# Patient Record
Sex: MALE | Race: BLACK OR AFRICAN AMERICAN | Employment: OTHER | URBAN - METROPOLITAN AREA
[De-identification: names, ages, dates, MRNs, and addresses within clinical notes are randomized per-mention and may not be internally consistent; named-entity substitution may affect disease eponyms.]

---

## 2017-01-18 RX ORDER — CYCLOBENZAPRINE HCL 5 MG
10 TABLET ORAL
Status: ON HOLD | COMMUNITY
End: 2019-08-01

## 2017-01-18 RX ORDER — LOSARTAN POTASSIUM 25 MG/1
25 TABLET ORAL DAILY
COMMUNITY
End: 2020-01-08 | Stop reason: CLARIF

## 2017-01-18 RX ORDER — METOPROLOL TARTRATE 25 MG/1
50 TABLET, FILM COATED ORAL 2 TIMES DAILY
Status: ON HOLD | COMMUNITY
End: 2020-01-30 | Stop reason: SDUPTHER

## 2017-01-18 RX ORDER — ASPIRIN 81 MG/1
81 TABLET ORAL DAILY
COMMUNITY
End: 2020-01-04

## 2017-01-19 ENCOUNTER — HOSPITAL ENCOUNTER (OUTPATIENT)
Dept: CARDIAC CATH/INVASIVE PROCEDURES | Age: 77
Discharge: HOME | End: 2017-01-19
Attending: INTERNAL MEDICINE | Admitting: INTERNAL MEDICINE
Payer: MEDICARE

## 2017-01-19 VITALS
BODY MASS INDEX: 23.19 KG/M2 | HEIGHT: 73 IN | TEMPERATURE: 97.8 F | SYSTOLIC BLOOD PRESSURE: 128 MMHG | OXYGEN SATURATION: 100 % | HEART RATE: 59 BPM | DIASTOLIC BLOOD PRESSURE: 72 MMHG | WEIGHT: 175 LBS | RESPIRATION RATE: 18 BRPM

## 2017-01-19 LAB
ANION GAP BLD CALC-SCNC: 9 MMOL/L (ref 7–16)
ATRIAL RATE: 68 BPM
BUN SERPL-MCNC: 23 MG/DL (ref 8–23)
CALCIUM SERPL-MCNC: 8.4 MG/DL (ref 8.3–10.4)
CALCULATED P AXIS, ECG09: 83 DEGREES
CALCULATED R AXIS, ECG10: -65 DEGREES
CALCULATED T AXIS, ECG11: 71 DEGREES
CHLORIDE SERPL-SCNC: 105 MMOL/L (ref 98–107)
CO2 SERPL-SCNC: 25 MMOL/L (ref 21–32)
CREAT SERPL-MCNC: 1.72 MG/DL (ref 0.8–1.5)
DIAGNOSIS, 93000: NORMAL
DIASTOLIC BP, ECG02: NORMAL MMHG
ERYTHROCYTE [DISTWIDTH] IN BLOOD BY AUTOMATED COUNT: 13.5 % (ref 11.9–14.6)
GLUCOSE SERPL-MCNC: 257 MG/DL (ref 65–100)
HCT VFR BLD AUTO: 40.9 % (ref 41.1–50.3)
HGB BLD-MCNC: 14.3 G/DL (ref 13.6–17.2)
INR PPP: 1 (ref 0.9–1.2)
MCH RBC QN AUTO: 27.9 PG (ref 26.1–32.9)
MCHC RBC AUTO-ENTMCNC: 35 G/DL (ref 31.4–35)
MCV RBC AUTO: 79.9 FL (ref 79.6–97.8)
P-R INTERVAL, ECG05: 166 MS
PLATELET # BLD AUTO: 191 K/UL (ref 150–450)
PMV BLD AUTO: 9.2 FL (ref 10.8–14.1)
POTASSIUM SERPL-SCNC: 4.4 MMOL/L (ref 3.5–5.1)
PROTHROMBIN TIME: 10.7 SEC (ref 9.6–12)
Q-T INTERVAL, ECG07: 414 MS
QRS DURATION, ECG06: 118 MS
QTC CALCULATION (BEZET), ECG08: 440 MS
RBC # BLD AUTO: 5.12 M/UL (ref 4.23–5.67)
SODIUM SERPL-SCNC: 139 MMOL/L (ref 136–145)
SYSTOLIC BP, ECG01: NORMAL MMHG
VENTRICULAR RATE, ECG03: 68 BPM
WBC # BLD AUTO: 4.3 K/UL (ref 4.3–11.1)

## 2017-01-19 PROCEDURE — 93005 ELECTROCARDIOGRAM TRACING: CPT | Performed by: INTERNAL MEDICINE

## 2017-01-19 PROCEDURE — 74011636320 HC RX REV CODE- 636/320: Performed by: INTERNAL MEDICINE

## 2017-01-19 PROCEDURE — 77030004558 HC CATH ANGI DX SUPR TORQ CARD -A

## 2017-01-19 PROCEDURE — 74011250636 HC RX REV CODE- 250/636

## 2017-01-19 PROCEDURE — 80048 BASIC METABOLIC PNL TOTAL CA: CPT | Performed by: INTERNAL MEDICINE

## 2017-01-19 PROCEDURE — 77030004559 HC CATH ANGI DX SUPT CARD -B

## 2017-01-19 PROCEDURE — 74011250637 HC RX REV CODE- 250/637: Performed by: INTERNAL MEDICINE

## 2017-01-19 PROCEDURE — 93458 L HRT ARTERY/VENTRICLE ANGIO: CPT

## 2017-01-19 PROCEDURE — 74011000250 HC RX REV CODE- 250: Performed by: INTERNAL MEDICINE

## 2017-01-19 PROCEDURE — C1894 INTRO/SHEATH, NON-LASER: HCPCS

## 2017-01-19 PROCEDURE — 85027 COMPLETE CBC AUTOMATED: CPT | Performed by: INTERNAL MEDICINE

## 2017-01-19 PROCEDURE — 99152 MOD SED SAME PHYS/QHP 5/>YRS: CPT

## 2017-01-19 PROCEDURE — 74011250636 HC RX REV CODE- 250/636: Performed by: INTERNAL MEDICINE

## 2017-01-19 PROCEDURE — 99153 MOD SED SAME PHYS/QHP EA: CPT

## 2017-01-19 PROCEDURE — 85610 PROTHROMBIN TIME: CPT | Performed by: INTERNAL MEDICINE

## 2017-01-19 RX ORDER — SODIUM CHLORIDE 0.9 % (FLUSH) 0.9 %
5-10 SYRINGE (ML) INJECTION EVERY 8 HOURS
Status: DISCONTINUED | OUTPATIENT
Start: 2017-01-19 | End: 2017-01-19 | Stop reason: HOSPADM

## 2017-01-19 RX ORDER — SODIUM CHLORIDE 9 MG/ML
75 INJECTION, SOLUTION INTRAVENOUS CONTINUOUS
Status: DISCONTINUED | OUTPATIENT
Start: 2017-01-19 | End: 2017-01-19 | Stop reason: HOSPADM

## 2017-01-19 RX ORDER — ACETAMINOPHEN 325 MG/1
650 TABLET ORAL
Status: DISCONTINUED | OUTPATIENT
Start: 2017-01-19 | End: 2017-01-19 | Stop reason: HOSPADM

## 2017-01-19 RX ORDER — CLOPIDOGREL BISULFATE 75 MG/1
75 TABLET ORAL ONCE
Status: COMPLETED | OUTPATIENT
Start: 2017-01-19 | End: 2017-01-19

## 2017-01-19 RX ORDER — HEPARIN SODIUM 200 [USP'U]/100ML
3 INJECTION, SOLUTION INTRAVENOUS CONTINUOUS
Status: DISCONTINUED | OUTPATIENT
Start: 2017-01-19 | End: 2017-01-19

## 2017-01-19 RX ORDER — GUAIFENESIN 100 MG/5ML
81 LIQUID (ML) ORAL DAILY
Status: DISCONTINUED | OUTPATIENT
Start: 2017-01-19 | End: 2017-01-19 | Stop reason: HOSPADM

## 2017-01-19 RX ORDER — LIDOCAINE HYDROCHLORIDE 20 MG/ML
1-20 INJECTION, SOLUTION INFILTRATION; PERINEURAL
Status: DISCONTINUED | OUTPATIENT
Start: 2017-01-19 | End: 2017-01-19

## 2017-01-19 RX ORDER — NITROGLYCERIN 0.4 MG/1
0.4 TABLET SUBLINGUAL
Status: DISCONTINUED | OUTPATIENT
Start: 2017-01-19 | End: 2017-01-19 | Stop reason: HOSPADM

## 2017-01-19 RX ORDER — DIAZEPAM 5 MG/1
5 TABLET ORAL ONCE
Status: DISCONTINUED | OUTPATIENT
Start: 2017-01-19 | End: 2017-01-19 | Stop reason: HOSPADM

## 2017-01-19 RX ORDER — FENTANYL CITRATE 50 UG/ML
25-100 INJECTION, SOLUTION INTRAMUSCULAR; INTRAVENOUS
Status: DISCONTINUED | OUTPATIENT
Start: 2017-01-19 | End: 2017-01-19

## 2017-01-19 RX ORDER — LORAZEPAM 1 MG/1
1 TABLET ORAL
Status: DISCONTINUED | OUTPATIENT
Start: 2017-01-19 | End: 2017-01-19 | Stop reason: HOSPADM

## 2017-01-19 RX ORDER — GUAIFENESIN 100 MG/5ML
81-324 LIQUID (ML) ORAL
Status: COMPLETED | OUTPATIENT
Start: 2017-01-19 | End: 2017-01-19

## 2017-01-19 RX ORDER — SODIUM CHLORIDE 0.9 % (FLUSH) 0.9 %
5-10 SYRINGE (ML) INJECTION AS NEEDED
Status: DISCONTINUED | OUTPATIENT
Start: 2017-01-19 | End: 2017-01-19 | Stop reason: HOSPADM

## 2017-01-19 RX ORDER — MIDAZOLAM HYDROCHLORIDE 1 MG/ML
.5-5 INJECTION, SOLUTION INTRAMUSCULAR; INTRAVENOUS
Status: DISCONTINUED | OUTPATIENT
Start: 2017-01-19 | End: 2017-01-19

## 2017-01-19 RX ORDER — LABETALOL HYDROCHLORIDE 5 MG/ML
10 INJECTION, SOLUTION INTRAVENOUS ONCE
Status: COMPLETED | OUTPATIENT
Start: 2017-01-19 | End: 2017-01-19

## 2017-01-19 RX ADMIN — SODIUM CHLORIDE 75 ML/HR: 900 INJECTION, SOLUTION INTRAVENOUS at 08:00

## 2017-01-19 RX ADMIN — ASPIRIN 81 MG CHEWABLE TABLET 324 MG: 81 TABLET CHEWABLE at 07:45

## 2017-01-19 RX ADMIN — LABETALOL HYDROCHLORIDE 10 MG: 5 INJECTION, SOLUTION INTRAVENOUS at 10:29

## 2017-01-19 RX ADMIN — LIDOCAINE HYDROCHLORIDE 200 MG: 20 INJECTION, SOLUTION INFILTRATION; PERINEURAL at 10:05

## 2017-01-19 RX ADMIN — CLOPIDOGREL BISULFATE 75 MG: 75 TABLET, FILM COATED ORAL at 08:20

## 2017-01-19 RX ADMIN — FENTANYL CITRATE 25 MCG: 50 INJECTION, SOLUTION INTRAMUSCULAR; INTRAVENOUS at 10:04

## 2017-01-19 RX ADMIN — IOVERSOL 100 ML: 741 INJECTION INTRA-ARTERIAL; INTRAVENOUS at 10:31

## 2017-01-19 RX ADMIN — MIDAZOLAM HYDROCHLORIDE 1 MG: 1 INJECTION, SOLUTION INTRAMUSCULAR; INTRAVENOUS at 10:04

## 2017-01-19 RX ADMIN — HEPARIN SODIUM 3 ML/HR: 200 INJECTION, SOLUTION INTRAVENOUS at 10:02

## 2017-01-19 NOTE — PROGRESS NOTES
Patient took Aspirin 81mg x 4 today at [place] [address] prior to arrival. Patient took Plavix 75mg x 1 at [facility]

## 2017-01-19 NOTE — PROGRESS NOTES
Patient received to [address] [place] [address] room # 1  Ambulatory from [facility]. Patient scheduled for C today with Dr Tano Villa. Procedure reviewed & questions answered, voiced good understanding consent obtained & placed on chart. All medications and medical history reviewed. Will prep patient per orders. Patient & family updated on plan of care.

## 2017-01-19 NOTE — PROGRESS NOTES
Patient pre-assessment complete for [facility] poss with DR Herrera President scheduled for 17 at 9:30, arrival time 7:30am. Patient verified using . Patient instructed to bring all home medications in labeled bottles on the day of procedure. NPO status reinforced. Patient informed to take a full dose aspirin 325mg  or 81 mg x 4 on the day of procedure. Patient instructed to take 1/2 insulin tonight & hold insulin tomorrow. Instructed they can take all other medications excluding vitamins & supplements. Patient verbalizes understanding of all instructions & denies any questions at this time.

## 2017-01-19 NOTE — IP AVS SNAPSHOT
Soledad Weldon 
 
 
 [address] [facility] [address] [facility] 
[phone] Patient: [facility] MRN: [id number] WTD:7/19/2738 Discharge Summary 1/19/2017 [facility] MRN[de-identified]  [id number] Admission Information Provider Pager Service Admission Date Expected D/C Date Karen Kaiser MD  CARDIAC CATH LAB 1/19/2017 Actual LOS Patient Class 0 days OUTPATIENT Follow-up Information Follow up With Details Comments Contact Info Karen Kaiser MD On 2/2/2017 @ 1:30 pm [address] [place] [address] 
[phone] Current Discharge Medication List  
  
ASK your doctor about these medications Dose & Instructions Dispensing Information Comments Morning Noon Evening Bedtime  
 aspirin delayed-release 81 mg tablet Your next dose is: Today, Tomorrow Other:  _________ Dose:  81 mg Take 81 mg by mouth daily. Refills:  0  
     
   
   
   
  
 ATIVAN 1 mg tablet Generic drug:  LORazepam  
   
Your next dose is: Today, Tomorrow Other:  _________ Dose:  1 mg Take 1 mg by mouth every eight (8) hours as needed. Refills:  0  
     
   
   
   
  
 cyclobenzaprine 5 mg tablet Commonly known as:  FLEXERIL Your next dose is: Today, Tomorrow Other:  _________ Dose:  10 mg Take 10 mg by mouth three (3) times daily as needed for Muscle Spasm(s). Refills:  0 INSULIN REGULAR HUMAN SC Your next dose is: Today, Tomorrow Other:  _________ Dose:  5 Units 5 Units by SubCUTAneous route three (3) times daily. As needed based on BGL Refills:  0  
     
   
   
   
  
 LANTUS 100 unit/mL injection Generic drug:  insulin glargine Your next dose is: Today, Tomorrow Other:  _________ Dose:  10 Units 10 Units nightly. Refills:  1 LIPITOR 10 mg tablet Generic drug:  atorvastatin Your next dose is: Today, Tomorrow Other:  _________ Dose:  10 mg Take 10 mg by mouth daily. Refills:  0  
     
   
   
   
  
 lisinopril 5 mg tablet Commonly known as:  Mollie Ripa Your next dose is: Today, Tomorrow Other:  _________ Dose:  5 mg  
take 5 mg by mouth daily. Refills:  0  
     
   
   
   
  
 losartan 25 mg tablet Commonly known as:  COZAAR Your next dose is: Today, Tomorrow Other:  _________ Dose:  25 mg Take 25 mg by mouth daily. Refills:  0  
     
   
   
   
  
 metoprolol tartrate 25 mg tablet Commonly known as:  LOPRESSOR Your next dose is: Today, Tomorrow Other:  _________ Dose:  25 mg Take 25 mg by mouth two (2) times a day. Refills:  0 PLAVIX PO Your next dose is: Today, Tomorrow Other:  _________ Dose:  75 mg  
take 75 mg by mouth daily. Refills:  0 WELLBUTRIN PO Your next dose is: Today, Tomorrow Other:  _________ Dose:  150 mg  
take 150 mg by mouth two (2) times a day. Refills:  0 General Information Please provide this summary of care documentation to your next provider. Allergies No Known Allergies Current Immunizations  Reviewed on 4/29/2015 No immunizations on file. Discharge Instructions Discharge Instructions Cardiac Catheterization/Angiography Discharge Instructions *Check the puncture site frequently for swelling or bleeding. If you see any bleeding, lie down and apply pressure over the area with a clean town or washcloth. Notify your doctor for any redness, swelling, drainage or oozing from the puncture site. Notify your doctor for any fever or chills. *If the leg or arm with the puncture becomes cold, numb or painful, call Dr Abdi Scruggs at  [phone] *Activity should be limited for the next 48 hours. Climb stairs as little as possible and avoid any stooping, bending or strenuous activity for 48 hours. No heavy lifting (anything over 10 pounds) for three days. *Do not drive for 48 hours. *You may resume your usual diet. Drink more fluids than usual. 
 
*Have a responsible person drive you home and stay with you for at least 24 hours after your heart catheterization/angiography. *You may remove the bandage from your Right and Groin in 24 hours. You may shower in 24 hours. No tub baths, hot tubs or swimming for one week. Do not place any lotions, creams, powders, ointments over the puncture site for one week. You may place a clean band-aid over the puncture site each day for 5 days. Change this daily. Discharge Orders None  
  
` Patient Signature:  ____________________________________________________________ Date:  ____________________________________________________________  
  
 Earnstine Tim Provider Signature:  ____________________________________________________________ Date:  ____________________________________________________________

## 2017-01-19 NOTE — PROGRESS NOTES
Pt chart reviewed for pending LHC with Dr Madeleine De Dios. Confirmed signed pt consent present on chart. Pt received ASA 81mg x4 today at 0745.  Pt currently denies cp

## 2017-01-19 NOTE — PROGRESS NOTES
TRANSFER - IN REPORT:    Verbal report received from [facility] on Monroe June Payton Dandy  being received from CCL(unit) for routine post - op      Report consisted of patients Situation, Background, Assessment and   Recommendations(SBAR). Information from the following report(s) Procedure Summary and MAR was reviewed with the receiving nurse. Opportunity for questions and clarification was provided. Assessment completed upon patients arrival to unit and care assumed.

## 2017-01-19 NOTE — ROUTINE PROCESS
TRANSFER - OUT REPORT:    Verbal report given to RN (name) on Monroe June Carissa Barahona  being transferred to [address] [place] [address] ([facility]) for routine progression of care       Report consisted of patients Situation, Background, Assessment and   Recommendations(SBAR). Information from the following report(s) Procedure Summary, MAR and Recent Results was reviewed with the receiving nurse. Lines:   Peripheral IV 01/19/17 Right Forearm (Active)       Peripheral IV 01/19/17 Left Antecubital (Active)        Opportunity for questions and clarification was provided.       Patient transported with:   [facility] w/ Dr Smiley Hewitt  6F sheath to [facility]  Versed 1 mg IV  Fentanyl 25 mcg IV  Labetolol 10 mg IV

## 2017-01-19 NOTE — PROCEDURES
Kendrick Coleman 46       Name:  Almas Sauceda   MR#:  [id number]   :  1940   Account #:  [de-identified]   Date of Adm:  2017       INDICATION: Referred for cardiac catheterization for positive   stress test, recurrent anginal pain. POST CATHETERIZATION DIAGNOSES: The left main was normal. The   left main bifurcated into left anterior descending and   circumflex artery. The left anterior descending has mild to   moderate disease in the distal segment. diagonal 1   and diagonal 2 branches showed some mild the disease. The   circumflex artery showed some luminal irregularities, about 30   40% disease. Right coronary artery shows moderate disease in the   mid segment, 45-50% ,also has 45-50% disease in the LAD and a   lot of calcium in the proximal mid LAD. LV gram showed global   hypokinesia. The EF is about 45%. There is hypokinesis inferior   wall. Left ventricular end-diastolic pressure was 12. There was   no gradient across the aortic valve. PROCEDURE IN DETAIL: The patient was brought to the cath lab, IV   line placed. Using Seldinger technique, a number 6-Iraqi   arterial sheath was introduced. Using 6-Iraqi left [place],   left coronary angiography was performed. Using JR4 catheter,   right coronary angiography was performed. Using angled pigtail   catheter, left ventriculography was performed. The patient   tolerated the procedure well. CONCLUSIONS: Left anterior descending artery has calcification   in the proximal mid segment showed luminal irregularities, about   25-30%, mid LAD has about 35-40% disease, diagonal 1, diagonal 2   has 25% to 30% disease. The circumflex artery has 25 to 30%   disease. Nothing obstructive in the left . The right coronary   artery has moderate disease about 45-50% in the mid segment,   long tubular disease from the mid segment of the right coronary   artery.  Global left ventricular ejection fraction 45-50%, hypokinesis inferior wall and anteroapical wall. We discussed   the treatment options with the patient about stent versus   medical management. The patient is preferred medical management   since he is tolerating medications and I discussed with Dr. Viktor Umanzor, agree with the plan to give him medical management. If his symptoms become more frequent, then we will proceed with   the stent in the right coronary at this point. PLAN: We are going to treat aggressively with medications. If he   gets frequent angina despite adequate medical management, then   he will undergo stent placement right coronary artery.         MD MARCUS Laureano / Cynthia Marroquin   D:  01/19/2017   10:43   T:  01/19/2017   11:08   Job #:  [id number]

## 2017-01-19 NOTE — PROGRESS NOTES
Patient received into lab for left heart cath. Patient was identified using name and date of birth. Pertinent information was reviewed including allergies, history, medications and lab work. Patient states that he has no questions concerning procedure. Signed consent is on chart as well current history and physical. IV access was checked for patency. ]

## 2017-01-19 NOTE — PROCEDURES
Brief Cardiac Procedure Note    Patient: [facility] MRN: [id number]  SSN: [id number]    YOB: 1940  Age: 78 y.o. Sex: male      Date of Procedure: 1/19/2017     Pre-procedure Diagnosis: Coronary Artery Disease    Post-procedure Diagnosis: Coronary Artery Disease    Procedure: Left Heart Catheterization    Brief Description of Procedure: left heart cath    Performed By: Waylon Muniz MD     Assistants: see chart    Anesthesia: Moderate Sedation    Estimated Blood Loss: Less than 10 mL      Specimens: None    Implants: None    Findings: morderate disease rca mid see report dictated    Complications: None    Recommendations: Continue medical therapy.     Signed By: Waylon Muniz MD     January 19, 2017

## 2017-01-19 NOTE — DISCHARGE INSTRUCTIONS
Cardiac Catheterization/Angiography Discharge Instructions    *Check the puncture site frequently for swelling or bleeding. If you see any bleeding, lie down and apply pressure over the area with a clean town or washcloth. Notify your doctor for any redness, swelling, drainage or oozing from the puncture site. Notify your doctor for any fever or chills. *If the leg or arm with the puncture becomes cold, numb or painful, call Dr Gladis Wills at  [phone]    *Activity should be limited for the next 48 hours. Climb stairs as little as possible and avoid any stooping, bending or strenuous activity for 48 hours. No heavy lifting (anything over 10 pounds) for three days. *Do not drive for 48 hours. *You may resume your usual diet. Drink more fluids than usual.    *Have a responsible person drive you home and stay with you for at least 24 hours after your heart catheterization/angiography. *You may remove the bandage from your Right and Groin in 24 hours. You may shower in 24 hours. No tub baths, hot tubs or swimming for one week. Do not place any lotions, creams, powders, ointments over the puncture site for one week. You may place a clean band-aid over the puncture site each day for 5 days. Change this daily.

## 2017-01-19 NOTE — PROGRESS NOTES
Discharge instructions reviewed with patient including post cath care. INT removed with cath intact. Right groin with no bleeding or hematoma noted.

## 2017-01-19 NOTE — PROGRESS NOTES
6 fr Right groin sheath removed using manual pressure for 25 minutes. Patient tolerated procedure well. No bleeding or hematoma noted. Gauze and tegaderm placed with sand bag in tact. Patient instructed on strict bedrest and limitations with right leg.

## 2019-06-19 ENCOUNTER — HOSPITAL ENCOUNTER (EMERGENCY)
Age: 79
Discharge: HOME | End: 2019-06-19
Attending: EMERGENCY MEDICINE
Payer: MEDICARE

## 2019-06-19 VITALS
RESPIRATION RATE: 18 BRPM | TEMPERATURE: 97.6 F | WEIGHT: 167 LBS | BODY MASS INDEX: 22.13 KG/M2 | HEIGHT: 73 IN | DIASTOLIC BLOOD PRESSURE: 68 MMHG | SYSTOLIC BLOOD PRESSURE: 156 MMHG | OXYGEN SATURATION: 98 % | HEART RATE: 55 BPM

## 2019-06-19 DIAGNOSIS — E11.649 DIABETIC HYPOGLYCEMIA (HCC): Primary | ICD-10-CM

## 2019-06-19 LAB
ALBUMIN SERPL-MCNC: 2.8 G/DL (ref 3.2–4.6)
ALBUMIN/GLOB SERPL: 0.8 {RATIO} (ref 1.2–3.5)
ALP SERPL-CCNC: 236 U/L (ref 50–136)
ALT SERPL-CCNC: 83 U/L (ref 12–65)
ANION GAP SERPL CALC-SCNC: 9 MMOL/L (ref 7–16)
AST SERPL-CCNC: 77 U/L (ref 15–37)
BACTERIA URNS QL MICRO: 0 /HPF
BASOPHILS # BLD: 0.1 K/UL (ref 0–0.2)
BASOPHILS NFR BLD: 1 % (ref 0–2)
BILIRUB SERPL-MCNC: 0.3 MG/DL (ref 0.2–1.1)
BUN SERPL-MCNC: 21 MG/DL (ref 8–23)
CALCIUM SERPL-MCNC: 7.4 MG/DL (ref 8.3–10.4)
CASTS URNS QL MICRO: 0 /LPF
CHLORIDE SERPL-SCNC: 109 MMOL/L (ref 98–107)
CO2 SERPL-SCNC: 23 MMOL/L (ref 21–32)
CREAT SERPL-MCNC: 1.39 MG/DL (ref 0.8–1.5)
DIFFERENTIAL METHOD BLD: ABNORMAL
EOSINOPHIL # BLD: 0.1 K/UL (ref 0–0.8)
EOSINOPHIL NFR BLD: 2 % (ref 0.5–7.8)
EPI CELLS #/AREA URNS HPF: 0 /HPF
ERYTHROCYTE [DISTWIDTH] IN BLOOD BY AUTOMATED COUNT: 13.9 % (ref 11.9–14.6)
GLOBULIN SER CALC-MCNC: 3.5 G/DL (ref 2.3–3.5)
GLUCOSE BLD STRIP.AUTO-MCNC: 205 MG/DL (ref 65–100)
GLUCOSE BLD STRIP.AUTO-MCNC: 92 MG/DL (ref 65–100)
GLUCOSE SERPL-MCNC: 67 MG/DL (ref 65–100)
HCT VFR BLD AUTO: 41.6 % (ref 41.1–50.3)
HGB BLD-MCNC: 14.1 G/DL (ref 13.6–17.2)
IMM GRANULOCYTES # BLD AUTO: 0 K/UL (ref 0–0.5)
IMM GRANULOCYTES NFR BLD AUTO: 1 % (ref 0–5)
LYMPHOCYTES # BLD: 2.2 K/UL (ref 0.5–4.6)
LYMPHOCYTES NFR BLD: 36 % (ref 13–44)
MCH RBC QN AUTO: 28.6 PG (ref 26.1–32.9)
MCHC RBC AUTO-ENTMCNC: 33.9 G/DL (ref 31.4–35)
MCV RBC AUTO: 84.4 FL (ref 79.6–97.8)
MONOCYTES # BLD: 0.4 K/UL (ref 0.1–1.3)
MONOCYTES NFR BLD: 7 % (ref 4–12)
NEUTS SEG # BLD: 3.3 K/UL (ref 1.7–8.2)
NEUTS SEG NFR BLD: 54 % (ref 43–78)
NRBC # BLD: 0 K/UL (ref 0–0.2)
PLATELET # BLD AUTO: 200 K/UL (ref 150–450)
PMV BLD AUTO: 8.9 FL (ref 9.4–12.3)
POTASSIUM SERPL-SCNC: 4 MMOL/L (ref 3.5–5.1)
PROT SERPL-MCNC: 6.3 G/DL (ref 6.3–8.2)
RBC # BLD AUTO: 4.93 M/UL (ref 4.23–5.6)
RBC #/AREA URNS HPF: 0 /HPF
SODIUM SERPL-SCNC: 141 MMOL/L (ref 136–145)
WBC # BLD AUTO: 6.1 K/UL (ref 4.3–11.1)
WBC URNS QL MICRO: NORMAL /HPF

## 2019-06-19 PROCEDURE — 82962 GLUCOSE BLOOD TEST: CPT

## 2019-06-19 PROCEDURE — 80053 COMPREHEN METABOLIC PANEL: CPT

## 2019-06-19 PROCEDURE — 81001 URINALYSIS AUTO W/SCOPE: CPT

## 2019-06-19 PROCEDURE — 85025 COMPLETE CBC W/AUTO DIFF WBC: CPT

## 2019-06-19 PROCEDURE — 99284 EMERGENCY DEPT VISIT MOD MDM: CPT | Performed by: EMERGENCY MEDICINE

## 2019-06-19 PROCEDURE — 81003 URINALYSIS AUTO W/O SCOPE: CPT | Performed by: EMERGENCY MEDICINE

## 2019-06-19 NOTE — ED TRIAGE NOTES
Pt states he was at PCP office about 3 hours ago and thinks his sugar dropped. When asked why he thinks his sugar dropped he states that his mind started to drift. Pt's sister says that he became very confusion. PCP office did not check blood sugar they called his sister and told her to bring him to ER.

## 2019-06-19 NOTE — ED PROVIDER NOTES
Patient presents to the ER for an episode of confusion, altered mental status. Patient was at his primary care physician we became somewhat more confused. Patient reports he felt as if his blood sugar was dropping. He was sent to the ER, reports he did eat some crackers and drink some juice on the way here. Initial blood sugar was noted be 98. Reports he feels at his normal baseline currently. Denies any preceding illnesses, chest pain, vomiting or diaphoresis    The history is provided by the patient. Altered mental status    This is a new problem. The current episode started 1 to 2 hours ago. Associated symptoms include confusion. Pertinent negatives include no weakness, no agitation, no self-injury, no tingling and no numbness. Past Medical History:   Diagnosis Date    COPD     Diabetes ([facility] [address]     HTN (hypertension) 4/29/2015    Other ill-defined conditions(799.89)     cholesterol       Past Surgical History:   Procedure Laterality Date    ABDOMEN SURGERY PROC UNLISTED  5/2015    explor lap    HX ORTHOPAEDIC      bilateral shoulder repairs, both knees repaired-no hardware         History reviewed. No pertinent family history.     Social History     Socioeconomic History    Marital status: SINGLE     Spouse name: Not on file    Number of children: Not on file    Years of education: Not on file    Highest education level: Not on file   Occupational History    Not on file   Social Needs    Financial resource strain: Not on file    Food insecurity:     Worry: Not on file     Inability: Not on file    Transportation needs:     Medical: Not on file     Non-medical: Not on file   Tobacco Use    Smoking status: Current Every Day Smoker     Packs/day: 1.00   Substance and Sexual Activity    Alcohol use: No    Drug use: No    Sexual activity: Not on file   Lifestyle    Physical activity:     Days per week: Not on file     Minutes per session: Not on file    Stress: Not on file Relationships    Social connections:     Talks on phone: Not on file     Gets together: Not on file     Attends Judaism service: Not on file     Active member of club or organization: Not on file     Attends meetings of clubs or organizations: Not on file     Relationship status: Not on file    Intimate partner violence:     Fear of current or ex partner: Not on file     Emotionally abused: Not on file     Physically abused: Not on file     Forced sexual activity: Not on file   Other Topics Concern    Not on file   Social History Narrative    Not on file         ALLERGIES: Patient has no known allergies. Review of Systems   Constitutional: Negative for fever. HENT: Negative for congestion and dental problem. Eyes: Negative for photophobia and visual disturbance. Respiratory: Negative for chest tightness. Cardiovascular: Negative for palpitations and leg swelling. Gastrointestinal: Negative for abdominal pain and nausea. Endocrine: Negative for polyuria. Genitourinary: Negative for flank pain, frequency and urgency. Musculoskeletal: Negative for back pain, neck pain and neck stiffness. Skin: Negative for color change and pallor. Neurological: Negative for tingling, weakness, light-headedness and numbness. Hematological: Negative for adenopathy. Does not bruise/bleed easily. Psychiatric/Behavioral: Positive for confusion. Negative for agitation and self-injury. All other systems reviewed and are negative. Vitals:    06/19/19 1823   BP: 137/63   Pulse: (!) 55   Resp: 17   Temp: 97.5 °F (36.4 °C)   SpO2: 100%   Weight: 75.8 kg (167 lb)   Height: 6' 1\" (1.854 m)            Physical Exam   Constitutional: He is oriented to person, place, and time. He appears well-developed and well-nourished. HENT:   Head: Normocephalic and atraumatic. Cardiovascular: Normal rate and regular rhythm. Exam reveals no friction rub. No murmur heard.   Pulmonary/Chest: Effort normal and breath sounds normal. No stridor. No respiratory distress. Abdominal: Soft. Bowel sounds are normal. He exhibits no distension. There is no tenderness. Musculoskeletal: Normal range of motion. He exhibits no edema or deformity. Neurological: He is alert and oriented to person, place, and time. No cranial nerve deficit. Coordination normal.   Nursing note and vitals reviewed. MDM  Number of Diagnoses or Management Options  Diagnosis management comments: Will continue to monitor blood sugars. Patient well-appearing here  Currently    9:12 PM  Blood sugar has improved at 205. Patient feels better. At his baseline. Able to stand and ambulate without complication. We'll discharge home. Amount and/or Complexity of Data Reviewed  Clinical lab tests: ordered and reviewed    Risk of Complications, Morbidity, and/or Mortality  Presenting problems: moderate  Diagnostic procedures: low  Management options: low    Patient Progress  Patient progress: stable         Procedures               Results Include:    Recent Results (from the past 24 hour(s))   GLUCOSE, POC    Collection Time: 06/19/19  6:28 PM   Result Value Ref Range    Glucose (POC) 92 65 - 100 mg/dL   CBC WITH AUTOMATED DIFF    Collection Time: 06/19/19  6:30 PM   Result Value Ref Range    WBC 6.1 4.3 - 11.1 K/uL    RBC 4.93 4.23 - 5.6 M/uL    HGB 14.1 13.6 - 17.2 g/dL    HCT 41.6 41.1 - 50.3 %    MCV 84.4 79.6 - 97.8 FL    MCH 28.6 26.1 - 32.9 PG    MCHC 33.9 31.4 - 35.0 g/dL    RDW 13.9 11.9 - 14.6 %    PLATELET 322 228 - 972 K/uL    MPV 8.9 (L) 9.4 - 12.3 FL    ABSOLUTE NRBC 0.00 0.0 - 0.2 K/uL    DF AUTOMATED      NEUTROPHILS 54 43 - 78 %    LYMPHOCYTES 36 13 - 44 %    MONOCYTES 7 4.0 - 12.0 %    EOSINOPHILS 2 0.5 - 7.8 %    BASOPHILS 1 0.0 - 2.0 %    IMMATURE GRANULOCYTES 1 0.0 - 5.0 %    ABS. NEUTROPHILS 3.3 1.7 - 8.2 K/UL    ABS. LYMPHOCYTES 2.2 0.5 - 4.6 K/UL    ABS. MONOCYTES 0.4 0.1 - 1.3 K/UL    ABS. EOSINOPHILS 0.1 0.0 - 0.8 K/UL    ABS. BASOPHILS 0.1 0.0 - 0.2 K/UL    ABS. IMM. GRANS. 0.0 0.0 - 0.5 K/UL   METABOLIC PANEL, COMPREHENSIVE    Collection Time: 06/19/19  6:30 PM   Result Value Ref Range    Sodium 141 136 - 145 mmol/L    Potassium 4.0 3.5 - 5.1 mmol/L    Chloride 109 (H) 98 - 107 mmol/L    CO2 23 21 - 32 mmol/L    Anion gap 9 7 - 16 mmol/L    Glucose 67 65 - 100 mg/dL    BUN 21 8 - 23 MG/DL    Creatinine 1.39 0.8 - 1.5 MG/DL    GFR est AA >60 >60 ml/min/1.73m2    GFR est non-AA 53 (L) >60 ml/min/1.73m2    Calcium 7.4 (L) 8.3 - 10.4 MG/DL    Bilirubin, total 0.3 0.2 - 1.1 MG/DL    ALT (SGPT) 83 (H) 12 - 65 U/L    AST (SGOT) 77 (H) 15 - 37 U/L    Alk. phosphatase 236 (H) 50 - 136 U/L    Protein, total 6.3 6.3 - 8.2 g/dL    Albumin 2.8 (L) 3.2 - 4.6 g/dL    Globulin 3.5 2.3 - 3.5 g/dL    A-G Ratio 0.8 (L) 1.2 - 3.5     URINE MICROSCOPIC    Collection Time: 06/19/19  7:53 PM   Result Value Ref Range    WBC 0-3 0 /hpf    RBC 0 0 /hpf    Epithelial cells 0 0 /hpf    Bacteria 0 0 /hpf    Casts 0 0 /lpf   GLUCOSE, POC    Collection Time: 06/19/19  8:39 PM   Result Value Ref Range    Glucose (POC) 205 (H) 65 - 100 mg/dL     Voice dictation software was used during the making of this note. This software is not perfect and grammatical and other typographical errors may be present. This note has been proofread, but may still contain errors.   Micaela Li MD; 6/19/2019 @9:12 PM   ===================================================================

## 2019-06-20 NOTE — DISCHARGE INSTRUCTIONS
Monitor your blood sugars at home  Follow up with your primary care physician  Return to the ER for any new or worsening symptoms    Diabetes Blood Sugar Emergencies: Your Action Plan  How can you prevent a blood sugar emergency? An important part of living with diabetes is keeping your blood sugar in your target range. You'll need to know what to do if it's too high or too low. Managing your blood sugar levels helps you avoid emergencies. This care sheet will teach you about the signs of high and low blood sugar. It will help you make an action plan with your doctor for when these signs occur. Low blood sugar is more likely to happen if you take certain medicines for diabetes. It can also happen if you skip a meal, drink alcohol, or exercise more than usual.  You may get high blood sugar if you eat differently than you normally do. One example is eating more carbohydrate than usual. Having a cold, the flu, or other sudden illness can also cause high blood sugar levels. Levels can also rise if you miss a dose of medicine. Any change in how you take your medicine may affect your blood sugar level. So it's important to work with your doctor before you make any changes. Check your blood sugar  Work with your doctor to fill in the blank spaces below that apply to you. Track your levels, know your target range, and write down ways you can get your blood sugar back in your target range. A log book can help you track your levels. Take the book to all of your medical appointments. · Check your blood sugar _____ times a day, at these times:________________________________________________. (For example: Before meals, at bedtime, before exercise, during exercise, other.)  · Your blood sugar target range before a meal is ___________________. Your blood sugar target range after a meal is _______________________.   · Do this--___________________________________________________--to get your blood sugar back within your safe range if your blood sugar results are _________________________________________. (For example: Less than 70 or above 250 mg/dL.)  Call your doctor when your blood sugar results are ___________________________________. (For example: Less than 70 or above 250 mg/dL.)  What are the symptoms of low and high blood sugar? Common symptoms of low blood sugar are sweating and feeling shaky, weak, hungry, or confused. Symptoms can start quickly. Common symptoms of high blood sugar are feeling very thirsty or very hungry. You may also pass urine more often than usual. You may have blurry vision and may lose weight without trying. But some people may have high or low blood sugar without having any symptoms. That's a good reason to check your blood sugar on a regular schedule. What should you do if you have symptoms? Work with your doctor to fill in the blank spaces below that apply to you. Low blood sugar  If you have symptoms of low blood sugar, check your blood sugar. If it's below _____ ( for example, below 70), eat or drink a quick-sugar food that has about 15 grams of carbohydrate. Your goal is to get your level back to your safe range. Check your blood sugar again 15 minutes later. If it's still not in your target range, take another 15 grams of carbohydrate and check your blood sugar again in 15 minutes. Repeat this until you reach your target. Then go back to your regular testing schedule. When you have low blood sugar, it's best to stop or reduce any physical activity until your blood sugar is back in your target range and is stable. If you must stay active, eat or drink 30 grams of carbohydrate. Then check your blood sugar again in 15 minutes. If it's not in your target range, take another 30 grams of carbohydrates. Check your blood sugar again in 15 minutes. Keep doing this until you reach your target. You can then go back to your regular testing schedule.   If your symptoms or blood sugar levels are getting worse or have not improved after 15 minutes, seek medical care right away. Here are some examples of quick-sugar foods with 15 grams of carbohydrate:  · 3 or 4 glucose tablets  · 1 tube of glucose gel  · Hard candy (such as 3 Jolly Ranchers or 5 to 7 Life Savers)  · ½ cup to ¾ cup (4 to 6 ounces) of fruit juice or regular (not diet) soda  High blood sugar  If you have symptoms of high blood sugar, check your blood sugar. Your goal is to get your level back to your target range. If it's above ______ ( for example, above 250), follow these steps:  · If you missed a dose of your diabetes medicine, take it now. Take only the amount of medicine that you have been prescribed. Do not take more or less medicine. · Give yourself insulin if your doctor has prescribed it for high blood sugar. · Test for ketones, if the doctor told you to do so. If the results of the ketone test show a moderate-to-large amount of ketones, call the doctor for advice. · Wait 30 minutes after you take the extra insulin or the missed medicine. Check your blood sugar again. If your symptoms or blood sugar levels are getting worse or have not improved after taking these steps, seek medical care right away. Follow-up care is a key part of your treatment and safety. Be sure to make and go to all appointments, and call your doctor if you are having problems. It's also a good idea to know your test results and keep a list of the medicines you take. Where can you learn more? Go to http://anastasia-lexis.info/. Enter R981 in the search box to learn more about \"Diabetes Blood Sugar Emergencies: Your Action Plan. \"  Current as of: July 25, 2018  Content Version: 11.9  © 4590-1205 [employer]. Care instructions adapted under license by [employer] (which disclaims liability or warranty for this information).  If you have questions about a medical condition or this instruction, always ask your healthcare professional. [address] any warranty or liability for your use of this information.

## 2019-06-20 NOTE — ED NOTES
I have reviewed discharge instructions with the patient. The patient verbalized understanding. Patient left ED via Discharge Method: ambulatory to Home with son. Opportunity for questions and clarification provided. Patient given 0 scripts. To continue your aftercare when you leave the hospital, you may receive an automated call from our care team to check in on how you are doing. This is a free service and part of our promise to provide the best care and service to meet your aftercare needs.  If you have questions, or wish to unsubscribe from this service please call [phone]. Thank you for Choosing our [facility].

## 2019-07-31 NOTE — PROGRESS NOTES
Called to pre-assess for [facility] poss with Dr Malcolm Ziegler , Scheduled 8/1/19. No answer & message left.

## 2019-08-01 ENCOUNTER — HOSPITAL ENCOUNTER (OUTPATIENT)
Dept: CARDIAC CATH/INVASIVE PROCEDURES | Age: 79
Discharge: HOME | End: 2019-08-01
Attending: INTERNAL MEDICINE | Admitting: INTERNAL MEDICINE
Payer: MEDICARE

## 2019-08-01 VITALS
HEART RATE: 60 BPM | SYSTOLIC BLOOD PRESSURE: 148 MMHG | OXYGEN SATURATION: 96 % | DIASTOLIC BLOOD PRESSURE: 63 MMHG | WEIGHT: 160 LBS | HEIGHT: 73 IN | RESPIRATION RATE: 21 BRPM | BODY MASS INDEX: 21.2 KG/M2

## 2019-08-01 LAB
ANION GAP SERPL CALC-SCNC: 12 MMOL/L (ref 7–16)
ATRIAL RATE: 62 BPM
BUN BLD-MCNC: 26 MG/DL (ref 8–26)
BUN SERPL-MCNC: 25 MG/DL (ref 8–23)
CA-I BLD-MCNC: 1.19 MMOL/L (ref 1.12–1.32)
CALCIUM SERPL-MCNC: 7.2 MG/DL (ref 8.3–10.4)
CALCULATED P AXIS, ECG09: 48 DEGREES
CALCULATED R AXIS, ECG10: -65 DEGREES
CALCULATED T AXIS, ECG11: 8 DEGREES
CHLORIDE BLD-SCNC: 97 MMOL/L (ref 98–107)
CHLORIDE SERPL-SCNC: 105 MMOL/L (ref 98–107)
CO2 BLD-SCNC: 24 MMOL/L (ref 21–32)
CO2 SERPL-SCNC: 24 MMOL/L (ref 21–32)
CREAT BLD-MCNC: 1.3 MG/DL (ref 0.8–1.5)
CREAT SERPL-MCNC: 1.2 MG/DL (ref 0.8–1.5)
DIAGNOSIS, 93000: NORMAL
ERYTHROCYTE [DISTWIDTH] IN BLOOD BY AUTOMATED COUNT: 12.8 % (ref 11.9–14.6)
GLUCOSE BLD-MCNC: 334 MG/DL (ref 65–100)
GLUCOSE SERPL-MCNC: 296 MG/DL (ref 65–100)
HCT VFR BLD AUTO: 34.7 % (ref 41.1–50.3)
HGB BLD-MCNC: 11.8 G/DL (ref 13.6–17.2)
INR PPP: 1
MCH RBC QN AUTO: 28 PG (ref 26.1–32.9)
MCHC RBC AUTO-ENTMCNC: 34 G/DL (ref 31.4–35)
MCV RBC AUTO: 82.2 FL (ref 79.6–97.8)
NRBC # BLD: 0 K/UL (ref 0–0.2)
P-R INTERVAL, ECG05: 172 MS
PLATELET # BLD AUTO: 177 K/UL (ref 150–450)
PMV BLD AUTO: 9.6 FL (ref 9.4–12.3)
POTASSIUM BLD-SCNC: 4.4 MMOL/L (ref 3.5–5.1)
POTASSIUM SERPL-SCNC: 3.8 MMOL/L (ref 3.5–5.1)
PROTHROMBIN TIME: 13.4 SEC (ref 11.7–14.5)
Q-T INTERVAL, ECG07: 434 MS
QRS DURATION, ECG06: 114 MS
QTC CALCULATION (BEZET), ECG08: 440 MS
RBC # BLD AUTO: 4.22 M/UL (ref 4.23–5.6)
SODIUM BLD-SCNC: 134 MMOL/L (ref 136–145)
SODIUM SERPL-SCNC: 141 MMOL/L (ref 136–145)
VENTRICULAR RATE, ECG03: 62 BPM
WBC # BLD AUTO: 4.4 K/UL (ref 4.3–11.1)

## 2019-08-01 PROCEDURE — 74011250637 HC RX REV CODE- 250/637: Performed by: INTERNAL MEDICINE

## 2019-08-01 PROCEDURE — 77030004558 HC CATH ANGI DX SUPR TORQ CARD -A

## 2019-08-01 PROCEDURE — 74011636320 HC RX REV CODE- 636/320: Performed by: INTERNAL MEDICINE

## 2019-08-01 PROCEDURE — 93454 CORONARY ARTERY ANGIO S&I: CPT

## 2019-08-01 PROCEDURE — 74011250636 HC RX REV CODE- 250/636

## 2019-08-01 PROCEDURE — C1769 GUIDE WIRE: HCPCS

## 2019-08-01 PROCEDURE — 80048 BASIC METABOLIC PNL TOTAL CA: CPT

## 2019-08-01 PROCEDURE — 99152 MOD SED SAME PHYS/QHP 5/>YRS: CPT

## 2019-08-01 PROCEDURE — 74011250636 HC RX REV CODE- 250/636: Performed by: INTERNAL MEDICINE

## 2019-08-01 PROCEDURE — 93005 ELECTROCARDIOGRAM TRACING: CPT | Performed by: INTERNAL MEDICINE

## 2019-08-01 PROCEDURE — 85027 COMPLETE CBC AUTOMATED: CPT

## 2019-08-01 PROCEDURE — C1894 INTRO/SHEATH, NON-LASER: HCPCS

## 2019-08-01 PROCEDURE — 77030004559 HC CATH ANGI DX SUPT CARD -B

## 2019-08-01 PROCEDURE — 80047 BASIC METABLC PNL IONIZED CA: CPT

## 2019-08-01 PROCEDURE — 85610 PROTHROMBIN TIME: CPT

## 2019-08-01 RX ORDER — GUAIFENESIN 100 MG/5ML
81-324 LIQUID (ML) ORAL ONCE
Status: COMPLETED | OUTPATIENT
Start: 2019-08-01 | End: 2019-08-01

## 2019-08-01 RX ORDER — GUAIFENESIN 100 MG/5ML
81 LIQUID (ML) ORAL DAILY
Status: DISCONTINUED | OUTPATIENT
Start: 2019-08-01 | End: 2019-08-01 | Stop reason: HOSPADM

## 2019-08-01 RX ORDER — LANOLIN ALCOHOL/MO/W.PET/CERES
400 CREAM (GRAM) TOPICAL DAILY
COMMUNITY
End: 2020-01-08 | Stop reason: CLARIF

## 2019-08-01 RX ORDER — ACETAMINOPHEN 325 MG/1
650 TABLET ORAL
Status: DISCONTINUED | OUTPATIENT
Start: 2019-08-01 | End: 2019-08-01 | Stop reason: HOSPADM

## 2019-08-01 RX ORDER — LORAZEPAM 1 MG/1
1 TABLET ORAL
Status: DISCONTINUED | OUTPATIENT
Start: 2019-08-01 | End: 2019-08-01 | Stop reason: HOSPADM

## 2019-08-01 RX ORDER — SODIUM CHLORIDE 9 MG/ML
75 INJECTION, SOLUTION INTRAVENOUS CONTINUOUS
Status: DISCONTINUED | OUTPATIENT
Start: 2019-08-01 | End: 2019-08-01 | Stop reason: HOSPADM

## 2019-08-01 RX ORDER — DIAZEPAM 5 MG/1
5 TABLET ORAL ONCE
Status: COMPLETED | OUTPATIENT
Start: 2019-08-01 | End: 2019-08-01

## 2019-08-01 RX ORDER — SODIUM CHLORIDE 0.9 % (FLUSH) 0.9 %
5-40 SYRINGE (ML) INJECTION EVERY 8 HOURS
Status: DISCONTINUED | OUTPATIENT
Start: 2019-08-01 | End: 2019-08-01 | Stop reason: HOSPADM

## 2019-08-01 RX ORDER — SODIUM CHLORIDE 0.9 % (FLUSH) 0.9 %
5-40 SYRINGE (ML) INJECTION AS NEEDED
Status: DISCONTINUED | OUTPATIENT
Start: 2019-08-01 | End: 2019-08-01 | Stop reason: HOSPADM

## 2019-08-01 RX ORDER — FENTANYL CITRATE 50 UG/ML
25-200 INJECTION, SOLUTION INTRAMUSCULAR; INTRAVENOUS
Status: DISCONTINUED | OUTPATIENT
Start: 2019-08-01 | End: 2019-08-01 | Stop reason: HOSPADM

## 2019-08-01 RX ORDER — HEPARIN SODIUM 200 [USP'U]/100ML
3 INJECTION, SOLUTION INTRAVENOUS CONTINUOUS
Status: DISCONTINUED | OUTPATIENT
Start: 2019-08-01 | End: 2019-08-01 | Stop reason: HOSPADM

## 2019-08-01 RX ORDER — NITROGLYCERIN 0.4 MG/1
0.4 TABLET SUBLINGUAL
Status: DISCONTINUED | OUTPATIENT
Start: 2019-08-01 | End: 2019-08-01 | Stop reason: HOSPADM

## 2019-08-01 RX ORDER — LIDOCAINE HYDROCHLORIDE 10 MG/ML
1-40 INJECTION INFILTRATION; PERINEURAL
Status: DISCONTINUED | OUTPATIENT
Start: 2019-08-01 | End: 2019-08-01 | Stop reason: HOSPADM

## 2019-08-01 RX ORDER — MIDAZOLAM HYDROCHLORIDE 1 MG/ML
.5-5 INJECTION, SOLUTION INTRAMUSCULAR; INTRAVENOUS
Status: DISCONTINUED | OUTPATIENT
Start: 2019-08-01 | End: 2019-08-01 | Stop reason: HOSPADM

## 2019-08-01 RX ADMIN — ASPIRIN 81 MG 324 MG: 81 TABLET ORAL at 07:26

## 2019-08-01 RX ADMIN — SODIUM CHLORIDE 75 ML/HR: 900 INJECTION, SOLUTION INTRAVENOUS at 07:28

## 2019-08-01 RX ADMIN — LIDOCAINE HYDROCHLORIDE 20 ML: 10 INJECTION, SOLUTION INFILTRATION; PERINEURAL at 08:12

## 2019-08-01 RX ADMIN — FENTANYL CITRATE 25 MCG: 50 INJECTION, SOLUTION INTRAMUSCULAR; INTRAVENOUS at 08:40

## 2019-08-01 RX ADMIN — HEPARIN SODIUM 3 ML/HR: 200 INJECTION, SOLUTION INTRAVENOUS at 08:12

## 2019-08-01 RX ADMIN — IOPAMIDOL 65 ML: 755 INJECTION, SOLUTION INTRAVENOUS at 09:02

## 2019-08-01 RX ADMIN — MIDAZOLAM HYDROCHLORIDE 1 MG: 1 INJECTION, SOLUTION INTRAMUSCULAR; INTRAVENOUS at 08:40

## 2019-08-01 RX ADMIN — DIAZEPAM 5 MG: 5 TABLET ORAL at 07:26

## 2019-08-01 NOTE — PROGRESS NOTES
Report received from [facility] crystal, Cath Lab RN. Procedural findings communicated. Intra procedural  medication administration reviewed. Progression of care discussed.      Patient received into [address] , with 6fr sheath to RFA    Access site without bleeding or swelling yes    Dressing dry and intact yes    Patient instructed to limit movement to right lower extremity    Routine post procedural vital signs and site assessment initiated yes

## 2019-08-01 NOTE — PROGRESS NOTES
6FR sheath removed from RFA. Manual pressure held for 20 minutes until hemostasis achieved. No bleeding or hematoma noted afterwards. Sterile dressing placed. Pt instructed to keep right leg straight and to remain flat. Pt verbalized understanding of post procedural instructions. Call bell within reach. Will continue to monitor. Hemostasis achieved at 0940.

## 2019-08-01 NOTE — DISCHARGE INSTRUCTIONS
HEART CATHETERIZATION/ANGIOGRAPHY DISCHARGE INSTRUCTIONS    1. Check puncture site frequently for swelling or bleeding. If there is any bleeding, lie down and apply pressure over the area with a clean towel or washcloth and call 911. Notify your doctor for any redness, swelling, drainage, or oozing from the puncture site. Notify your doctor for any fever or chills. 2. If the extremity becomes cold, numb, or painful call Dr. John Parrish at [phone].  3. Activity should be limited for the next 48 hours. Climb stairs as little as possible and avoid any stooping, bending, or strenuous activity for 48 hours. No heavy lifting (anything over 10 pounds) for 3 days. 4. You may resume your usual diet. Drink more fluids than usual.  5. Have a responsible person drive you home and stay with you for at least 24 hours after your heart catheterization/angiography. 6. You may remove bandage from your Right groin in 24 hours. You may shower in 24 hours. No tub baths, hot tubs, or swimming for 1 week. Do not place any lotions, creams, powders, or ointments over puncture site for 1 week. You may place a clean band-aid over the puncture site each day for 5 days. Change daily. I have read the above instructions and have had the opportunity to ask questions.       Patient: ________________________   Date: 8/1/2019    Witness: _______________________   Date: 8/1/2019

## 2019-08-01 NOTE — PROGRESS NOTES
Patient up to bedside, vital signs and site stable. Patient ambulated to bathroom without difficulty. Patient voided without difficulty. Vascular site stable. 1320 Discharge instructions and home medications reviewed with patient. Time allowed for questions and answers. 1330 Patient ambulated second time without difficulty. Site stable after ambulation. Peripheral IV sites dc'd without difficulty with tips intact. 1335 Patient discharged to home with family.

## 2019-08-01 NOTE — PROGRESS NOTES
TRANSFER - OUT REPORT:    Verbal report given to Marquis Heart RN(name) on [place]  being transferred to CPRU(unit) for routine progression of care       Report consisted of patients Situation, Background, Assessment and   Recommendations(SBAR). Information from the following report(s) SBAR and Procedure Summary was reviewed with the receiving nurse. Opportunity for questions and clarification was provided. Procedure: [facility]   Finding Summary: Diagnostic(cath/pci/pacer settings)  Location: RFA    Closure Device: 6 fr sheath in place to pull manually in recovery(yes/no/description)  Post Site Assessment: no oozing or hematoma, verified with [facility]     Pre Procedure Meds:(if any)    ASA: 324 mg  When Received:  0726  Antiplatelet: 75 mg Plavix at 0726    Intra Procedure Meds:    Versed: 1 mg  Fentanyl: 25 mcg               Peripheral IV 08/01/19 Posterior;Right Hand (Active)     Sheath 08/01/19 (Active)                             has No Known Allergies.     Past Medical History:   Diagnosis Date    COPD     Diabetes ([facility] [address]     HTN (hypertension) 4/29/2015    Other ill-defined conditions(799.89)     cholesterol     Visit Vitals  /48 (BP 1 Location: Left arm, BP Patient Position: At rest)   Pulse 66   Resp 18   Ht 6' 1\" (1.854 m)   Wt 72.6 kg (160 lb)   SpO2 95%   BMI 21.11 kg/m²

## 2019-08-01 NOTE — PROGRESS NOTES
IP consult to Cardiac Rehab. Chart review completed. No qualifying dx noted for Cardiac Rehab at this time. We will contact the patient if a qualifying referral is received.

## 2019-08-01 NOTE — PROGRESS NOTES
Patient's HOB elevated to 60 degrees, patient given beverage and meal tray. Call light within reach.

## 2019-08-01 NOTE — PROGRESS NOTES
TRANSFER - IN REPORT:    Verbal report received from Steve rojas RN(name) on [place]  being received from cath lab(unit) for routine progression of care      Report consisted of patients Situation, Background, Assessment and   Recommendations(SBAR). Information from the following report(s) Procedure Summary was reviewed with the receiving nurse. Opportunity for questions and clarification was provided. Assessment completed upon patients arrival to unit and care assumed.

## 2019-08-01 NOTE — PROGRESS NOTES
Patient received to [address] [place] [address] room # 12  Ambulatory from [facility]. Patient scheduled for [facility] today with Dr Angelique Dumont. Procedure reviewed & questions answered, voiced good understanding consent obtained & placed on chart. All medications and medical history reviewed. Will prep patient per orders. Patient & family updated on plan of care. The patient has a fraility score of 3-MANAGING WELL, based on patient A&Ox3, patient able to ambulate to room without difficulty.

## 2019-08-02 NOTE — PROCEDURES
[facility]  CARDIAC CATH    Name:  Jennifer Whiteside  MR#:  [id number]  :  1940  ACCOUNT #:  [de-identified]  DATE OF SERVICE:  2019    PROCEDURES PERFORMED:   Cardiac catheterization. PREOPERATIVE DIAGNOSES:  Abnormal stress  POSTOPERATIVE DIAGNOSES: cad. SURGEON:  Karen Marshall MD    ASSISTANT:  See chart    ESTIMATED BLOOD LOSS:  none    SPECIMENS REMOVED:  none    COMPLICATIONS:  none    IMPLANTS:  none    ANESTHESIA:  Lidocaine  local    INDICATIONS FOR CARDIAC CATHETERIZATION:  Abnormal stress test, diabetes, hypertension, heavy smoker. POST-CATH DIAGNOSES:  Left main was normal.  Left vein bifurcates to left anterior descending artery and circumflex artery. Left anterior descending artery showed luminal irregularities in the proximal mid segment. No obstructive disease. Gave diagonal 1, diagonal 2, and septal branches, which showed luminal irregularities, but no obstructive disease. Circumflex artery, good caliber vessel, gave obtuse marginal 1 and obtuse marginal 2 branches showed no evidence of any obstructive disease. Right coronary artery also shows luminal irregularities, gave ventricular branches, posterolateral branches, posterior descending artery branches, showed no obstructive disease. LV gram was not performed. The patient has a creatinine elevated to 1.3.  Echo criteria, prior to cath his ejection fraction is around 55%. Left ventricular end-diastolic pressure were 14 to 16. There was no gradient across the aortic valve. PROCEDURE IN DETAIL:   The patient was brought to cath lab. IV line was placed. Using Seldinger technique, a #6-Wallisian arterial sheath was introduced. Using #6-Wallisian left [place] coronary catheter, coronary angiography was performed. JR-4 catheter, right coronary angiography was performed. Using angled pigtail catheter, left ventricular pressure was measured. The patient tolerated the procedure well.     CONCLUSIONS:  No obstructive coronary artery disease. LV gram was not performed. LV ejection fraction by echo was about 55%. The patient was advised medical management, will see him back in 1 week from the date of discharge.           MD ELIZABETH [id number]  D:  08/01/2019 9:18  T:  08/01/2019 13:34  JOB #:  [id number]  CC:  Pasha Hsu MD

## 2019-10-10 ENCOUNTER — HOSPITAL ENCOUNTER (OUTPATIENT)
Dept: PET IMAGING | Age: 79
Discharge: HOME | End: 2019-10-10
Payer: MEDICARE

## 2019-10-10 DIAGNOSIS — R91.8 LUNG MASS: ICD-10-CM

## 2019-10-10 PROCEDURE — 74011636320 HC RX REV CODE- 636/320: Performed by: INTERNAL MEDICINE

## 2019-10-10 PROCEDURE — A9552 F18 FDG: HCPCS

## 2019-10-10 RX ORDER — SODIUM CHLORIDE 0.9 % (FLUSH) 0.9 %
10 SYRINGE (ML) INJECTION
Status: COMPLETED | OUTPATIENT
Start: 2019-10-10 | End: 2019-10-10

## 2019-10-10 RX ADMIN — DIATRIZOATE MEGLUMINE AND DIATRIZOATE SODIUM 10 ML: 660; 100 LIQUID ORAL; RECTAL at 16:32

## 2019-10-10 RX ADMIN — Medication 10 ML: at 16:32

## 2019-10-29 NOTE — PROGRESS NOTES
Pt confirmed arrival time to hospital of 0700 for their 0800 procedure. Pt also confirmed they will have  present.

## 2019-11-12 ENCOUNTER — APPOINTMENT (OUTPATIENT)
Dept: GENERAL RADIOLOGY | Age: 79
End: 2019-11-12
Attending: INTERNAL MEDICINE
Payer: MEDICARE

## 2019-11-12 ENCOUNTER — HOSPITAL ENCOUNTER (OUTPATIENT)
Age: 79
Setting detail: OUTPATIENT SURGERY
Discharge: HOME | End: 2019-11-12
Attending: INTERNAL MEDICINE | Admitting: INTERNAL MEDICINE
Payer: MEDICARE

## 2019-11-12 VITALS
TEMPERATURE: 97.7 F | RESPIRATION RATE: 20 BRPM | BODY MASS INDEX: 21.2 KG/M2 | HEIGHT: 73 IN | OXYGEN SATURATION: 93 % | SYSTOLIC BLOOD PRESSURE: 178 MMHG | DIASTOLIC BLOOD PRESSURE: 78 MMHG | WEIGHT: 160 LBS | HEART RATE: 65 BPM

## 2019-11-12 DIAGNOSIS — R91.1 LUNG NODULE: ICD-10-CM

## 2019-11-12 LAB — GLUCOSE BLD STRIP.AUTO-MCNC: 170 MG/DL (ref 65–100)

## 2019-11-12 PROCEDURE — 77030009046 HC CATH BRNCH BLLN OCOA -B: Performed by: INTERNAL MEDICINE

## 2019-11-12 PROCEDURE — 74011250636 HC RX REV CODE- 250/636: Performed by: INTERNAL MEDICINE

## 2019-11-12 PROCEDURE — 31652 BRONCH EBUS SAMPLNG 1/2 NODE: CPT | Performed by: INTERNAL MEDICINE

## 2019-11-12 PROCEDURE — 99152 MOD SED SAME PHYS/QHP 5/>YRS: CPT | Performed by: INTERNAL MEDICINE

## 2019-11-12 PROCEDURE — 74011000250 HC RX REV CODE- 250: Performed by: INTERNAL MEDICINE

## 2019-11-12 PROCEDURE — 77030020268 HC MISC GENERAL SUPPLY: Performed by: INTERNAL MEDICINE

## 2019-11-12 PROCEDURE — 88172 CYTP DX EVAL FNA 1ST EA SITE: CPT

## 2019-11-12 PROCEDURE — 77030031404 HC PTCH SENS SD DISP SPDM -A: Performed by: INTERNAL MEDICINE

## 2019-11-12 PROCEDURE — 31654 BRONCH EBUS IVNTJ PERPH LES: CPT | Performed by: INTERNAL MEDICINE

## 2019-11-12 PROCEDURE — 31624 DX BRONCHOSCOPE/LAVAGE: CPT | Performed by: INTERNAL MEDICINE

## 2019-11-12 PROCEDURE — 31627 NAVIGATIONAL BRONCHOSCOPY: CPT | Performed by: INTERNAL MEDICINE

## 2019-11-12 PROCEDURE — 76040000026: Performed by: INTERNAL MEDICINE

## 2019-11-12 PROCEDURE — 77030003406 HC NDL ASPIR BIOP OCOA -C: Performed by: INTERNAL MEDICINE

## 2019-11-12 PROCEDURE — 31628 BRONCHOSCOPY/LUNG BX EACH: CPT | Performed by: INTERNAL MEDICINE

## 2019-11-12 PROCEDURE — 88305 TISSUE EXAM BY PATHOLOGIST: CPT

## 2019-11-12 PROCEDURE — 88112 CYTOPATH CELL ENHANCE TECH: CPT

## 2019-11-12 PROCEDURE — 88173 CYTOPATH EVAL FNA REPORT: CPT

## 2019-11-12 PROCEDURE — 88177 CYTP FNA EVAL EA ADDL: CPT

## 2019-11-12 PROCEDURE — 77030021922 HC FCPS BIOP SD DISP SPDM -D: Performed by: INTERNAL MEDICINE

## 2019-11-12 PROCEDURE — 99153 MOD SED SAME PHYS/QHP EA: CPT | Performed by: INTERNAL MEDICINE

## 2019-11-12 PROCEDURE — 77030012699 HC VLV SUC CNTRL OCOA -A: Performed by: INTERNAL MEDICINE

## 2019-11-12 PROCEDURE — 82962 GLUCOSE BLOOD TEST: CPT

## 2019-11-12 RX ORDER — SODIUM CHLORIDE 0.9 % (FLUSH) 0.9 %
5-40 SYRINGE (ML) INJECTION EVERY 8 HOURS
Status: DISCONTINUED | OUTPATIENT
Start: 2019-11-12 | End: 2019-11-12 | Stop reason: HOSPADM

## 2019-11-12 RX ORDER — FLUMAZENIL 0.1 MG/ML
0.2 INJECTION INTRAVENOUS
Status: DISCONTINUED | OUTPATIENT
Start: 2019-11-12 | End: 2019-11-12 | Stop reason: HOSPADM

## 2019-11-12 RX ORDER — LIDOCAINE HYDROCHLORIDE 20 MG/ML
JELLY TOPICAL ONCE
Status: COMPLETED | OUTPATIENT
Start: 2019-11-12 | End: 2019-11-12

## 2019-11-12 RX ORDER — MIDAZOLAM HYDROCHLORIDE 1 MG/ML
1 INJECTION, SOLUTION INTRAMUSCULAR; INTRAVENOUS
Status: DISCONTINUED | OUTPATIENT
Start: 2019-11-12 | End: 2019-11-12 | Stop reason: HOSPADM

## 2019-11-12 RX ORDER — SODIUM CHLORIDE 0.9 % (FLUSH) 0.9 %
5-40 SYRINGE (ML) INJECTION AS NEEDED
Status: DISCONTINUED | OUTPATIENT
Start: 2019-11-12 | End: 2019-11-12 | Stop reason: HOSPADM

## 2019-11-12 RX ORDER — NALOXONE HYDROCHLORIDE 0.4 MG/ML
0.4 INJECTION, SOLUTION INTRAMUSCULAR; INTRAVENOUS; SUBCUTANEOUS
Status: DISCONTINUED | OUTPATIENT
Start: 2019-11-12 | End: 2019-11-12 | Stop reason: HOSPADM

## 2019-11-12 RX ORDER — FENTANYL CITRATE 50 UG/ML
50 INJECTION, SOLUTION INTRAMUSCULAR; INTRAVENOUS
Status: DISCONTINUED | OUTPATIENT
Start: 2019-11-12 | End: 2019-11-12 | Stop reason: HOSPADM

## 2019-11-12 RX ORDER — SODIUM CHLORIDE 9 MG/ML
1000 INJECTION, SOLUTION INTRAVENOUS CONTINUOUS
Status: DISCONTINUED | OUTPATIENT
Start: 2019-11-12 | End: 2019-11-12 | Stop reason: HOSPADM

## 2019-11-12 RX ORDER — LIDOCAINE HYDROCHLORIDE 40 MG/ML
SOLUTION TOPICAL ONCE
Status: COMPLETED | OUTPATIENT
Start: 2019-11-12 | End: 2019-11-12

## 2019-11-12 RX ADMIN — SODIUM CHLORIDE 1000 ML: 900 INJECTION, SOLUTION INTRAVENOUS at 08:57

## 2019-11-12 RX ADMIN — FENTANYL CITRATE 50 MCG: 50 INJECTION, SOLUTION INTRAMUSCULAR; INTRAVENOUS at 11:14

## 2019-11-12 RX ADMIN — MIDAZOLAM 1 MG: 1 INJECTION INTRAMUSCULAR; INTRAVENOUS at 10:33

## 2019-11-12 RX ADMIN — MIDAZOLAM 2 MG: 1 INJECTION INTRAMUSCULAR; INTRAVENOUS at 10:30

## 2019-11-12 RX ADMIN — FENTANYL CITRATE 50 MCG: 50 INJECTION, SOLUTION INTRAMUSCULAR; INTRAVENOUS at 11:02

## 2019-11-12 RX ADMIN — LIDOCAINE HYDROCHLORIDE: 20 JELLY TOPICAL at 10:30

## 2019-11-12 RX ADMIN — FENTANYL CITRATE 50 MCG: 50 INJECTION, SOLUTION INTRAMUSCULAR; INTRAVENOUS at 10:35

## 2019-11-12 RX ADMIN — LIDOCAINE HYDROCHLORIDE: 40 SOLUTION TOPICAL at 10:30

## 2019-11-12 RX ADMIN — FENTANYL CITRATE 50 MCG: 50 INJECTION, SOLUTION INTRAMUSCULAR; INTRAVENOUS at 10:50

## 2019-11-12 RX ADMIN — PROMETHAZINE HYDROCHLORIDE 6.25 MG: 25 INJECTION INTRAMUSCULAR; INTRAVENOUS at 11:13

## 2019-11-12 RX ADMIN — FENTANYL CITRATE 50 MCG: 50 INJECTION, SOLUTION INTRAMUSCULAR; INTRAVENOUS at 11:09

## 2019-11-12 RX ADMIN — FENTANYL CITRATE 50 MCG: 50 INJECTION, SOLUTION INTRAMUSCULAR; INTRAVENOUS at 10:59

## 2019-11-12 RX ADMIN — FENTANYL CITRATE 50 MCG: 50 INJECTION, SOLUTION INTRAMUSCULAR; INTRAVENOUS at 10:30
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
## 2019-11-12 NOTE — PROCEDURES
PROCEDURE  Bronchoscopy with Endobronchial Ultrasound Guided Fine Needle Aspiration Of Medistinal Lymph nodes and airway inspection and EMN aided biopsy of peripheral lung nodule. EQUIPMENT:  Olympus T180 bronchoscope, Super Dimension EMN system, Olympus PH897G EBUS scope, UM 17 Radial probe, Super D forceps, Fluoroscopy. Super dimension 270 deg guide sheath. INDICATION   Peripheral nodule/mass suspicious for malignancy/staging of presumed malignancy    10 L       POST OP DIAGNOSIS:  Super Dimension EMN system was employed to identify and biopsy the EVA nodule seen on CT/PET. 10 forceps biopsies were obtained 5 of which were  evaluated via touch prep and TAI. Touch preps revealed atypical cells. Histology from obtained tissue is currently pending. Following EMN procedure, linear EBUS was performed for mediastinal staging. Station 10L, was the only target,  biopsied and negative for malignancy on TAI. Based on CT chest/PET CT / and EBUS pt is a STAGE [T1c,N0,M0]  IA3 [place] lung Cancer once confirmed. CPFT:      Based on the above , the patient should be able to tolerate a EVA lobectomy and most definitely a wedge resection. Will discuss in tumor board. ANESTHESIA  Concious sedation with: Fentanyl 350 mcg IV; Versed   mg IV; Lidocaine 3 mg to tracheo-bronchial tree and vocal cords; Phenergan  6.25 mg IV  for nausea and vomiting prophylaxis. AIRWAY INSPECTION  After obtaining informed consent, using a bite block, an Olympus Q 180 viedeo bronchoscope was  introduced into the trachea through the vocal cords without complication.     RIGHT  LOCATION NORM/ABNORMAL DESCRIPTION   VOCAL CORDS NL    TRACHEA NL    CHARLETTE NL    RMSB NL    RUL NL    BI NL    RML NL    SUP SEGM RLL NL    MED BASAL NL    ANTERIOR BASAL NL    LATERAL BASAL NL    POSTERIOR BASAL NL                  LEFT  LOCATION NORMAL/ABNORMAL TYPE   LMSB NL    EVA NL    LINGULA NL    SUPERIOR DIVISION NL    SUPERIOR SEG LLL NL    TRINIDAD-MEDIAL LLL NL    LATERAL LLL NL    POSTERIOR LLL NL                         EBUS  After completing the airway inspection an Olympus  F EBUS bronchoscope was introduced into the trachea through the vocal chords without complication. The balloon was inflated with saline and a mediastinal inspection commenced:      STATION SIZE IN CM   11R sup Not insp   11R inf Not insp   10R No tgt   7 No tgt   4R 0.2/0.2   11L No tgt   10L 1.2/1.0   4L 0.2/0.2  0.4/0.2  0.4/0.4   2L No tgt   2R No tgt         After identifying targets the following samples were obtained:    STATION PASS# [id number] MALIGNANT ATYPICAL GRANULOMA NONDGNSTC   10L 1 - - - - blood    2 + - - -     3 - - - - blood       © 2018 UpToDate, Inc. and/or its affiliates. All Rights Reserved. T, N, and M descriptors for the eighth edition of TNM classification for lung cancer    T: Primary tumor   Tx Primary tumor cannot be assessed or tumor proven by presence of malignant cells in sputum or bronchial washings but not visualized by imaging or bronchoscopy   T0 No evidence of primary tumor   Tis Carcinoma in situ   T1 Tumor ? 3 cm in greatest dimension surrounded by lung or visceral pleura without bronchoscopic evidence of invasion more proximal than the lobar bronchus (ie, not in the main bronchus)*   T1a(mi) Minimally invasive adenocarcinoma¶   T1a Tumor ? 1 cm in greatest dimension*   T1b Tumor >1 cm but ?2 cm in greatest dimension*   T1c Tumor >2 cm but ?3 cm in greatest dimension*   T2 Tumor >3 cm but ?5 cm or tumor with any of the following features:?  · Involves main bronchus regardless of distance from the lata but without involvement of the lata  · Invades visceral pleura  · Associated with atelectasis or obstructive pneumonitis that extends to the hilar region, involving part or all of the lung   T2a Tumor >3 cm but ?4 cm in greatest dimension   T2b Tumor >4 cm but ?5 cm in greatest dimension   T3 Tumor >5 cm but ?7 cm in greatest dimension or associated with separate tumor nodule(s) in the same lobe as the primary tumor or directly invades any of the following structures: chest wall (including the parietal pleura and superior sulcus tumors), phrenic nerve, parietal pericardium   T4 Tumor >7 cm in greatest dimension or associated with separate tumor nodule(s) in a different ipsilateral lobe than that of the primary tumor or invades any of the following structures: diaphragm, mediastinum, heart, great vessels, trachea, recurrent laryngeal nerve, esophagus, vertebral body, and lata   N: Regional lymph node involvement   Nx Regional lymph nodes cannot be assessed   N0 No regional lymph node metastasis   N1 Metastasis in ipsilateral peribronchial and/or ipsilateral hilar lymph nodes and intrapulmonary nodes, including involvement by direct extension   N2 Metastasis in ipsilateral mediastinal and/or subcarinal lymph node(s)   N3 Metastasis in contralateral mediastinal, contralateral hilar, ipsilateral or contralateral scalene, or supraclavicular lymph node(s)   M: Distant metastasis   M0 No distant metastasis   M1 Distant metastasis present   M1a Separate tumor nodule(s) in a contralateral lobe; tumor with pleural or pericardial nodule(s) or malignant pleural or pericardial effusion?    M1b Single extrathoracic metastasis§   M1c Multiple extrathoracic metastases in one or more organs   Stage groupings   Occult carcinoma TX N0 M0   Stage 0 Tis N0 M0   Stage IA1 T1a(mi) N0 M0    T1a N0 M0   Stage IA2 T1b N0 M0   Stage IA3 T1c N0 M0   Stage IB T2a N0 M0   Stage IIA T2b N0 M0   Stage IIB T1a to c N1 M0    T2a N1 M0    T2b N1 M0    T3 N0 M0   Stage IIIA T1a to c N2 M0    T2a to b N2 M0    T3 N1 M0    T4 N0 M0    T4 N1 M0   Stage IIIB T1a to c N3 M0    T2a to b N3 M0    T3 N2 M0    T4 N2 M0   Stage IIIC T3 N3 M0    T4 N3 M0   Stage TAMARA Any T Any N M1a    Any T Any N M1b   Stage IVB Any T Any N M1c   NOTE: Changes to the seventh edition are in bold.    TNM: tumor, node, metastasis; Tis: carcinoma in situ; T1a(mi): minimally invasive adenocarcinoma. * The uncommon superficial spreading tumor of any size with its invasive component limited to the bronchial wall, which may extend proximal to the main bronchus, is also classified as T1a. ¶ Solitary adenocarcinoma, ?3 cm with a predominately lepidic pattern and ?5 mm invasion in any one focus. ? T2 tumors with these features are classified as T2a if ?4 cm in greatest dimension or if size cannot be determined, and T2b if >4 cm but ?5 cm in greatest dimension. ? Most pleural (pericardial) effusions with lung cancer are due to tumor. In a few patients, however, multiple microscopic examinations of pleural (pericardial) fluid are negative for tumor and the fluid is nonbloody and not an exudate. When these elements and clinical judgment dictate that the effusion is not related to the tumor, the effusion should be excluded as a staging descriptor. § This includes involvement of a single distant (nonregional) lymph node. Reproduced from: Cornel Montanez et al. The IASLC Lung Cancer Staging Project: Proposals for Revision of the TNM Stage Groupings in the Forthcoming (Eighth) Edition of the TNM Classification for Lung Cancer. J Thorac Oncol 2016; 11:39. Table used with the permission of [employer]. All rights reserved. Graphic 104504 Version 1.0                 Olympus T 180   bronchoscope wasintroduced into the airways to identify and biopsy the EVA nodule with the aid of Super D EMN system and radial probe US. CT image was acquired on with Super D protocol were used and the pathway to target  planned using super D planning software. Planing data was then used to navigate to the nodule, after verification with radial US:     10 forceps biopsies were obtained 5 of which were  evaluated via touch prep and TAI. Touch preps revealed atypical cells.     Histology from obtained tissue is currently pending. TOUCH PREP BIOPSY# MALIGNANT SUSPICIOUS ATYPIA NONDGNSTC   EVA nodule forceps 1 - - +     2 - - +     3 - - +     4 - - +     5 - - +     6 -- -- -- In toto for histo    7 -- -- -- \"    8 -- -- -- \"    9 -- -- -- \"    10 -- -- -- \"           EVA nodule BAL 1 -- -- -- In toto for cyto     EBL: 5 ml    Complications: NONE with no evidence of pneumothorax on post-op flouroscopy.     Makeda Delarosa MD.

## 2019-11-12 NOTE — ROUTINE PROCESS
Pt. Discharged to car by Jayna Raymond with Christelle Mckinleys, patients son . Vital signs stable. Able to tolerate ice chips.   Seen by MD.

## 2019-11-12 NOTE — DISCHARGE INSTRUCTIONS
RESPIRATORY CARE - BRONCHOSCOPY - DISCHARGE INSTRUCTIONS      You received a lot of numbing medication for your throat and nose, and you also received medication to make you sleepy during your procedure. Because of this and because the bronchoscopy may have irritated your airways, we ask that you follow these directions:    1. Do not eat or drink until  12:45 . After that, you may have what you please. You may want to try some liquids first, because your throat may be a little sore. 2. Medication may cause drowsiness for several hours, therefore:  · Do not drive or operate machinery for remainder of the day. · No alcohol today  · Do not make any important or legal decisions for 24 hours  · Do not sign any legal documents for 24 hours    3. You may cough up more mucus than usual and you may see some blood, but this is expected and should subside by the following day. 4. If severe throat irritation, coughing, or bleeding continue, call your doctor. 5.         If you run a fever greater than 102, call [facility] at [phone]. 6.         Dr. Luci Smith has asked that you:                A. Call the doctor's office at [phone] for any questions or concerns r/t to your procedure today. B. Await for final pathology to come back; ~1 week. The office will call you will follow-up appointments and test when the results are back. Discharge Medications:  Restart your Plavix tomorrow.          Instructions given to Nelson Main and other family members

## 2019-12-12 PROBLEM — C34.12 MALIGNANT NEOPLASM OF UPPER LOBE OF LEFT LUNG (HCC): Status: ACTIVE | Noted: 2019-12-12

## 2019-12-31 ENCOUNTER — HOSPITAL ENCOUNTER (INPATIENT)
Age: 79
LOS: 4 days | Discharge: HOME HEALTH | DRG: 193 | End: 2020-01-04
Attending: EMERGENCY MEDICINE | Admitting: HOSPITALIST
Payer: MEDICARE

## 2019-12-31 ENCOUNTER — APPOINTMENT (OUTPATIENT)
Dept: GENERAL RADIOLOGY | Age: 79
DRG: 193 | End: 2019-12-31
Attending: EMERGENCY MEDICINE
Payer: MEDICARE

## 2019-12-31 DIAGNOSIS — R91.1 LUNG NODULE: ICD-10-CM

## 2019-12-31 DIAGNOSIS — J18.9 PNEUMONIA OF LEFT UPPER LOBE DUE TO INFECTIOUS ORGANISM: ICD-10-CM

## 2019-12-31 DIAGNOSIS — R04.2 COUGH WITH HEMOPTYSIS: ICD-10-CM

## 2019-12-31 DIAGNOSIS — Z72.0 TOBACCO ABUSE: Chronic | ICD-10-CM

## 2019-12-31 DIAGNOSIS — C34.12 MALIGNANT NEOPLASM OF UPPER LOBE OF LEFT LUNG (HCC): ICD-10-CM

## 2019-12-31 LAB
ALBUMIN SERPL-MCNC: 1.9 G/DL (ref 3.2–4.6)
ALBUMIN/GLOB SERPL: 0.4 {RATIO} (ref 1.2–3.5)
ALP SERPL-CCNC: 239 U/L (ref 50–136)
ALT SERPL-CCNC: 23 U/L (ref 12–65)
ANION GAP SERPL CALC-SCNC: 7 MMOL/L (ref 7–16)
AST SERPL-CCNC: 37 U/L (ref 15–37)
ATRIAL RATE: 85 BPM
BASOPHILS # BLD: 0 K/UL (ref 0–0.2)
BASOPHILS NFR BLD: 0 % (ref 0–2)
BILIRUB SERPL-MCNC: 0.4 MG/DL (ref 0.2–1.1)
BNP SERPL-MCNC: 651 PG/ML
BUN SERPL-MCNC: 11 MG/DL (ref 8–23)
CALCIUM SERPL-MCNC: 8.7 MG/DL (ref 8.3–10.4)
CALCULATED P AXIS, ECG09: 88 DEGREES
CALCULATED R AXIS, ECG10: -65 DEGREES
CALCULATED T AXIS, ECG11: 84 DEGREES
CHLORIDE SERPL-SCNC: 96 MMOL/L (ref 98–107)
CO2 SERPL-SCNC: 29 MMOL/L (ref 21–32)
CREAT SERPL-MCNC: 1.27 MG/DL (ref 0.8–1.5)
DIAGNOSIS, 93000: NORMAL
DIFFERENTIAL METHOD BLD: ABNORMAL
EOSINOPHIL # BLD: 0 K/UL (ref 0–0.8)
EOSINOPHIL NFR BLD: 0 % (ref 0.5–7.8)
ERYTHROCYTE [DISTWIDTH] IN BLOOD BY AUTOMATED COUNT: 13.4 % (ref 11.9–14.6)
GLOBULIN SER CALC-MCNC: 5.1 G/DL (ref 2.3–3.5)
GLUCOSE BLD STRIP.AUTO-MCNC: 396 MG/DL (ref 65–100)
GLUCOSE SERPL-MCNC: 191 MG/DL (ref 65–100)
HCT VFR BLD AUTO: 33 % (ref 41.1–50.3)
HGB BLD-MCNC: 11.6 G/DL (ref 13.6–17.2)
IMM GRANULOCYTES # BLD AUTO: 0.1 K/UL (ref 0–0.5)
IMM GRANULOCYTES NFR BLD AUTO: 1 % (ref 0–5)
LYMPHOCYTES # BLD: 2.4 K/UL (ref 0.5–4.6)
LYMPHOCYTES NFR BLD: 18 % (ref 13–44)
MCH RBC QN AUTO: 28.2 PG (ref 26.1–32.9)
MCHC RBC AUTO-ENTMCNC: 35.2 G/DL (ref 31.4–35)
MCV RBC AUTO: 80.3 FL (ref 79.6–97.8)
MONOCYTES # BLD: 1 K/UL (ref 0.1–1.3)
MONOCYTES NFR BLD: 8 % (ref 4–12)
NEUTS SEG # BLD: 9.5 K/UL (ref 1.7–8.2)
NEUTS SEG NFR BLD: 73 % (ref 43–78)
NRBC # BLD: 0 K/UL (ref 0–0.2)
P-R INTERVAL, ECG05: 166 MS
PLATELET # BLD AUTO: 408 K/UL (ref 150–450)
PMV BLD AUTO: 9.7 FL (ref 9.4–12.3)
POTASSIUM SERPL-SCNC: 4.5 MMOL/L (ref 3.5–5.1)
PROCALCITONIN SERPL-MCNC: 1.31 NG/ML
PROT SERPL-MCNC: 7 G/DL (ref 6.3–8.2)
Q-T INTERVAL, ECG07: 412 MS
QRS DURATION, ECG06: 136 MS
QTC CALCULATION (BEZET), ECG08: 490 MS
RBC # BLD AUTO: 4.11 M/UL (ref 4.23–5.6)
SODIUM SERPL-SCNC: 132 MMOL/L (ref 136–145)
VENTRICULAR RATE, ECG03: 85 BPM
WBC # BLD AUTO: 13 K/UL (ref 4.3–11.1)

## 2019-12-31 PROCEDURE — 74011636637 HC RX REV CODE- 636/637: Performed by: HOSPITALIST

## 2019-12-31 PROCEDURE — 93005 ELECTROCARDIOGRAM TRACING: CPT | Performed by: EMERGENCY MEDICINE

## 2019-12-31 PROCEDURE — 94640 AIRWAY INHALATION TREATMENT: CPT

## 2019-12-31 PROCEDURE — 36415 COLL VENOUS BLD VENIPUNCTURE: CPT

## 2019-12-31 PROCEDURE — 85025 COMPLETE CBC W/AUTO DIFF WBC: CPT

## 2019-12-31 PROCEDURE — 84466 ASSAY OF TRANSFERRIN: CPT

## 2019-12-31 PROCEDURE — 82728 ASSAY OF FERRITIN: CPT

## 2019-12-31 PROCEDURE — 84145 PROCALCITONIN (PCT): CPT

## 2019-12-31 PROCEDURE — 99285 EMERGENCY DEPT VISIT HI MDM: CPT | Performed by: EMERGENCY MEDICINE

## 2019-12-31 PROCEDURE — 83540 ASSAY OF IRON: CPT

## 2019-12-31 PROCEDURE — 74011000250 HC RX REV CODE- 250: Performed by: HOSPITALIST

## 2019-12-31 PROCEDURE — 74011000258 HC RX REV CODE- 258: Performed by: HOSPITALIST

## 2019-12-31 PROCEDURE — 71046 X-RAY EXAM CHEST 2 VIEWS: CPT

## 2019-12-31 PROCEDURE — 80053 COMPREHEN METABOLIC PANEL: CPT

## 2019-12-31 PROCEDURE — 83880 ASSAY OF NATRIURETIC PEPTIDE: CPT

## 2019-12-31 PROCEDURE — 65270000029 HC RM PRIVATE

## 2019-12-31 PROCEDURE — 87040 BLOOD CULTURE FOR BACTERIA: CPT

## 2019-12-31 PROCEDURE — 82962 GLUCOSE BLOOD TEST: CPT

## 2019-12-31 PROCEDURE — 94760 N-INVAS EAR/PLS OXIMETRY 1: CPT

## 2019-12-31 PROCEDURE — 77010033678 HC OXYGEN DAILY

## 2019-12-31 PROCEDURE — 74011250636 HC RX REV CODE- 250/636: Performed by: HOSPITALIST

## 2019-12-31 PROCEDURE — 74011250637 HC RX REV CODE- 250/637: Performed by: HOSPITALIST

## 2019-12-31 RX ORDER — ARFORMOTEROL TARTRATE 15 UG/2ML
15 SOLUTION RESPIRATORY (INHALATION)
Status: DISCONTINUED | OUTPATIENT
Start: 2019-12-31 | End: 2020-01-04 | Stop reason: HOSPADM

## 2019-12-31 RX ORDER — LANOLIN ALCOHOL/MO/W.PET/CERES
1 CREAM (GRAM) TOPICAL 2 TIMES DAILY WITH MEALS
Status: DISCONTINUED | OUTPATIENT
Start: 2020-01-01 | End: 2020-01-04 | Stop reason: HOSPADM

## 2019-12-31 RX ORDER — DIPHENHYDRAMINE HCL 25 MG
25 CAPSULE ORAL
Status: DISCONTINUED | OUTPATIENT
Start: 2019-12-31 | End: 2020-01-04 | Stop reason: HOSPADM

## 2019-12-31 RX ORDER — HYDRALAZINE HYDROCHLORIDE 20 MG/ML
10 INJECTION INTRAMUSCULAR; INTRAVENOUS
Status: DISCONTINUED | OUTPATIENT
Start: 2019-12-31 | End: 2020-01-04 | Stop reason: HOSPADM

## 2019-12-31 RX ORDER — BENZONATATE 100 MG/1
100 CAPSULE ORAL
Status: DISCONTINUED | OUTPATIENT
Start: 2019-12-31 | End: 2020-01-04 | Stop reason: HOSPADM

## 2019-12-31 RX ORDER — VANCOMYCIN 2 GRAM/500 ML IN 0.9 % SODIUM CHLORIDE INTRAVENOUS
2000
Status: COMPLETED | OUTPATIENT
Start: 2019-12-31 | End: 2020-01-01

## 2019-12-31 RX ORDER — NALOXONE HYDROCHLORIDE 0.4 MG/ML
0.4 INJECTION, SOLUTION INTRAMUSCULAR; INTRAVENOUS; SUBCUTANEOUS AS NEEDED
Status: DISCONTINUED | OUTPATIENT
Start: 2019-12-31 | End: 2020-01-04 | Stop reason: HOSPADM

## 2019-12-31 RX ORDER — IPRATROPIUM BROMIDE AND ALBUTEROL SULFATE 2.5; .5 MG/3ML; MG/3ML
3 SOLUTION RESPIRATORY (INHALATION)
Status: DISCONTINUED | OUTPATIENT
Start: 2019-12-31 | End: 2020-01-04 | Stop reason: HOSPADM

## 2019-12-31 RX ORDER — ONDANSETRON 2 MG/ML
4 INJECTION INTRAMUSCULAR; INTRAVENOUS
Status: DISCONTINUED | OUTPATIENT
Start: 2019-12-31 | End: 2020-01-04 | Stop reason: HOSPADM

## 2019-12-31 RX ORDER — SODIUM CHLORIDE 9 MG/ML
50 INJECTION, SOLUTION INTRAVENOUS CONTINUOUS
Status: DISCONTINUED | OUTPATIENT
Start: 2019-12-31 | End: 2020-01-02

## 2019-12-31 RX ORDER — DRONABINOL 2.5 MG/1
5 CAPSULE ORAL 2 TIMES DAILY
Status: DISCONTINUED | OUTPATIENT
Start: 2019-12-31 | End: 2020-01-04 | Stop reason: HOSPADM

## 2019-12-31 RX ORDER — INSULIN LISPRO 100 [IU]/ML
6 INJECTION, SOLUTION INTRAVENOUS; SUBCUTANEOUS
Status: DISCONTINUED | OUTPATIENT
Start: 2020-01-01 | End: 2020-01-01

## 2019-12-31 RX ORDER — METOPROLOL TARTRATE 25 MG/1
25 TABLET, FILM COATED ORAL 2 TIMES DAILY
Status: DISCONTINUED | OUTPATIENT
Start: 2019-12-31 | End: 2020-01-04 | Stop reason: HOSPADM

## 2019-12-31 RX ORDER — ACETAMINOPHEN 325 MG/1
650 TABLET ORAL
Status: DISCONTINUED | OUTPATIENT
Start: 2019-12-31 | End: 2020-01-04 | Stop reason: HOSPADM

## 2019-12-31 RX ORDER — ATORVASTATIN CALCIUM 10 MG/1
10 TABLET, FILM COATED ORAL DAILY
Status: DISCONTINUED | OUTPATIENT
Start: 2020-01-01 | End: 2020-01-01

## 2019-12-31 RX ORDER — SODIUM CHLORIDE 0.9 % (FLUSH) 0.9 %
5-40 SYRINGE (ML) INJECTION EVERY 8 HOURS
Status: DISCONTINUED | OUTPATIENT
Start: 2019-12-31 | End: 2020-01-04 | Stop reason: HOSPADM

## 2019-12-31 RX ORDER — INSULIN GLARGINE 100 [IU]/ML
20 INJECTION, SOLUTION SUBCUTANEOUS
Status: DISCONTINUED | OUTPATIENT
Start: 2019-12-31 | End: 2020-01-01

## 2019-12-31 RX ORDER — MIRTAZAPINE 15 MG/1
15 TABLET, FILM COATED ORAL
Status: DISCONTINUED | OUTPATIENT
Start: 2019-12-31 | End: 2020-01-04 | Stop reason: HOSPADM

## 2019-12-31 RX ORDER — METRONIDAZOLE 500 MG/100ML
500 INJECTION, SOLUTION INTRAVENOUS EVERY 12 HOURS
Status: DISCONTINUED | OUTPATIENT
Start: 2019-12-31 | End: 2020-01-01

## 2019-12-31 RX ORDER — LOSARTAN POTASSIUM 25 MG/1
25 TABLET ORAL DAILY
Status: DISCONTINUED | OUTPATIENT
Start: 2020-01-01 | End: 2020-01-04 | Stop reason: HOSPADM

## 2019-12-31 RX ORDER — SODIUM CHLORIDE 0.9 % (FLUSH) 0.9 %
5-40 SYRINGE (ML) INJECTION AS NEEDED
Status: DISCONTINUED | OUTPATIENT
Start: 2019-12-31 | End: 2020-01-04 | Stop reason: HOSPADM

## 2019-12-31 RX ORDER — INSULIN LISPRO 100 [IU]/ML
INJECTION, SOLUTION INTRAVENOUS; SUBCUTANEOUS
Status: DISCONTINUED | OUTPATIENT
Start: 2019-12-31 | End: 2020-01-04 | Stop reason: HOSPADM

## 2019-12-31 RX ORDER — LORAZEPAM 0.5 MG/1
0.5 TABLET ORAL
Status: DISCONTINUED | OUTPATIENT
Start: 2019-12-31 | End: 2020-01-04 | Stop reason: HOSPADM

## 2019-12-31 RX ORDER — LANOLIN ALCOHOL/MO/W.PET/CERES
400 CREAM (GRAM) TOPICAL DAILY
Status: DISCONTINUED | OUTPATIENT
Start: 2020-01-01 | End: 2020-01-01

## 2019-12-31 RX ORDER — BUPROPION HYDROCHLORIDE 150 MG/1
150 TABLET, EXTENDED RELEASE ORAL DAILY
Status: CANCELLED | OUTPATIENT
Start: 2020-01-01

## 2019-12-31 RX ORDER — ADHESIVE BANDAGE
30 BANDAGE TOPICAL DAILY PRN
Status: DISCONTINUED | OUTPATIENT
Start: 2019-12-31 | End: 2020-01-04 | Stop reason: HOSPADM

## 2019-12-31 RX ORDER — MORPHINE SULFATE 2 MG/ML
2 INJECTION, SOLUTION INTRAMUSCULAR; INTRAVENOUS
Status: DISCONTINUED | OUTPATIENT
Start: 2019-12-31 | End: 2020-01-04 | Stop reason: HOSPADM

## 2019-12-31 RX ORDER — GUAIFENESIN 600 MG/1
600 TABLET, EXTENDED RELEASE ORAL EVERY 12 HOURS
Status: DISCONTINUED | OUTPATIENT
Start: 2019-12-31 | End: 2020-01-04 | Stop reason: HOSPADM

## 2019-12-31 RX ORDER — AMOXICILLIN 250 MG
2 CAPSULE ORAL DAILY
Status: DISCONTINUED | OUTPATIENT
Start: 2020-01-01 | End: 2020-01-04 | Stop reason: HOSPADM

## 2019-12-31 RX ORDER — CODEINE PHOSPHATE AND GUAIFENESIN 10; 100 MG/5ML; MG/5ML
5 SOLUTION ORAL
Status: DISCONTINUED | OUTPATIENT
Start: 2019-12-31 | End: 2020-01-04 | Stop reason: HOSPADM

## 2019-12-31 RX ORDER — HYDROCODONE BITARTRATE AND ACETAMINOPHEN 5; 325 MG/1; MG/1
1 TABLET ORAL
Status: DISCONTINUED | OUTPATIENT
Start: 2019-12-31 | End: 2020-01-04 | Stop reason: HOSPADM

## 2019-12-31 RX ADMIN — AZITHROMYCIN MONOHYDRATE 500 MG: 500 INJECTION, POWDER, LYOPHILIZED, FOR SOLUTION INTRAVENOUS at 21:08

## 2019-12-31 RX ADMIN — CEFTRIAXONE 2 G: 2 INJECTION, POWDER, FOR SOLUTION INTRAMUSCULAR; INTRAVENOUS at 21:09

## 2019-12-31 RX ADMIN — METOPROLOL TARTRATE 25 MG: 25 TABLET, FILM COATED ORAL at 21:18

## 2019-12-31 RX ADMIN — Medication 10 ML: at 21:21

## 2019-12-31 RX ADMIN — MIRTAZAPINE 15 MG: 15 TABLET, FILM COATED ORAL at 21:18

## 2019-12-31 RX ADMIN — METRONIDAZOLE 500 MG: 500 INJECTION, SOLUTION INTRAVENOUS at 21:08

## 2019-12-31 RX ADMIN — VANCOMYCIN HYDROCHLORIDE 2000 MG: 10 INJECTION, POWDER, LYOPHILIZED, FOR SOLUTION INTRAVENOUS at 21:09

## 2019-12-31 RX ADMIN — DRONABINOL 5 MG: 2.5 CAPSULE ORAL at 21:18

## 2019-12-31 RX ADMIN — SODIUM CHLORIDE 100 ML/HR: 900 INJECTION, SOLUTION INTRAVENOUS at 21:08

## 2019-12-31 RX ADMIN — GUAIFENESIN 600 MG: 600 TABLET ORAL at 21:18

## 2019-12-31 RX ADMIN — INSULIN LISPRO 10 UNITS: 100 INJECTION, SOLUTION INTRAVENOUS; SUBCUTANEOUS at 21:19

## 2019-12-31 RX ADMIN — ARFORMOTEROL TARTRATE 15 MCG: 15 SOLUTION RESPIRATORY (INHALATION) at 20:21

## 2019-12-31 RX ADMIN — LORAZEPAM 0.5 MG: 0.5 TABLET ORAL at 21:21

## 2019-12-31 RX ADMIN — INSULIN GLARGINE 20 UNITS: 100 INJECTION, SOLUTION SUBCUTANEOUS at 21:18

## 2019-12-31 NOTE — ED NOTES
TRANSFER - OUT REPORT:    Verbal report given to Adelaida(name) on Kimberly Ly  being transferred to 820(unit) for routine progression of care       Report consisted of patients Situation, Background, Assessment and   Recommendations(SBAR). Information from the following report(s) ED Summary was reviewed with the receiving nurse. Lines:   Peripheral IV 12/31/19 Right Forearm (Active)        Opportunity for questions and clarification was provided.       Patient transported with:   RO[employer]

## 2019-12-31 NOTE — H&P
H&P      Patient: Cindy Gayle               Sex: male             MRN: [id number]      YOB: 1940      Age:  70 y.o. 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 Patient had a bronchoscopy and biopsy in November 2019 and diagnosed with lung cancer. He was having cough which has gradually worsened over the last 1 month, has become moderate in severity in the last few days, worse with exertion, no specific relieving factors, associated with blood-tinged sputum over the last several days. Blood in the sputum is worsening over the last few days. He was also having shortness of breath which is also progressively getting worse, moderate in severity, worse with exertion, better with rest.  He was having left upper anterior chest pain with coughing. No fevers. No diarrhea. Nausea on and off. Decreased appetite and has not been eating or drinking much in the last 1 month. Complains of generalized weakness, weight loss in the last 1 month. He has noticed increasing swelling in both lower extremities over the last few days. Patient denies any burning with urination. No abdominal pain. Review of Systems  Comprehensive 10 point ROS is done, and pertinent positives & negatives per HPI, rest of them are negative.     Past Medical History:   Diagnosis Date    COPD     Diabetes ([address]     HTN (hypertension) 4/29/2015    Malignant neoplasm of upper lobe of left lung ([address] 12/12/2019    Other ill-defined conditions(799.89)     cholesterol       Past Surgical History:   Procedure Laterality Date    ABDOMEN SURGERY PROC UNLISTED  5/2015    explor lap    HX ORTHOPAEDIC      bilateral shoulder repairs, both knees repaired-no hardware       Family history: Reviewed and significant for lung cancer in brother. Diabetes in family. Social history: Patient was a lifetime smoker, quit 1 month ago after the diagnosis of cancer. No alcohol or drug abuse. Social History     Socioeconomic History    Marital status: SINGLE     Spouse name: Not on file    Number of children: Not on file    Years of education: Not on file    Highest education level: Not on file   Tobacco Use    Smoking status: Current Every Day Smoker     Packs/day: 1.00     Years: 40.00     Pack years: 40.00     Types: Cigarettes    Smokeless tobacco: Never Used   Substance and Sexual Activity    Alcohol use: No    Drug use: No       No Known Allergies    Prior to Admission medications    Medication Sig Start Date End Date Taking? Authorizing Provider   magnesium oxide (MAG-OX) 400 mg tablet Take 400 mg by mouth daily. Provider, Historical   metoprolol tartrate (LOPRESSOR) 25 mg tablet Take 25 mg by mouth two (2) times a day. Provider, Historical   losartan (COZAAR) 25 mg tablet Take 25 mg by mouth daily. Provider, Historical   aspirin delayed-release 81 mg tablet Take 81 mg by mouth daily. Provider, Historical   LANTUS 100 unit/mL injection 20 Units nightly. 4/11/16   Provider, Historical   LIPITOR 10 mg Tab Take 10 mg by mouth daily. Provider, Historical   lisinopril (PRINIVIL, ZESTRIL) 5 mg tablet take 5 mg by mouth daily. Provider, Historical   ATIVAN 1 mg Tab Take 1 mg by mouth every eight (8) hours as needed. Provider, Historical   PLAVIX PO take 75 mg by mouth daily. Provider, Historical   INSULIN REGULAR HUMAN SC 5 Units by SubCUTAneous route three (3) times daily. As needed based on BGL    Provider, Historical   WELLBUTRIN PO take 150 mg by mouth two (2) times a day.               Physical Exam     Visit Vitals  /56 (BP Patient Position: Standing)   Pulse 94   Temp 98.4 °F (36.9 °C)   Resp 16   Ht 6' 1\" (1.854 m)   Wt 72.6 kg (160 lb)   SpO2 94%   BMI 21.11 kg/m²      Temp (24hrs), Av.4 °F (36.9 °C), Min:98.4 °F (36.9 °C), Max:98.4 °F (36.9 °C)    Oxygen Therapy  O2 Sat (%): 94 % (19 1651)  Pulse via Oximetry: 84 beats per minute (19 1648)  O2 Device: Room air (19 1236)  No intake or output data in the 24 hours ending 19 1856     General: Conscious, No acute distress  Eyes:  LISA, No pallor/icterus    HENT:             Oral Mucosa is dry, No sinus tenderness  Neck:               Supple, No JVD  Lungs:  CTA Bilaterally, No significant wheezing  Heart:  S1 S2 regular  Abdomen: Soft, Positive bowel sounds, NTND, No guarding/rigidity/rebound tend. Extremities: Bilateral pitting edema in the lower extremities. Neurologic:  AAOX2, No acute FND, Motor: LUE: 5/5, LLE: 5/5, RUE: 5/5, RLE: 5/5  Skin:                No acute rashes  Musculoskeletal: No Acute findings  Psych:            Somewhat depressed mood    LAB  Recent Results (from the past 24 hour(s))   CBC WITH AUTOMATED DIFF    Collection Time: 19 12:41 PM   Result Value Ref Range    WBC 13.0 (H) 4.3 - 11.1 K/uL    RBC 4.11 (L) 4.23 - 5.6 M/uL    HGB 11.6 (L) 13.6 - 17.2 g/dL    HCT 33.0 (L) 41.1 - 50.3 %    MCV 80.3 79.6 - 97.8 FL    MCH 28.2 26.1 - 32.9 PG    MCHC 35.2 (H) 31.4 - 35.0 g/dL    RDW 13.4 11.9 - 14.6 %    PLATELET 559 771 - 852 K/uL    MPV 9.7 9.4 - 12.3 FL    ABSOLUTE NRBC 0.00 0.0 - 0.2 K/uL    DF AUTOMATED      NEUTROPHILS 73 43 - 78 %    LYMPHOCYTES 18 13 - 44 %    MONOCYTES 8 4.0 - 12.0 %    EOSINOPHILS 0 (L) 0.5 - 7.8 %    BASOPHILS 0 0.0 - 2.0 %    IMMATURE GRANULOCYTES 1 0.0 - 5.0 %    ABS. NEUTROPHILS 9.5 (H) 1.7 - 8.2 K/UL    ABS. LYMPHOCYTES 2.4 0.5 - 4.6 K/UL    ABS. MONOCYTES 1.0 0.1 - 1.3 K/UL    ABS. EOSINOPHILS 0.0 0.0 - 0.8 K/UL    ABS. BASOPHILS 0.0 0.0 - 0.2 K/UL    ABS. IMM.  GRANS. 0.1 0.0 - 0.5 K/UL   METABOLIC PANEL, COMPREHENSIVE    Collection Time: 19 12:41 PM   Result Value Ref Range Sodium 132 (L) 136 - 145 mmol/L    Potassium 4.5 3.5 - 5.1 mmol/L    Chloride 96 (L) 98 - 107 mmol/L    CO2 29 21 - 32 mmol/L    Anion gap 7 7 - 16 mmol/L    Glucose 191 (H) 65 - 100 mg/dL    BUN 11 8 - 23 MG/DL    Creatinine 1.27 0.8 - 1.5 MG/DL    GFR est AA >60 >60 ml/min/1.73m2    GFR est non-AA 58 (L) >60 ml/min/1.73m2    Calcium 8.7 8.3 - 10.4 MG/DL    Bilirubin, total 0.4 0.2 - 1.1 MG/DL    ALT (SGPT) 23 12 - 65 U/L    AST (SGOT) 37 15 - 37 U/L    Alk. phosphatase 239 (H) 50 - 136 U/L    Protein, total 7.0 6.3 - 8.2 g/dL    Albumin 1.9 (L) 3.2 - 4.6 g/dL    Globulin 5.1 (H) 2.3 - 3.5 g/dL    A-G Ratio 0.4 (L) 1.2 - 3.5     EKG, 12 LEAD, INITIAL    Collection Time: 12/31/19 12:43 PM   Result Value Ref Range    Ventricular Rate 85 BPM    Atrial Rate 85 BPM    P-R Interval 166 ms    QRS Duration 136 ms    Q-T Interval 412 ms    QTC Calculation (Bezet) 490 ms    Calculated P Axis 88 degrees    Calculated R Axis -65 degrees    Calculated T Axis 84 degrees    Diagnosis       Sinus rhythm with occasional Premature ventricular complexes  Right bundle branch block  Left anterior fascicular block  !!! Bifascicular block !!!  Septal infarct (cited on or before 30-NOV-2010)  Abnormal ECG  When compared with ECG of 01-AUG-2019 07:31,  Premature ventricular complexes are now Present  Right bundle branch block has replaced Incomplete right bundle branch block  Questionable change in initial forces of Septal leads  Confirmed by Tracey Webb MD (), DAVID OROSCO ([id number]) on 12/31/2019 3:28:49 PM         IMAGING:     Xr Chest Pa Lat    Result Date: 12/31/2019  IMPRESSION: 1. Cavitary left upper lobe lesion consistent with a lung tumor. There is surrounding consolidation in the left upper lobe. 2. Minimal consolidation in the left lung base.  [address] Results     Procedure Component Value Units Date/Time    MSSA/MRSA SC BY PCR, NASAL SWAB [[id number]]     Order Status:  Sent Specimen:  Nasal swab     CULTURE, BLOOD [[id number]] Order Status:  Sent Specimen:  Blood     CULTURE, BLOOD [[id number]]     Order Status:  Sent Specimen:  Blood           EKG: personally reviewed and shows normal sinus rhythm, right bundle branch block. CXR: Personally reviewed and shows left upper lobe lung mass with getting consolidation and also left lower lobe consolidation. Assessment/Plan     Active Problems:    Pneumonia (12/31/2019)      1. Left upper lobe adenocarcinoma of the lung with left upper lobe consolidation. Patient also has small left lower lobe consolidation. Patient may have obstructive pneumonia which is probably causing shortness of breath and cough. Patient has small amount of hemoptysis which could be from pneumonia or could be from underlying cancer. We will send blood cultures, get sputum cultures. Start him on empiric IV ceftriaxone, azithromycin, and Flagyl. Patient has no history of MRSA and has not been hospitalized recently. Will check MRSA screen. He received 1 dose of IV vancomycin in the emergency room. Patient is not wheezing currently. Hold off on his steroids. Place him on bronchodilators, guaifenesin, monitor. Pulmonology consulted for further recommendations. 2.  Bilateral lower extremity edema: Could be secondary to severe hypoalbuminemia from protein calorie malnutrition. He had a cardiac cath in August 2019 and showed normal EF and no obstructive coronary artery disease. Will check venous Doppler to look for any DVT. 3.  Submassive hemoptysis. Hemoglobin is okay. Will hold aspirin and Plavix, monitor hemoglobin. Pulmonology consulted. 4.  Severe protein calorie malnutrition, secondary to anorexia, malignancy. Nutritionist consulted. Glucerna ordered. Also placed on Marinol to help boost appetite. 5.  Hypertension: Continue home blood pressure medications and monitor. 6.  Diabetes type 2, insulin-dependent. Check A1c.  Keep him on Lantus, mealtime Humalog, insulin sliding scale.     7. CKD stage III    8. Possibly anemia of chronic disease with subacute blood loss anemia. Check iron studies, start him on iron supplementation and monitor. 9.  Mild hyponatremia. Place him on normal saline and monitor. All the pertinent investigative studies and treatment plan was discussed with the patient. Further recommendations as per the clinical course. DVT prophylaxis: SCDs  Code status: Full  Risk: High risk patient secondary to lung cancer, at risk for respiratory failure and sepsis.     Pryia Lau MD  December 31, 2019

## 2019-12-31 NOTE — ED TRIAGE NOTES
Pt has had hemoptysis for the last couple of weeks. Pt has lung cancer and COPD. Was told that he would cough up blood but is still concerned. Not currently receiving chemo. Scheduled for operation on January 15th. Also chest pain when he coughs. Sees Dr Jenni Segovia.

## 2019-12-31 NOTE — ED PROVIDER NOTES
Epifanio Marr is a 70 y.o. male seen on 12/31/2019 at 4:21 PM in the [facility] EMERGENCY DEPT in room ER04/04. Chief Complaint   Patient presents with    Hemoptysis     HPI: Is a 70-year-old male story of recent diagnosis of lung cancer, COPD, hypertension and diabetes presenting to the emergency department for hemoptysis, fatigue, increasing cough, and swelling in bilateral lower extremities. He has had hemoptysis since a pulmonary biopsy about 4 to 6 weeks ago. However he feels like it has increased slightly. He is also complaining of swelling in bilateral lower extremities. No unilateral leg swelling or edema. He states he thinks he supposed to be taking a water pill but is not sure what the name of it is. He lives independently and his family is concerned for his safety as he feels lightheaded when he stands. It does look like he is scheduled for lung mass resection in 2 weeks. He has not had any fevers, no sweats or chills. Historian: Patient    REVIEW OF SYSTEMS     Review of Systems   Constitutional: Negative for fatigue and fever. HENT: Negative. Eyes: Negative. Respiratory: Positive for cough. Negative for chest tightness, shortness of breath and wheezing. Cardiovascular: Positive for leg swelling. Negative for chest pain. Gastrointestinal: Negative for abdominal distention, abdominal pain, diarrhea, nausea and vomiting. Genitourinary: Negative for dysuria, flank pain, frequency, genital sores and urgency. Musculoskeletal: Negative. Skin: Negative for rash. Neurological: Negative for dizziness, syncope and headaches. All other systems reviewed and are negative.       PAST MEDICAL HISTORY     Past Medical History:   Diagnosis Date    COPD     Diabetes ([address]     HTN (hypertension) 4/29/2015    Malignant neoplasm of upper lobe of left lung ([address] 12/12/2019    Other ill-defined conditions(799.89)     cholesterol     Past Surgical History:   Procedure Laterality Date    ABDOMEN SURGERY PROC UNLISTED  2015    explor lap    HX ORTHOPAEDIC      bilateral shoulder repairs, both knees repaired-no hardware     Social History     Socioeconomic History    Marital status: SINGLE     Spouse name: Not on file    Number of children: Not on file    Years of education: Not on file    Highest education level: Not on file   Tobacco Use    Smoking status: Current Every Day Smoker     Packs/day: 1.00     Years: 40.00     Pack years: 40.00     Types: Cigarettes    Smokeless tobacco: Never Used   Substance and Sexual Activity    Alcohol use: No    Drug use: No     Prior to Admission Medications   Prescriptions Last Dose Informant Patient Reported? Taking? ATIVAN 1 mg Tab  Self Yes No   Sig: Take 1 mg by mouth every eight (8) hours as needed. INSULIN REGULAR HUMAN SC  Self Yes No   Si Units by SubCUTAneous route three (3) times daily. As needed based on BGL   LANTUS 100 unit/mL injection   Yes No   Si Units nightly. LIPITOR 10 mg Tab  Self Yes No   Sig: Take 10 mg by mouth daily. PLAVIX PO  Self Yes No   Sig: take 75 mg by mouth daily. WELLBUTRIN PO  Self Yes No   Sig: take 150 mg by mouth two (2) times a day. aspirin delayed-release 81 mg tablet   Yes No   Sig: Take 81 mg by mouth daily. lisinopril (PRINIVIL, ZESTRIL) 5 mg tablet  Self Yes No   Sig: take 5 mg by mouth daily. losartan (COZAAR) 25 mg tablet   Yes No   Sig: Take 25 mg by mouth daily. magnesium oxide (MAG-OX) 400 mg tablet   Yes No   Sig: Take 400 mg by mouth daily. metoprolol tartrate (LOPRESSOR) 25 mg tablet   Yes No   Sig: Take 25 mg by mouth two (2) times a day. Facility-Administered Medications: None     No Known Allergies     PHYSICAL EXAM       Vitals:    19 1236   BP: 133/56   Pulse: 83   Resp: 20   Temp: 98.4 °F (36.9 °C)   SpO2: 94%    Vital signs were reviewed. Physical Exam  Vitals signs reviewed.    Constitutional: General: He is not in acute distress. Appearance: He is well-developed. He is not diaphoretic. HENT:      Head: Normocephalic and atraumatic. Eyes:      Pupils: Pupils are equal, round, and reactive to light. Neck:      Musculoskeletal: Normal range of motion and neck supple. Cardiovascular:      Rate and Rhythm: Normal rate and regular rhythm. Heart sounds: Normal heart sounds. No murmur. No friction rub. No gallop. Pulmonary:      Effort: Pulmonary effort is normal. No respiratory distress. Breath sounds: Normal breath sounds. No wheezing. Abdominal:      General: Bowel sounds are normal. There is no distension. Palpations: Abdomen is soft. There is no mass. Tenderness: There is no tenderness. There is no guarding or rebound. Musculoskeletal: Normal range of motion. General: No tenderness or deformity. Right lower leg: Edema (2+) present. Left lower leg: Edema (2+) present. Skin:     General: Skin is warm. Findings: No erythema or rash. Neurological:      Mental Status: He is alert and oriented to person, place, and time. Sensory: No sensory deficit. Coordination: Coordination normal.      Comments: No focal deficits   Psychiatric:         Behavior: Behavior normal.         Thought Content: Thought content normal.          MEDICAL DECISION MAKING     MDM  Number of Diagnoses or Management Options     Amount and/or Complexity of Data Reviewed  Clinical lab tests: ordered and reviewed  Tests in the radiology section of CPT®: ordered and reviewed  Review and summarize past medical records: yes  Discuss the patient with other providers: yes    Risk of Complications, Morbidity, and/or Mortality  Presenting problems: high  Management options: high      Procedures    ED Course: In reviewing the patient's medications, he has multiple empty pill bottles.   He has a bottle of Plavix that is empty, however it was a 90-day prescription that should have lasted him until January 18. I am concerned he is not taking his medication as prescribed. 6:17 PM  Discussed the case with pulmonary, Dr. Rosalia Fields, who feels like given his chest x-ray findings and mild leukocytosis that he should be started on antibiotics to prevent worsening infection that might affect his surgery. Given his recent instrumentation this would most likely be healthcare associated pneumonia and will require IV vancomycin. He was started on Rocephin and vancomycin here in the emergency department I discussed the case with the hospitalist who will admit for further treatment and care. Disposition: Admission to the hospital  Diagnosis: Lung cancer, pneumonia, hemoptysis  ____________________________________________________________________  A portion of this note was generated using voice recognition dictation software. While the note has been reviewed for accuracy, please note certain words and phrases may not be transcribed as intended and some grammatical and/or typographical errors may be present.

## 2020-01-01 ENCOUNTER — APPOINTMENT (OUTPATIENT)
Dept: ULTRASOUND IMAGING | Age: 80
DRG: 193 | End: 2020-01-01
Attending: HOSPITALIST
Payer: MEDICARE

## 2020-01-01 PROBLEM — R04.2 COUGH WITH HEMOPTYSIS: Status: ACTIVE | Noted: 2020-01-01

## 2020-01-01 LAB
ALBUMIN SERPL-MCNC: 1.7 G/DL (ref 3.2–4.6)
ALBUMIN/GLOB SERPL: 0.3 {RATIO} (ref 1.2–3.5)
ALP SERPL-CCNC: 219 U/L (ref 50–136)
ALT SERPL-CCNC: 20 U/L (ref 12–65)
ANION GAP SERPL CALC-SCNC: 6 MMOL/L (ref 7–16)
APTT PPP: 30.1 SEC (ref 24.7–39.8)
AST SERPL-CCNC: 29 U/L (ref 15–37)
BASOPHILS # BLD: 0.1 K/UL (ref 0–0.2)
BASOPHILS NFR BLD: 1 % (ref 0–2)
BILIRUB SERPL-MCNC: 0.3 MG/DL (ref 0.2–1.1)
BUN SERPL-MCNC: 10 MG/DL (ref 8–23)
CALCIUM SERPL-MCNC: 8.6 MG/DL (ref 8.3–10.4)
CHLORIDE SERPL-SCNC: 103 MMOL/L (ref 98–107)
CK SERPL-CCNC: 113 U/L (ref 21–215)
CO2 SERPL-SCNC: 28 MMOL/L (ref 21–32)
CREAT SERPL-MCNC: 1.09 MG/DL (ref 0.8–1.5)
DIFFERENTIAL METHOD BLD: ABNORMAL
EOSINOPHIL # BLD: 0 K/UL (ref 0–0.8)
EOSINOPHIL NFR BLD: 0 % (ref 0.5–7.8)
ERYTHROCYTE [DISTWIDTH] IN BLOOD BY AUTOMATED COUNT: 13.6 % (ref 11.9–14.6)
EST. AVERAGE GLUCOSE BLD GHB EST-MCNC: 249 MG/DL
FERRITIN SERPL-MCNC: 326 NG/ML (ref 8–388)
GLOBULIN SER CALC-MCNC: 5 G/DL (ref 2.3–3.5)
GLUCOSE BLD STRIP.AUTO-MCNC: 188 MG/DL (ref 65–100)
GLUCOSE BLD STRIP.AUTO-MCNC: 234 MG/DL (ref 65–100)
GLUCOSE BLD STRIP.AUTO-MCNC: 241 MG/DL (ref 65–100)
GLUCOSE BLD STRIP.AUTO-MCNC: 372 MG/DL (ref 65–100)
GLUCOSE SERPL-MCNC: 201 MG/DL (ref 65–100)
HBA1C MFR BLD: 10.3 % (ref 4.8–6)
HCT VFR BLD AUTO: 32.9 % (ref 41.1–50.3)
HGB BLD-MCNC: 11.4 G/DL (ref 13.6–17.2)
HGB RETIC QN AUTO: 33 PG (ref 29–35)
IMM GRANULOCYTES # BLD AUTO: 0.1 K/UL (ref 0–0.5)
IMM GRANULOCYTES NFR BLD AUTO: 1 % (ref 0–5)
IMM RETICS NFR: 22.9 % (ref 2.3–13.4)
INR PPP: 1.1
IRON SERPL-MCNC: 23 UG/DL (ref 35–150)
LYMPHOCYTES # BLD: 2.4 K/UL (ref 0.5–4.6)
LYMPHOCYTES NFR BLD: 24 % (ref 13–44)
MAGNESIUM SERPL-MCNC: 1.7 MG/DL (ref 1.8–2.4)
MCH RBC QN AUTO: 27.9 PG (ref 26.1–32.9)
MCHC RBC AUTO-ENTMCNC: 34.7 G/DL (ref 31.4–35)
MCV RBC AUTO: 80.6 FL (ref 79.6–97.8)
MONOCYTES # BLD: 0.9 K/UL (ref 0.1–1.3)
MONOCYTES NFR BLD: 9 % (ref 4–12)
NEUTS SEG # BLD: 6.7 K/UL (ref 1.7–8.2)
NEUTS SEG NFR BLD: 67 % (ref 43–78)
NRBC # BLD: 0 K/UL (ref 0–0.2)
PHOSPHATE SERPL-MCNC: 2.2 MG/DL (ref 2.3–3.7)
PLATELET # BLD AUTO: 406 K/UL (ref 150–450)
PMV BLD AUTO: 9.9 FL (ref 9.4–12.3)
POTASSIUM SERPL-SCNC: 4.1 MMOL/L (ref 3.5–5.1)
PROT SERPL-MCNC: 6.7 G/DL (ref 6.3–8.2)
PROTHROMBIN TIME: 14.7 SEC (ref 11.7–14.5)
RBC # BLD AUTO: 4.08 M/UL (ref 4.23–5.6)
RETICS # AUTO: 0.11 M/UL (ref 0.03–0.1)
RETICS/RBC NFR AUTO: 2.7 % (ref 0.3–2)
SODIUM SERPL-SCNC: 137 MMOL/L (ref 136–145)
TRANSFERRIN SERPL-MCNC: 152 MG/DL (ref 202–364)
VIT B12 SERPL-MCNC: 518 PG/ML (ref 193–986)
WBC # BLD AUTO: 10.1 K/UL (ref 4.3–11.1)

## 2020-01-01 PROCEDURE — 82962 GLUCOSE BLOOD TEST: CPT

## 2020-01-01 PROCEDURE — 94640 AIRWAY INHALATION TREATMENT: CPT

## 2020-01-01 PROCEDURE — 83735 ASSAY OF MAGNESIUM: CPT

## 2020-01-01 PROCEDURE — 85610 PROTHROMBIN TIME: CPT

## 2020-01-01 PROCEDURE — 82550 ASSAY OF CK (CPK): CPT

## 2020-01-01 PROCEDURE — 77030012893

## 2020-01-01 PROCEDURE — 83036 HEMOGLOBIN GLYCOSYLATED A1C: CPT

## 2020-01-01 PROCEDURE — 74011000250 HC RX REV CODE- 250: Performed by: HOSPITALIST

## 2020-01-01 PROCEDURE — 74011000258 HC RX REV CODE- 258: Performed by: HOSPITALIST

## 2020-01-01 PROCEDURE — 65270000029 HC RM PRIVATE

## 2020-01-01 PROCEDURE — 99223 1ST HOSP IP/OBS HIGH 75: CPT | Performed by: INTERNAL MEDICINE

## 2020-01-01 PROCEDURE — 85730 THROMBOPLASTIN TIME PARTIAL: CPT

## 2020-01-01 PROCEDURE — 97530 THERAPEUTIC ACTIVITIES: CPT

## 2020-01-01 PROCEDURE — 85025 COMPLETE CBC W/AUTO DIFF WBC: CPT

## 2020-01-01 PROCEDURE — 84100 ASSAY OF PHOSPHORUS: CPT

## 2020-01-01 PROCEDURE — 94760 N-INVAS EAR/PLS OXIMETRY 1: CPT

## 2020-01-01 PROCEDURE — 74011636637 HC RX REV CODE- 636/637: Performed by: HOSPITALIST

## 2020-01-01 PROCEDURE — 77030020263 HC SOL INJ SOD CL0.9% LFCR 1000ML

## 2020-01-01 PROCEDURE — 74011250637 HC RX REV CODE- 250/637: Performed by: HOSPITALIST

## 2020-01-01 PROCEDURE — 36415 COLL VENOUS BLD VENIPUNCTURE: CPT

## 2020-01-01 PROCEDURE — 80053 COMPREHEN METABOLIC PANEL: CPT

## 2020-01-01 PROCEDURE — 97161 PT EVAL LOW COMPLEX 20 MIN: CPT

## 2020-01-01 PROCEDURE — 82607 VITAMIN B-12: CPT

## 2020-01-01 PROCEDURE — 93970 EXTREMITY STUDY: CPT

## 2020-01-01 PROCEDURE — 85046 RETICYTE/HGB CONCENTRATE: CPT

## 2020-01-01 PROCEDURE — 77010033678 HC OXYGEN DAILY

## 2020-01-01 PROCEDURE — 74011250636 HC RX REV CODE- 250/636: Performed by: HOSPITALIST

## 2020-01-01 RX ORDER — LANOLIN ALCOHOL/MO/W.PET/CERES
400 CREAM (GRAM) TOPICAL 2 TIMES DAILY
Status: DISCONTINUED | OUTPATIENT
Start: 2020-01-01 | End: 2020-01-04 | Stop reason: HOSPADM

## 2020-01-01 RX ORDER — INSULIN LISPRO 100 [IU]/ML
8 INJECTION, SOLUTION INTRAVENOUS; SUBCUTANEOUS
Status: DISCONTINUED | OUTPATIENT
Start: 2020-01-01 | End: 2020-01-04 | Stop reason: HOSPADM

## 2020-01-01 RX ORDER — INSULIN GLARGINE 100 [IU]/ML
30 INJECTION, SOLUTION SUBCUTANEOUS
Status: DISCONTINUED | OUTPATIENT
Start: 2020-01-01 | End: 2020-01-02

## 2020-01-01 RX ORDER — LANOLIN ALCOHOL/MO/W.PET/CERES
400 CREAM (GRAM) TOPICAL ONCE
Status: COMPLETED | OUTPATIENT
Start: 2020-01-01 | End: 2020-01-01

## 2020-01-01 RX ORDER — INSULIN LISPRO 100 [IU]/ML
4 INJECTION, SOLUTION INTRAVENOUS; SUBCUTANEOUS
Status: DISCONTINUED | OUTPATIENT
Start: 2020-01-01 | End: 2020-01-01

## 2020-01-01 RX ORDER — SODIUM,POTASSIUM PHOSPHATES 280-250MG
1 POWDER IN PACKET (EA) ORAL 2 TIMES DAILY
Status: COMPLETED | OUTPATIENT
Start: 2020-01-01 | End: 2020-01-01

## 2020-01-01 RX ORDER — ATORVASTATIN CALCIUM 10 MG/1
10 TABLET, FILM COATED ORAL
Status: DISCONTINUED | OUTPATIENT
Start: 2020-01-01 | End: 2020-01-04 | Stop reason: HOSPADM

## 2020-01-01 RX ORDER — INSULIN GLARGINE 100 [IU]/ML
24 INJECTION, SOLUTION SUBCUTANEOUS
Status: DISCONTINUED | OUTPATIENT
Start: 2020-01-01 | End: 2020-01-01

## 2020-01-01 RX ADMIN — ARFORMOTEROL TARTRATE 15 MCG: 15 SOLUTION RESPIRATORY (INHALATION) at 08:24

## 2020-01-01 RX ADMIN — DRONABINOL 5 MG: 2.5 CAPSULE ORAL at 09:00

## 2020-01-01 RX ADMIN — METRONIDAZOLE 500 MG: 500 INJECTION, SOLUTION INTRAVENOUS at 09:40

## 2020-01-01 RX ADMIN — GUAIFENESIN 600 MG: 600 TABLET ORAL at 09:37

## 2020-01-01 RX ADMIN — Medication 400 MG: at 16:13

## 2020-01-01 RX ADMIN — ATORVASTATIN CALCIUM 10 MG: 10 TABLET, FILM COATED ORAL at 21:02

## 2020-01-01 RX ADMIN — Medication 10 ML: at 16:15

## 2020-01-01 RX ADMIN — INSULIN LISPRO 2 UNITS: 100 INJECTION, SOLUTION INTRAVENOUS; SUBCUTANEOUS at 06:12

## 2020-01-01 RX ADMIN — INSULIN LISPRO 4 UNITS: 100 INJECTION, SOLUTION INTRAVENOUS; SUBCUTANEOUS at 12:17

## 2020-01-01 RX ADMIN — Medication 400 MG: at 21:08

## 2020-01-01 RX ADMIN — METOPROLOL TARTRATE 25 MG: 25 TABLET, FILM COATED ORAL at 09:00

## 2020-01-01 RX ADMIN — POTASSIUM & SODIUM PHOSPHATES POWDER PACK 280-160-250 MG 1 PACKET: 280-160-250 PACK at 21:08

## 2020-01-01 RX ADMIN — SODIUM CHLORIDE 100 ML/HR: 900 INJECTION, SOLUTION INTRAVENOUS at 09:33

## 2020-01-01 RX ADMIN — AZITHROMYCIN MONOHYDRATE 500 MG: 500 INJECTION, POWDER, LYOPHILIZED, FOR SOLUTION INTRAVENOUS at 21:14

## 2020-01-01 RX ADMIN — INSULIN GLARGINE 30 UNITS: 100 INJECTION, SOLUTION SUBCUTANEOUS at 21:11

## 2020-01-01 RX ADMIN — TIOTROPIUM BROMIDE 18 MCG: 18 CAPSULE ORAL; RESPIRATORY (INHALATION) at 08:24

## 2020-01-01 RX ADMIN — Medication 10 ML: at 06:12

## 2020-01-01 RX ADMIN — Medication 400 MG: at 09:00

## 2020-01-01 RX ADMIN — INSULIN LISPRO 4 UNITS: 100 INJECTION, SOLUTION INTRAVENOUS; SUBCUTANEOUS at 17:09

## 2020-01-01 RX ADMIN — CEFTRIAXONE 2 G: 2 INJECTION, POWDER, FOR SOLUTION INTRAMUSCULAR; INTRAVENOUS at 20:38

## 2020-01-01 RX ADMIN — INSULIN LISPRO 8 UNITS: 100 INJECTION, SOLUTION INTRAVENOUS; SUBCUTANEOUS at 17:09

## 2020-01-01 RX ADMIN — INSULIN LISPRO 10 UNITS: 100 INJECTION, SOLUTION INTRAVENOUS; SUBCUTANEOUS at 12:15

## 2020-01-01 RX ADMIN — POTASSIUM & SODIUM PHOSPHATES POWDER PACK 280-160-250 MG 1 PACKET: 280-160-250 PACK at 16:14

## 2020-01-01 RX ADMIN — GUAIFENESIN 600 MG: 600 TABLET ORAL at 21:02

## 2020-01-01 RX ADMIN — Medication 10 ML: at 21:15

## 2020-01-01 RX ADMIN — SENNOSIDES AND DOCUSATE SODIUM 2 TABLET: 8.6; 5 TABLET ORAL at 09:00

## 2020-01-01 RX ADMIN — INSULIN LISPRO 4 UNITS: 100 INJECTION, SOLUTION INTRAVENOUS; SUBCUTANEOUS at 09:39

## 2020-01-01 RX ADMIN — MIRTAZAPINE 15 MG: 15 TABLET, FILM COATED ORAL at 21:02

## 2020-01-01 RX ADMIN — ARFORMOTEROL TARTRATE 15 MCG: 15 SOLUTION RESPIRATORY (INHALATION) at 21:07

## 2020-01-01 RX ADMIN — LORAZEPAM 0.5 MG: 0.5 TABLET ORAL at 21:08

## 2020-01-01 RX ADMIN — INSULIN LISPRO 4 UNITS: 100 INJECTION, SOLUTION INTRAVENOUS; SUBCUTANEOUS at 21:12

## 2020-01-01 RX ADMIN — FERROUS SULFATE TAB 325 MG (65 MG ELEMENTAL FE) 325 MG: 325 (65 FE) TAB at 08:00

## 2020-01-01 NOTE — PROGRESS NOTES
Pt resting in bed. Respirations even and unlabored. No s/sx of distress noted. Call light within reach, bed low and locked. Will give report to oncoming RN.

## 2020-01-01 NOTE — PROGRESS NOTES
Nutrition Assessment for:   Consult for general nutrition management and supplements (Lane Payne MD)  Lung cancer, protein calorie malnutrition, weight loss and anorexia. Assessment:   Admitted with L upper lobe adenocarcinoma with left upper lobe consolidation, bilateral LE edema, submassive hemoptysis, severe protein calorie malnutrition secondary to anorexia, malignancy, mild hyponatremia. 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 Visit with patient at bedside, his sister arrived during interview. Pt reports history of poor appetite and weight loss. He lives alone and generally gets food from [employer] or [employer]. He indicates he always tries to eat something secondary to DM but has been consuming significantly less than baseline. He indicates his # ~ 5 months ago and indicates his clothing has changed from 32 waist to 30 waist.  He references a recent surgery several times stating that is when his appetite started to decline, it appears he is speaking of his cardiac cath in August based on time frame. He has consumed 100% of am meal at RD visit and didn't want to drink the glucerna secondary to \"sugar\" related to DM. He indicates his appetite has increased over the past 3-4 days. DIET NUTRITIONAL SUPPLEMENTS All Meals; Glucerna Shake ( )  DIET DIABETIC CONSISTENT CARB Regular    Pertinent Medications: marinol, SSI, humalog (nutritional insulin), flagyl, remeron  marinol and remeron are not on med rec history pt is uncertain if he was taking these meds at home  Anthropometrics:  Height: 6' 1\" (185.4 cm), Weight: 72.2 kg (159 lb 1.6 oz), Weight Source: Standing scale (comment), Body mass index is 20.99 kg/m². BMI class of Underweight (Age >65 and BMI <22). Note current weight is likely skewed by 2+ bilateral LE edema.     Weight history per EMR review reveals 167# in June in emergency room, 159# in pulmonary office in September. Based on available data pt presents with 5% weight loss over 6 months, potentially greater given current BLE edema which pt reports is new. Malnutrition Criteria: Body fat loss:   (seen in Orbital and Triceps)  Muscle mass loss:   (seen in Temples (temporalis), Clavicle (pectoralis and deltoids) and Hand (interosseous)))    Macronutrient needs: (using CBW (Current body weight) standing scale 72 kg)  EER: 4021-5954 kcal/day 25-30 kcal/kg   EPR: 72-86 g/day 1-1.2g/kg    Intake/Comparative Standards: Pt consumed 100% times one meal at this point. His recall of home intake is of limited nutrition quality with decreasing amounts reported but he doesn't provide a quantifiable amount. Insufficient data to fully assess at this time. Nutrition Diagnosis:  Moderate chronic disease or condition related malnutrition related to declining oral intake and hypermetabolic state as evidenced by reports decreased intake, +wt loss with muscle and fat wasting in setting of recent L upper lobe adenocarcinoma and short bowel syndrome. Nutrition Intervention:  Meals and snacks: Continue current diet. Medical food supplement therapy: Change oral supplement to Ensure High Protein BID, discussed with patient use of lower cho formula to prevent additional weight loss. Coordination of nutrition care by a Nutrition Professional: Discussed with Norma Hernández RN. Discharge Nutrition Plan: Continue Oral Nutrition Supplement (ONS) at discharge. Recommend Ensure High Protein or a comparable/similar product Twice daily for 30 days unless otherwise directed by your Primary Care Physician.       [place],  [address], [address] [facility]

## 2020-01-01 NOTE — PROGRESS NOTES
Pt arrived to floor via stretcher accompanied by family. Pt oriented to room and surroundings. Pt alert and oriented x4. Pt complaining of cough with pink sputum. Respirations are even and unlabored. Lung sounds are coarse with no wheezes. Bowel sounds are active. Heart sounds are regular. Pt has pitting edema in the lower extremities. Skin is clean, dry, and intact, Dual skin assessment performed with Daina Benitez RN. Pt denies pain at this time. No s/sx of distress noted. Call light within reach, bed low and locked, door open, family at bedside.

## 2020-01-01 NOTE — PROGRESS NOTES
Lying quietly in bed, alert, answers questions appropriately. Tolerating diet. Remains on O2 at 2lpm. lung sounds diminished with small amts pink tinged sputum noted when pt coughs. Abdomen soft, bs present. 1-2 + edema in lower extremities. NS continues to infuse at 100cc/hr via pump.   Rails up x3, susana open  Aware to call for asst

## 2020-01-01 NOTE — PROGRESS NOTES
Problem: Mobility Impaired (Adult and Pediatric)  Goal: *Acute Goals and Plan of Care (Insert Text)  Description  Discharge Goals:  (1.)Mr. Henri Deluca will move from supine to sit and sit to supine  with INDEPENDENT within 7 treatment day(s). (2.)Mr. Henri Deluca will transfer from bed to chair and chair to bed with INDEPENDENT using the least restrictive device within 7 treatment day(s). (3.)Mr. Henri Deluca will ambulate with SUPERVISION for 500+ feet with the least restrictive device within 7 treatment day(s) while maintaining stable O2 sat.     ________________________________________________________________________________________________     Outcome: Progressing Towards Goal       PHYSICAL THERAPY: Initial Assessment and PM 1/1/2020  INPATIENT: PT Visit Days : 1  Payor: Merediht Richards / Plan: [address] HMO / Product Type: [employer] /       NAME/AGE/GENDER: Lainey Cortes is a 70 y.o. male   PRIMARY DIAGNOSIS: Pneumonia [J18.9] Pneumonia of left upper lobe due to infectious organism ([facility] Pneumonia of left upper lobe due to infectious organism [facility])        ICD-10: Treatment Diagnosis:    Generalized Muscle Weakness (M62.81)  Difficulty in walking, Not elsewhere classified (R26.2)   Precaution/Allergies:  Patient has no known allergies. ASSESSMENT:     Mr. Henri Deluca is supine in bed upon contact and agreeable to PT evaluation and treatment this afternoon. Pt with reports of 0/10 pain prior to mobility. Pt currently on 1L O2 NC but does not require supplemental O2 at baseline with O2 sat at 93%. Pt lives alone in 1 story home with 1 step to enter. Pt is independent with gait and ADLs, drives, and 0 falls. Pt reports having access to RW at discharge if needed. Pt transitioned supine to sit EOB with SBA-supervision. Pt performed STS to RW with CGA-Fer. Pt ambulated 120 ft in hallway with RW and CGA. Pt ambulates with narrowed PERLA and shuffling gait pattern.  Pt returned to room and sitting up in bedside chair with O2 sat at 91%. Pt returned to 1L O2 and left sitting up with all needs met and within reach. Jacquelyn Mendoza will benefit from skilled PT (medically necessary) to address decreased strength, decreased balance, decreased functional tolerance, decreased cardiopulmonary endurance affecting participation in basic ADLs and functional tasks. This section established at most recent assessment   PROBLEM LIST (Impairments causing functional limitations):  Decreased Strength  Decreased ADL/Functional Activities  Decreased Transfer Abilities  Decreased Ambulation Ability/Technique  Decreased Balance  Decreased Activity Tolerance  Decreased Pacing Skills  Increased Fatigue  Increased Shortness of Breath  Decreased [place] with Home Exercise Program   INTERVENTIONS PLANNED: (Benefits and precautions of physical therapy have been discussed with the patient.)  Balance Exercise  Bed Mobility  Family Education  Gait Training  Home Exercise Program (HEP)  Neuromuscular Re-education/Strengthening  Therapeutic Activites  Therapeutic Exercise/Strengthening  Transfer Training     TREATMENT PLAN: Frequency/Duration: 3 times a week for duration of hospital stay  Rehabilitation Potential For Stated Goals: Good     REHAB RECOMMENDATIONS (at time of discharge pending progress):    Placement: It is my opinion, based on this patient's performance to date, that Mr. Ashish Joel may benefit from [address] after discharge due to the functional deficits listed above that are likely to improve with skilled rehabilitation because he/she has multiple medical issues that affect his/her functional mobility in the community.   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 Patient had a bronchoscopy and biopsy in November 2019 and diagnosed with lung cancer. He was having cough which has gradually worsened over the last 1 month, has become moderate in severity in the last few days, worse with exertion, no specific relieving factors, associated with blood-tinged sputum over the last several days. Blood in the sputum is worsening over the last few days. He was also having shortness of breath which is also progressively getting worse, moderate in severity, worse with exertion, better with rest.  He was having left upper anterior chest pain with coughing. No fevers. No diarrhea. Nausea on and off. Decreased appetite and has not been eating or drinking much in the last 1 month. Complains of generalized weakness, weight loss in the last 1 month. He has noticed increasing swelling in both lower extremities over the last few days. Patient denies any burning with urination. No abdominal pain. Past Medical History/Comorbidities:   Mr. Deborah Gutiérrez  has a past medical history of COPD, Diabetes ([address] HTN (hypertension) (4/29/2015), Malignant neoplasm of upper lobe of left lung ([address] (12/12/2019), and Other ill-defined conditions(799.89). He also has no past medical history of Arrhythmia, Asthma, Autoimmune disease ([address] CAD (coronary artery disease), Chronic kidney disease, Difficult intubation, GERD (gastroesophageal reflux disease), Heart failure ([address] Liver disease, Malignant hyperthermia due to anesthesia, Nausea & vomiting, Neurological disorder, Pseudocholinesterase deficiency, Psychiatric disorder, PUD (peptic ulcer disease), Seizures ([address] Sleep apnea, Stroke ([address] Thromboembolus ([address] or Thyroid disease. Mr. Deborah Gutiérrez  has a past surgical history that includes pr abdomen surgery proc unlisted (5/2015) and hx orthopaedic.   Social History/Living Environment:   Home Environment: Private residence  # Steps to Enter: 1  One/Two Story Residence: One story  Living Alone: Yes  Support Systems: Family member(s), Friends \ neighbors  Patient Expects to be Discharged to[de-identified] Private residence  Current DME Used/Available at Home: Betty Morin  Prior Level of Function/Work/Activity:  Independent with gait and ADLs, 0 falls, drives   Number of Personal Factors/Comorbidities that affect the Plan of Care: 3+: HIGH COMPLEXITY   EXAMINATION:   Most Recent Physical Functioning:   Gross Assessment:  AROM: Within functional limits  Strength: Generally decreased, functional  Coordination: Generally decreased, functional               Posture:     Balance:  Sitting: Intact; Without support  Standing: Impaired;Pull to stand; With support  Standing - Static: Good;Constant support  Standing - Dynamic : Fair;Constant support Bed Mobility:  Supine to Sit: Supervision  Wheelchair Mobility:     Transfers:  Sit to Stand: Contact guard assistance;Minimum assistance  Stand to Sit: Contact guard assistance  Gait:     Base of Support: Narrowed  Speed/Kate: Slow;Shuffled  Step Length: Left shortened;Right shortened  Gait Abnormalities: Decreased step clearance;Shuffling gait  Distance (ft): 120 Feet (ft)  Assistive Device: Walker, rolling  Ambulation - Level of Assistance: Contact guard assistance  Interventions: Safety awareness training; Tactile cues; Verbal cues      Body Structures Involved:  Lungs  Bones  Muscles Body Functions Affected:  Cardio  Respiratory  Neuromusculoskeletal  Movement Related Activities and Participation Affected:  General Tasks and Demands  Mobility  Self Care  Domestic Life  Interpersonal Interactions and Relationships  Community, Social and Civic Life   Number of elements that affect the Plan of Care: 4+: HIGH COMPLEXITY   CLINICAL PRESENTATION:   Presentation: Evolving clinical presentation with changing clinical characteristics: [address] [place] [address] AM-PAC 6 Clicks   Basic Mobility Inpatient Short Form  How much difficulty does the patient currently have. .. Unable A Lot A Little None   1. Turning over in bed (including adjusting bedclothes, sheets and blankets)? [] 1   [] 2   [] 3   [x] 4   2. Sitting down on and standing up from a chair with arms ( e.g., wheelchair, bedside commode, etc.)   [] 1   [] 2   [x] 3   [] 4   3. Moving from lying on back to sitting on the side of the bed? [] 1   [] 2   [x] 3   [] 4   How much help from another person does the patient currently need. .. Total A Lot A Little None   4. Moving to and from a bed to a chair (including a wheelchair)? [] 1   [] 2   [x] 3   [] 4   5. Need to walk in hospital room? [] 1   [] 2   [x] 3   [] 4   6. Climbing 3-5 steps with a railing? [] 1   [] 2   [x] 3   [] 4   © , Trustees of [address] [place] [address], under license to [employer]. All rights reserved      Score:  Initial:19 Most Recent: X (Date: -- )    Interpretation of Tool:  Represents activities that are increasingly more difficult (i.e. Bed mobility, Transfers, Gait). Medical Necessity:     Patient is expected to demonstrate progress in   strength, balance, coordination, and functional technique   to   decrease assistance required with gait, transfers, and functional mobility. .  Reason for Services/Other Comments:  Patient continues to require skilled intervention due to   decreased strength, decreased balance, decreased functional tolerance, decreased cardiopulmonary endurance affecting participation in basic ADLs and functional tasks. .   Use of outcome tool(s) and clinical judgement create a POC that gives a: Clear prediction of patient's progress: LOW COMPLEXITY            TREATMENT:   (In addition to Assessment/Re-Assessment sessions the following treatments were rendered)   Pre-treatment Symptoms/Complaints:   \"Im feeling better\"  Pain: Initial:   Pain Intensity 1: 0  Post Session:  0/10     Therapeutic Activity: (    8 minutes): Therapeutic activities including Bed transfers, Chair transfers, and Ambulation on level ground to improve mobility, strength, balance, and coordination. Required minimal Safety awareness training; Tactile cues; Verbal cues to promote static and dynamic balance in standing and promote coordination of bilateral, lower extremity(s). Braces/Orthotics/Lines/Etc:   IV  O2 Device: Nasal cannula  Treatment/Session Assessment:    Response to Treatment:  amb 120 ft with RW and CGA  Interdisciplinary Collaboration:   Physical Therapist  Registered Nurse  After treatment position/precautions:   Up in chair  Bed/Chair-wheels locked  Bed in low position  Call light within reach  RN notified   Compliance with Program/Exercises: Will assess as treatment progresses  Recommendations/Intent for next treatment session: \"Next visit will focus on advancements to more challenging activities and reduction in assistance provided\".   Total Treatment Duration:  PT Patient Time In/Time Out  Time In: 1307  Time Out: [address]

## 2020-01-01 NOTE — PROGRESS NOTES
Appetite good this shift. Scant amt hemoptysis today just small amt pink tinged sputum. Remains alert, oriented x4, short memory span. Rails up x3, door open.  Aware to call for asst oxygen remains on via n/c at 2 lpm.

## 2020-01-01 NOTE — CONSULTS
CONSULT NOTE    Martha Al    1/1/2020    Date of Admission:  12/31/2019    The patient's chart is reviewed and the patient is discussed with the staff. Subjective:     Patient is a 70 y.o.  male seen and evaluated at the request of Dr. Triny Quinonez. 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 Patient had a bronchoscopy and biopsy in November 2019 and diagnosed with lung cancer. He was having cough which has gradually worsened over the last 1 month, has become moderate in severity in the last few days, worse with exertion, no specific relieving factors, associated with blood-tinged sputum over the last several days. Blood in the sputum is worsening over the last few days. He was also having shortness of breath which is also progressively getting worse, moderate in severity, worse with exertion, better with rest.  He was having left upper anterior chest pain with coughing. No fevers , chills, diarrhea, but has nausea on and off. He also complains of poor appetite, generalized weakness, weight loss in the last month. He has noticed increasing swelling in both lower extremities over the last few days. Patient denies any burning with urination. No abdominal pain. Patient was seen by Dr. Yasmin Cazares earlier last month and he was a scheduled tentatively for left VAT needle localization with segmentectomy and mediastinal lymphadenectomy. We were asked to assist in his management. Review of Systems  A comprehensive review of systems was negative except for that written in the HPI.     Patient Active Problem List   Diagnosis Code    SBO (small bowel obstruction) ([facility] K56.609    Type II or unspecified type diabetes mellitus with other specified manifestations, not stated as uncontrolled E11.69    Hyperglycemia R73.9    Abdominal pain R10.9    HTN (hypertension) I10    Hyperlipidemia E78.5    Tobacco abuse Z72.0    Anxiety disorder F41.9    High transaminase levels R74.0    Abnormal finding of kidney R39.9    Internal hernia K45.8    Lung nodule R91.1    Malignant neoplasm of upper lobe of left lung (HCC) C34.12    Pneumonia of left upper lobe due to infectious organism (HCC) J18.1    Cough with hemoptysis R04.2       Home [employer] none. Prior to Admission Medications   Prescriptions Last Dose Informant Patient Reported? Taking? ATIVAN 1 mg Tab  Self Yes No   Sig: Take 1 mg by mouth every eight (8) hours as needed. INSULIN REGULAR HUMAN SC  Self Yes No   Si Units by SubCUTAneous route three (3) times daily. As needed based on BGL   LANTUS 100 unit/mL injection   Yes No   Si Units nightly. LIPITOR 10 mg Tab  Self Yes No   Sig: Take 10 mg by mouth daily. PLAVIX PO  Self Yes No   Sig: take 75 mg by mouth daily. WELLBUTRIN PO  Self Yes No   Sig: take 150 mg by mouth two (2) times a day. aspirin delayed-release 81 mg tablet   Yes No   Sig: Take 81 mg by mouth daily. lisinopril (PRINIVIL, ZESTRIL) 5 mg tablet  Self Yes No   Sig: take 5 mg by mouth daily. losartan (COZAAR) 25 mg tablet   Yes No   Sig: Take 25 mg by mouth daily. magnesium oxide (MAG-OX) 400 mg tablet   Yes No   Sig: Take 400 mg by mouth daily. metoprolol tartrate (LOPRESSOR) 25 mg tablet   Yes No   Sig: Take 25 mg by mouth two (2) times a day.       Facility-Administered Medications: None       Past Medical History:   Diagnosis Date    COPD     [facility]     HTN (hypertension) 2015    Malignant neoplasm of upper lobe of left lung ([facility] 2019    Other ill-defined conditions(799.89)     cholesterol     Past Surgical History:   Procedure Laterality Date    ABDOMEN SURGERY PROC UNLISTED  2015    explor lap    HX ORTHOPAEDIC bilateral shoulder repairs, both knees repaired-no hardware     Social History     Socioeconomic History    Marital status: SINGLE     Spouse name: Not on file    Number of children: Not on file    Years of education: Not on file    Highest education level: Not on file   Occupational History    Not on file   Social Needs    Financial resource strain: Not on file    Food insecurity:     Worry: Not on file     Inability: Not on file    Transportation needs:     Medical: Not on file     Non-medical: Not on file   Tobacco Use    Smoking status: Current Every Day Smoker     Packs/day: 1.00     Years: 40.00     Pack years: 40.00     Types: Cigarettes    Smokeless tobacco: Never Used   Substance and Sexual Activity    Alcohol use: No    Drug use: No    Sexual activity: Not on file   Lifestyle    Physical activity:     Days per week: Not on file     Minutes per session: Not on file    Stress: Not on file   Relationships    Social connections:     Talks on phone: Not on file     Gets together: Not on file     Attends Restorationist service: Not on file     Active member of club or organization: Not on file     Attends meetings of clubs or organizations: Not on file     Relationship status: Not on file    Intimate partner violence:     Fear of current or ex partner: Not on file     Emotionally abused: Not on file     Physically abused: Not on file     Forced sexual activity: Not on file   Other Topics Concern    Not on file   Social History Narrative    Not on file     History reviewed. No pertinent family history.   No Known Allergies    Current Facility-Administered Medications   Medication Dose Route Frequency    atorvastatin (LIPITOR) tablet 10 mg  10 mg Oral QHS    insulin lispro (HUMALOG) injection 4 Units  4 Units SubCUTAneous TID WITH MEALS    [Held by provider] insulin glargine (LANTUS) injection 24 Units  24 Units SubCUTAneous QHS    cefTRIAXone (ROCEPHIN) 2 g in 0.9% sodium chloride (MBP/ADV) 50 mL 2 g IntraVENous Q24H    dronabinol (MARINOL) capsule 5 mg  5 mg Oral BID    albuterol-ipratropium (DUO-NEB) 2.5 MG-0.5 MG/3 ML  3 mL Nebulization Q4H PRN    guaiFENesin ER (MUCINEX) tablet 600 mg  600 mg Oral Q12H    benzonatate (TESSALON) capsule 100 mg  100 mg Oral TID PRN    guaiFENesin-codeine (ROBITUSSIN AC) 100-10 mg/5 mL solution 5 mL  5 mL Oral Q4H PRN    insulin lispro (HUMALOG) injection   SubCUTAneous AC&HS    hydrALAZINE (APRESOLINE) 20 mg/mL injection 10 mg  10 mg IntraVENous Q6H PRN    LORazepam (ATIVAN) tablet 0.5 mg  0.5 mg Oral Q8H PRN    [Held by provider] losartan (COZAAR) tablet 25 mg  25 mg Oral DAILY    magnesium oxide (MAG-OX) tablet 400 mg  400 mg Oral DAILY    [Held by provider] metoprolol tartrate (LOPRESSOR) tablet 25 mg  25 mg Oral BID    0.9% sodium chloride infusion  50 mL/hr IntraVENous CONTINUOUS    sodium chloride (NS) flush 5-40 mL  5-40 mL IntraVENous Q8H    sodium chloride (NS) flush 5-40 mL  5-40 mL IntraVENous PRN    acetaminophen (TYLENOL) tablet 650 mg  650 mg Oral Q4H PRN    HYDROcodone-acetaminophen (NORCO) 5-325 mg per tablet 1 Tab  1 Tab Oral Q4H PRN    morphine injection 2 mg  2 mg IntraVENous Q4H PRN    naloxone (NARCAN) injection 0.4 mg  0.4 mg IntraVENous PRN    diphenhydrAMINE (BENADRYL) capsule 25 mg  25 mg Oral Q4H PRN    ondansetron (ZOFRAN) injection 4 mg  4 mg IntraVENous Q4H PRN    magnesium hydroxide (MILK OF MAGNESIA) 400 mg/5 mL oral suspension 30 mL  30 mL Oral DAILY PRN    senna-docusate (PERICOLACE) 8.6-50 mg per tablet 2 Tab  2 Tab Oral DAILY    arformoterol (BROVANA) neb solution 15 mcg  15 mcg Nebulization BID RT    tiotropium (SPIRIVA) inhalation capsule 18 mcg  1 Cap Inhalation DAILY    phenol throat spray (CHLORASEPTIC) 1 Spray  1 Spray Oral PRN    mirtazapine (REMERON) tablet 15 mg  15 mg Oral QHS    ferrous sulfate tablet 325 mg  1 Tab Oral BID WITH MEALS    azithromycin (ZITHROMAX) 500 mg in 0.9% sodium chloride (MBP/ADV) 250 mL  500 mg IntraVENous Q24H    metroNIDAZOLE (FLAGYL) IVPB premix 500 mg  500 mg IntraVENous Q12H         Objective:     Vitals:    20 0232 20 0743 20 0824 20 1144   BP: 104/61 107/57  127/63   Pulse: (!) 59 89  (!) 102   Resp:  18  18   Temp: 98.1 °F (36.7 °C) 98.2 °F (36.8 °C)  98 °F (36.7 °C)   SpO2: 96% 96% 95% 93%   Weight:       Height:           PHYSICAL EXAM     Constitutional:  the patient is well developed and in no acute distress  EENMT:  Sclera clear, pupils equal, oral mucosa moist  Respiratory: Diminished breath sound bilaterally  Cardiovascular:  RRR without M,G,R  Gastrointestinal: soft and non-tender; with positive bowel sounds. Musculoskeletal: warm without cyanosis. There is 1+ lower extremity edema. Skin:  no jaundice or rashes, no wounds   Neurologic: no gross neuro deficits     Psychiatric:  alert and oriented x 3    CXR:            Recent Labs     20  0913 19  1241   WBC 10.1 13.0*   HGB 11.4* 11.6*   HCT 32.9* 33.0*    408   INR 1.1  --      Recent Labs     20  0913 19  1241    132*   K 4.1 4.5    96*   * 191*   CO2 28 29   BUN 10 11   CREA 1.09 1.27   MG 1.7*  --    PHOS 2.2*  --    CA 8.6 8.7   ALB 1.7* 1.9*   TBILI 0.3 0.4   ALT 20 23   SGOT 29 37       PFT:      10/25/2019              COMPLETE PULMONARY FUNCTION STUDY    SPIROMETRY:    FVC 3.32 L or 69% of predicted  FEV1   2.04 L or 57% of predicted  FEV1/FVC was 61 %. FEF 25-75 was 33 % of predicted. The flow volume loop reveals expiratory flow obstruction. LUNG VOLUMES:  Obtained by plethysmography.     Total Lung Capacity: 109% of predicted  FRC:   151% of predicted  Residual Volume:   165% of predicted  RV/T-increased    DIFFUSION CAPACITY:    Diffusion Capacity: 45% of predicted  DLCO corrected for alveolar volume: 55% of predicted    IMPRESSION:    Spirometry is consistent with a moderately severe obstructive/restrictive ventilatory defect.  Lung volumes were increased consistent with air trapping.  The diffusion capacity corrected for alveolar volume was decreased suggesting loss of gas exchanging lung units.  This is study is consistent with a diagnosis of emphysema.  Clinical correlation is needed. No results for input(s): PH, PCO2, PO2, HCO3, PHI, PCO2I, PO2I, HCO3I in the last 72 hours. No results for input(s): LCAD, LAC in the last 72 hours. Assessment:  (Medical Decision Making)     Hospital Problems  Date Reviewed: 12/10/2019          Codes Class Noted POA    Cough with hemoptysis ICD-10-CM: R04.2  ICD-9-CM: 786.39  1/1/2020 Yes        * (Principal) Pneumonia of left upper lobe due to infectious organism [facility]) ICD-10-CM: J18.1  ICD-9-CM: 141  12/31/2019 Yes    Overview Signed 1/1/2020 12:23 PM by Raj Hardin MD     Presumptive non-small cell lung cancer stage IA3 based on the bronchoscopy finding done last month             Malignant neoplasm of upper lobe of left lung ([facility] ICD-10-CM: C34.12  ICD-9-CM: 162.3  12/12/2019 Yes        Tobacco abuse (Chronic) ICD-10-CM: Z72.0  ICD-9-CM: 305.1  4/29/2015 Yes              Plan:  (Medical Decision Making)     Continue bronchodilators  Continue antibiotic coverage for likely postobstructive pneumonia. Can stop Flagyl  Hold antihypertensive medication   surgery consult in a.m. to consider doing surgical resection sooner  Follow his CBC and replace his magnesium  Follow his labs  DVT and GI prophylaxis      More than 50% of the time documented was spent in face-to-face contact with the patient and in the care of the patient on the floor/unit where the patient is located. Thank you very much for this referral.  We appreciate the opportunity to participate in this patient's care. Will follow along with above stated plan.     Yesy Melendez MD

## 2020-01-01 NOTE — PROGRESS NOTES
Progress Note      Patient: Lainey Cortes               Sex: male          MRN: [id number]           YOB: 1940      Age:  70 y.o. 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 2020: Cough is improving. Very minimal hemoptysis today. Breathing is also improving. No chest pain or abdominal pain or nausea or vomitings. Eating better today. Objective:   Physical Exam:   Visit Vitals  /63   Pulse (!) 102   Temp 98 °F (36.7 °C)   Resp 18   Ht 6' 1\" (1.854 m)   Wt 72.2 kg (159 lb 1.6 oz)   SpO2 93%   BMI 20.99 kg/m²      Temp (24hrs), Av.1 °F (36.7 °C), Min:98 °F (36.7 °C), Max:98.2 °F (36.8 °C)    Oxygen Therapy  O2 Sat (%): 93 % (20 1144)  Pulse via Oximetry: 77 beats per minute (19)  O2 Device: Nasal cannula (20)  O2 Flow Rate (L/min): 1.5 l/min (20)    Intake/Output Summary (Last 24 hours) at 2020 1252  Last data filed at 2020 1145  Gross per 24 hour   Intake 118 ml   Output 1525 ml   Net -1407 ml      General:          Conscious, No acute distress  Eyes:               LISA, No pallor/icterus                      HENT:             Oral Mucosa is dry, No sinus tenderness  Neck:               Supple, No JVD  Lungs:             CTA Bilaterally, No significant wheezing  Heart:              S1 S2 regular  Abdomen:        Soft, Positive bowel sounds, NTND, No guarding/rigidity/rebound tend. Extremities:     Bilateral pitting edema in the lower extremities.   Neurologic:  AAOX2, No acute FND, Motor: LUE: 5/5, LLE: 5/5, RUE: 5/5, RLE: 5/5  Skin:                No acute rashes  Musculoskeletal: No Acute findings  Psych:             Mood is appropriate. LAB  Recent Results (from the past 24 hour(s))   CULTURE, BLOOD    Collection Time: 12/31/19  7:57 PM   Result Value Ref Range    Special Requests: RIGHT  HAND        Culture result: NO GROWTH AFTER 10 HOURS     CULTURE, BLOOD    Collection Time: 12/31/19  7:58 PM   Result Value Ref Range    Special Requests: RIGHT  HAND        Culture result: NO GROWTH AFTER 10 HOURS     GLUCOSE, POC    Collection Time: 12/31/19  8:48 PM   Result Value Ref Range    Glucose (POC) 396 (H) 65 - 100 mg/dL   GLUCOSE, POC    Collection Time: 01/01/20  5:41 AM   Result Value Ref Range    Glucose (POC) 188 (H) 65 - 100 mg/dL   CBC WITH AUTOMATED DIFF    Collection Time: 01/01/20  9:13 AM   Result Value Ref Range    WBC 10.1 4.3 - 11.1 K/uL    RBC 4.08 (L) 4.23 - 5.6 M/uL    HGB 11.4 (L) 13.6 - 17.2 g/dL    HCT 32.9 (L) 41.1 - 50.3 %    MCV 80.6 79.6 - 97.8 FL    MCH 27.9 26.1 - 32.9 PG    MCHC 34.7 31.4 - 35.0 g/dL    RDW 13.6 11.9 - 14.6 %    PLATELET 786 290 - 082 K/uL    MPV 9.9 9.4 - 12.3 FL    ABSOLUTE NRBC 0.00 0.0 - 0.2 K/uL    DF AUTOMATED      NEUTROPHILS 67 43 - 78 %    LYMPHOCYTES 24 13 - 44 %    MONOCYTES 9 4.0 - 12.0 %    EOSINOPHILS 0 (L) 0.5 - 7.8 %    BASOPHILS 1 0.0 - 2.0 %    IMMATURE GRANULOCYTES 1 0.0 - 5.0 %    ABS. NEUTROPHILS 6.7 1.7 - 8.2 K/UL    ABS. LYMPHOCYTES 2.4 0.5 - 4.6 K/UL    ABS. MONOCYTES 0.9 0.1 - 1.3 K/UL    ABS. EOSINOPHILS 0.0 0.0 - 0.8 K/UL    ABS. BASOPHILS 0.1 0.0 - 0.2 K/UL    ABS. IMM.  GRANS. 0.1 0.0 - 0.5 K/UL   METABOLIC PANEL, COMPREHENSIVE    Collection Time: 01/01/20  9:13 AM   Result Value Ref Range    Sodium 137 136 - 145 mmol/L    Potassium 4.1 3.5 - 5.1 mmol/L    Chloride 103 98 - 107 mmol/L    CO2 28 21 - 32 mmol/L    Anion gap 6 (L) 7 - 16 mmol/L    Glucose 201 (H) 65 - 100 mg/dL    BUN 10 8 - 23 MG/DL Creatinine 1.09 0.8 - 1.5 MG/DL    GFR est AA >60 >60 ml/min/1.73m2    GFR est non-AA >60 >60 ml/min/1.73m2    Calcium 8.6 8.3 - 10.4 MG/DL    Bilirubin, total 0.3 0.2 - 1.1 MG/DL    ALT (SGPT) 20 12 - 65 U/L    AST (SGOT) 29 15 - 37 U/L    Alk. phosphatase 219 (H) 50 - 136 U/L    Protein, total 6.7 6.3 - 8.2 g/dL    Albumin 1.7 (L) 3.2 - 4.6 g/dL    Globulin 5.0 (H) 2.3 - 3.5 g/dL    A-G Ratio 0.3 (L) 1.2 - 3.5     PROTHROMBIN TIME + INR    Collection Time: 01/01/20  9:13 AM   Result Value Ref Range    Prothrombin time 14.7 (H) 11.7 - 14.5 sec    INR 1.1     PTT    Collection Time: 01/01/20  9:13 AM   Result Value Ref Range    aPTT 30.1 24.7 - 39.8 SEC   HEMOGLOBIN A1C WITH EAG    Collection Time: 01/01/20  9:13 AM   Result Value Ref Range    Hemoglobin A1c 10.3 (H) 4.8 - 6.0 %    Est. average glucose 249 mg/dL   VITAMIN B12    Collection Time: 01/01/20  9:13 AM   Result Value Ref Range    Vitamin B12 518 193 - 986 pg/mL   RETICULOCYTE COUNT    Collection Time: 01/01/20  9:13 AM   Result Value Ref Range    Reticulocyte count 2.7 (H) 0.3 - 2.0 %    Absolute Retic Cnt. 0.1098 (H) 0.026 - 0.095 M/ul    Immature Retic Fraction 22.9 (H) 2.3 - 13.4 %    Retic Hgb Conc. 33 29 - 35 pg   MAGNESIUM    Collection Time: 01/01/20  9:13 AM   Result Value Ref Range    Magnesium 1.7 (L) 1.8 - 2.4 mg/dL   PHOSPHORUS    Collection Time: 01/01/20  9:13 AM   Result Value Ref Range    Phosphorus 2.2 (L) 2.3 - 3.7 MG/DL   GLUCOSE, POC    Collection Time: 01/01/20 11:09 AM   Result Value Ref Range    Glucose (POC) 372 (H) 65 - 100 mg/dL       Xr Chest Pa Lat    Result Date: 12/31/2019  PA LATERAL CHEST  12/31/2019 3:29 PM HISTORY:  hemoptysis COMPARISON: July 26, 2016 chest radiograph and PET/CT October 10, 2019 FINDINGS:  The heart size is within normal limits. There is a cavitary mass in the left upper lobe containing air-fluid level. There is surrounding consolidation. There is minimal consolidation in the left lung base as well. Pleural spaces are clear. IMPRESSION: 1. Cavitary left upper lobe lesion consistent with a lung tumor. There is surrounding consolidation in the left upper lobe. 2. Minimal consolidation in the left lung base. 689    Duplex Lower Ext Venous Bilat    Result Date: 1/1/2020  Bilateral lower extremity venous ultrasound INDICATION:  Pain and swelling, Doppler ultrasound of both lower extremities was performed. FINDINGS:  There is normal flow in the greater saphenous, common femoral, superficial femoral, and popliteal veins. Normal compression and augmentation is demonstrated. The proximal calf veins are also patent. IMPRESSION: No evidence of deep venous thrombosis in either lower extremity       Xr Chest Pa Lat    Result Date: 12/31/2019  IMPRESSION: 1. Cavitary left upper lobe lesion consistent with a lung tumor. There is surrounding consolidation in the left upper lobe. 2. Minimal consolidation in the left lung base.  689    Duplex Lower Ext Venous Bilat    Result Date: 1/1/2020  IMPRESSION: No evidence of deep venous thrombosis in either lower extremity       All Micro Results     Procedure Component Value Units Date/Time    CULTURE, BLOOD [[id number]] Collected:  12/31/19 1957    Order Status:  Completed Specimen:  Blood Updated:  01/01/20 0631     Special Requests: --        RIGHT  HAND       Culture result: NO GROWTH AFTER 10 HOURS       CULTURE, BLOOD [[id number]] Collected:  12/31/19 1958    Order Status:  Completed Specimen:  Blood Updated:  01/01/20 0631     Special Requests: --        RIGHT  HAND       Culture result: NO GROWTH AFTER 10 HOURS       CULTURE, RESPIRATORY/SPUTUM/BRONCH Starr Rude STAIN [[id number]]     Order Status:  Sent Specimen:  Sputum     MSSA/MRSA SC BY PCR, NASAL SWAB [[id number]]     Order Status:  Sent Specimen:  Nasal swab           Current Medications Reviewed      Assessment/Plan     Principal Problem:    Pneumonia of left upper lobe due to infectious organism ([facility] [address] (12/31/2019) Overview: Presumptive non-small cell lung cancer stage IA3 based on the bronchoscopy       finding done last month    Active Problems:    Tobacco abuse (4/29/2015)      Malignant neoplasm of upper lobe of left lung ([address] (12/12/2019)      Cough with hemoptysis (1/1/2020)        1. 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 Patient also has small left lower lobe consolidation. Patient has small amount of hemoptysis which could be from pneumonia or could be from underlying cancer. Seen by pulmonology in consultation. Recommended to continue him on empiric IV ceftriaxone, azithromycin. Will check MRSA screen. He received 1 dose of IV vancomycin in the emergency room. Placed him on bronchodilators, guaifenesin, monitor. Spoke to the pulmonologist today, recommended to continue antibiotics and consult general surgery, Dr. Army Linda to evaluate for surgical resection of the tumor sooner than later. 2.  Bilateral lower extremity edema: Could be secondary to severe hypoalbuminemia from protein calorie malnutrition. He had a cardiac cath in August 2019 and showed normal EF and no obstructive coronary artery disease. No DVT in lower extremities. Apply compression stockings to help with edema. 3.  Submassive hemoptysis. Hemoglobin is stable. Will hold aspirin and Plavix, monitor hemoglobin. Pulmonology consulted.     4.  Severe protein calorie malnutrition, secondary to anorexia, malignancy. Nutritionist consulted. Glucerna ordered. Continue on Marinol to help boost appetite.     5. Hypertension: Continue home blood pressure medications and monitor. Blood pressure is stable.     6.  Diabetes type 2, insulin-dependent. With hyperglycemia, seems to be uncontrolled. A1c is 10.3. Adjust insulin regimen, monitor.       7. CKD stage III     8. Possibly anemia of chronic disease with subacute blood loss anemia. Iron deficiency anemia.   Started on iron replacement. 9.  Mild hyponatremia. Sodium level improved with fluids.      DVT prophylaxis: SCDs  Code status: Full  Risk: High risk patient secondary to lung cancer, at risk for respiratory failure and sepsis.     Priya Lau MD  January 1, 2020

## 2020-01-02 ENCOUNTER — APPOINTMENT (OUTPATIENT)
Dept: CT IMAGING | Age: 80
DRG: 193 | End: 2020-01-02
Attending: SURGERY
Payer: MEDICARE

## 2020-01-02 LAB
ANION GAP SERPL CALC-SCNC: 7 MMOL/L (ref 7–16)
BASOPHILS # BLD: 0 K/UL (ref 0–0.2)
BASOPHILS NFR BLD: 0 % (ref 0–2)
BUN SERPL-MCNC: 7 MG/DL (ref 8–23)
CALCIUM SERPL-MCNC: 8.1 MG/DL (ref 8.3–10.4)
CHLORIDE SERPL-SCNC: 102 MMOL/L (ref 98–107)
CO2 SERPL-SCNC: 27 MMOL/L (ref 21–32)
CREAT SERPL-MCNC: 1.01 MG/DL (ref 0.8–1.5)
DIFFERENTIAL METHOD BLD: ABNORMAL
EOSINOPHIL # BLD: 0.1 K/UL (ref 0–0.8)
EOSINOPHIL NFR BLD: 1 % (ref 0.5–7.8)
ERYTHROCYTE [DISTWIDTH] IN BLOOD BY AUTOMATED COUNT: 13.8 % (ref 11.9–14.6)
GLUCOSE BLD STRIP.AUTO-MCNC: 133 MG/DL (ref 65–100)
GLUCOSE BLD STRIP.AUTO-MCNC: 157 MG/DL (ref 65–100)
GLUCOSE BLD STRIP.AUTO-MCNC: 165 MG/DL (ref 65–100)
GLUCOSE BLD STRIP.AUTO-MCNC: 257 MG/DL (ref 65–100)
GLUCOSE BLD STRIP.AUTO-MCNC: 69 MG/DL (ref 65–100)
GLUCOSE SERPL-MCNC: 155 MG/DL (ref 65–100)
HCT VFR BLD AUTO: 29.8 % (ref 41.1–50.3)
HGB BLD-MCNC: 10.2 G/DL (ref 13.6–17.2)
IMM GRANULOCYTES # BLD AUTO: 0.1 K/UL (ref 0–0.5)
IMM GRANULOCYTES NFR BLD AUTO: 1 % (ref 0–5)
LYMPHOCYTES # BLD: 2.4 K/UL (ref 0.5–4.6)
LYMPHOCYTES NFR BLD: 26 % (ref 13–44)
MAGNESIUM SERPL-MCNC: 1.6 MG/DL (ref 1.8–2.4)
MCH RBC QN AUTO: 27.9 PG (ref 26.1–32.9)
MCHC RBC AUTO-ENTMCNC: 34.2 G/DL (ref 31.4–35)
MCV RBC AUTO: 81.4 FL (ref 79.6–97.8)
MONOCYTES # BLD: 0.9 K/UL (ref 0.1–1.3)
MONOCYTES NFR BLD: 10 % (ref 4–12)
NEUTS SEG # BLD: 5.5 K/UL (ref 1.7–8.2)
NEUTS SEG NFR BLD: 62 % (ref 43–78)
NRBC # BLD: 0 K/UL (ref 0–0.2)
PHOSPHATE SERPL-MCNC: 2.9 MG/DL (ref 2.3–3.7)
PLATELET # BLD AUTO: 382 K/UL (ref 150–450)
PMV BLD AUTO: 9.9 FL (ref 9.4–12.3)
POTASSIUM SERPL-SCNC: 3.9 MMOL/L (ref 3.5–5.1)
RBC # BLD AUTO: 3.66 M/UL (ref 4.23–5.6)
SODIUM SERPL-SCNC: 136 MMOL/L (ref 136–145)
WBC # BLD AUTO: 8.9 K/UL (ref 4.3–11.1)

## 2020-01-02 PROCEDURE — 74011000250 HC RX REV CODE- 250: Performed by: HOSPITALIST

## 2020-01-02 PROCEDURE — 65270000029 HC RM PRIVATE

## 2020-01-02 PROCEDURE — 84100 ASSAY OF PHOSPHORUS: CPT

## 2020-01-02 PROCEDURE — 74011250636 HC RX REV CODE- 250/636: Performed by: HOSPITALIST

## 2020-01-02 PROCEDURE — 85025 COMPLETE CBC W/AUTO DIFF WBC: CPT

## 2020-01-02 PROCEDURE — 77010033678 HC OXYGEN DAILY

## 2020-01-02 PROCEDURE — 80048 BASIC METABOLIC PNL TOTAL CA: CPT

## 2020-01-02 PROCEDURE — 94640 AIRWAY INHALATION TREATMENT: CPT

## 2020-01-02 PROCEDURE — 99232 SBSQ HOSP IP/OBS MODERATE 35: CPT | Performed by: INTERNAL MEDICINE

## 2020-01-02 PROCEDURE — 82962 GLUCOSE BLOOD TEST: CPT

## 2020-01-02 PROCEDURE — 71250 CT THORAX DX C-: CPT

## 2020-01-02 PROCEDURE — 74011250637 HC RX REV CODE- 250/637: Performed by: HOSPITALIST

## 2020-01-02 PROCEDURE — 74011250636 HC RX REV CODE- 250/636: Performed by: INTERNAL MEDICINE

## 2020-01-02 PROCEDURE — 94760 N-INVAS EAR/PLS OXIMETRY 1: CPT

## 2020-01-02 PROCEDURE — 87070 CULTURE OTHR SPECIMN AEROBIC: CPT

## 2020-01-02 PROCEDURE — 87106 FUNGI IDENTIFICATION YEAST: CPT

## 2020-01-02 PROCEDURE — 36415 COLL VENOUS BLD VENIPUNCTURE: CPT

## 2020-01-02 PROCEDURE — 83735 ASSAY OF MAGNESIUM: CPT

## 2020-01-02 PROCEDURE — 74011000258 HC RX REV CODE- 258: Performed by: HOSPITALIST

## 2020-01-02 PROCEDURE — 74011636637 HC RX REV CODE- 636/637: Performed by: HOSPITALIST

## 2020-01-02 RX ORDER — INSULIN GLARGINE 100 [IU]/ML
28 INJECTION, SOLUTION SUBCUTANEOUS
Status: DISCONTINUED | OUTPATIENT
Start: 2020-01-02 | End: 2020-01-03

## 2020-01-02 RX ORDER — MAGNESIUM SULFATE HEPTAHYDRATE 40 MG/ML
2 INJECTION, SOLUTION INTRAVENOUS ONCE
Status: COMPLETED | OUTPATIENT
Start: 2020-01-02 | End: 2020-01-02

## 2020-01-02 RX ADMIN — INSULIN LISPRO 8 UNITS: 100 INJECTION, SOLUTION INTRAVENOUS; SUBCUTANEOUS at 11:47

## 2020-01-02 RX ADMIN — Medication 400 MG: at 09:04

## 2020-01-02 RX ADMIN — SENNOSIDES AND DOCUSATE SODIUM 2 TABLET: 8.6; 5 TABLET ORAL at 09:04

## 2020-01-02 RX ADMIN — DRONABINOL 5 MG: 2.5 CAPSULE ORAL at 09:04

## 2020-01-02 RX ADMIN — AZITHROMYCIN MONOHYDRATE 500 MG: 500 INJECTION, POWDER, LYOPHILIZED, FOR SOLUTION INTRAVENOUS at 20:57

## 2020-01-02 RX ADMIN — ATORVASTATIN CALCIUM 10 MG: 10 TABLET, FILM COATED ORAL at 21:02

## 2020-01-02 RX ADMIN — INSULIN LISPRO 2 UNITS: 100 INJECTION, SOLUTION INTRAVENOUS; SUBCUTANEOUS at 11:48

## 2020-01-02 RX ADMIN — MAGNESIUM SULFATE HEPTAHYDRATE 2 G: 40 INJECTION, SOLUTION INTRAVENOUS at 16:50

## 2020-01-02 RX ADMIN — GUAIFENESIN 600 MG: 600 TABLET ORAL at 09:05

## 2020-01-02 RX ADMIN — GUAIFENESIN 600 MG: 600 TABLET ORAL at 21:02

## 2020-01-02 RX ADMIN — Medication 10 ML: at 16:57

## 2020-01-02 RX ADMIN — Medication 400 MG: at 17:00

## 2020-01-02 RX ADMIN — SODIUM CHLORIDE 50 ML/HR: 900 INJECTION, SOLUTION INTRAVENOUS at 05:01

## 2020-01-02 RX ADMIN — INSULIN LISPRO 6 UNITS: 100 INJECTION, SOLUTION INTRAVENOUS; SUBCUTANEOUS at 16:55

## 2020-01-02 RX ADMIN — INSULIN LISPRO 8 UNITS: 100 INJECTION, SOLUTION INTRAVENOUS; SUBCUTANEOUS at 16:54

## 2020-01-02 RX ADMIN — DRONABINOL 5 MG: 2.5 CAPSULE ORAL at 16:50

## 2020-01-02 RX ADMIN — INSULIN GLARGINE 28 UNITS: 100 INJECTION, SOLUTION SUBCUTANEOUS at 21:03

## 2020-01-02 RX ADMIN — METOPROLOL TARTRATE 25 MG: 25 TABLET, FILM COATED ORAL at 16:50

## 2020-01-02 RX ADMIN — CEFTRIAXONE 2 G: 2 INJECTION, POWDER, FOR SOLUTION INTRAMUSCULAR; INTRAVENOUS at 20:57

## 2020-01-02 RX ADMIN — Medication 10 ML: at 05:01

## 2020-01-02 RX ADMIN — ARFORMOTEROL TARTRATE 15 MCG: 15 SOLUTION RESPIRATORY (INHALATION) at 07:48

## 2020-01-02 RX ADMIN — INSULIN LISPRO 8 UNITS: 100 INJECTION, SOLUTION INTRAVENOUS; SUBCUTANEOUS at 09:05

## 2020-01-02 RX ADMIN — METOPROLOL TARTRATE 25 MG: 25 TABLET, FILM COATED ORAL at 09:05

## 2020-01-02 RX ADMIN — ARFORMOTEROL TARTRATE 15 MCG: 15 SOLUTION RESPIRATORY (INHALATION) at 20:21

## 2020-01-02 RX ADMIN — Medication 10 ML: at 21:04

## 2020-01-02 RX ADMIN — FERROUS SULFATE TAB 325 MG (65 MG ELEMENTAL FE) 325 MG: 325 (65 FE) TAB at 16:50

## 2020-01-02 RX ADMIN — INSULIN LISPRO 2 UNITS: 100 INJECTION, SOLUTION INTRAVENOUS; SUBCUTANEOUS at 21:02

## 2020-01-02 RX ADMIN — MIRTAZAPINE 15 MG: 15 TABLET, FILM COATED ORAL at 21:02

## 2020-01-02 RX ADMIN — TIOTROPIUM BROMIDE 18 MCG: 18 CAPSULE ORAL; RESPIRATORY (INHALATION) at 07:48

## 2020-01-02 RX ADMIN — FERROUS SULFATE TAB 325 MG (65 MG ELEMENTAL FE) 325 MG: 325 (65 FE) TAB at 09:05

## 2020-01-02 NOTE — PROGRESS NOTES
Care Management Interventions  PCP Verified by CM: Yes  Physical Therapy Consult: Yes  Occupational Therapy Consult: No  Speech Therapy Consult: No  Current Support Network: Lives Alone  Confirm Follow Up Transport: Family  The Patient and/or Patient Representative was Provided with a Choice of Provider and Agrees with the Discharge Plan?: Yes   Resource Information Provided?: No  Discharge Location  Discharge Placement: Unable to determine at this time  Patient is alert and oriented in all spheres. Lives alone. Independent with ambulation and ADLs. Drives. No home 02. Verified pcp. No history of HH or rehab. PT consulted. CM following.

## 2020-01-02 NOTE — PROGRESS NOTES
Received bedside shift report from offgoing nurseDavid. Patient resting quietly in bed at this time, eyes closed, respirations even and unlabored on 1L NC. No needs stated. Bed low and locked. Bedside table, personal belongings and call light within reach.

## 2020-01-02 NOTE — PROGRESS NOTES
Problem: Falls - Risk of  Goal: *Absence of Falls  Description  Document Christie Perry Fall Risk and appropriate interventions in the flowsheet.   Outcome: Progressing Towards Goal  Note: Fall Risk Interventions:  Mobility Interventions: Patient to call before getting OOB         Medication Interventions: Evaluate medications/consider consulting pharmacy    Elimination Interventions: Call light in reach

## 2020-01-02 NOTE — PROGRESS NOTES
Bedside shift report completed with oncoming nurse, Rosalva Moran. Patient resting quietly in bed at this time, awake, respirations even and unlabored on 1L NC. Denies needs at this time. Bed low and locked. Bedside table, personal belongings and call light within reach.

## 2020-01-02 NOTE — CONSULTS
H&P/Consult Note/Progress Note/Office Note:   Gissel Jose  MRN: [id number]  DZO:8/22[birth year]  Age:79 y.o.    HPI: Gissel Jose is a 70 y.o. male who we are asked by hospitalist to see for left upper lobe lung mass. Pt is known to Dr. Trista Britton and was initially referred by pulmonary for evaluation of a lung mass involving the Left upper lobe. The tumor was identified incidentally by routine chest xray and then proceeded to CT scan. The pathology showed a adenocarcinoma and lymph node 10L suspicious for malignancy. He was scheduled for Left VATs on 1/15/20. He presented to the ED with worsening hemoptysis, cough, pleuritic pain, SOB, bilateral LE edema, weakness, weight loss and lightheadedness. Pt reports hemoptysis for 4-6 weeks since his biopsy in November. He is on Plavix but may not be taking it correctly. On admission, he was found to have pneumonia and was started on IV Abx. His H/H is 10/29 today. Pt denies any further hempotysis in the last 24 hours. General surgery was consulted for evaluation. He has a history of smoking and quit 1 month ago. He does not have a history of drinking.         According to previous HPI, \"He does not have a family history of cancer. Medical history - DM, Htn, COPD . He has had previous chest surgery- collapsed left lung with chest tubes. Other surgeries include exploratory laparotomy for SBO. does take blood thinner.- Plavix, but unsure why he takes this, denies any stent or a-fib, denies history of CVA, states has been taking for a long time. \"    12/31/19 CXR:  FINDINGS:  The heart size is within normal limits.     There is a cavitary mass in the left upper lobe containing air-fluid level. There is surrounding consolidation. There is minimal consolidation in the left  lung base as well. Pleural spaces are clear. IMPRESSION:     1. Cavitary left upper lobe lesion consistent with a lung tumor. There is  surrounding consolidation in the left upper lobe.     2. Minimal consolidation in the left lung base.     Past Medical History:   Diagnosis Date    COPD     Diabetes ([facility] [address]     HTN (hypertension) 4/29/2015    Malignant neoplasm of upper lobe of left lung ([facility] [address] 12/12/2019    Other ill-defined conditions(799.89)     cholesterol     Past Surgical History:   Procedure Laterality Date    ABDOMEN SURGERY PROC UNLISTED  5/2015    explor lap    HX ORTHOPAEDIC      bilateral shoulder repairs, both knees repaired-no hardware     Current Facility-Administered Medications   Medication Dose Route Frequency    insulin glargine (LANTUS) injection 28 Units  28 Units SubCUTAneous QHS    atorvastatin (LIPITOR) tablet 10 mg  10 mg Oral QHS    insulin lispro (HUMALOG) injection 8 Units  8 Units SubCUTAneous TID WITH MEALS    magnesium oxide (MAG-OX) tablet 400 mg  400 mg Oral BID    cefTRIAXone (ROCEPHIN) 2 g in 0.9% sodium chloride (MBP/ADV) 50 mL  2 g IntraVENous Q24H    dronabinol (MARINOL) capsule 5 mg  5 mg Oral BID    albuterol-ipratropium (DUO-NEB) 2.5 MG-0.5 MG/3 ML  3 mL Nebulization Q4H PRN    guaiFENesin ER (MUCINEX) tablet 600 mg  600 mg Oral Q12H    benzonatate (TESSALON) capsule 100 mg  100 mg Oral TID PRN    guaiFENesin-codeine (ROBITUSSIN AC) 100-10 mg/5 mL solution 5 mL  5 mL Oral Q4H PRN    insulin lispro (HUMALOG) injection   SubCUTAneous AC&HS    hydrALAZINE (APRESOLINE) 20 mg/mL injection 10 mg  10 mg IntraVENous Q6H PRN    LORazepam (ATIVAN) tablet 0.5 mg  0.5 mg Oral Q8H PRN    [Held by provider] losartan (COZAAR) tablet 25 mg  25 mg Oral DAILY    metoprolol tartrate (LOPRESSOR) tablet 25 mg  25 mg Oral BID    sodium chloride (NS) flush 5-40 mL  5-40 mL IntraVENous Q8H    sodium chloride (NS) flush 5-40 mL  5-40 mL IntraVENous PRN    acetaminophen (TYLENOL) tablet 650 mg  650 mg Oral Q4H PRN    HYDROcodone-acetaminophen (NORCO) 5-325 mg per tablet 1 Tab  1 Tab Oral Q4H PRN    morphine injection 2 mg  2 mg IntraVENous Q4H PRN    naloxone [facility]) injection 0.4 mg  0.4 mg IntraVENous PRN    diphenhydrAMINE (BENADRYL) capsule 25 mg  25 mg Oral Q4H PRN    ondansetron (ZOFRAN) injection 4 mg  4 mg IntraVENous Q4H PRN    magnesium hydroxide (MILK OF MAGNESIA) 400 mg/5 mL oral suspension 30 mL  30 mL Oral DAILY PRN    senna-docusate (PERICOLACE) 8.6-50 mg per tablet 2 Tab  2 Tab Oral DAILY    arformoterol (BROVANA) neb solution 15 mcg  15 mcg Nebulization BID RT    tiotropium (SPIRIVA) inhalation capsule 18 mcg  1 Cap Inhalation DAILY    phenol throat spray (CHLORASEPTIC) 1 Spray  1 Spray Oral PRN    mirtazapine (REMERON) tablet 15 mg  15 mg Oral QHS    ferrous sulfate tablet 325 mg  1 Tab Oral BID WITH MEALS    azithromycin (ZITHROMAX) 500 mg in 0.9% sodium chloride (MBP/ADV) 250 mL  500 mg IntraVENous Q24H     Patient has no known allergies. Social History     Socioeconomic History    Marital status: SINGLE     Spouse name: Not on file    Number of children: Not on file    Years of education: Not on file    Highest education level: Not on file   Tobacco Use    Smoking status: Current Every Day Smoker     Packs/day: 1.00     Years: 40.00     Pack years: 40.00     Types: Cigarettes    Smokeless tobacco: Never Used   Substance and Sexual Activity    Alcohol use: No    Drug use: No     Social History     Tobacco Use   Smoking Status Current Every Day Smoker    Packs/day: 1.00    Years: 40.00    Pack years: 40.00    Types: Cigarettes   Smokeless Tobacco Never Used     History reviewed. No pertinent family history. ROS: The patient has difficulty with chest pain and shortness of breath. No fever or chills. Comprehensive review of systems was otherwise unremarkable except as noted above.     Physical Exam:   Visit Vitals  /63 (BP 1 Location: Right arm, BP Patient Position: At rest)   Pulse 87   Temp 98.7 °F (37.1 °C)   Resp 18   Ht 6' 1\" (1.854 m)   Wt 154 lb 12.2 oz (70.2 kg)   SpO2 94%   BMI 20.42 kg/m²     Constitutional: Alert, oriented, cooperative patient in no acute distress; appears stated age    Eyes: Sclera are clear. EOMs intact  ENMT: no external lesions gross hearing normal; no obvious neck masses, no ear or lip lesions, nares normal  CV: RRR. Normal perfusion  Resp: No JVD. Breathing is  non-labored; no audible wheezing. Mild wheezing left lung fields. GI: soft and non-distended; non tender. Musculoskeletal: unremarkable with normal function. No embolic signs or cyanosis. + LE edema.   Neuro:  Oriented; moves all 4; no focal deficits  Psychiatric: normal affect and mood, no memory impairment    Recent vitals (if inpt):  Patient Vitals for the past 24 hrs:   BP Temp Pulse Resp SpO2 Weight   01/02/20 0748 -- -- -- -- 94 % --   01/02/20 0713 146/63 98.7 °F (37.1 °C) 87 18 95 % --   01/02/20 0336 138/57 98 °F (36.7 °C) 85 17 95 % 154 lb 12.2 oz (70.2 kg)   01/01/20 2346 145/63 98.4 °F (36.9 °C) 88 16 95 % --   01/01/20 2107 -- -- -- -- 94 % --   01/01/20 1927 132/50 98.9 °F (37.2 °C) 97 17 94 % --   01/01/20 1551 130/57 97.9 °F (36.6 °C) 87 18 94 % --   01/01/20 1144 127/63 98 °F (36.7 °C) (!) 102 18 93 % --       Labs:  Recent Labs     01/02/20  0757 01/01/20  0913   WBC 8.9 10.1   HGB 10.2* 11.4*    406   NA  --  137   K  --  4.1   CL  --  103   CO2  --  28   BUN  --  10   CREA  --  1.09   GLU  --  201*   PTP  --  14.7*   INR  --  1.1   APTT  --  30.1   TBILI  --  0.3   SGOT  --  29   ALT  --  20   AP  --  219*       Lab Results   Component Value Date/Time    WBC 8.9 01/02/2020 07:57 AM    HGB 10.2 (L) 01/02/2020 07:57 AM    PLATELET 843 62/95/4478 07:57 AM    Sodium 137 01/01/2020 09:13 AM    Potassium 4.1 01/01/2020 09:13 AM    Chloride 103 01/01/2020 09:13 AM    CO2 28 01/01/2020 09:13 AM    BUN 10 01/01/2020 09:13 AM    Creatinine 1.09 01/01/2020 09:13 AM    Glucose 201 (H) 01/01/2020 09:13 AM    INR 1.1 01/01/2020 09:13 AM    aPTT 30.1 01/01/2020 09:13 AM    Bilirubin, total 0.3 01/01/2020 09:13 AM    AST (SGOT) 29 01/01/2020 09:13 AM    ALT (SGPT) 20 01/01/2020 09:13 AM    Alk. phosphatase 219 (H) 01/01/2020 09:13 AM    Lipase 144 04/28/2015 01:00 PM    Troponin-I <0.05 11/30/2010 02:02 PM       I reviewed recent labs and recent radiologic studies. I independently reviewed radiology images for studies I described above or studies I have ordered.    Admission date (for inpatients): 12/31/2019   * No surgery found *  * No surgery found *    ASSESSMENT/PLAN:  Problem List  Date Reviewed: 12/10/2019          Codes Class Noted    Cough with hemoptysis ICD-10-CM: R04.2  ICD-9-CM: 786.39  1/1/2020        * (Principal) Pneumonia of left upper lobe due to infectious organism [facility]) ICD-10-CM: J18.1  ICD-9-CM: 486  12/31/2019    Overview Signed 1/1/2020 12:23 PM by Fany Montelongo MD     Presumptive non-small cell lung cancer stage IA3 based on the bronchoscopy finding done last month             Malignant neoplasm of upper lobe of left lung ([facility] [address] ICD-10-CM: C34.12  ICD-9-CM: 162.3  12/12/2019        Lung nodule ICD-10-CM: R91.1  ICD-9-CM: 793.11  11/12/2019        Internal hernia ICD-10-CM: K45.8  ICD-9-CM: 553.8  4/30/2015    Overview Signed 4/30/2015  8:34 AM by Burke Razo MD     4/29/15 s/p expl lap with reduction of internal hernia (which was obstructing small bowel) and lysis; Dr Patrick Bingham             HTN (hypertension) (Chronic) ICD-10-CM: I10  ICD-9-CM: 401.9  4/29/2015        Hyperlipidemia (Chronic) ICD-10-CM: E78.5  ICD-9-CM: 272.4  4/29/2015        Tobacco abuse (Chronic) ICD-10-CM: Z72.0  ICD-9-CM: 305.1  4/29/2015        Anxiety disorder (Chronic) ICD-10-CM: F41.9  ICD-9-CM: 300.00  4/29/2015        High transaminase levels ICD-10-CM: R74.0  ICD-9-CM: 790.4  4/29/2015        Abnormal finding of kidney ICD-10-CM: R39.9  ICD-9-CM: 593.9  4/29/2015    Overview Signed 4/29/2015 12:52 PM by Emmett Landers     Abnormal cystic changes             SBO (small bowel obstruction) ([facility] ICD-10-CM: K97.837  ICD-9-CM: 560.9  4/28/2015        Type II or unspecified type diabetes mellitus with other specified manifestations, not stated as uncontrolled ICD-10-CM: E11.69  ICD-9-CM: 250.80  4/28/2015        Hyperglycemia ICD-10-CM: R73.9  ICD-9-CM: 790.29  4/28/2015        Abdominal pain ICD-10-CM: R10.9  ICD-9-CM: 789.00  4/28/2015            Principal Problem:    Pneumonia of left upper lobe due to infectious organism ([facility] [address] (12/31/2019)      Overview: Presumptive non-small cell lung cancer stage IA3 based on the bronchoscopy       finding done last month    Active Problems:    Tobacco abuse (4/29/2015)      Malignant neoplasm of upper lobe of left lung ([facility] [address] (12/12/2019)      Cough with hemoptysis (1/1/2020)           CT Results                 PET Results (most recent):       Results from [place] encounter on 10/10/19   PET/CT TUMOR IMAGE SKULL THIGH W (INI)     Narrative F-18 FDG PET/CT Imaging.      Indication: lung mass, 70 years Male initial staging     Comparison: CT chest outside facility September 23, 2019     Radiopharmaceutical: 17.2 mCi of F-18 FDG.    Technique: Delayed PET post oral contrast CT images from skull base to mid  thighs were performed. Patient received 10 cc of Gastroview for oral contrast.  F-18 FDG PET/CT has limited sensitivity for low grade malignancies, small tumor  deposits, mucin producing neoplasms.     Findings: Persistent mild bilateral upper lobe predominant panlobular emphysema. Again visualized is the spiculated mass in the medial left upper lobe measuring  2.8 x 2.5 cm, associated with intense FDG uptake, maximum SUV of 6.9, consistent  with a small primary bronchogenic carcinoma. Small perifissural nodule in the  right middle lobe measuring up to 6 mm is also relatively unchanged, without  significant FDG uptake but too small to evaluate by PET.   Asymmetric focal  intense FDG uptake is seen in the left hilum, maximum SUV of 3.6, suspicious for  local cristhian metastasis. There is physiologic distribution of FDG in head and  neck region, abdomen and pelvis. No focal uptake identified in the bone. Non  diagnostic CT demonstrates grossly unremarkable heart and mediastinum as well as  liver, spleen, adrenals, kidneys, pancreas and bowel.        Impression Impression:   1. Relatively unchanged spiculated mass in the medial left upper lobe  associated with intense FDG uptake, consistent with a small primary bronchogenic  neoplasm. 2.  Findings suspicious for left hilar cristhian metastasis. 3.  Unchanged 6 mm perifissural right middle lobe nodule, without significant  FDG uptake but too small to evaluate by PET.              PFT   DLCO- 45%  FEV1- 2.04     Pathology   DIAGNOSIS   EVA NODULE BIOPSY:   FINDINGS CONSISTENT WITH ADENOCARCINOMA      Cytology   DIAGNOSIS   10L Lymph Node, Fine Needle Aspiration:   RARE CELLS SUSPICIOUS FOR MALIGNANCY         Assessment/Plan:   Kimberly Julian is a 70 y.o. male who has signs and symptoms consistent with adenocarcinoma left upper lobe. Discussed his PFT and may need supplemental oxygen for a short while, but did offer segmentectomy as well as lobectomy, but chose segmentectomy related to DLCO 45%. Also, small nodule in right lung too small to characterized- will monitor. Treat pneumonia before proceeding with surgery. If pneumonia resolves by the date of surgery (1/15/20), continue with plans for left VAT Needle localization with segmentectomy and mediastinal lymphadenectomy as scheduled. If pneumonia has not resolved, delay surgery. If hemoptysis becomes severe may necessitate earlier intervention. Hold blood thinners      Signed:  Norton Ganser, PA     I have seen and examined the patient with the Nurse Practitioner and agree with the above assessment and plan. Will try not rush him to surgery if possible except hemoptysis is out of control. May have to postpone his surgery due to active pneumonia.      Amilcar Kc, MD

## 2020-01-02 NOTE — PROGRESS NOTES
Spiritual Care Visit. Initial visit. Patient was visited by [facility]. Entered by Irais Garcia, Staff Chaplain. Vera, Gus.

## 2020-01-02 NOTE — PROGRESS NOTES
Stacie Kvng  Admission Date: 12/31/2019             Daily Progress Note: 1/2/2020    The patient's chart is reviewed and the patient is discussed with the staff. 70 y.o.  male seen and evaluated at the request of Dr. Eula Rush. This is a 82-year-old gentleman with history of hypertension, diabetes, COPD, CKD stage III, who was recently diagnosed with adenocarcinoma of the upper lobe of the left lung, came to the emergency room with complaints of worsening cough, shortness of breath and bloody sputum.  Patient had a bronchoscopy and biopsy in November 2019 and diagnosed with lung cancer. Gustavo Tobin was having cough which has gradually worsened over the last 1 month, has become moderate in severity in the last few days, worse with exertion, no specific relieving factors, associated with blood-tinged sputum over the last several days.  Blood in the sputum is worsening over the last few days. Gustavo Tobin was also having shortness of breath which is also progressively getting worse, moderate in severity, worse with exertion, better with rest.  He was having left upper anterior chest pain with coughing.  No fevers , chills, diarrhea, but has nausea on and off.    He also complains of poor appetite, generalized weakness, weight loss in the last month.  He has noticed increasing swelling in both lower extremities over the last few days.  Patient denies any burning with urination.  No abdominal pain. Patient was seen by Dr. Lindsey Ibrahim earlier last month and he was a scheduled tentatively for left VAT needle localization with segmentectomy and mediastinal lymphadenectomy. We were asked to assist in his management. Subjective:     Patient resting in bed, family at bedside. States feeling \"better\" than previously noted. No hemoptysis yesterday or today so far. Denies any worsening dyspnea or pain with respirations.       Current Facility-Administered Medications   Medication Dose Route Frequency    insulin glargine (LANTUS) injection 28 Units  28 Units SubCUTAneous QHS    atorvastatin (LIPITOR) tablet 10 mg  10 mg Oral QHS    insulin lispro (HUMALOG) injection 8 Units  8 Units SubCUTAneous TID WITH MEALS    magnesium oxide (MAG-OX) tablet 400 mg  400 mg Oral BID    cefTRIAXone (ROCEPHIN) 2 g in 0.9% sodium chloride (MBP/ADV) 50 mL  2 g IntraVENous Q24H    dronabinol (MARINOL) capsule 5 mg  5 mg Oral BID    albuterol-ipratropium (DUO-NEB) 2.5 MG-0.5 MG/3 ML  3 mL Nebulization Q4H PRN    guaiFENesin ER (MUCINEX) tablet 600 mg  600 mg Oral Q12H    benzonatate (TESSALON) capsule 100 mg  100 mg Oral TID PRN    guaiFENesin-codeine (ROBITUSSIN AC) 100-10 mg/5 mL solution 5 mL  5 mL Oral Q4H PRN    insulin lispro (HUMALOG) injection   SubCUTAneous AC&HS    hydrALAZINE (APRESOLINE) 20 mg/mL injection 10 mg  10 mg IntraVENous Q6H PRN    LORazepam (ATIVAN) tablet 0.5 mg  0.5 mg Oral Q8H PRN    [Held by provider] losartan (COZAAR) tablet 25 mg  25 mg Oral DAILY    metoprolol tartrate (LOPRESSOR) tablet 25 mg  25 mg Oral BID    sodium chloride (NS) flush 5-40 mL  5-40 mL IntraVENous Q8H    sodium chloride (NS) flush 5-40 mL  5-40 mL IntraVENous PRN    acetaminophen (TYLENOL) tablet 650 mg  650 mg Oral Q4H PRN    HYDROcodone-acetaminophen (NORCO) 5-325 mg per tablet 1 Tab  1 Tab Oral Q4H PRN    morphine injection 2 mg  2 mg IntraVENous Q4H PRN    naloxone (NARCAN) injection 0.4 mg  0.4 mg IntraVENous PRN    diphenhydrAMINE (BENADRYL) capsule 25 mg  25 mg Oral Q4H PRN    ondansetron (ZOFRAN) injection 4 mg  4 mg IntraVENous Q4H PRN    magnesium hydroxide (MILK OF MAGNESIA) 400 mg/5 mL oral suspension 30 mL  30 mL Oral DAILY PRN    senna-docusate (PERICOLACE) 8.6-50 mg per tablet 2 Tab  2 Tab Oral DAILY    arformoterol (BROVANA) neb solution 15 mcg  15 mcg Nebulization BID RT    tiotropium (SPIRIVA) inhalation capsule 18 mcg  1 Cap Inhalation DAILY    phenol throat spray (CHLORASEPTIC) 1 Spray  1 Spray Oral PRN    mirtazapine (REMERON) tablet 15 mg  15 mg Oral QHS    ferrous sulfate tablet 325 mg  1 Tab Oral BID WITH MEALS    azithromycin (ZITHROMAX) 500 mg in 0.9% sodium chloride (MBP/ADV) 250 mL  500 mg IntraVENous Q24H       Review of Systems    Constitutional: negative for fever, chills, sweats  Cardiovascular: negative for chest pain, palpitations, syncope, edema  Gastrointestinal:  negative for dysphagia, reflux, vomiting, diarrhea, abdominal pain, or melena  Neurologic:  negative for focal weakness, numbness, headache    Objective:     Vitals:    01/01/20 2346 01/02/20 0336 01/02/20 0713 01/02/20 0748   BP: 145/63 138/57 146/63    Pulse: 88 85 87    Resp: 16 17 18    Temp: 98.4 °F (36.9 °C) 98 °F (36.7 °C) 98.7 °F (37.1 °C)    SpO2: 95% 95% 95% 94%   Weight:  154 lb 12.2 oz (70.2 kg)     Height:             Intake/Output Summary (Last 24 hours) at 1/2/2020 1055  Last data filed at 1/2/2020 1041  Gross per 24 hour   Intake 718 ml   Output 2970 ml   Net -2252 ml       Physical Exam:   Constitution:  the patient is well developed and in no acute distress  EENMT:  Sclera clear, pupils equal, oral mucosa moist  Respiratory: Clear, O2 at 1L NC  Cardiovascular:  RRR without M,G,R  Gastrointestinal: soft and non-tender; with positive bowel sounds. Musculoskeletal: warm without cyanosis. There is no lower extremity edema. Skin:  no jaundice or rashes, no wounds   Neurologic: no gross neuro deficits     Psychiatric:  alert and oriented x 3, pleasant, following commands    CXR: 12/31/2019      IMPRESSION:  1. Cavitary left upper lobe lesion consistent with a lung tumor. There is  surrounding consolidation in the left upper lobe. 2. Minimal consolidation in the left lung base. BILATERAL LOWER EXTREMITY DUPLEX:  1/1/2020  FINDINGS:  There is normal flow in the greater saphenous, common femoral,  superficial femoral, and popliteal veins. Normal compression and augmentation is  demonstrated.  The proximal calf veins are also patent. IMPRESSION  No evidence of deep venous thrombosis in either lower extremity        10/25/2019  COMPLETE PULMONARY FUNCTION STUDY    SPIROMETRY:    FVC 3.32 L or 69% of predicted  FEV1   2.04 L or 57% of predicted  FEV1/FVC was 61 %. FEF 25-75 was 33 % of predicted. The flow volume loop reveals expiratory flow obstruction. LUNG VOLUMES:  Obtained by plethysmography. Total Lung Capacity: 109% of predicted  FRC:   151% of predicted  Residual Volume:   165% of predicted  RV/T-increased    DIFFUSION CAPACITY:    Diffusion Capacity: 45% of predicted  DLCO corrected for alveolar volume: 55% of predicted    IMPRESSION:    Spirometry is consistent with a moderately severe obstructive/restrictive ventilatory defect.  Lung volumes were increased consistent with air trapping.  The diffusion capacity corrected for alveolar volume was decreased suggesting loss of gas exchanging lung units.  This is study is consistent with a diagnosis of emphysema.  Clinical correlation is needed. Louis Rand MD        LAB  Recent Labs     20  0625 20  0545 20  2050 20  1648 20  1109   GLUCPOC 133* 69 234* 241* 372*      Recent Labs     20  0757 20  0913 19  1241   WBC 8.9 10.1 13.0*   HGB 10.2* 11.4* 11.6*   HCT 29.8* 32.9* 33.0*    406 408   INR  --  1.1  --      Recent Labs     20  0757 20  0913 19  1241    137 132*   K 3.9 4.1 4.5    103 96*   CO2 27 28 29   * 201* 191*   BUN 7* 10 11   CREA 1.01 1.09 1.27   MG 1.6* 1.7*  --    CA 8.1* 8.6 8.7   PHOS 2.9 2.2*  --    ALB  --  1.7* 1.9*   TBILI  --  0.3 0.4   ALT  --  20 23   SGOT  --  29 37     No results for input(s): PH, PCO2, PO2, HCO3, PHI, PCO2I, PO2I, HCO3I in the last 72 hours. No results for input(s): LCAD, LAC in the last 72 hours.       Assessment:  (Medical Decision Making)     Hospital Problems  Date Reviewed: 2020 Codes Class Noted POA    Cough with hemoptysis ICD-10-CM: R04.2  ICD-9-CM: 786.39  1/1/2020 Yes    No hemoptysis recently    * (Principal) Pneumonia of left upper lobe due to infectious organism [facility]) ICD-10-CM: J18.1  ICD-9-CM: 623  12/31/2019 Yes    Overview Signed 1/1/2020 12:23 PM by Diego Houser MD     Presumptive non-small cell lung cancer stage IA3 based on the bronchoscopy finding done last month         On antibiotics    Malignant neoplasm of upper lobe of left lung ([facility] [address] ICD-10-CM: C34.12  ICD-9-CM: 162.3  12/12/2019 Yes    Plans for VAT    Tobacco abuse (Chronic) ICD-10-CM: Z72.0  ICD-9-CM: 305.1  4/29/2015 Yes    Current daily smoker, 1 ppd x 40 years          Plan:  (Medical Decision Making)     --Blood cultures X 2 negative growth X 2 days  --Zithromax day 3/7, Rocephin day 3/7  --Continue supplemental O2,wean as tolerated  --Continue Brovana, Mucinex, Spiriva, prn Duoneb  --Cough suppressants as needed  --Plans for left VAT on 1/15/20 as long as pneumonia has resolved    More than 50% of the time documented was spent in face-to-face contact with the patient and in the care of the patient on the floor/unit where the patient is located. Sonal Weems, NP    Lungs:  coarse  Heart:  RRR with no Murmur/Rubs/Gallops    Additional Comments:  Continue ABX ,surgery plans for 1/15/ unless uncontrolled hemoptysis    I have spoken with and examined the patient. I agree with the above assessment and plan as documented.     Lilly Brannon MD

## 2020-01-02 NOTE — PROGRESS NOTES
Ambulating in room, no hemoptysis this shift. Appetite good, hw intact, denies any pain. aware to call for asst.

## 2020-01-02 NOTE — PROGRESS NOTES
Progress Note      Patient: Lainey Cortes               Sex: male          MRN: [id number]           YOB: 1940      Age:  70 y.o. PCP:  Cintia Garnett MD  Treatment Team: Attending Provider: Kadeem Lara MD; Consulting Provider: Hui Lantigua MD; Consulting Provider: Ru Briseno MD; Care Manager: Shobha Dickens.; Utilization Review: Kirstin Hernandez RN  Subjective: This is a 73-year-old gentleman with history of hypertension, diabetes, COPD, CKD stage III, who was recently diagnosed with adenocarcinoma of the upper lobe of the left lung, came to the emergency room with complaints of worsening cough, shortness of breath and bloody sputum. 2020: Cough is improving. Very minimal hemoptysis today. Breathing is also improving. No chest pain or abdominal pain or nausea or vomitings. Eating better today. 2020: Feeling better. Cough is much improved. No hemoptysis. Denies any chest pain or abdominal pain.       Objective:   Physical Exam:   Visit Vitals  /63 (BP 1 Location: Right arm, BP Patient Position: At rest)   Pulse 84   Temp 98.2 °F (36.8 °C)   Resp 18   Ht 6' 1\" (1.854 m)   Wt 70.2 kg (154 lb 12.2 oz)   SpO2 96%   BMI 20.42 kg/m²      Temp (24hrs), Av.4 °F (36.9 °C), Min:97.9 °F (36.6 °C), Max:98.9 °F (37.2 °C)    Oxygen Therapy  O2 Sat (%): 96 % (20 1107)  Pulse via Oximetry: 83 beats per minute (20 0748)  O2 Device: Nasal cannula (20)  O2 Flow Rate (L/min): 1 l/min (20 07)    Intake/Output Summary (Last 24 hours) at 2020 1333  Last data filed at 2020 1041  Gross per 24 hour   Intake 718 ml   Output 2295 ml   Net -1577 ml      General:          Conscious, No acute distress  Eyes:               LISA, No pallor/icterus                      HENT:             Oral Mucosa is moist, No sinus tenderness  Neck:               Supple, No JVD  Lungs:             CTA Bilaterally, No significant wheezing  Heart:              S1 S2 regular  Abdomen:        Soft, Positive bowel sounds, NTND, No guarding/rigidity/rebound tend. Extremities:     Bilateral pitting edema in the lower extremities -improving. Neurologic:      AAOX2, No acute FND, Motor: LUE: 5/5, LLE: 5/5, RUE: 5/5, RLE: 5/5  Skin:                No acute rashes  Musculoskeletal: No Acute findings  Psych:             Mood is appropriate. LAB  Recent Results (from the past 24 hour(s))   GLUCOSE, POC    Collection Time: 01/01/20  4:48 PM   Result Value Ref Range    Glucose (POC) 241 (H) 65 - 100 mg/dL   GLUCOSE, POC    Collection Time: 01/01/20  8:50 PM   Result Value Ref Range    Glucose (POC) 234 (H) 65 - 100 mg/dL   GLUCOSE, POC    Collection Time: 01/02/20  5:45 AM   Result Value Ref Range    Glucose (POC) 69 65 - 100 mg/dL   GLUCOSE, POC    Collection Time: 01/02/20  6:25 AM   Result Value Ref Range    Glucose (POC) 133 (H) 65 - 100 mg/dL   CBC WITH AUTOMATED DIFF    Collection Time: 01/02/20  7:57 AM   Result Value Ref Range    WBC 8.9 4.3 - 11.1 K/uL    RBC 3.66 (L) 4.23 - 5.6 M/uL    HGB 10.2 (L) 13.6 - 17.2 g/dL    HCT 29.8 (L) 41.1 - 50.3 %    MCV 81.4 79.6 - 97.8 FL    MCH 27.9 26.1 - 32.9 PG    MCHC 34.2 31.4 - 35.0 g/dL    RDW 13.8 11.9 - 14.6 %    PLATELET 167 459 - 792 K/uL    MPV 9.9 9.4 - 12.3 FL    ABSOLUTE NRBC 0.00 0.0 - 0.2 K/uL    DF AUTOMATED      NEUTROPHILS 62 43 - 78 %    LYMPHOCYTES 26 13 - 44 %    MONOCYTES 10 4.0 - 12.0 %    EOSINOPHILS 1 0.5 - 7.8 %    BASOPHILS 0 0.0 - 2.0 %    IMMATURE GRANULOCYTES 1 0.0 - 5.0 %    ABS. NEUTROPHILS 5.5 1.7 - 8.2 K/UL    ABS. LYMPHOCYTES 2.4 0.5 - 4.6 K/UL    ABS. MONOCYTES 0.9 0.1 - 1.3 K/UL    ABS. EOSINOPHILS 0.1 0.0 - 0.8 K/UL    ABS. BASOPHILS 0.0 0.0 - 0.2 K/UL    ABS. IMM.  GRANS. 0.1 0.0 - 0.5 K/UL   METABOLIC PANEL, BASIC    Collection Time: 01/02/20  7:57 AM   Result Value Ref Range    Sodium 136 136 - 145 mmol/L    Potassium 3.9 3.5 - 5.1 mmol/L    Chloride 102 98 - 107 mmol/L    CO2 27 21 - 32 mmol/L    Anion gap 7 7 - 16 mmol/L    Glucose 155 (H) 65 - 100 mg/dL    BUN 7 (L) 8 - 23 MG/DL    Creatinine 1.01 0.8 - 1.5 MG/DL    GFR est AA >60 >60 ml/min/1.73m2    GFR est non-AA >60 >60 ml/min/1.73m2    Calcium 8.1 (L) 8.3 - 10.4 MG/DL   MAGNESIUM    Collection Time: 01/02/20  7:57 AM   Result Value Ref Range    Magnesium 1.6 (L) 1.8 - 2.4 mg/dL   PHOSPHORUS    Collection Time: 01/02/20  7:57 AM   Result Value Ref Range    Phosphorus 2.9 2.3 - 3.7 MG/DL   GLUCOSE, POC    Collection Time: 01/02/20 11:10 AM   Result Value Ref Range    Glucose (POC) 165 (H) 65 - 100 mg/dL       No results found. No results found. All Micro Results     Procedure Component Value Units Date/Time    CULTURE, RESPIRATORY/SPUTUM/BRONCH Brendia Began [[id number]] Collected:  01/02/20 9832    Order Status:  Completed Specimen:  Sputum Updated:  01/02/20 0941    CULTURE, BLOOD [[id number]] Collected:  12/31/19 1957    Order Status:  Completed Specimen:  Blood Updated:  01/02/20 0809     Special Requests: --        RIGHT  HAND       Culture result: NO GROWTH 2 DAYS       CULTURE, BLOOD [[id number]] Collected:  12/31/19 1958    Order Status:  Completed Specimen:  Blood Updated:  01/02/20 0809     Special Requests: --        RIGHT  HAND       Culture result: NO GROWTH 2 DAYS       MSSA/MRSA SC BY PCR, NASAL SWAB [[id number]]     Order Status:  Sent Specimen:  Nasal swab           Current Medications Reviewed      Assessment/Plan     Principal Problem:    Pneumonia of left upper lobe due to infectious organism ([facility] [address] (12/31/2019)      Overview: Presumptive non-small cell lung cancer stage IA3 based on the bronchoscopy       finding done last month    Active Problems:    Tobacco abuse (4/29/2015)      Malignant neoplasm of upper lobe of left lung ([address] (12/12/2019)      Cough with hemoptysis (1/1/2020)        1. 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  Patient also has small left lower lobe consolidation. Patient has small amount of hemoptysis which could be from pneumonia or could be from underlying cancer. Seen by pulmonology in consultation. Recommended to continue him on empiric IV ceftriaxone, azithromycin. He received 1 dose of IV vancomycin in the emergency room. Placed him on bronchodilators, guaifenesin, monitor. Seen by Dr. Madison Alvarado and recommended no rush surgery unless patient develops any significant hemoptysis. 2.  Bilateral lower extremity edema: Could be secondary to severe hypoalbuminemia from protein calorie malnutrition. He had a cardiac cath in August 2019 and showed normal EF and no obstructive coronary artery disease. No DVT in lower extremities. Apply compression stockings to help with edema. 3.  Submassive hemoptysis. Hemoglobin is stable. Will hold aspirin and Plavix, monitor hemoglobin. Pulmonology following.     4.  Severe protein calorie malnutrition, secondary to anorexia, malignancy. Nutritionist consulted. Glucerna ordered. Continue on Marinol to help boost appetite.     5. Hypertension: Continue home blood pressure medications and monitor. Blood pressure is stable.     6.  Diabetes type 2, insulin-dependent. With hyperglycemia, seems to be uncontrolled. A1c is 10.3. Had mild hypoglycemic episode this morning. Adjust insulin regimen and monitor. 7.  CKD stage III -creatinine stable.     8.  Possibly anemia of chronic disease with subacute blood loss anemia. Iron deficiency anemia. Started on iron replacement. 9.  Mild hyponatremia. Sodium level improved with fluids.      DVT prophylaxis: SCDs  Code status: Full  Moderate risk patient.     Bud Palomino MD  January 2, 2020

## 2020-01-02 NOTE — PROGRESS NOTES
Ambulating in room,alert, oriented. Oxygen remains on via n/c at 2 lpm heart regular, lung sounds diminished. No cough, no hemoptysis noted. Abdomen soft,bs present, appetite good. Voiding into urinal w/o any difficulty. Amari hose remain on bilaterally. labs resulted and reviewed. Right arm hw intact with ns infusing at 50cc/hr via pump. aware to call for asst,

## 2020-01-02 NOTE — PROGRESS NOTES
Patient's blood glucose 69. Patient given juice to drink per hypoglycemia protocol. Will recheck blood glucose.

## 2020-01-03 ENCOUNTER — HOME HEALTH ADMISSION (OUTPATIENT)
Dept: HOME HEALTH SERVICES | Facility: HOME HEALTH | Age: 80
End: 2020-01-03
Payer: MEDICARE

## 2020-01-03 LAB
GLUCOSE BLD STRIP.AUTO-MCNC: 140 MG/DL (ref 65–100)
GLUCOSE BLD STRIP.AUTO-MCNC: 141 MG/DL (ref 65–100)
GLUCOSE BLD STRIP.AUTO-MCNC: 303 MG/DL (ref 65–100)
GLUCOSE BLD STRIP.AUTO-MCNC: 62 MG/DL (ref 65–100)
GLUCOSE BLD STRIP.AUTO-MCNC: 62 MG/DL (ref 65–100)
GLUCOSE BLD STRIP.AUTO-MCNC: 86 MG/DL (ref 65–100)

## 2020-01-03 PROCEDURE — 74011000250 HC RX REV CODE- 250: Performed by: HOSPITALIST

## 2020-01-03 PROCEDURE — 65270000029 HC RM PRIVATE

## 2020-01-03 PROCEDURE — 82962 GLUCOSE BLOOD TEST: CPT

## 2020-01-03 PROCEDURE — 94760 N-INVAS EAR/PLS OXIMETRY 1: CPT

## 2020-01-03 PROCEDURE — 94640 AIRWAY INHALATION TREATMENT: CPT

## 2020-01-03 PROCEDURE — 74011250637 HC RX REV CODE- 250/637: Performed by: HOSPITALIST

## 2020-01-03 PROCEDURE — 97530 THERAPEUTIC ACTIVITIES: CPT

## 2020-01-03 PROCEDURE — 99232 SBSQ HOSP IP/OBS MODERATE 35: CPT | Performed by: INTERNAL MEDICINE

## 2020-01-03 PROCEDURE — 74011636637 HC RX REV CODE- 636/637: Performed by: HOSPITALIST

## 2020-01-03 RX ORDER — SODIUM CHLORIDE 0.9 % (FLUSH) 0.9 %
5-40 SYRINGE (ML) INJECTION EVERY 8 HOURS
Status: CANCELLED | OUTPATIENT
Start: 2020-01-03

## 2020-01-03 RX ORDER — SODIUM CHLORIDE 0.9 % (FLUSH) 0.9 %
5-40 SYRINGE (ML) INJECTION AS NEEDED
Status: CANCELLED | OUTPATIENT
Start: 2020-01-03

## 2020-01-03 RX ORDER — INSULIN GLARGINE 100 [IU]/ML
24 INJECTION, SOLUTION SUBCUTANEOUS
Status: DISCONTINUED | OUTPATIENT
Start: 2020-01-03 | End: 2020-01-04 | Stop reason: HOSPADM

## 2020-01-03 RX ADMIN — FERROUS SULFATE TAB 325 MG (65 MG ELEMENTAL FE) 325 MG: 325 (65 FE) TAB at 17:10

## 2020-01-03 RX ADMIN — GUAIFENESIN 600 MG: 600 TABLET ORAL at 09:27

## 2020-01-03 RX ADMIN — DRONABINOL 5 MG: 2.5 CAPSULE ORAL at 09:26

## 2020-01-03 RX ADMIN — ATORVASTATIN CALCIUM 10 MG: 10 TABLET, FILM COATED ORAL at 21:07

## 2020-01-03 RX ADMIN — METOPROLOL TARTRATE 25 MG: 25 TABLET, FILM COATED ORAL at 17:10

## 2020-01-03 RX ADMIN — DRONABINOL 5 MG: 2.5 CAPSULE ORAL at 17:09

## 2020-01-03 RX ADMIN — ARFORMOTEROL TARTRATE 15 MCG: 15 SOLUTION RESPIRATORY (INHALATION) at 07:49

## 2020-01-03 RX ADMIN — Medication 10 ML: at 05:51

## 2020-01-03 RX ADMIN — GUAIFENESIN 600 MG: 600 TABLET ORAL at 21:07

## 2020-01-03 RX ADMIN — Medication 400 MG: at 09:27

## 2020-01-03 RX ADMIN — INSULIN LISPRO 8 UNITS: 100 INJECTION, SOLUTION INTRAVENOUS; SUBCUTANEOUS at 12:26

## 2020-01-03 RX ADMIN — Medication 10 ML: at 09:36

## 2020-01-03 RX ADMIN — LEVOFLOXACIN 750 MG: 500 TABLET, FILM COATED ORAL at 12:30

## 2020-01-03 RX ADMIN — SENNOSIDES AND DOCUSATE SODIUM 2 TABLET: 8.6; 5 TABLET ORAL at 09:26

## 2020-01-03 RX ADMIN — INSULIN LISPRO 8 UNITS: 100 INJECTION, SOLUTION INTRAVENOUS; SUBCUTANEOUS at 09:27

## 2020-01-03 RX ADMIN — FERROUS SULFATE TAB 325 MG (65 MG ELEMENTAL FE) 325 MG: 325 (65 FE) TAB at 09:27

## 2020-01-03 RX ADMIN — Medication 10 ML: at 21:08

## 2020-01-03 RX ADMIN — MIRTAZAPINE 15 MG: 15 TABLET, FILM COATED ORAL at 21:07

## 2020-01-03 RX ADMIN — METOPROLOL TARTRATE 25 MG: 25 TABLET, FILM COATED ORAL at 09:27

## 2020-01-03 RX ADMIN — Medication 400 MG: at 17:10

## 2020-01-03 RX ADMIN — TIOTROPIUM BROMIDE 18 MCG: 18 CAPSULE ORAL; RESPIRATORY (INHALATION) at 07:49

## 2020-01-03 RX ADMIN — INSULIN GLARGINE 24 UNITS: 100 INJECTION, SOLUTION SUBCUTANEOUS at 21:09

## 2020-01-03 RX ADMIN — INSULIN LISPRO 8 UNITS: 100 INJECTION, SOLUTION INTRAVENOUS; SUBCUTANEOUS at 21:08

## 2020-01-03 NOTE — PROGRESS NOTES
Assumed care of patient. Assessment completed and documented, see docflow. Patient awakens easily, resting in bed. NAD noted at present. Respirations even and non labored. Encouraged to call for needs. Call light within reach. Will continue to monitor.

## 2020-01-03 NOTE — PROGRESS NOTES
Problem: Falls - Risk of  Goal: *Absence of Falls  Description  Document Stephon Eubanks Fall Risk and appropriate interventions in the flowsheet.   Outcome: Progressing Towards Goal  Note: Fall Risk Interventions:  Mobility Interventions: Patient to call before getting OOB         Medication Interventions: Evaluate medications/consider consulting pharmacy    Elimination Interventions: Call light in reach              Problem: Gas Exchange - Impaired  Goal: *Absence of hypoxia  Outcome: Progressing Towards Goal

## 2020-01-03 NOTE — PROGRESS NOTES
Progress Note      Patient: Jacquelyn Mendoza               Sex: male          MRN: [id number]           YOB: 1940      Age:  70 y.o. 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 2020: Cough is improving. Very minimal hemoptysis today. Breathing is also improving. No chest pain or abdominal pain or nausea or vomitings. Eating better today. 2020: Feeling better. Cough is much improved. No hemoptysis. Denies any chest pain or abdominal pain. 1/3/2020: Cough is improving. Minimal amount of sputum production. No blood in the sputum. No fevers. Shortness of breath much improved. No fevers overnight. Blood sugars again dropped this morning. Eating okay.       Objective:   Physical Exam:   Visit Vitals  /63   Pulse 82   Temp 98.2 °F (36.8 °C)   Resp 19   Ht 6' 1\" (1.854 m)   Wt 69.4 kg (153 lb 1.6 oz)   SpO2 94%   BMI 20.20 kg/m²      Temp (24hrs), Av.6 °F (37 °C), Min:98.2 °F (36.8 °C), Max:99.1 °F (37.3 °C)    Oxygen Therapy  O2 Sat (%): 94 % (20 1103)  Pulse via Oximetry: 79 beats per minute (20 0749)  O2 Device: Room air (20 0749)  O2 Flow Rate (L/min): 1 l/min (20 0748)    Intake/Output Summary (Last 24 hours) at 1/3/2020 1149  Last data filed at 1/3/2020 0908  Gross per 24 hour   Intake 720 ml   Output 2700 ml   Net -1980 ml      General:          Conscious, No acute distress  Eyes: LISA, No pallor/icterus                      HENT:             Oral Mucosa is moist, No sinus tenderness  Neck:               Supple, No JVD  Lungs:             CTA Bilaterally, No significant wheezing  Heart:              S1 S2 regular  Abdomen:        Soft, Positive bowel sounds, NTND, No guarding/rigidity/rebound tend. Extremities:     Bilateral pitting edema in the lower extremities -improving. Neurologic:      AAOX2, No acute FND, Motor: LUE: 5/5, LLE: 5/5, RUE: 5/5, RLE: 5/5  Skin:                No acute rashes  Musculoskeletal: No Acute findings  Psych:             Mood is appropriate. LAB  Recent Results (from the past 24 hour(s))   GLUCOSE, POC    Collection Time: 01/02/20  4:25 PM   Result Value Ref Range    Glucose (POC) 257 (H) 65 - 100 mg/dL   GLUCOSE, POC    Collection Time: 01/02/20  8:06 PM   Result Value Ref Range    Glucose (POC) 157 (H) 65 - 100 mg/dL   GLUCOSE, POC    Collection Time: 01/03/20  5:32 AM   Result Value Ref Range    Glucose (POC) 62 (L) 65 - 100 mg/dL   GLUCOSE, POC    Collection Time: 01/03/20  6:25 AM   Result Value Ref Range    Glucose (POC) 140 (H) 65 - 100 mg/dL   GLUCOSE, POC    Collection Time: 01/03/20 10:55 AM   Result Value Ref Range    Glucose (POC) 141 (H) 65 - 100 mg/dL       Ct Chest Wo Cont    Result Date: 1/2/2020  CT THORAX WITHOUT CONTRAST HISTORY: lung cancer with pneumonia, 70 years Male Bernice Man a 70 y. o.?male? who has signs and symptoms consistent with adenocarcinoma left upper lobe. ? Discussed his PFT and may need supplemental oxygen for a short while, but did offer segmentectomy as well as lobectomy, but chose segmentectomy related to DLCO 45%. ? Also, small nodule in right lung too small to characterized- will monitor. History of left VATS segmentectomy and mediastinal lymphadenectomy on January 15, 2020.  COMPARISON: CT chest September 23, 2019 outside facility TECHNIQUE: Noncontrast axial helical images from the thoracic inlet through diaphragm were obtained. Coronal reformatted images were obtained at the scanner console and made available for review. All CT scans at this location are performed using dose modulation techniques as appropriate to a performed exam including the following: automated exposure control; adjustment of the mA and/or kV according to patient's size (this includes techniques or standardized protocols for targeted exams where dose is matched to indication/reason for exam; i.e. extremities or head); use of iterative reconstruction technique. FINDINGS: Partially visualized thyroid unremarkable. No evidence of significant mediastinal, or axillary lymphadenopathy. Mild calcific atherosclerosis of a normal caliber aortic arch and descending aorta. Persistent mild bilateral upper lobe predominant panlobular emphysema. New small left and trace right pleural effusions with associated mild dependent subsegmental atelectasis bilateral lung bases. There is new patchy dense airspace opacity involving the left upper lobe, consistent with acute left upper lobar pneumonia. A small perifissural right middle lobe nodule measuring up to 6 m appears unchanged and is nonspecific. Visualized proximal airways unremarkable. Visualized upper abdominal viscera including the adrenal glands are otherwise unremarkable. Visualized osseous structures unremarkable. IMPRESSION: 1. Acute left upper lobar pneumonia, with new small bilateral pleural effusions. 2.  Other chronic and nonspecific findings as above. Ct Chest Wo Cont    Result Date: 1/2/2020  IMPRESSION: 1. Acute left upper lobar pneumonia, with new small bilateral pleural effusions. 2.  Other chronic and nonspecific findings as above.        All Micro Results     Procedure Component Value Units Date/Time    CULTURE, RESPIRATORY/SPUTUM/BRONCH Saima Quintero [[id number]] Collected:  01/02/20 4676    Order Status:  Completed Specimen:  Sputum Updated:  01/03/20 0912     Special Requests: NO SPECIAL REQUESTS        GRAM STAIN 20 TO 50 WBCS SEEN PER OIF      0 TO 1 EPITHELIAL CELLS SEEN PER OIF      2+ MUCUS PRESENT               MODERATE GRAM POSITIVE COCCI            FEW GRAM POSITIVE RODS         FEW YEAST        Culture result:       MODERATE YEAST SUBCULTURE IN PROGRESS          CULTURE, BLOOD [[id number]] Collected:  12/31/19 1958    Order Status:  Completed Specimen:  Blood Updated:  01/03/20 0812     Special Requests: --        RIGHT  HAND       Culture result: NO GROWTH 3 DAYS       CULTURE, BLOOD [[id number]] Collected:  12/31/19 1957    Order Status:  Completed Specimen:  Blood Updated:  01/03/20 0812     Special Requests: --        RIGHT  HAND       Culture result: NO GROWTH 3 DAYS       MSSA/MRSA SC BY PCR, NASAL SWAB [[id number]] Collected:  12/31/19 1900    Order Status:  Canceled Specimen:  Nasal swab           Current Medications Reviewed      Assessment/Plan     Principal Problem:    Pneumonia of left upper lobe due to infectious organism ([facility] [address] (12/31/2019)      Overview: Presumptive non-small cell lung cancer stage IA3 based on the bronchoscopy       finding done last month    Active Problems:    Tobacco abuse (4/29/2015)      Malignant neoplasm of upper lobe of left lung ([facility] [address] (12/12/2019)      Cough with hemoptysis (1/1/2020)        1. 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 Patient also has small left lower lobe consolidation. Patient has small amount of hemoptysis which could be from pneumonia or could be from underlying cancer. Seen by pulmonology in consultation. Received IV ceftriaxone and azithromycin for 3 days. We will switch him to oral Levaquin today and observe. .  Continue him on bronchodilators, guaifenesin, monitor. Seen by Dr. Wes Wolfe and recommended no rush surgery unless patient develops any significant hemoptysis.     2.  Bilateral lower extremity edema: Could be secondary to severe hypoalbuminemia from protein calorie malnutrition. He had a cardiac cath in August 2019 and showed normal EF and no obstructive coronary artery disease. No DVT in lower extremities. Apply compression stockings to help with edema. Improving. 3.  Submassive hemoptysis. Hemoglobin is stable. Will hold aspirin and Plavix, monitor hemoglobin. Pulmonology following.     4.  Severe protein calorie malnutrition, secondary to anorexia, malignancy. Nutritionist consulted. Glucerna ordered. Continue on Marinol to help boost appetite.     5. Hypertension: Continue home blood pressure medications and monitor. Blood pressure is stable.     6.  Diabetes type 2, insulin-dependent. A1c is 10.3. Patient had hypoglycemic episode this morning again. Will decrease Lantus to 24 units at night and continue 8 units of mealtime insulin along with sliding scale. 7.  CKD stage III -creatinine stable.     8.  Possibly anemia of chronic disease with subacute blood loss anemia. Iron deficiency anemia. Started on iron replacement. Hemoglobin has been stable. 9.  Mild hyponatremia. Resolved. If blood sugars are stable, if patient continues to do well, possible discharge home tomorrow with home health. DVT prophylaxis: SCDs  Code status: Full  Moderate risk patient.     Carolann Bates MD  January 3, 2020

## 2020-01-03 NOTE — ADT AUTH CERT NOTES
Patient Demographics     Patient Name  Flavio Alberto  [id number] Sex  Male   1940 Address  [address] [place] [address] Phone  [phone] (Home) *Preferred*  [phone] (Mobile)   CSN:   [id number]   Admit Date: Admit Time Room Bed   Dec 31, 2019  3:17  [[id number]] 01 [963]   Attending Providers     Provider Pager From To   Jelani Jaramillo MD  19   Porsha Seth MD  19    Emergency Contact(s)     Name Relation Home Work Mobile   Eve Clark [phone]     Utilization Reviews         Pneumonia - Care Day 3 (2020) by Mahnaz Wong RN         Review Entered Review Status   1/3/2020 10:04 Completed       Criteria Review      Care Day: 3 Care Date: 2020 Level of Care: Inpatient Floor    Guideline Day 3    Clinical Status    (X) * Hemodynamic stability    1/3/2020 10:04:09 EST by Boo Bray      TEMP 98.6, HR 80, RR 18, O2 96 ON 1L NC, /69    (X) * Afebrile, or temperature acceptable for next level of care    (X) * Tachypnea absent    (X) * Hypoxemia absent    (X) * Mental status at baseline    ( ) * Antibiotic regimen acceptable for next level of care    1/3/2020 10:04:09 EST by Boo Bray      REMAINS ON IV ABX    ( ) * Discharge plans and education understood    Activity    (X) * Ambulatory    Routes    ( ) * Oral hydration, medications, and diet    1/3/2020 10:04:09 EST by Boo Bray      NS DRIP @ 50, MAG SULF 2G IV X 1, MARINOL 5 MG BID PO, BROVANA NEB BID    Interventions    ( ) * Oxygen absent or at baseline need    1/3/2020 10:04:09 EST by Boo Bray      1L NC    (X) WBC    1/3/2020 10:04:09 EST by Boo Bray      8.9    (X) Incentive spirometry    Medications    ( ) Oral antibiotics    1/3/2020 10:04:09 EST by Boo Bray      ZITHROMAX 500 MG QD IV, ROCEPHIN 2G QD IV    * Milestone   Additional Notes   GEN SURG CONSULT   Assessment/Plan:    Alfie Lees is a 70 y. o. male who has signs and symptoms consistent with adenocarcinoma left upper lobe.  Discussed his PFT and may need supplemental oxygen for a short while, but did offer segmentectomy as well as lobectomy, but chose segmentectomy related to DLCO 45%.  Also, small nodule in right lung too small to characterized- will monitor.       Treat pneumonia before proceeding with surgery. If pneumonia resolves by the date of surgery (1/15/20), continue with plans for left VAT Needle localization with segmentectomy and mediastinal lymphadenectomy as scheduled. If pneumonia has not resolved, delay surgery. If hemoptysis becomes severe may necessitate earlier intervention.       Hold blood thinners           Will try not cruz him to surgery if possible except hemoptysis is out of control. May have to postpone his surgery due to active pneumonia. PULM NOTE   Patient resting in bed, family at bedside.  States feeling \"better\" than previously noted.  No hemoptysis yesterday or today so far. Denies any worsening dyspnea or pain with respirations.       --Blood cultures X 2 negative growth X 2 days   --Zithromax day 3/7, Rocephin day 3/7   --Continue supplemental O2,wean as tolerated   --Continue Brovana, Mucinex, Spiriva, prn Duoneb   --Cough suppressants as needed   --Plans for left VAT on 1/15/20 as long as pneumonia has resolved      Lungs:  coarse   Heart:  RRR with no Murmur/Rubs/Gallops       Additional Comments:  Continue ABX ,surgery plans for 1/15/ unless uncontrolled hemoptysis               IM NOTE   1/2/2020: Feeling better.  Cough is much improved.  No hemoptysis.  Denies any chest pain or abdominal pain.           1.  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 Patient also has small left lower lobe consolidation.  Patient has small amount of hemoptysis which could be from pneumonia or could be from underlying cancer.  Seen by pulmonology in consultation.  Recommended to continue him on empiric IV ceftriaxone, azithromycin. He received 1 dose of IV vancomycin in the emergency room. Placed him on bronchodilators, guaifenesin, monitor.  Seen by Dr. Wilber Max and recommended no rush surgery unless patient develops any significant hemoptysis.       2.  Bilateral lower extremity edema: Could be secondary to severe hypoalbuminemia from protein calorie malnutrition. Azael Mcgregor had a cardiac cath in August 2019 and showed normal EF and no obstructive coronary artery disease.  No DVT in lower extremities.  Apply compression stockings to help with edema.       3.  Submassive hemoptysis.  Hemoglobin is stable.  Will hold aspirin and Plavix, monitor hemoglobin.  Pulmonology following.       4.  Severe protein calorie malnutrition, secondary to anorexia, malignancy.  Nutritionist consulted. Branda Card ordered.    Continue on Marinol to help boost appetite.       5.  Hypertension: Continue home blood pressure medications and monitor.  Blood pressure is stable.       6.  Diabetes type 2, insulin-dependent.    With hyperglycemia, seems to be uncontrolled.  A1c is 10.3.  Had mild hypoglycemic episode this morning.  Adjust insulin regimen and monitor.       7.  CKD stage III -creatinine stable.       8.  Possibly anemia of chronic disease with subacute blood loss anemia.  Iron deficiency anemia.  Started on iron replacement.       9.  Mild hyponatremia.  Sodium level improved with fluids.                /63, TEMP 98.7, HR 87, RR 18, O2 94 ON 1L NC      MAG 1.6, CA 8.1         CT CHEST: 1.  Acute left upper lobar pneumonia, with new small bilateral pleural   effusions. 2.  Other chronic and nonspecific findings as above.             PT, NUT SUPP, DIAB DIET, I/O, IS                Pneumonia - Care Day 2 (1/1/2020) by Brett Cummins RN         Review Entered Review Status   1/3/2020 09:54 Completed       Criteria Review      Care Day: 2 Care Date: 1/1/2020 Level of Care: Inpatient Floor    Guideline Day 2    Level Of Care    (X) Floor    Clinical Status    (X) * No CO2 retention or acidosis    (X) * No requirement for mechanical ventilation    (X) * Hypotension absent    1/3/2020 09:54:11 EST by Boo Bray      /57    (X) * Fever absent or reduced    (X) * No hypoxia on room air or oxygenation improved    (X) * Mental status improved or at baseline    Activity    (X) * Increased activity    Routes    (X) Oral hydration, medications    (X) Usual diet    1/3/2020 09:54:11 EST by Boo Bray      DIAB DIET    Interventions    (X) Incentive spirometry    (X) Possible oxygen    1/3/2020 09:54:11 EST by Boo Bray      1.5L NC    Medications    (X) IV or oral antibiotics    1/3/2020 09:54:11 EST by Cheikh Beam 500 MG QD IV, ROCEPHIN 2G QD IV, FLAGYL 500 MG IV X 1    * Milestone   Additional Notes   PULM CONSULT   Continue bronchodilators   Continue antibiotic coverage for likely postobstructive pneumonia.  Can stop Flagyl   Hold antihypertensive medication    surgery consult in a.m. to consider doing surgical resection sooner   Follow his CBC and replace his magnesium   Follow his labs   DVT and GI prophylaxis               IM NOTE   1/1/2020: Cough is improving.  Very minimal hemoptysis today.  Breathing is also improving.  No chest pain or abdominal pain or nausea or vomitings.  Eating better today.       General:          Conscious, No acute distress   Eyes:               LISA, No pallor/icterus                       HENT:             Oral Mucosa is dry, No sinus tenderness   Neck:               Supple, No JVD   Lungs:             CTA Bilaterally, No significant wheezing   Heart:              S1 S2 regular   Abdomen:        Soft, Positive bowel sounds, NTND, No guarding/rigidity/rebound tend. Extremities:     Bilateral pitting edema in the lower extremities.    Neurologic:      AAOX2, No acute FND, Motor: LUE: 5/5, LLE: 5/5, RUE: 5/5, RLE: 5/5   Skin:                No acute rashes Musculoskeletal: No Acute findings   Psych:             Mood is appropriate. 1.  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 Patient also has small left lower lobe consolidation.  Patient has small amount of hemoptysis which could be from pneumonia or could be from underlying cancer.  Seen by pulmonology in consultation.  Recommended to continue him on empiric IV ceftriaxone, azithromycin. Will check MRSA screen.  He received 1 dose of IV vancomycin in the emergency room. Placed him on bronchodilators, guaifenesin, monitor.  Spoke to the pulmonologist today, recommended to continue antibiotics and consult general surgery, Dr. Shari Le to evaluate for surgical resection of the tumor sooner than later.       2.  Bilateral lower extremity edema: Could be secondary to severe hypoalbuminemia from protein calorie malnutrition. [facility] had a cardiac cath in August 2019 and showed normal EF and no obstructive coronary artery disease.  No DVT in lower extremities.  Apply compression stockings to help with edema.       3.  Submassive hemoptysis.  Hemoglobin is stable.  Will hold aspirin and Plavix, monitor hemoglobin.  Pulmonology consulted.       4.  Severe protein calorie malnutrition, secondary to anorexia, malignancy.  Nutritionist consulted. Radha Keene ordered.    Continue on Marinol to help boost appetite.       5.  Hypertension: Continue home blood pressure medications and monitor.  Blood pressure is stable.       6.  Diabetes type 2, insulin-dependent.    With hyperglycemia, seems to be uncontrolled.  A1c is 10. 3.  Adjust insulin regimen, monitor.         7.  CKD stage III       8.  Possibly anemia of chronic disease with subacute blood loss anemia.  Iron deficiency anemia.  Started on iron replacement.       9.  Mild hyponatremia.  Sodium level improved with fluids.        DVT prophylaxis: SCDs   Code status: Full   Risk: High risk patient secondary to lung cancer, at risk for respiratory failure and sepsis. /57, TEMP 97.9, HR 87, RR 18, O2 94 ON 1.5L NC      PT 14.7, PHOS 2.2, MAG 1.7, ALB 1.7, ALK , A1C 10.3         **RESP CULT: 2 + MUCUS, GRAM + RODS**            CT CHEST: 1.  Acute left upper lobar pneumonia, with new small bilateral pleural   effusions.    2.  Other chronic and nonspecific findings as above.             PT, DIAB DIET, I/O, IS, GEN SURG CONSULT

## 2020-01-03 NOTE — PROGRESS NOTES
Problem: Mobility Impaired (Adult and Pediatric)  Goal: *Acute Goals and Plan of Care (Insert Text)  Description  Discharge Goals:  (1.)Mr. [place] Lynch will move from supine to sit and sit to supine  with INDEPENDENT within 7 treatment day(s). (2.)Mr. [place] Lynch will transfer from bed to chair and chair to bed with INDEPENDENT using the least restrictive device within 7 treatment day(s). (3.)Mr. [place] Lynch will ambulate with SUPERVISION for 500+ feet with the least restrictive device within 7 treatment day(s) while maintaining stable O2 sat.     ________________________________________________________________________________________________     Outcome: Progressing Towards Goal       PHYSICAL THERAPY: Daily Note and PM 1/3/2020  INPATIENT: PT Visit Days : 2  Payor: Jacky Mcrae / Plan: [address] [place] [address] HMO / Product Type: [employer] /       NAME/AGE/GENDER: Ana Cristina Kiran is a 70 y.o. male   PRIMARY DIAGNOSIS: Pneumonia [J18.9] Pneumonia of left upper lobe due to infectious organism ([facility] [address] Pneumonia of left upper lobe due to infectious organism ([facility] [address]       ICD-10: Treatment Diagnosis:    · Generalized Muscle Weakness (M62.81)  · Difficulty in walking, Not elsewhere classified (R26.2)   Precaution/Allergies:  Patient has no known allergies. ASSESSMENT:     Mr. [place]ne Lynch is sitting up in chair on contact and agreeable to therapy. Pt stood and was able to ambulate about 250' with rolling walker and CGA/SBA. Pt does ambulate with narrow PERLA, slightly flexed posture, and shuffled gait pattern. Pt returned to chair in room and left with needs in reach. Pt is making good progress towards goals with increased ambulation distance this afternoon. Will continue with POC. This section established at most recent assessment   PROBLEM LIST (Impairments causing functional limitations):  1. Decreased Strength  2. Decreased ADL/Functional Activities  3. Decreased Transfer Abilities  4.  Decreased Ambulation Ability/Technique  5. Decreased Balance  6. Decreased Activity Tolerance  7. Decreased Pacing Skills  8. Increased Fatigue  9. Increased Shortness of Breath  10. Decreased [place] with Home Exercise Program   INTERVENTIONS PLANNED: (Benefits and precautions of physical therapy have been discussed with the patient.)  1. Balance Exercise  2. Bed Mobility  3. Family Education  4. Gait Training  5. Home Exercise Program (HEP)  6. Neuromuscular Re-education/Strengthening  7. Therapeutic Activites  8. Therapeutic Exercise/Strengthening  9. Transfer Training     TREATMENT PLAN: Frequency/Duration: 3 times a week for duration of hospital stay  Rehabilitation Potential For Stated Goals: Good     REHAB RECOMMENDATIONS (at time of discharge pending progress):    Placement: It is my opinion, based on this patient's performance to date, that Mr. [place] Lynch may benefit from [address] after discharge due to the functional deficits listed above that are likely to improve with skilled rehabilitation because he/she has multiple medical issues that affect his/her functional mobility in the community. Equipment:    None at this time              HISTORY:   History of Present Injury/Illness (Reason for Referral):  See H&P below  This is a 82-year-old gentleman with history of hypertension, diabetes, COPD, CKD stage III, who was recently diagnosed with adenocarcinoma of the upper lobe of the left lung, came to the emergency room with complaints of worsening cough, shortness of breath and bloody sputum. Patient had a bronchoscopy and biopsy in November 2019 and diagnosed with lung cancer. He was having cough which has gradually worsened over the last 1 month, has become moderate in severity in the last few days, worse with exertion, no specific relieving factors, associated with blood-tinged sputum over the last several days. Blood in the sputum is worsening over the last few days.   He was also having shortness of breath which is also progressively getting worse, moderate in severity, worse with exertion, better with rest.  He was having left upper anterior chest pain with coughing. No fevers. No diarrhea. Nausea on and off. Decreased appetite and has not been eating or drinking much in the last 1 month. Complains of generalized weakness, weight loss in the last 1 month. He has noticed increasing swelling in both lower extremities over the last few days. Patient denies any burning with urination. No abdominal pain. Past Medical History/Comorbidities:   Mr. Salena Banda  has a past medical history of COPD, Diabetes ([address] HTN (hypertension) (4/29/2015), Malignant neoplasm of upper lobe of left lung ([address] (12/12/2019), and Other ill-defined conditions(799.89). He also has no past medical history of Arrhythmia, Asthma, Autoimmune disease ([address] CAD (coronary artery disease), Chronic kidney disease, Difficult intubation, GERD (gastroesophageal reflux disease), Heart failure ([address] Liver disease, Malignant hyperthermia due to anesthesia, Nausea & vomiting, Neurological disorder, Pseudocholinesterase deficiency, Psychiatric disorder, PUD (peptic ulcer disease), Seizures ([address] Sleep apnea, Stroke ([address] Thromboembolus ([address] or Thyroid disease. Mr. Salena Banda  has a past surgical history that includes pr abdomen surgery proc unlisted (5/2015) and hx orthopaedic.   Social History/Living Environment:   Home Environment: Private residence  # Steps to Enter: 1  One/Two Story Residence: One story  Living Alone: No  Support Systems: Child(shayy), [employer]anda[employer] / kathy community, Family member(s), Friends \ neighbors  Patient Expects to be Discharged to[de-identified] Private residence  Current DME Used/Available at Home: None  Prior Level of Function/Work/Activity:  Independent with gait and ADLs, 0 falls, drives   Number of Personal Factors/Comorbidities that affect the Plan of Care: 3+: HIGH COMPLEXITY   EXAMINATION:   Most Recent Physical Functioning:   Gross Assessment:                  Posture:     Balance:  Sitting: Intact  Standing - Static: Good;Constant support  Standing - Dynamic : Fair;Constant support Bed Mobility:     Wheelchair Mobility:     Transfers:  Sit to Stand: Contact guard assistance  Stand to Sit: Contact guard assistance  Gait:     Base of Support: Narrowed  Speed/Kate: Pace decreased (<100 feet/min)  Step Length: Left shortened;Right shortened  Gait Abnormalities: Decreased step clearance;Trunk sway increased  Distance (ft): 250 Feet (ft)  Assistive Device: Walker, rolling  Ambulation - Level of Assistance: Contact guard assistance;Stand-by assistance  Interventions: Safety awareness training;Verbal cues      Body Structures Involved:  1. Lungs  2. Bones  3. Muscles Body Functions Affected:  1. Cardio  2. Respiratory  3. Neuromusculoskeletal  4. Movement Related Activities and Participation Affected:  1. General Tasks and Demands  2. Mobility  3. Self Care  4. Domestic Life  5. Interpersonal Interactions and Relationships  6. Community, Social and [place]   Number of elements that affect the Plan of Care: 4+: HIGH COMPLEXITY   CLINICAL PRESENTATION:   Presentation: Evolving clinical presentation with changing clinical characteristics: MODERATE COMPLEXITY   CLINICAL DECISION MAKIN [facility] Inpatient Short Form  How much difficulty does the patient currently have. .. Unable A Lot A Little None   1. Turning over in bed (including adjusting bedclothes, sheets and blankets)? [] 1   [] 2   [] 3   [x] 4   2. Sitting down on and standing up from a chair with arms ( e.g., wheelchair, bedside commode, etc.)   [] 1   [] 2   [x] 3   [] 4   3. Moving from lying on back to sitting on the side of the bed? [] 1   [] 2   [x] 3   [] 4   How much help from another person does the patient currently need. .. Total A Lot A Little None   4.   Moving to and from a bed to a chair (including a wheelchair)? [] 1   [] 2   [x] 3   [] 4   5. Need to walk in hospital room? [] 1   [] 2   [x] 3   [] 4   6. Climbing 3-5 steps with a railing? [] 1   [] 2   [x] 3   [] 4   © 2007, Trustees of [address] [place] [address] [address] [address], under license to [employer]. All rights reserved      Score:  Initial:19 Most Recent: X (Date: -- )    Interpretation of Tool:  Represents activities that are increasingly more difficult (i.e. Bed mobility, Transfers, Gait). Medical Necessity:     · Patient is expected to demonstrate progress in   · strength, balance, coordination, and functional technique  ·  to   · decrease assistance required with gait, transfers, and functional mobility. · .  Reason for Services/Other Comments:  · Patient continues to require skilled intervention due to   · decreased strength, decreased balance, decreased functional tolerance, decreased cardiopulmonary endurance affecting participation in basic ADLs and functional tasks. · .   Use of outcome tool(s) and clinical judgement create a POC that gives a: Clear prediction of patient's progress: LOW COMPLEXITY            TREATMENT:      Pre-treatment Symptoms/Complaints: \"Im feeling better\"  Pain: Initial:      Post Session:  0/10     Therapeutic Activity: (    15 minutes): Therapeutic activities including Bed transfers, Chair transfers, and Ambulation on level ground to improve mobility, strength, balance, and coordination. Required minimal Safety awareness training;Verbal cues to promote static and dynamic balance in standing and promote coordination of bilateral, lower extremity(s).        Braces/Orthotics/Lines/Etc:   · IV  · O2 Device: Nasal cannula  Treatment/Session Assessment:    · Response to Treatment:  amb 250 ft with RW and SBA/CGA  · Interdisciplinary Collaboration:   o Physical Therapy Assistant  o Registered Nurse  · After treatment position/precautions:   o Up in chair  o Bed/Chair-wheels locked  o Bed in low position  o Call light within reach  o RN notified  o PCT at bedside   · Compliance with Program/Exercises: Will assess as treatment progresses  · Recommendations/Intent for next treatment session: \"Next visit will focus on advancements to more challenging activities and reduction in assistance provided\".   Total Treatment Duration:  PT Patient Time In/Time Out  Time In: 1445  Time Out: [address], PTA

## 2020-01-03 NOTE — PROGRESS NOTES
Ale Cornejo  Admission Date: 12/31/2019             Daily Progress Note: 1/3/2020    The patient's chart is reviewed and the patient is discussed with the staff. 70 y.o.  male seen and evaluated at the request of Dr. Jw Serrano. 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  Patient had a bronchoscopy and biopsy in November 2019 and diagnosed with lung cancer. Brittani Harding was having cough which has gradually worsened over the last 1 month, has become moderate in severity in the last few days, worse with exertion, no specific relieving factors, associated with blood-tinged sputum over the last several days.  Blood in the sputum is worsening over the last few days. Brittani Harding was also having shortness of breath which is also progressively getting worse, moderate in severity, worse with exertion, better with rest.  He was having left upper anterior chest pain with coughing.  No fevers , chills, diarrhea, but has nausea on and off.    He also complains of poor appetite, generalized weakness, weight loss in the last month.  He has noticed increasing swelling in both lower extremities over the last few days.  Patient denies any burning with urination.  No abdominal pain. Patient was seen by Dr. Kasey Cooper earlier last month and he was a scheduled tentatively for left VAT needle localization with segmentectomy and mediastinal lymphadenectomy. We were asked to assist in his management. Subjective:     Patient resting in bed, denies hemoptysis yesterday or today so far. Denies any worsening dyspnea or pain with respirations. On room air now.       Current Facility-Administered Medications   Medication Dose Route Frequency    insulin glargine (LANTUS) injection 24 Units  24 Units SubCUTAneous QHS    atorvastatin (LIPITOR) tablet 10 mg  10 mg Oral QHS    insulin lispro (HUMALOG) injection 8 Units  8 Units SubCUTAneous TID WITH MEALS    magnesium oxide (MAG-OX) tablet 400 mg  400 mg Oral BID    cefTRIAXone (ROCEPHIN) 2 g in 0.9% sodium chloride (MBP/ADV) 50 mL  2 g IntraVENous Q24H    dronabinol (MARINOL) capsule 5 mg  5 mg Oral BID    albuterol-ipratropium (DUO-NEB) 2.5 MG-0.5 MG/3 ML  3 mL Nebulization Q4H PRN    guaiFENesin ER (MUCINEX) tablet 600 mg  600 mg Oral Q12H    benzonatate (TESSALON) capsule 100 mg  100 mg Oral TID PRN    guaiFENesin-codeine (ROBITUSSIN AC) 100-10 mg/5 mL solution 5 mL  5 mL Oral Q4H PRN    insulin lispro (HUMALOG) injection   SubCUTAneous AC&HS    hydrALAZINE (APRESOLINE) 20 mg/mL injection 10 mg  10 mg IntraVENous Q6H PRN    LORazepam (ATIVAN) tablet 0.5 mg  0.5 mg Oral Q8H PRN    [Held by provider] losartan (COZAAR) tablet 25 mg  25 mg Oral DAILY    metoprolol tartrate (LOPRESSOR) tablet 25 mg  25 mg Oral BID    sodium chloride (NS) flush 5-40 mL  5-40 mL IntraVENous Q8H    sodium chloride (NS) flush 5-40 mL  5-40 mL IntraVENous PRN    acetaminophen (TYLENOL) tablet 650 mg  650 mg Oral Q4H PRN    HYDROcodone-acetaminophen (NORCO) 5-325 mg per tablet 1 Tab  1 Tab Oral Q4H PRN    morphine injection 2 mg  2 mg IntraVENous Q4H PRN    naloxone (NARCAN) injection 0.4 mg  0.4 mg IntraVENous PRN    diphenhydrAMINE (BENADRYL) capsule 25 mg  25 mg Oral Q4H PRN    ondansetron (ZOFRAN) injection 4 mg  4 mg IntraVENous Q4H PRN    magnesium hydroxide (MILK OF MAGNESIA) 400 mg/5 mL oral suspension 30 mL  30 mL Oral DAILY PRN    senna-docusate (PERICOLACE) 8.6-50 mg per tablet 2 Tab  2 Tab Oral DAILY    arformoterol (BROVANA) neb solution 15 mcg  15 mcg Nebulization BID RT    tiotropium (SPIRIVA) inhalation capsule 18 mcg  1 Cap Inhalation DAILY    phenol throat spray (CHLORASEPTIC) 1 Spray  1 Spray Oral PRN    mirtazapine (REMERON) tablet 15 mg  15 mg Oral QHS    ferrous sulfate tablet 325 mg  1 Tab Oral BID WITH MEALS    azithromycin (ZITHROMAX) 500 mg in 0.9% sodium chloride (MBP/ADV) 250 mL  500 mg IntraVENous Q24H       Review of Systems    Constitutional: negative for fever, chills, sweats  Cardiovascular: negative for chest pain, palpitations, syncope, edema  Gastrointestinal:  negative for dysphagia, reflux, vomiting, diarrhea, abdominal pain, or melena  Neurologic:  negative for focal weakness, numbness, headache    Objective:     Vitals:    01/03/20 0240 01/03/20 0607 01/03/20 0712 01/03/20 0749   BP: 145/62  146/66    Pulse: 88  84    Resp: 19  19    Temp: 99.1 °F (37.3 °C)  98.5 °F (36.9 °C)    SpO2: 91%  96% 97%   Weight:  153 lb 1.6 oz (69.4 kg)     Height:             Intake/Output Summary (Last 24 hours) at 1/3/2020 6038  Last data filed at 1/3/2020 0908  Gross per 24 hour   Intake 960 ml   Output 3200 ml   Net -2240 ml       Physical Exam:   Constitution:  the patient is well developed and in no acute distress  EENMT:  Sclera clear, pupils equal, oral mucosa moist  Respiratory: Clear, on room air  Cardiovascular:  RRR without M,G,R  Gastrointestinal: soft and non-tender; with positive bowel sounds. Musculoskeletal: warm without cyanosis. There is no lower extremity edema. Skin:  no jaundice or rashes, no wounds   Neurologic: no gross neuro deficits     Psychiatric:  alert and oriented x 3, pleasant, following commands    CXR: 12/31/2019      IMPRESSION:  1. Cavitary left upper lobe lesion consistent with a lung tumor. There is  surrounding consolidation in the left upper lobe. 2. Minimal consolidation in the left lung base. BILATERAL LOWER EXTREMITY DUPLEX:  1/1/2020  FINDINGS:  There is normal flow in the greater saphenous, common femoral,  superficial femoral, and popliteal veins. Normal compression and augmentation is  demonstrated. The proximal calf veins are also patent.   IMPRESSION  No evidence of deep venous thrombosis in either lower extremity        10/25/2019  COMPLETE PULMONARY FUNCTION STUDY    SPIROMETRY:    FVC 3.32 L or 69% of predicted  FEV1   2.04 L or 57% of predicted  FEV1/FVC was 61 %. FEF 25-75 was 33 % of predicted. The flow volume loop reveals expiratory flow obstruction. LUNG VOLUMES:  Obtained by plethysmography. Total Lung Capacity: 109% of predicted  FRC:   151% of predicted  Residual Volume:   165% of predicted  RV/T-increased    DIFFUSION CAPACITY:    Diffusion Capacity: 45% of predicted  DLCO corrected for alveolar volume: 55% of predicted    IMPRESSION:    Spirometry is consistent with a moderately severe obstructive/restrictive ventilatory defect.  Lung volumes were increased consistent with air trapping.  The diffusion capacity corrected for alveolar volume was decreased suggesting loss of gas exchanging lung units.  This is study is consistent with a diagnosis of emphysema.  Clinical correlation is needed. Seth Pascual MD        LAB  Recent Labs     20  0625 20  0532 20  1625 20  1110   GLUCPOC 140* 62* 157* 257* 165*      Recent Labs     20  0757 20  0913 19  1241   WBC 8.9 10.1 13.0*   HGB 10.2* 11.4* 11.6*   HCT 29.8* 32.9* 33.0*    406 408   INR  --  1.1  --      Recent Labs     20  0757 20  0913 19  1241    137 132*   K 3.9 4.1 4.5    103 96*   CO2 27 28 29   * 201* 191*   BUN 7* 10 11   CREA 1.01 1.09 1.27   MG 1.6* 1.7*  --    CA 8.1* 8.6 8.7   PHOS 2.9 2.2*  --    ALB  --  1.7* 1.9*   TBILI  --  0.3 0.4   ALT  --  20 23   SGOT  --  29 37     No results for input(s): PH, PCO2, PO2, HCO3, PHI, PCO2I, PO2I, HCO3I in the last 72 hours. No results for input(s): LCAD, LAC in the last 72 hours.       Assessment:  (Medical Decision Making)     Hospital Problems  Date Reviewed: 2020          Codes Class Noted POA    Cough with hemoptysis ICD-10-CM: R04.2  ICD-9-CM: 786.39 1/1/2020 Yes    No hemoptysis recently    * (Principal) Pneumonia of left upper lobe due to infectious organism [facility]) ICD-10-CM: J18.1  ICD-9-CM: 486  12/31/2019 Yes    Overview Signed 1/1/2020 12:23 PM by Robert Estrella MD     Presumptive non-small cell lung cancer stage IA3 based on the bronchoscopy finding done last month         On antibiotics    Malignant neoplasm of upper lobe of left lung [facility]) ICD-10-CM: C34.12  ICD-9-CM: 162.3  12/12/2019 Yes    Plans for VAT    Tobacco abuse (Chronic) ICD-10-CM: Z72.0  ICD-9-CM: 305.1  4/29/2015 Yes    Current daily smoker, 1 ppd x 40 years          Plan:  (Medical Decision Making)     --Blood cultures X 2, negative growth X 3 days  --Zithromax day 4/7, Rocephin day 4/7  --Continue supplemental O2,wean as tolerated  --Continue Brovana, Mucinex, Spiriva, prn Duoneb  --Cough suppressants as needed  --Plans for left VAT on 1/15/20 as long as pneumonia has resolved and there is no uncontrolled hemoptysis    More than 50% of the time documented was spent in face-to-face contact with the patient and in the care of the patient on the floor/unit where the patient is located. Jorge Uribe NP     Lungs:  Few trace rhonchi  Heart:  RRR with no Murmur/Rubs/Gallops    Additional Comments:  Much better. No further hemoptysis. Appears to be responding to Abx. Likely discharge tomorrow. Surgery hopefully in mid January. Will sign off. Call if needed. I have spoken with and examined the patient. I agree with the above assessment and plan as documented.     Riaz Bartlett MD

## 2020-01-03 NOTE — PROGRESS NOTES
[facility]  Face to Face Encounter    Patients Name: Lainey Cortes    YOB: 1940    Ordering Physician: Dr. Reid Reilly    Primary Diagnosis: Pneumonia [J18.9]    Date of Face to Face:   1/3/2020                                  Face to Face Encounter findings are related to primary reason for home care:   yes. 1. I certify that the patient needs intermittent care as follows: skilled nursing care:  skilled observation/assessment, patient education  physical therapy: strengthening  occupational therapy:  ADL safety (ie. cooking, bathing, dressing)    2. I certify that this patient is homebound, that is: 1) patient requires the use of a walker device, special transportation, or assistance of another to leave the home; or 2) patient's condition makes leaving the home medically contraindicated; and 3) patient has a normal inability to leave the home and leaving the home requires considerable and taxing effort. Patient may leave the home for infrequent and short duration for medical reasons, and occasional absences for non-medical reasons. Homebound status is due to the following functional limitations: Patient with strength deficits limiting the performance of all ADL's without caregiver assistance or the use of an assistive device. 3. I certify that this patient is under my care and that I, or a nurse practitioner or  [id number], or clinical nurse specialist, or certified nurse midwife, working with me, had a Face-to-Face Encounter that meets the physician Face-to-Face Encounter requirements. The following are the clinical findings from the [address] [place] [address] encounter that support the need for skilled services and is a summary of the encounter:     See discharge summary      Chris Cooper  1/3/2020      THE FOLLOWING TO BE COMPLETED BY THE COMMUNITY PHYSICIAN:    I concur with the findings described above from the [facility] encounter that this patient is homebound and in need of a skilled service.     Certifying Physician: _____________________________________      Printed Certifying Physician Name: _____________________________________    Date: _________________

## 2020-01-03 NOTE — PROGRESS NOTES
Bedside report given to Artur, RN. Pt. Is alert and oriented with no signs of distress or complaints of pain at this time.

## 2020-01-03 NOTE — PROGRESS NOTES
1/3/2020     PLAN:  Care management per Hospitalist  Hemoptysis improved overnight. Will re-evaluate on Monday. May have to postpone his surgery due to active pneumonia. 1/3/19: Pt awake in bed. Reports no hemoptysis overnight. No other complaints. AF, NAD.     HPI: Verónica Schaefer is a 70 y.o. male who we are asked by hospitalist to see for left upper lobe lung mass. Pt is known to Dr. Stephanie Puckett and was initially referred by pulmonary for evaluation of a lung mass involving the Left upper lobe. The tumor was identified incidentally by routine chest xray and then proceeded to CT scan. The pathology showed a adenocarcinoma and lymph node 10L suspicious for malignancy. He was scheduled for Left VATs on 1/15/20. He presented to the ED with worsening hemoptysis, cough, pleuritic pain, SOB, bilateral LE edema, weakness, weight loss and lightheadedness. Pt reports hemoptysis for 4-6 weeks since his biopsy in November. He is on Plavix but may not be taking it correctly. On admission, he was found to have pneumonia and was started on IV Abx. His H/H is 10/29 today. Pt denies any further hempotysis in the last 24 hours. General surgery was consulted for evaluation. He has a history of smoking and quit 1 month ago. He does not have a history of drinking.         According to previous HPI, \"He does not have a family history of cancer. Medical history - DM, Htn, COPD . He has had previous chest surgery- collapsed left lung with chest tubes. Other surgeries include exploratory laparotomy for SBO. does take blood thinner.- Plavix, but unsure why he takes this, denies any stent or a-fib, denies history of CVA, states has been taking for a long time. \"    12/31/19 CXR:  FINDINGS:  The heart size is within normal limits.     There is a cavitary mass in the left upper lobe containing air-fluid level.  There is surrounding consolidation. There is minimal consolidation in the left  lung base as well. Pleural spaces are clear. IMPRESSION:     1. Cavitary left upper lobe lesion consistent with a lung tumor. There is  surrounding consolidation in the left upper lobe.     2.  Minimal consolidation in the left lung base.     Past Medical History:   Diagnosis Date    COPD     Diabetes ([facility]     HTN (hypertension) 4/29/2015    Malignant neoplasm of upper lobe of left lung ([facility] [address] 12/12/2019    Other ill-defined conditions(799.89)     cholesterol     Past Surgical History:   Procedure Laterality Date    ABDOMEN SURGERY PROC UNLISTED  5/2015    explor lap    HX ORTHOPAEDIC      bilateral shoulder repairs, both knees repaired-no hardware     Current Facility-Administered Medications   Medication Dose Route Frequency    insulin glargine (LANTUS) injection 24 Units  24 Units SubCUTAneous QHS    atorvastatin (LIPITOR) tablet 10 mg  10 mg Oral QHS    insulin lispro (HUMALOG) injection 8 Units  8 Units SubCUTAneous TID WITH MEALS    magnesium oxide (MAG-OX) tablet 400 mg  400 mg Oral BID    cefTRIAXone (ROCEPHIN) 2 g in 0.9% sodium chloride (MBP/ADV) 50 mL  2 g IntraVENous Q24H    dronabinol (MARINOL) capsule 5 mg  5 mg Oral BID    albuterol-ipratropium (DUO-NEB) 2.5 MG-0.5 MG/3 ML  3 mL Nebulization Q4H PRN    guaiFENesin ER (MUCINEX) tablet 600 mg  600 mg Oral Q12H    benzonatate (TESSALON) capsule 100 mg  100 mg Oral TID PRN    guaiFENesin-codeine (ROBITUSSIN AC) 100-10 mg/5 mL solution 5 mL  5 mL Oral Q4H PRN    insulin lispro (HUMALOG) injection   SubCUTAneous AC&HS    hydrALAZINE (APRESOLINE) 20 mg/mL injection 10 mg  10 mg IntraVENous Q6H PRN    LORazepam (ATIVAN) tablet 0.5 mg  0.5 mg Oral Q8H PRN    [Held by provider] losartan (COZAAR) tablet 25 mg  25 mg Oral DAILY    metoprolol tartrate (LOPRESSOR) tablet 25 mg  25 mg Oral BID    sodium chloride (NS) flush 5-40 mL  5-40 mL IntraVENous Q8H  sodium chloride (NS) flush 5-40 mL  5-40 mL IntraVENous PRN    acetaminophen (TYLENOL) tablet 650 mg  650 mg Oral Q4H PRN    HYDROcodone-acetaminophen (NORCO) 5-325 mg per tablet 1 Tab  1 Tab Oral Q4H PRN    morphine injection 2 mg  2 mg IntraVENous Q4H PRN    naloxone (NARCAN) injection 0.4 mg  0.4 mg IntraVENous PRN    diphenhydrAMINE (BENADRYL) capsule 25 mg  25 mg Oral Q4H PRN    ondansetron (ZOFRAN) injection 4 mg  4 mg IntraVENous Q4H PRN    magnesium hydroxide (MILK OF MAGNESIA) 400 mg/5 mL oral suspension 30 mL  30 mL Oral DAILY PRN    senna-docusate (PERICOLACE) 8.6-50 mg per tablet 2 Tab  2 Tab Oral DAILY    arformoterol (BROVANA) neb solution 15 mcg  15 mcg Nebulization BID RT    tiotropium (SPIRIVA) inhalation capsule 18 mcg  1 Cap Inhalation DAILY    phenol throat spray (CHLORASEPTIC) 1 Spray  1 Spray Oral PRN    mirtazapine (REMERON) tablet 15 mg  15 mg Oral QHS    ferrous sulfate tablet 325 mg  1 Tab Oral BID WITH MEALS    azithromycin (ZITHROMAX) 500 mg in 0.9% sodium chloride (MBP/ADV) 250 mL  500 mg IntraVENous Q24H     Patient has no known allergies. Social History     Socioeconomic History    Marital status: SINGLE     Spouse name: Not on file    Number of children: Not on file    Years of education: Not on file    Highest education level: Not on file   Tobacco Use    Smoking status: Current Every Day Smoker     Packs/day: 1.00     Years: 40.00     Pack years: 40.00     Types: Cigarettes    Smokeless tobacco: Never Used   Substance and Sexual Activity    Alcohol use: No    Drug use: No     Social History     Tobacco Use   Smoking Status Current Every Day Smoker    Packs/day: 1.00    Years: 40.00    Pack years: 40.00    Types: Cigarettes   Smokeless Tobacco Never Used     History reviewed. No pertinent family history. ROS:  Comprehensive review of systems was otherwise unremarkable except as noted above.     Physical Exam:   Visit Vitals  /66   Pulse 84   Temp 98.5 °F (36.9 °C)   Resp 19   Ht 6' 1\" (1.854 m)   Wt 153 lb 1.6 oz (69.4 kg)   SpO2 97%   BMI 20.20 kg/m²     Constitutional: Alert, cooperative, no acute distress; appears stated age    Eyes: Sclera are clear. EOMs intact  ENMT: no external lesions gross hearing normal; no obvious neck masses, no ear or lip lesions, nares normal  CV: RRR. Normal perfusion  Resp: No JVD. Breathing is  non-labored; no audible wheezing. GI: soft and non-distended; non tender. Musculoskeletal: unremarkable with normal function. No embolic signs or cyanosis. + LE edema.   Neuro:  Oriented; moves all 4; no focal deficits  Psychiatric: normal affect and mood, no memory impairment    Recent vitals (if inpt):  Patient Vitals for the past 24 hrs:   BP Temp Pulse Resp SpO2 Weight   01/03/20 0749 -- -- -- -- 97 % --   01/03/20 0712 146/66 98.5 °F (36.9 °C) 84 19 96 % --   01/03/20 0607 -- -- -- -- -- 153 lb 1.6 oz (69.4 kg)   01/03/20 0240 145/62 99.1 °F (37.3 °C) 88 19 91 % --   01/02/20 2244 162/71 98.6 °F (37 °C) 89 19 96 % --   01/02/20 2022 -- -- -- -- 93 % --   01/02/20 1930 136/67 98.6 °F (37 °C) 88 19 94 % --   01/02/20 1514 154/69 98.6 °F (37 °C) 80 18 96 % --   01/02/20 1107 145/63 98.2 °F (36.8 °C) 84 18 96 % --       Labs:  Recent Labs     01/02/20  0757 01/01/20  0913   WBC 8.9 10.1   HGB 10.2* 11.4*    406    137   K 3.9 4.1    103   CO2 27 28   BUN 7* 10   CREA 1.01 1.09   * 201*   PTP  --  14.7*   INR  --  1.1   APTT  --  30.1   TBILI  --  0.3   SGOT  --  29   ALT  --  20   AP  --  219*       Lab Results   Component Value Date/Time    WBC 8.9 01/02/2020 07:57 AM    HGB 10.2 (L) 01/02/2020 07:57 AM    PLATELET 272 65/51/8137 07:57 AM    Sodium 136 01/02/2020 07:57 AM    Potassium 3.9 01/02/2020 07:57 AM    Chloride 102 01/02/2020 07:57 AM    CO2 27 01/02/2020 07:57 AM    BUN 7 (L) 01/02/2020 07:57 AM    Creatinine 1.01 01/02/2020 07:57 AM    Glucose 155 (H) 01/02/2020 07:57 AM    INR 1.1 01/01/2020 09:13 AM    aPTT 30.1 01/01/2020 09:13 AM    Bilirubin, total 0.3 01/01/2020 09:13 AM    AST (SGOT) 29 01/01/2020 09:13 AM    ALT (SGPT) 20 01/01/2020 09:13 AM    Alk. phosphatase 219 (H) 01/01/2020 09:13 AM    Lipase 144 04/28/2015 01:00 PM    Troponin-I <0.05 11/30/2010 02:02 PM       I reviewed recent labs and recent radiologic studies. I independently reviewed radiology images for studies I described above or studies I have ordered.    Admission date (for inpatients): 12/31/2019   * No surgery found *  * No surgery found *    ASSESSMENT/PLAN:  Problem List  Date Reviewed: 1/2/2020          Codes Class Noted    Cough with hemoptysis ICD-10-CM: R04.2  ICD-9-CM: 786.39  1/1/2020        * (Principal) Pneumonia of left upper lobe due to infectious organism [facility]) ICD-10-CM: J18.1  ICD-9-CM: 978  12/31/2019    Overview Signed 1/1/2020 12:23 PM by Camille Nunez MD     Presumptive non-small cell lung cancer stage IA3 based on the bronchoscopy finding done last month             Malignant neoplasm of upper lobe of left lung ([facility] [address] ICD-10-CM: C34.12  ICD-9-CM: 162.3  12/12/2019        Lung nodule ICD-10-CM: R91.1  ICD-9-CM: 793.11  11/12/2019        Internal hernia ICD-10-CM: K45.8  ICD-9-CM: 553.8  4/30/2015    Overview Signed 4/30/2015  8:34 AM by Teri Mancera MD     4/29/15 s/p expl lap with reduction of internal hernia (which was obstructing small bowel) and lysis; Dr Shelli Read             HTN (hypertension) (Chronic) ICD-10-CM: I10  ICD-9-CM: 401.9  4/29/2015        Hyperlipidemia (Chronic) ICD-10-CM: E78.5  ICD-9-CM: 272.4  4/29/2015        Tobacco abuse (Chronic) ICD-10-CM: Z72.0  ICD-9-CM: 305.1  4/29/2015        Anxiety disorder (Chronic) ICD-10-CM: F41.9  ICD-9-CM: 300.00  4/29/2015        High transaminase levels ICD-10-CM: R74.0  ICD-9-CM: 790.4  4/29/2015        Abnormal finding of kidney ICD-10-CM: R39.9  ICD-9-CM: 593.9  4/29/2015    Overview Signed 4/29/2015 12:52 PM by Estrella Luna Abnormal cystic changes             SBO (small bowel obstruction) ([facility] [address] ICD-10-CM: Z13.788  ICD-9-CM: 560.9  4/28/2015        Type II or unspecified type diabetes mellitus with other specified manifestations, not stated as uncontrolled ICD-10-CM: E11.69  ICD-9-CM: 250.80  4/28/2015        Hyperglycemia ICD-10-CM: R73.9  ICD-9-CM: 790.29  4/28/2015        Abdominal pain ICD-10-CM: R10.9  ICD-9-CM: 789.00  4/28/2015            Principal Problem:    Pneumonia of left upper lobe due to infectious organism [facility] (12/31/2019)      Overview: Presumptive non-small cell lung cancer stage IA3 based on the bronchoscopy       finding done last month    Active Problems:    Tobacco abuse (4/29/2015)      Malignant neoplasm of upper lobe of left lung ([facility] [address] (12/12/2019)      Cough with hemoptysis (1/1/2020)           CT Results                 PET Results (most recent):       Results from [place] encounter on 10/10/19   PET/CT TUMOR IMAGE SKULL THIGH W (INI)     Narrative F-18 FDG PET/CT Imaging.      Indication: lung mass, 70 years Male initial staging     Comparison: CT chest outside facility September 23, 2019     Radiopharmaceutical: 17.2 mCi of F-18 FDG.    Technique: Delayed PET post oral contrast CT images from skull base to mid  thighs were performed. Patient received 10 cc of Gastroview for oral contrast.  F-18 FDG PET/CT has limited sensitivity for low grade malignancies, small tumor  deposits, mucin producing neoplasms.     Findings: Persistent mild bilateral upper lobe predominant panlobular emphysema. Again visualized is the spiculated mass in the medial left upper lobe measuring  2.8 x 2.5 cm, associated with intense FDG uptake, maximum SUV of 6.9, consistent  with a small primary bronchogenic carcinoma. Small perifissural nodule in the  right middle lobe measuring up to 6 mm is also relatively unchanged, without  significant FDG uptake but too small to evaluate by PET.   Asymmetric focal  intense FDG uptake is seen in the left hilum, maximum SUV of 3.6, suspicious for  local cristhian metastasis. There is physiologic distribution of FDG in head and  neck region, abdomen and pelvis. No focal uptake identified in the bone. Non  diagnostic CT demonstrates grossly unremarkable heart and mediastinum as well as  liver, spleen, adrenals, kidneys, pancreas and bowel.        Impression Impression:   1. Relatively unchanged spiculated mass in the medial left upper lobe  associated with intense FDG uptake, consistent with a small primary bronchogenic  neoplasm. 2.  Findings suspicious for left hilar cristhian metastasis. 3.  Unchanged 6 mm perifissural right middle lobe nodule, without significant  FDG uptake but too small to evaluate by PET.              PFT   DLCO- 45%  FEV1- 2.04     Pathology   DIAGNOSIS   EVA NODULE BIOPSY:   FINDINGS CONSISTENT WITH ADENOCARCINOMA      Cytology   DIAGNOSIS   10L Lymph Node, Fine Needle Aspiration:   RARE CELLS SUSPICIOUS FOR MALIGNANCY         Assessment/Plan:   Charito Fox is a 70 y.o. male who has signs and symptoms consistent with adenocarcinoma left upper lobe. Discussed his PFT and may need supplemental oxygen for a short while, but did offer segmentectomy as well as lobectomy, but chose segmentectomy related to DLCO 45%. Also, small nodule in right lung too small to characterized- will monitor. Treat pneumonia before proceeding with surgery. If pneumonia resolves by the date of surgery (1/15/20), continue with plans for left VAT Needle localization with segmentectomy and mediastinal lymphadenectomy as scheduled. If pneumonia has not resolved, delay surgery. If hemoptysis becomes severe may necessitate earlier intervention. Hold blood thinners      Signed:  Gloria Null NP     I have seen and examined the patient with the Nurse Practitioner and agree with the above assessment and plan.      CT reviewed, left upper lobe pneumonia is separate from cancer, the cancer is more peripheral and is not the cause for his hemoptysis. Will mostly likely postpone surgery to allow pneumonia to resolve, will decide next week.      Ludwig Guadarrama MD

## 2020-01-03 NOTE — PROGRESS NOTES
Bedside report received from Luli Hall, [address] [place] [address]. Assessment completed. Bed is in low and locked position with floor free of objects. Patient AxOx3, and resting quietly sitting on side of bed. No needs noted at present. Respirations even and non labored. Verbalized understanding to call for needs. Call light within reach.

## 2020-01-04 VITALS
TEMPERATURE: 98.1 F | SYSTOLIC BLOOD PRESSURE: 136 MMHG | HEIGHT: 73 IN | RESPIRATION RATE: 18 BRPM | WEIGHT: 153.1 LBS | DIASTOLIC BLOOD PRESSURE: 63 MMHG | BODY MASS INDEX: 20.29 KG/M2 | OXYGEN SATURATION: 94 % | HEART RATE: 80 BPM

## 2020-01-04 LAB
ANION GAP SERPL CALC-SCNC: 6 MMOL/L (ref 7–16)
BASOPHILS # BLD: 0.1 K/UL (ref 0–0.2)
BASOPHILS NFR BLD: 1 % (ref 0–2)
BUN SERPL-MCNC: 11 MG/DL (ref 8–23)
CALCIUM SERPL-MCNC: 8.9 MG/DL (ref 8.3–10.4)
CHLORIDE SERPL-SCNC: 99 MMOL/L (ref 98–107)
CO2 SERPL-SCNC: 31 MMOL/L (ref 21–32)
CREAT SERPL-MCNC: 0.96 MG/DL (ref 0.8–1.5)
DIFFERENTIAL METHOD BLD: ABNORMAL
EOSINOPHIL # BLD: 0.1 K/UL (ref 0–0.8)
EOSINOPHIL NFR BLD: 1 % (ref 0.5–7.8)
ERYTHROCYTE [DISTWIDTH] IN BLOOD BY AUTOMATED COUNT: 13.9 % (ref 11.9–14.6)
GLUCOSE BLD STRIP.AUTO-MCNC: 120 MG/DL (ref 65–100)
GLUCOSE BLD STRIP.AUTO-MCNC: 182 MG/DL (ref 65–100)
GLUCOSE SERPL-MCNC: 89 MG/DL (ref 65–100)
HCT VFR BLD AUTO: 32.8 % (ref 41.1–50.3)
HGB BLD-MCNC: 11.4 G/DL (ref 13.6–17.2)
IMM GRANULOCYTES # BLD AUTO: 0 K/UL (ref 0–0.5)
IMM GRANULOCYTES NFR BLD AUTO: 1 % (ref 0–5)
LYMPHOCYTES # BLD: 2.6 K/UL (ref 0.5–4.6)
LYMPHOCYTES NFR BLD: 31 % (ref 13–44)
MAGNESIUM SERPL-MCNC: 1.7 MG/DL (ref 1.8–2.4)
MCH RBC QN AUTO: 27.9 PG (ref 26.1–32.9)
MCHC RBC AUTO-ENTMCNC: 34.8 G/DL (ref 31.4–35)
MCV RBC AUTO: 80.4 FL (ref 79.6–97.8)
MONOCYTES # BLD: 0.7 K/UL (ref 0.1–1.3)
MONOCYTES NFR BLD: 9 % (ref 4–12)
NEUTS SEG # BLD: 4.9 K/UL (ref 1.7–8.2)
NEUTS SEG NFR BLD: 58 % (ref 43–78)
NRBC # BLD: 0 K/UL (ref 0–0.2)
PHOSPHATE SERPL-MCNC: 3.5 MG/DL (ref 2.3–3.7)
PLATELET # BLD AUTO: 435 K/UL (ref 150–450)
PMV BLD AUTO: 9.6 FL (ref 9.4–12.3)
POTASSIUM SERPL-SCNC: 4.5 MMOL/L (ref 3.5–5.1)
RBC # BLD AUTO: 4.08 M/UL (ref 4.23–5.6)
SODIUM SERPL-SCNC: 136 MMOL/L (ref 136–145)
WBC # BLD AUTO: 8.4 K/UL (ref 4.3–11.1)

## 2020-01-04 PROCEDURE — 36415 COLL VENOUS BLD VENIPUNCTURE: CPT

## 2020-01-04 PROCEDURE — 94760 N-INVAS EAR/PLS OXIMETRY 1: CPT

## 2020-01-04 PROCEDURE — 84100 ASSAY OF PHOSPHORUS: CPT

## 2020-01-04 PROCEDURE — 74011250637 HC RX REV CODE- 250/637: Performed by: HOSPITALIST

## 2020-01-04 PROCEDURE — 74011000250 HC RX REV CODE- 250: Performed by: HOSPITALIST

## 2020-01-04 PROCEDURE — 94640 AIRWAY INHALATION TREATMENT: CPT

## 2020-01-04 PROCEDURE — 83735 ASSAY OF MAGNESIUM: CPT

## 2020-01-04 PROCEDURE — 82962 GLUCOSE BLOOD TEST: CPT

## 2020-01-04 PROCEDURE — 74011636637 HC RX REV CODE- 636/637: Performed by: HOSPITALIST

## 2020-01-04 PROCEDURE — 85025 COMPLETE CBC W/AUTO DIFF WBC: CPT

## 2020-01-04 PROCEDURE — 80048 BASIC METABOLIC PNL TOTAL CA: CPT

## 2020-01-04 RX ORDER — CEFPODOXIME PROXETIL 200 MG/1
200 TABLET, FILM COATED ORAL EVERY 12 HOURS
Status: DISCONTINUED | OUTPATIENT
Start: 2020-01-04 | End: 2020-01-04 | Stop reason: HOSPADM

## 2020-01-04 RX ORDER — DRONABINOL 5 MG/1
5 CAPSULE ORAL 2 TIMES DAILY
Qty: 60 CAP | Refills: 0 | Status: SHIPPED | OUTPATIENT
Start: 2020-01-04 | End: 2020-02-25 | Stop reason: CLARIF

## 2020-01-04 RX ORDER — BENZONATATE 100 MG/1
100 CAPSULE ORAL
Qty: 20 CAP | Refills: 0 | Status: SHIPPED | OUTPATIENT
Start: 2020-01-04 | End: 2020-01-11

## 2020-01-04 RX ORDER — CEFPODOXIME PROXETIL 200 MG/1
200 TABLET, FILM COATED ORAL EVERY 12 HOURS
Qty: 10 TAB | Refills: 0 | Status: SHIPPED | OUTPATIENT
Start: 2020-01-04 | End: 2020-01-09

## 2020-01-04 RX ORDER — AZITHROMYCIN 250 MG/1
500 TABLET, FILM COATED ORAL DAILY
Status: DISCONTINUED | OUTPATIENT
Start: 2020-01-04 | End: 2020-01-04 | Stop reason: HOSPADM

## 2020-01-04 RX ORDER — AZITHROMYCIN 250 MG/1
500 TABLET, FILM COATED ORAL DAILY
Qty: 2 TAB | Refills: 0 | Status: SHIPPED | OUTPATIENT
Start: 2020-01-04 | End: 2020-01-30

## 2020-01-04 RX ORDER — IPRATROPIUM BROMIDE AND ALBUTEROL SULFATE 2.5; .5 MG/3ML; MG/3ML
3 SOLUTION RESPIRATORY (INHALATION) 3 TIMES DAILY
Qty: 90 NEBULE | Refills: 0 | Status: SHIPPED | OUTPATIENT
Start: 2020-01-04

## 2020-01-04 RX ORDER — FLUTICASONE PROPIONATE AND SALMETEROL 250; 50 UG/1; UG/1
1 POWDER RESPIRATORY (INHALATION) 2 TIMES DAILY
Qty: 1 INHALER | Refills: 0 | Status: SHIPPED | OUTPATIENT
Start: 2020-01-04

## 2020-01-04 RX ORDER — LANOLIN ALCOHOL/MO/W.PET/CERES
325 CREAM (GRAM) TOPICAL 2 TIMES DAILY WITH MEALS
Qty: 60 TAB | Refills: 0 | Status: SHIPPED | OUTPATIENT
Start: 2020-01-04

## 2020-01-04 RX ORDER — GUAIFENESIN 600 MG/1
600 TABLET, EXTENDED RELEASE ORAL EVERY 12 HOURS
Qty: 40 TAB | Refills: 0 | Status: SHIPPED | OUTPATIENT
Start: 2020-01-04 | End: 2020-02-25 | Stop reason: CLARIF

## 2020-01-04 RX ADMIN — CEFPODOXIME PROXETIL 200 MG: 200 TABLET, FILM COATED ORAL at 11:55

## 2020-01-04 RX ADMIN — INSULIN LISPRO 8 UNITS: 100 INJECTION, SOLUTION INTRAVENOUS; SUBCUTANEOUS at 11:56

## 2020-01-04 RX ADMIN — TIOTROPIUM BROMIDE 18 MCG: 18 CAPSULE ORAL; RESPIRATORY (INHALATION) at 08:58

## 2020-01-04 RX ADMIN — LOSARTAN POTASSIUM 25 MG: 25 TABLET ORAL at 09:31

## 2020-01-04 RX ADMIN — GUAIFENESIN 600 MG: 600 TABLET ORAL at 09:30

## 2020-01-04 RX ADMIN — INSULIN LISPRO 2 UNITS: 100 INJECTION, SOLUTION INTRAVENOUS; SUBCUTANEOUS at 11:56

## 2020-01-04 RX ADMIN — Medication 400 MG: at 09:30

## 2020-01-04 RX ADMIN — DRONABINOL 5 MG: 2.5 CAPSULE ORAL at 09:30

## 2020-01-04 RX ADMIN — ARFORMOTEROL TARTRATE 15 MCG: 15 SOLUTION RESPIRATORY (INHALATION) at 08:58

## 2020-01-04 RX ADMIN — Medication 10 ML: at 09:36

## 2020-01-04 RX ADMIN — INSULIN LISPRO 8 UNITS: 100 INJECTION, SOLUTION INTRAVENOUS; SUBCUTANEOUS at 09:30

## 2020-01-04 RX ADMIN — Medication 10 ML: at 05:19

## 2020-01-04 RX ADMIN — METOPROLOL TARTRATE 25 MG: 25 TABLET, FILM COATED ORAL at 09:30

## 2020-01-04 RX ADMIN — SENNOSIDES AND DOCUSATE SODIUM 2 TABLET: 8.6; 5 TABLET ORAL at 09:30

## 2020-01-04 RX ADMIN — AZITHROMYCIN 500 MG: 250 TABLET, FILM COATED ORAL at 11:55

## 2020-01-04 RX ADMIN — FERROUS SULFATE TAB 325 MG (65 MG ELEMENTAL FE) 325 MG: 325 (65 FE) TAB at 09:30

## 2020-01-04 NOTE — PROGRESS NOTES
Assumed care of patient. Assessment completed and documented, see docflow. Patient awake in bed, watching TV. Patient denies pain or needs. Alert in all spheres. NAD noted at present. Respirations even and non labored. Encouraged to call for needs. Call light within reach. Will continue to monitor.

## 2020-01-04 NOTE — PROGRESS NOTES
Received bedside shift report from [address] [place] [address], Larry Lewis. Patient resting quietly in bed at this time, eyes closed, respirations even and unlabored on room air. No needs stated. Bed low and locked. Bedside table, personal belongings and call light within reach.

## 2020-01-04 NOTE — PROGRESS NOTES
Problem: Falls - Risk of  Goal: *Absence of Falls  Description  Document Isak Cheung Fall Risk and appropriate interventions in the flowsheet. Outcome: Resolved/Met  Note: Fall Risk Interventions:  Mobility Interventions: Communicate number of staff needed for ambulation/transfer, Patient to call before getting OOB, PT Consult for mobility concerns         Medication Interventions: Evaluate medications/consider consulting pharmacy, Teach patient to arise slowly    Elimination Interventions: Call light in reach, Toilet paper/wipes in reach, Toileting schedule/hourly rounds, Urinal in reach              Problem: Diabetes Self-Management  Goal: *Disease process and treatment process  Description  Define diabetes and identify own type of diabetes; list 3 options for treating diabetes. Outcome: Resolved/Met  Goal: *Incorporating nutritional management into lifestyle  Description  Describe effect of type, amount and timing of food on blood glucose; list 3 methods for planning meals. Outcome: Resolved/Met  Goal: *Incorporating physical activity into lifestyle  Description  State effect of exercise on blood glucose levels. Outcome: Resolved/Met  Goal: *Developing strategies to promote health/change behavior  Description  Define the ABC's of diabetes; identify appropriate screenings, schedule and personal plan for screenings. Outcome: Resolved/Met  Goal: *Using medications safely  Description  State effect of diabetes medications on diabetes; name diabetes medication taking, action and side effects. Outcome: Resolved/Met  Goal: *Monitoring blood glucose, interpreting and using results  Description  Identify recommended blood glucose targets  and personal targets. Outcome: Resolved/Met  Goal: *Prevention, detection, treatment of acute complications  Description  List symptoms of hyper- and hypoglycemia; describe how to treat low blood sugar and actions for lowering  high blood glucose level.   Outcome: Resolved/Met  Goal: *Prevention, detection and treatment of chronic complications  Description  Define the natural course of diabetes and describe the relationship of blood glucose levels to long term complications of diabetes.   Outcome: Resolved/Met  Goal: *Developing strategies to address psychosocial issues  Description  Describe feelings about living with diabetes; identify support needed and support network  Outcome: Resolved/Met  Goal: *Insulin pump training  Outcome: Resolved/Met  Goal: *Sick day guidelines  Outcome: Resolved/Met  Goal: *Patient Specific Goal (EDIT GOAL, INSERT TEXT)  Outcome: Resolved/Met     Problem: Nutrition Deficit  Goal: *Optimize nutritional status  Outcome: Resolved/Met     Problem: Gas Exchange - Impaired  Goal: *Absence of hypoxia  Outcome: Resolved/Met     Problem: Patient Education: Go to Patient Education Activity  Goal: Patient/Family Education  Outcome: Resolved/Met

## 2020-01-04 NOTE — DISCHARGE INSTRUCTIONS
Patient Education        Pneumonia: Care Instructions  Your Care Instructions    Pneumonia is an infection of the lungs. Most cases are caused by infections from bacteria or viruses. Pneumonia may be mild or very severe. If it is caused by bacteria, you will be treated with antibiotics. It may take a few weeks to a few months to recover fully from pneumonia, depending on how sick you were and whether your overall health is good. Follow-up care is a key part of your treatment and safety. Be sure to make and go to all appointments, and call your doctor if you are having problems. It's also a good idea to know your test results and keep a list of the medicines you take. How can you care for yourself at home? · Take your antibiotics exactly as directed. Do not stop taking the medicine just because you are feeling better. You need to take the full course of antibiotics. · Take your medicines exactly as prescribed. Call your doctor if you think you are having a problem with your medicine. · Get plenty of rest and sleep. You may feel weak and tired for a while, but your energy level will improve with time. · To prevent dehydration, drink plenty of fluids, enough so that your urine is light yellow or clear like water. Choose water and other caffeine-free clear liquids until you feel better. If you have kidney, heart, or liver disease and have to limit fluids, talk with your doctor before you increase the amount of fluids you drink. · Take care of your cough so you can rest. A cough that brings up mucus from your lungs is common with pneumonia. It is one way your body gets rid of the infection. But if coughing keeps you from resting or causes severe fatigue and chest-wall pain, talk to your doctor. He or she may suggest that you take a medicine to reduce the cough. · Use a vaporizer or humidifier to add moisture to your bedroom. Follow the directions for cleaning the machine.   · Do not smoke or allow others to smoke around you. Smoke will make your cough last longer. If you need help quitting, talk to your doctor about stop-smoking programs and medicines. These can increase your chances of quitting for good. · Take an over-the-counter pain medicine, such as acetaminophen (Tylenol), ibuprofen (Advil, Motrin), or naproxen (Aleve). Read and follow all instructions on the label. · Do not take two or more pain medicines at the same time unless the doctor told you to. Many pain medicines have acetaminophen, which is Tylenol. Too much acetaminophen (Tylenol) can be harmful. · If you were given a spirometer to measure how well your lungs are working, use it as instructed. This can help your doctor tell how your recovery is going. · To prevent pneumonia in the future, talk to your doctor about getting a flu vaccine (once a year) and a pneumococcal vaccine (one time only for most people). When should you call for help? Call 911 anytime you think you may need emergency care. For example, call if:    · You have severe trouble breathing.    Call your doctor now or seek immediate medical care if:    · You cough up dark brown or bloody mucus (sputum).     · You have new or worse trouble breathing.     · You are dizzy or lightheaded, or you feel like you may faint.    Watch closely for changes in your health, and be sure to contact your doctor if:    · You have a new or higher fever.     · You are coughing more deeply or more often.     · You are not getting better after 2 days (48 hours).     · You do not get better as expected. Where can you learn more? Go to http://anastasia-lexis.info/. Enter [id number] in the search box to learn more about \"Pneumonia: Care Instructions. \"  Current as of: June 9, 2019  Content Version: 12.2  © 8130-6318 [employer]. Care instructions adapted under license by [employer] (which disclaims liability or warranty for this information).  If you have questions about a medical condition or this instruction, always ask your healthcare professional. [address] any warranty or liability for your use of this information. Continue Oral Nutrition Supplement (ONS) at discharge. Recommend Ensure High Protien or a comparable/similar product Twice daily for 30 days unless otherwise directed by your Primary Care Physician.

## 2020-01-04 NOTE — PROGRESS NOTES
Bedside shift report completed with oncoming nurse, Brina Paris. Patient resting quietly in bed at this time, eyes closed, respirations even and unlabored on room air. No needs stated. Bed low and locked. Bedside table, personal belongings and call light within reach.

## 2020-01-04 NOTE — DISCHARGE SUMMARY
Physician Discharge Summary       Patient: Lainey Cortes MRN: [id number]  SSN: [id number]    YOB: 1940  Age: 70 y.o. Sex: male    PCP: Cintia Garnett MD    Allergies: Patient has no known allergies. Admit date: 12/31/2019  Admitting Provider: Dexter Strange MD    Discharge date: 1/4/2020  Discharging Provider: Sonia Lafleur MD    * Admission Diagnoses: Pneumonia [J18.9]    * Discharge Diagnoses:    Hospital Problems as of 1/4/2020 Date Reviewed: 1/2/2020          Codes Class Noted - Resolved POA    Cough with hemoptysis ICD-10-CM: R04.2  ICD-9-CM: 786.39  1/1/2020 - Present Yes        * (Principal) Pneumonia of left upper lobe due to infectious organism [facility]) ICD-10-CM: J18.1  ICD-9-CM: 486  12/31/2019 - Present Yes    Overview Signed 1/1/2020 12:23 PM by Cynthia Moyer MD     Presumptive non-small cell lung cancer stage IA3 based on the bronchoscopy finding done last month             Malignant neoplasm of upper lobe of left lung ([facility] [address] ICD-10-CM: C34.12  ICD-9-CM: 162.3  12/12/2019 - Present Yes        Tobacco abuse (Chronic) ICD-10-CM: Z72.0  ICD-9-CM: 305.1  4/29/2015 - Present Yes              * Hospital Course: This is a 17-year-old gentleman with history of hypertension, diabetes, COPD, CKD stage III, who was admitted on 12/31/19 after presenting with hemoptysis. 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 Patient had a bronchoscopy and biopsy in November 2019 and diagnosed with lung cancer. He reported cough over 1 month and worsening blood in the sputum. Pt had CXR which showed cavitary EVA mass with associated surrounding consolidation. Pt was seen by pulmonary and surgery due to the hemoptysis which resolved with holding aspirin/plavix.  He was treated with Zithromax and Rocephin for his pneumonia and did well. Pt was seen by surgery with plans to keep VATS on 1/15 as previously scheduled and have pt get over his pneumonia as this appeared separate from his lung mass and was not thought the source of his hemoptysis and more so his bronchitis. Pt was also treated for acute exacerbation of COPD. So at this time pt is doing better and on room air - O2 sat 95% on RA with ambulation. HH/Home PT as well as meals on wheels will be ordered at discharge. D/w pt and son. Pt is stable and will be discharged home. .    Consults: Pulmonary/Intensive care and General Surgery    Significant Diagnostic Studies: radiology: CT scan: Chest: IMPRESSION  IMPRESSION:      1. Acute left upper lobar pneumonia, with new small bilateral pleural  effusions. 2.  Other chronic and nonspecific findings as above. Venous US - no DVT    Discharge Exam:  General appearance: alert, cooperative, no distress, appears stated age  Lungs: clear to auscultation bilaterally  Heart: regular rate and rhythm, S1, S2 normal  Abdomen: soft, non-tender. Bowel sounds normal. No masses,  no organomegaly  Extremities: extremities normal, atraumatic, no cyanosis or edema  Psych: A+Ox3    * Discharge Condition: good  * Disposition: Home    Discharge Medications:  Current Discharge Medication List      START taking these medications    Details   albuterol-ipratropium (DUO-NEB) 2.5 mg-0.5 mg/3 ml nebu 3 mL by Nebulization route three (3) times daily. Qty: 90 Nebule, Refills: 0      fluticasone propion-salmeterol (ADVAIR/WIXELA) 250-50 mcg/dose diskus inhaler Take 1 Puff by inhalation two (2) times a day. Indications: Bronchospasm Prevention with COPD  Qty: 1 Inhaler, Refills: 0      azithromycin (ZITHROMAX) 250 mg tablet Take 2 Tabs by mouth daily. Qty: 2 Tab, Refills: 0      benzonatate (TESSALON) 100 mg capsule Take 1 Cap by mouth three (3) times daily as needed for Cough for up to 7 days.  Indications: cough  Qty: 20 Cap, Refills: 0 cefpodoxime (VANTIN) 200 mg tablet Take 1 Tab by mouth every twelve (12) hours for 5 days. Qty: 10 Tab, Refills: 0      dronabinol (MARINOL) 5 mg capsule Take 1 Cap by mouth two (2) times a day. Max Daily Amount: 10 mg.  Qty: 60 Cap, Refills: 0    Associated Diagnoses: Malignant neoplasm of upper lobe of left lung (HCC)      ferrous sulfate 325 mg (65 mg iron) tablet Take 1 Tab by mouth two (2) times daily (with meals). Qty: 60 Tab, Refills: 0      guaiFENesin ER (MUCINEX) 600 mg ER tablet Take 1 Tab by mouth every twelve (12) hours. Qty: 40 Tab, Refills: 0      tiotropium (SPIRIVA) 18 mcg inhalation capsule Take 1 Cap by inhalation daily. Qty: 30 Cap, Refills: 0         CONTINUE these medications which have NOT CHANGED    Details   magnesium oxide (MAG-OX) 400 mg tablet Take 400 mg by mouth daily. metoprolol tartrate (LOPRESSOR) 25 mg tablet Take 25 mg by mouth two (2) times a day. losartan (COZAAR) 25 mg tablet Take 25 mg by mouth daily. LANTUS 100 unit/mL injection 20 Units nightly. Refills: 1      LIPITOR 10 mg Tab Take 10 mg by mouth daily. lisinopril (PRINIVIL, ZESTRIL) 5 mg tablet take 5 mg by mouth daily. ATIVAN 1 mg Tab Take 1 mg by mouth every eight (8) hours as needed. INSULIN REGULAR HUMAN SC 5 Units by SubCUTAneous route three (3) times daily. As needed based on BGL      WELLBUTRIN PO take 150 mg by mouth two (2) times a day. STOP taking these medications       aspirin delayed-release 81 mg tablet Comments:   Reason for Stopping:         PLAVIX PO Comments:   Reason for Stopping:               * Follow-up Care/Patient Instructions:   Activity: Activity as tolerated  Diet: Diabetic Diet    Follow-up Information     Follow up With Specialties Details Why [address]   [address] [address]  [address] [place] [address] Services  will deliver your nebulizer and rolling walker [address] [id number]  [phone]    Nayan Murphy MD Cardiology   [address] [place] [address]  [phone]          40 minutes spent on discharge and coordination of care.   Signed:  Melly Mi MD  1/4/2020  11:54 AM

## 2020-01-04 NOTE — PROGRESS NOTES
Visit with patient to build rapport with .   Calm  Encouraged with presence and words of hope  Very pleasant to visit with      Kristen Langford  Staff   C: [phone]  /  [email or web address]

## 2020-01-04 NOTE — PROGRESS NOTES
Pt was discharged home today with resumed [facility] and a nebulizer and rolling walker provided by [facility]  PCP Verified by CM: Jean-Pierre Aragon MD)  Mode of Transport at Discharge:  Other (see comment)(Family)  Transition of Care Consult (CM Consult): Discharge Planning, Home Health(patient will be active with [facility] )  [facility] - INPATIENT: Yes  Discharge Durable Medical Equipment: Yes(yes, patient was ordered a walker and a nebulizer from [facility] [address])  Physical Therapy Consult: Yes  Occupational Therapy Consult: No  Speech Therapy Consult: No  Current Support Network: Lives Alone  Confirm Follow Up Transport: Family  The Patient and/or Patient Representative was Provided with a Choice of Provider and Agrees with the Discharge Plan?: Yes  Freedom of Choice List was Provided with Basic Dialogue that Supports the Patient's Individualized Plan of Care/Goals, Treatment Preferences and Shares the Quality Data Associated with the Providers?: Yes  [place] Resource Information Provided?: No  Discharge Location  Discharge Placement: Home with home health(Patient will be active with [facility])

## 2020-01-05 ENCOUNTER — HOME CARE VISIT (OUTPATIENT)
Dept: SCHEDULING | Facility: HOME HEALTH | Age: 80
End: 2020-01-05
Payer: MEDICARE

## 2020-01-05 ENCOUNTER — HOME CARE VISIT (OUTPATIENT)
Dept: HOME HEALTH SERVICES | Facility: HOME HEALTH | Age: 80
End: 2020-01-05
Payer: MEDICARE

## 2020-01-05 VITALS
DIASTOLIC BLOOD PRESSURE: 60 MMHG | HEART RATE: 78 BPM | RESPIRATION RATE: 14 BRPM | TEMPERATURE: 98 F | SYSTOLIC BLOOD PRESSURE: 140 MMHG | OXYGEN SATURATION: 97 %

## 2020-01-05 LAB
BACTERIA SPEC CULT: ABNORMAL
BACTERIA SPEC CULT: ABNORMAL
BACTERIA SPEC CULT: NORMAL
BACTERIA SPEC CULT: NORMAL
GRAM STN SPEC: ABNORMAL
SERVICE CMNT-IMP: ABNORMAL
SERVICE CMNT-IMP: NORMAL
SERVICE CMNT-IMP: NORMAL

## 2020-01-05 PROCEDURE — G0299 HHS/HOSPICE OF RN EA 15 MIN: HCPCS

## 2020-01-05 PROCEDURE — 3331090001 HH PPS REVENUE CREDIT

## 2020-01-05 PROCEDURE — 400013 HH SOC

## 2020-01-05 PROCEDURE — 3331090002 HH PPS REVENUE DEBIT

## 2020-01-06 PROCEDURE — 3331090001 HH PPS REVENUE CREDIT

## 2020-01-06 PROCEDURE — 3331090002 HH PPS REVENUE DEBIT

## 2020-01-07 PROCEDURE — 3331090002 HH PPS REVENUE DEBIT

## 2020-01-07 PROCEDURE — 3331090001 HH PPS REVENUE CREDIT

## 2020-01-08 ENCOUNTER — ANESTHESIA EVENT (OUTPATIENT)
Dept: SURGERY | Age: 80
End: 2020-01-08
Payer: MEDICARE

## 2020-01-08 ENCOUNTER — HOME CARE VISIT (OUTPATIENT)
Dept: HOME HEALTH SERVICES | Facility: HOME HEALTH | Age: 80
End: 2020-01-08
Payer: MEDICARE

## 2020-01-08 ENCOUNTER — HOSPITAL ENCOUNTER (OUTPATIENT)
Dept: SURGERY | Age: 80
Discharge: HOME | End: 2020-01-08
Payer: MEDICARE

## 2020-01-08 ENCOUNTER — HOSPITAL ENCOUNTER (OUTPATIENT)
Dept: GENERAL RADIOLOGY | Age: 80
Discharge: HOME | End: 2020-01-08
Attending: SURGERY
Payer: MEDICARE

## 2020-01-08 ENCOUNTER — HOME CARE VISIT (OUTPATIENT)
Dept: SCHEDULING | Facility: HOME HEALTH | Age: 80
End: 2020-01-08
Payer: MEDICARE

## 2020-01-08 VITALS
TEMPERATURE: 98 F | HEART RATE: 78 BPM | OXYGEN SATURATION: 95 % | RESPIRATION RATE: 18 BRPM | SYSTOLIC BLOOD PRESSURE: 116 MMHG | DIASTOLIC BLOOD PRESSURE: 64 MMHG

## 2020-01-08 VITALS
BODY MASS INDEX: 19.69 KG/M2 | SYSTOLIC BLOOD PRESSURE: 102 MMHG | HEIGHT: 73 IN | TEMPERATURE: 97.6 F | OXYGEN SATURATION: 95 % | RESPIRATION RATE: 16 BRPM | WEIGHT: 148.6 LBS | DIASTOLIC BLOOD PRESSURE: 49 MMHG

## 2020-01-08 LAB
ALBUMIN SERPL-MCNC: 2 G/DL (ref 3.2–4.6)
ALBUMIN/GLOB SERPL: 0.4 {RATIO} (ref 1.2–3.5)
ALP SERPL-CCNC: 250 U/L (ref 50–136)
ALT SERPL-CCNC: 26 U/L (ref 12–65)
ANION GAP SERPL CALC-SCNC: 7 MMOL/L (ref 7–16)
APPEARANCE UR: CLEAR
AST SERPL-CCNC: 32 U/L (ref 15–37)
BASOPHILS # BLD: 0.1 K/UL (ref 0–0.2)
BASOPHILS NFR BLD: 1 % (ref 0–2)
BILIRUB SERPL-MCNC: 0.3 MG/DL (ref 0.2–1.1)
BILIRUB UR QL: NEGATIVE
BUN SERPL-MCNC: 24 MG/DL (ref 8–23)
CALCIUM SERPL-MCNC: 8.7 MG/DL (ref 8.3–10.4)
CHLORIDE SERPL-SCNC: 98 MMOL/L (ref 98–107)
CO2 SERPL-SCNC: 27 MMOL/L (ref 21–32)
COLOR UR: YELLOW
CREAT SERPL-MCNC: 1.49 MG/DL (ref 0.8–1.5)
DIFFERENTIAL METHOD BLD: ABNORMAL
EOSINOPHIL # BLD: 0 K/UL (ref 0–0.8)
EOSINOPHIL NFR BLD: 1 % (ref 0.5–7.8)
ERYTHROCYTE [DISTWIDTH] IN BLOOD BY AUTOMATED COUNT: 14.3 % (ref 11.9–14.6)
GLOBULIN SER CALC-MCNC: 5.2 G/DL (ref 2.3–3.5)
GLUCOSE BLD STRIP.AUTO-MCNC: 428 MG/DL (ref 65–100)
GLUCOSE SERPL-MCNC: 420 MG/DL (ref 65–100)
GLUCOSE UR STRIP.AUTO-MCNC: >1000 MG/DL
HCT VFR BLD AUTO: 37 % (ref 41.1–50.3)
HGB BLD-MCNC: 12.3 G/DL (ref 13.6–17.2)
HGB UR QL STRIP: NEGATIVE
IMM GRANULOCYTES # BLD AUTO: 0 K/UL (ref 0–0.5)
IMM GRANULOCYTES NFR BLD AUTO: 0 % (ref 0–5)
INR PPP: 1
KETONES UR QL STRIP.AUTO: 15 MG/DL
LEUKOCYTE ESTERASE UR QL STRIP.AUTO: NEGATIVE
LYMPHOCYTES # BLD: 2.2 K/UL (ref 0.5–4.6)
LYMPHOCYTES NFR BLD: 41 % (ref 13–44)
MCH RBC QN AUTO: 27.8 PG (ref 26.1–32.9)
MCHC RBC AUTO-ENTMCNC: 33.2 G/DL (ref 31.4–35)
MCV RBC AUTO: 83.7 FL (ref 79.6–97.8)
MONOCYTES # BLD: 0.5 K/UL (ref 0.1–1.3)
MONOCYTES NFR BLD: 9 % (ref 4–12)
NEUTS SEG # BLD: 2.6 K/UL (ref 1.7–8.2)
NEUTS SEG NFR BLD: 49 % (ref 43–78)
NITRITE UR QL STRIP.AUTO: NEGATIVE
NRBC # BLD: 0 K/UL (ref 0–0.2)
PH UR STRIP: 6 [PH] (ref 5–9)
PLATELET # BLD AUTO: 395 K/UL (ref 150–450)
PMV BLD AUTO: 9.1 FL (ref 9.4–12.3)
POTASSIUM SERPL-SCNC: 4.2 MMOL/L (ref 3.5–5.1)
PROT SERPL-MCNC: 7.2 G/DL (ref 6.3–8.2)
PROT UR STRIP-MCNC: NEGATIVE MG/DL
PROTHROMBIN TIME: 13.4 SEC (ref 11.7–14.5)
RBC # BLD AUTO: 4.42 M/UL (ref 4.23–5.6)
SODIUM SERPL-SCNC: 132 MMOL/L (ref 136–145)
SP GR UR REFRACTOMETRY: 1.02 (ref 1–1.02)
UROBILINOGEN UR QL STRIP.AUTO: 0.2 EU/DL (ref 0.2–1)
WBC # BLD AUTO: 5.4 K/UL (ref 4.3–11.1)

## 2020-01-08 PROCEDURE — 82962 GLUCOSE BLOOD TEST: CPT

## 2020-01-08 PROCEDURE — 85610 PROTHROMBIN TIME: CPT

## 2020-01-08 PROCEDURE — 3331090002 HH PPS REVENUE DEBIT

## 2020-01-08 PROCEDURE — 93005 ELECTROCARDIOGRAM TRACING: CPT | Performed by: SURGERY

## 2020-01-08 PROCEDURE — 81003 URINALYSIS AUTO W/O SCOPE: CPT

## 2020-01-08 PROCEDURE — G0151 HHCP-SERV OF PT,EA 15 MIN: HCPCS

## 2020-01-08 PROCEDURE — 80053 COMPREHEN METABOLIC PANEL: CPT

## 2020-01-08 PROCEDURE — 3331090001 HH PPS REVENUE CREDIT

## 2020-01-08 PROCEDURE — 71046 X-RAY EXAM CHEST 2 VIEWS: CPT

## 2020-01-08 PROCEDURE — 85025 COMPLETE CBC W/AUTO DIFF WBC: CPT

## 2020-01-08 NOTE — PERIOP NOTES
PLEASE CONTINUE TAKING ALL PRESCRIPTION MEDICATIONS UP TO THE DAY OF SURGERY UNLESS OTHERWISE DIRECTED BELOW. DISCONTINUE all vitamins and supplements 7 days prior to surgery. DISCONTINUE Non-Steriodal Anti-Inflammatory (NSAIDS) such as Advil and Aleve 5 days prior to surgery. Home Medications to take  the day of surgery    Nebulizer, Inhalers, Ativan if needed, Wellbutrin and Metoprolol. Only take 16 units of Lantus on the night before surgery. Home Medications   to Hold   Vitamins, Supplements, and Herbals. Non-Steriodal Anti-Inflammatory (NSAIDS) such as Advil and Aleve. Comments   Appointment made with Dr. Marcela Smith on Thursday 1/9/20 for blood sugars. If medications are changed please call [phone] to see how to take the day of surgery. Please do not bring home medications with you on the day of surgery unless otherwise directed by your nurse. If you are instructed to bring home medications, please give them to your nurse as they will be administered by the nursing staff. If you have any questions, please call [facility] [phone] or [facility] [phone].

## 2020-01-08 NOTE — PERIOP NOTES
Recent Results (from the past 12 hour(s))   CBC WITH AUTOMATED DIFF    Collection Time: 01/08/20 11:31 AM   Result Value Ref Range    WBC 5.4 4.3 - 11.1 K/uL    RBC 4.42 4.23 - 5.6 M/uL    HGB 12.3 (L) 13.6 - 17.2 g/dL    HCT 37.0 (L) 41.1 - 50.3 %    MCV 83.7 79.6 - 97.8 FL    MCH 27.8 26.1 - 32.9 PG    MCHC 33.2 31.4 - 35.0 g/dL    RDW 14.3 11.9 - 14.6 %    PLATELET 398 171 - 928 K/uL    MPV 9.1 (L) 9.4 - 12.3 FL    ABSOLUTE NRBC 0.00 0.0 - 0.2 K/uL    DF AUTOMATED      NEUTROPHILS 49 43 - 78 %    LYMPHOCYTES 41 13 - 44 %    MONOCYTES 9 4.0 - 12.0 %    EOSINOPHILS 1 0.5 - 7.8 %    BASOPHILS 1 0.0 - 2.0 %    IMMATURE GRANULOCYTES 0 0.0 - 5.0 %    ABS. NEUTROPHILS 2.6 1.7 - 8.2 K/UL    ABS. LYMPHOCYTES 2.2 0.5 - 4.6 K/UL    ABS. MONOCYTES 0.5 0.1 - 1.3 K/UL    ABS. EOSINOPHILS 0.0 0.0 - 0.8 K/UL    ABS. BASOPHILS 0.1 0.0 - 0.2 K/UL    ABS. IMM. GRANS. 0.0 0.0 - 0.5 K/UL   METABOLIC PANEL, COMPREHENSIVE    Collection Time: 01/08/20 11:31 AM   Result Value Ref Range    Sodium 132 (L) 136 - 145 mmol/L    Potassium 4.2 3.5 - 5.1 mmol/L    Chloride 98 98 - 107 mmol/L    CO2 27 21 - 32 mmol/L    Anion gap 7 7 - 16 mmol/L    Glucose 420 (H) 65 - 100 mg/dL    BUN 24 (H) 8 - 23 MG/DL    Creatinine 1.49 0.8 - 1.5 MG/DL    GFR est AA 59 (L) >60 ml/min/1.73m2    GFR est non-AA 49 (L) >60 ml/min/1.73m2    Calcium 8.7 8.3 - 10.4 MG/DL    Bilirubin, total 0.3 0.2 - 1.1 MG/DL    ALT (SGPT) 26 12 - 65 U/L    AST (SGOT) 32 15 - 37 U/L    Alk.  phosphatase 250 (H) 50 - 136 U/L    Protein, total 7.2 6.3 - 8.2 g/dL    Albumin 2.0 (L) 3.2 - 4.6 g/dL    Globulin 5.2 (H) 2.3 - 3.5 g/dL    A-G Ratio 0.4 (L) 1.2 - 3.5     URINALYSIS W/ RFLX MICROSCOPIC    Collection Time: 01/08/20 11:31 AM   Result Value Ref Range    Color YELLOW      Appearance CLEAR      Specific gravity 1.022 1.001 - 1.023      pH (UA) 6.0 5.0 - 9.0      Protein NEGATIVE  NEG mg/dL    Glucose >1,000 mg/dL    Ketone 15 (A) NEG mg/dL    Bilirubin NEGATIVE  NEG      Blood NEGATIVE  NEG      Urobilinogen 0.2 0.2 - 1.0 EU/dL    Nitrites NEGATIVE  NEG      Leukocyte Esterase NEGATIVE  NEG     PROTHROMBIN TIME + INR    Collection Time: 01/08/20 11:31 AM   Result Value Ref Range    Prothrombin time 13.4 11.7 - 14.5 sec    INR 1.0     GLUCOSE, POC    Collection Time: 01/08/20 11:49 AM   Result Value Ref Range    Glucose (POC) 428 (H) 65 - 100 mg/dL   EKG, 12 LEAD, INITIAL    Collection Time: 01/08/20 12:57 PM   Result Value Ref Range    Ventricular Rate 70 BPM    Atrial Rate 70 BPM    P-R Interval 148 ms    QRS Duration 130 ms    Q-T Interval 462 ms    QTC Calculation (Bezet) 498 ms    Calculated P Axis 89 degrees    Calculated R Axis -80 degrees    Calculated T Axis 68 degrees    Diagnosis       Normal sinus rhythm  Left axis deviation  Right bundle branch block  Abnormal ECG  When compared with ECG of 31-DEC-2019 12:43,  Premature ventricular complexes are no longer Present  Criteria for Septal infarct are no longer Present  Nonspecific T wave abnormality now evident in Inferior leads          Results reviewed. Spoke with Dr. Sienna Armstrong and [address] at Dr. Ida Goss office regarding blood glucose levels. Chart flagged for charge nurse to follow up regarding appointment with PCP in the am. Results routed to Dr. Ida Goss, no further action required.

## 2020-01-08 NOTE — PERIOP NOTES
Patient verified name and     Order for consent found in EHR and matches case posting; patient verified. Type 3 surgery, assessment complete. Labs per surgeon: CBC, BMP, PT/INR and UA collected and sent to lab  Labs per anesthesia protocol: Type & Screen signed and held DOS   EK20  POC glucose: 428 and recheck 383    Dr. Barbour in to see patient, reviewed chart and EKG and notified of blood sugar. Per Dr. Barbour, called and made an appointment with Dr. Brissa Moreau tomorrow (20) at 10:30 am to try to get blood sugars better regulated before surgery, and instructed patient to call PAT back with any medication changes if they are made tomorrow. Called and spoke with Vasyl Ordoñez at Dr. Gagan Hudson office to update on blood glucose levels and appointment with PCP. Patient stated he did not know how to use his home nebulizer, and at his request called [phone] [facility] office and left a message for his  to please educate on machine at next home health visit. Hospital approved surgical skin cleanser and instructions given per hospital policy. Patient provided with and instructed on educational handouts including Guide to Surgery, Pain Management, Hand Hygiene, Blood Transfusion Education, and [facility] Brochure. Patient answered medical/surgical history questions at their best of ability. All prior to admission medications documented in [facility]. Original medication prescription bottle visualized during patient appointment. Patient instructed to hold all vitamins 7 days prior to surgery and NSAIDS 5 days prior to surgery, patient verbalized understanding. Patient teach back successful and patient demonstrates knowledge of instructions.

## 2020-01-09 ENCOUNTER — HOME CARE VISIT (OUTPATIENT)
Dept: SCHEDULING | Facility: HOME HEALTH | Age: 80
End: 2020-01-09
Payer: MEDICARE

## 2020-01-09 ENCOUNTER — TELEPHONE (OUTPATIENT)
Dept: HOME HEALTH SERVICES | Facility: HOME HEALTH | Age: 80
End: 2020-01-09

## 2020-01-09 VITALS
TEMPERATURE: 98.3 F | OXYGEN SATURATION: 98 % | HEART RATE: 68 BPM | RESPIRATION RATE: 16 BRPM | SYSTOLIC BLOOD PRESSURE: 118 MMHG | DIASTOLIC BLOOD PRESSURE: 60 MMHG

## 2020-01-09 VITALS
HEART RATE: 80 BPM | DIASTOLIC BLOOD PRESSURE: 58 MMHG | TEMPERATURE: 98.8 F | RESPIRATION RATE: 18 BRPM | OXYGEN SATURATION: 95 % | SYSTOLIC BLOOD PRESSURE: 120 MMHG

## 2020-01-09 LAB
ATRIAL RATE: 70 BPM
CALCULATED P AXIS, ECG09: 89 DEGREES
CALCULATED R AXIS, ECG10: -80 DEGREES
CALCULATED T AXIS, ECG11: 68 DEGREES
DIAGNOSIS, 93000: NORMAL
P-R INTERVAL, ECG05: 148 MS
Q-T INTERVAL, ECG07: 462 MS
QRS DURATION, ECG06: 130 MS
QTC CALCULATION (BEZET), ECG08: 498 MS
VENTRICULAR RATE, ECG03: 70 BPM

## 2020-01-09 PROCEDURE — G0152 HHCP-SERV OF OT,EA 15 MIN: HCPCS

## 2020-01-09 PROCEDURE — 3331090002 HH PPS REVENUE DEBIT

## 2020-01-09 PROCEDURE — G0299 HHS/HOSPICE OF RN EA 15 MIN: HCPCS

## 2020-01-09 PROCEDURE — 3331090001 HH PPS REVENUE CREDIT

## 2020-01-09 NOTE — TELEPHONE ENCOUNTER
[address] Pharmacist consult. Mr. Maria Del Rosario Morales was discharged from [facility] after having PNA. I spoke with him and explained my part of the [facility] team.  He said he was very drowsy and I found him to be very thick tongued. I did review his discharge medications. He did finished the azithromycin and has one more day of the Vantin. He said he could not figure out how to use his inhalers. I was unable to explain over there phone, so will contact his [facility] RN to rakesh. He was unable to take my cell number, but said OK to call back.

## 2020-01-10 ENCOUNTER — HOME CARE VISIT (OUTPATIENT)
Dept: SCHEDULING | Facility: HOME HEALTH | Age: 80
End: 2020-01-10
Payer: MEDICARE

## 2020-01-10 VITALS
RESPIRATION RATE: 18 BRPM | SYSTOLIC BLOOD PRESSURE: 114 MMHG | TEMPERATURE: 97.9 F | HEART RATE: 78 BPM | DIASTOLIC BLOOD PRESSURE: 62 MMHG

## 2020-01-10 PROCEDURE — 3331090001 HH PPS REVENUE CREDIT

## 2020-01-10 PROCEDURE — 3331090002 HH PPS REVENUE DEBIT

## 2020-01-10 PROCEDURE — G0151 HHCP-SERV OF PT,EA 15 MIN: HCPCS

## 2020-01-10 NOTE — PERIOP NOTES
Left voice mail message at Dr. Manuel Jeffy office to request copy of office visit note from 1/9/20.

## 2020-01-11 PROCEDURE — 3331090001 HH PPS REVENUE CREDIT

## 2020-01-11 PROCEDURE — 3331090002 HH PPS REVENUE DEBIT

## 2020-01-12 PROCEDURE — 3331090002 HH PPS REVENUE DEBIT

## 2020-01-12 PROCEDURE — 3331090001 HH PPS REVENUE CREDIT

## 2020-01-13 ENCOUNTER — HOME CARE VISIT (OUTPATIENT)
Dept: SCHEDULING | Facility: HOME HEALTH | Age: 80
End: 2020-01-13
Payer: MEDICARE

## 2020-01-13 PROCEDURE — G0157 HHC PT ASSISTANT EA 15: HCPCS

## 2020-01-13 PROCEDURE — 3331090002 HH PPS REVENUE DEBIT

## 2020-01-13 PROCEDURE — 3331090001 HH PPS REVENUE CREDIT

## 2020-01-13 NOTE — PERIOP NOTES
Received copy of 01/09/2020 appointment from Dr Segun Hagen office and new dose of Lantus insulin noted in notes. Called and spoke with patient, Mr. Salena Banda and instructed him to take 20 units of Lantus insulin the night prior to surgery.  Patient verbalized understanding of instructions

## 2020-01-14 ENCOUNTER — HOME CARE VISIT (OUTPATIENT)
Dept: SCHEDULING | Facility: HOME HEALTH | Age: 80
End: 2020-01-14
Payer: MEDICARE

## 2020-01-14 VITALS
SYSTOLIC BLOOD PRESSURE: 118 MMHG | DIASTOLIC BLOOD PRESSURE: 56 MMHG | HEART RATE: 63 BPM | OXYGEN SATURATION: 98 % | RESPIRATION RATE: 18 BRPM | TEMPERATURE: 98 F

## 2020-01-14 VITALS
HEART RATE: 66 BPM | SYSTOLIC BLOOD PRESSURE: 140 MMHG | TEMPERATURE: 97.2 F | RESPIRATION RATE: 18 BRPM | DIASTOLIC BLOOD PRESSURE: 60 MMHG

## 2020-01-14 PROCEDURE — G0299 HHS/HOSPICE OF RN EA 15 MIN: HCPCS

## 2020-01-14 PROCEDURE — 3331090002 HH PPS REVENUE DEBIT

## 2020-01-14 PROCEDURE — 3331090001 HH PPS REVENUE CREDIT

## 2020-01-14 RX ORDER — NALOXONE HYDROCHLORIDE 0.4 MG/ML
0.1 INJECTION, SOLUTION INTRAMUSCULAR; INTRAVENOUS; SUBCUTANEOUS AS NEEDED
Status: CANCELLED | OUTPATIENT
Start: 2020-01-14

## 2020-01-14 RX ORDER — HYDROMORPHONE HYDROCHLORIDE 2 MG/ML
0.5 INJECTION, SOLUTION INTRAMUSCULAR; INTRAVENOUS; SUBCUTANEOUS
Status: CANCELLED | OUTPATIENT
Start: 2020-01-14

## 2020-01-14 RX ORDER — ALBUTEROL SULFATE 0.83 MG/ML
2.5 SOLUTION RESPIRATORY (INHALATION) AS NEEDED
Status: CANCELLED | OUTPATIENT
Start: 2020-01-14

## 2020-01-14 RX ORDER — DIPHENHYDRAMINE HYDROCHLORIDE 50 MG/ML
12.5 INJECTION, SOLUTION INTRAMUSCULAR; INTRAVENOUS
Status: CANCELLED | OUTPATIENT
Start: 2020-01-14 | End: 2020-01-15

## 2020-01-14 RX ORDER — OXYCODONE HYDROCHLORIDE 5 MG/1
10 TABLET ORAL
Status: CANCELLED | OUTPATIENT
Start: 2020-01-14 | End: 2020-01-15

## 2020-01-14 RX ORDER — ONDANSETRON 2 MG/ML
4 INJECTION INTRAMUSCULAR; INTRAVENOUS ONCE
Status: CANCELLED | OUTPATIENT
Start: 2020-01-14 | End: 2020-01-14

## 2020-01-14 RX ORDER — OXYCODONE HYDROCHLORIDE 5 MG/1
5 TABLET ORAL
Status: CANCELLED | OUTPATIENT
Start: 2020-01-14 | End: 2020-01-15

## 2020-01-14 RX ORDER — SODIUM CHLORIDE, SODIUM LACTATE, POTASSIUM CHLORIDE, CALCIUM CHLORIDE 600; 310; 30; 20 MG/100ML; MG/100ML; MG/100ML; MG/100ML
100 INJECTION, SOLUTION INTRAVENOUS CONTINUOUS
Status: CANCELLED | OUTPATIENT
Start: 2020-01-14

## 2020-01-15 ENCOUNTER — APPOINTMENT (OUTPATIENT)
Dept: CT IMAGING | Age: 80
End: 2020-01-15
Attending: SURGERY
Payer: MEDICARE

## 2020-01-15 ENCOUNTER — ANESTHESIA (OUTPATIENT)
Dept: SURGERY | Age: 80
End: 2020-01-15
Payer: MEDICARE

## 2020-01-15 ENCOUNTER — HOSPITAL ENCOUNTER (OUTPATIENT)
Age: 80
Setting detail: OUTPATIENT SURGERY
LOS: 1 days | Discharge: HOME | End: 2020-01-15
Attending: SURGERY | Admitting: SURGERY
Payer: MEDICARE

## 2020-01-15 VITALS
OXYGEN SATURATION: 97 % | BODY MASS INDEX: 20.86 KG/M2 | HEIGHT: 73 IN | RESPIRATION RATE: 16 BRPM | WEIGHT: 157.4 LBS | TEMPERATURE: 98.2 F | DIASTOLIC BLOOD PRESSURE: 62 MMHG | SYSTOLIC BLOOD PRESSURE: 130 MMHG | HEART RATE: 61 BPM

## 2020-01-15 DIAGNOSIS — R91.1 LUNG NODULE: ICD-10-CM

## 2020-01-15 PROBLEM — C34.90 LUNG CANCER (HCC): Status: ACTIVE | Noted: 2020-01-15

## 2020-01-15 LAB
ABO + RH BLD: NORMAL
BLOOD GROUP ANTIBODIES SERPL: NORMAL
GLUCOSE BLD STRIP.AUTO-MCNC: 216 MG/DL (ref 65–100)
GLUCOSE BLD STRIP.AUTO-MCNC: 259 MG/DL (ref 65–100)
SPECIMEN EXP DATE BLD: NORMAL

## 2020-01-15 PROCEDURE — 74011636637 HC RX REV CODE- 636/637: Performed by: ANESTHESIOLOGY

## 2020-01-15 PROCEDURE — 74011250636 HC RX REV CODE- 250/636: Performed by: ANESTHESIOLOGY

## 2020-01-15 PROCEDURE — 86900 BLOOD TYPING SEROLOGIC ABO: CPT

## 2020-01-15 PROCEDURE — 71250 CT THORAX DX C-: CPT

## 2020-01-15 PROCEDURE — 3331090002 HH PPS REVENUE DEBIT

## 2020-01-15 PROCEDURE — 65270000029 HC RM PRIVATE

## 2020-01-15 PROCEDURE — 3331090001 HH PPS REVENUE CREDIT

## 2020-01-15 PROCEDURE — 82962 GLUCOSE BLOOD TEST: CPT

## 2020-01-15 RX ORDER — LIDOCAINE HYDROCHLORIDE 10 MG/ML
0.1 INJECTION INFILTRATION; PERINEURAL AS NEEDED
Status: DISCONTINUED | OUTPATIENT
Start: 2020-01-15 | End: 2020-01-15 | Stop reason: HOSPADM

## 2020-01-15 RX ORDER — FENTANYL CITRATE 50 UG/ML
25-50 INJECTION, SOLUTION INTRAMUSCULAR; INTRAVENOUS
Status: DISCONTINUED | OUTPATIENT
Start: 2020-01-15 | End: 2020-01-15

## 2020-01-15 RX ORDER — FENTANYL CITRATE 50 UG/ML
100 INJECTION, SOLUTION INTRAMUSCULAR; INTRAVENOUS ONCE
Status: DISCONTINUED | OUTPATIENT
Start: 2020-01-15 | End: 2020-01-15 | Stop reason: HOSPADM

## 2020-01-15 RX ORDER — SODIUM CHLORIDE 0.9 % (FLUSH) 0.9 %
5-40 SYRINGE (ML) INJECTION EVERY 8 HOURS
Status: DISCONTINUED | OUTPATIENT
Start: 2020-01-15 | End: 2020-01-15 | Stop reason: HOSPADM

## 2020-01-15 RX ORDER — MIDAZOLAM HYDROCHLORIDE 1 MG/ML
.5-2 INJECTION, SOLUTION INTRAMUSCULAR; INTRAVENOUS
Status: DISCONTINUED | OUTPATIENT
Start: 2020-01-15 | End: 2020-01-15

## 2020-01-15 RX ORDER — CEFAZOLIN SODIUM/WATER 2 G/20 ML
2 SYRINGE (ML) INTRAVENOUS ONCE
Status: DISCONTINUED | OUTPATIENT
Start: 2020-01-15 | End: 2020-01-15 | Stop reason: HOSPADM

## 2020-01-15 RX ORDER — MIDAZOLAM HYDROCHLORIDE 1 MG/ML
2 INJECTION, SOLUTION INTRAMUSCULAR; INTRAVENOUS ONCE
Status: DISCONTINUED | OUTPATIENT
Start: 2020-01-15 | End: 2020-01-15 | Stop reason: HOSPADM

## 2020-01-15 RX ORDER — SODIUM CHLORIDE, SODIUM LACTATE, POTASSIUM CHLORIDE, CALCIUM CHLORIDE 600; 310; 30; 20 MG/100ML; MG/100ML; MG/100ML; MG/100ML
100 INJECTION, SOLUTION INTRAVENOUS CONTINUOUS
Status: DISCONTINUED | OUTPATIENT
Start: 2020-01-15 | End: 2020-01-15 | Stop reason: HOSPADM

## 2020-01-15 RX ORDER — LIDOCAINE HYDROCHLORIDE 20 MG/ML
100 INJECTION, SOLUTION INFILTRATION; PERINEURAL ONCE
Status: DISCONTINUED | OUTPATIENT
Start: 2020-01-15 | End: 2020-01-15

## 2020-01-15 RX ORDER — SODIUM CHLORIDE 0.9 % (FLUSH) 0.9 %
5-40 SYRINGE (ML) INJECTION AS NEEDED
Status: DISCONTINUED | OUTPATIENT
Start: 2020-01-15 | End: 2020-01-15 | Stop reason: HOSPADM

## 2020-01-15 RX ORDER — MIDAZOLAM HYDROCHLORIDE 1 MG/ML
2 INJECTION, SOLUTION INTRAMUSCULAR; INTRAVENOUS
Status: DISCONTINUED | OUTPATIENT
Start: 2020-01-15 | End: 2020-01-15 | Stop reason: HOSPADM

## 2020-01-15 RX ORDER — INSULIN LISPRO 100 [IU]/ML
5 INJECTION, SOLUTION INTRAVENOUS; SUBCUTANEOUS
Status: COMPLETED | OUTPATIENT
Start: 2020-01-15 | End: 2020-01-15

## 2020-01-15 RX ADMIN — SODIUM CHLORIDE, SODIUM LACTATE, POTASSIUM CHLORIDE, AND CALCIUM CHLORIDE 100 ML/HR: 600; 310; 30; 20 INJECTION, SOLUTION INTRAVENOUS at 09:36

## 2020-01-15 RX ADMIN — INSULIN LISPRO 5 UNITS: 100 INJECTION, SOLUTION INTRAVENOUS; SUBCUTANEOUS at 09:33

## 2020-01-15 NOTE — PERIOP NOTES
Patient transported to IR by IR staff. Patient's family instructed to wait in main surgery waiting room.

## 2020-01-15 NOTE — PERIOP NOTES
Confirmed with Dr. Lennox Cooks, patient to be discharged home and office will contact patient regarding follow up.

## 2020-01-15 NOTE — H&P
Department of Interventional Radiology  [phone]    History and Physical    Patient:  Bakari Cabrales MRN:  [id number]  SSN:  [id number]    YOB: 1940  Age:  70 y.o. Sex:  male      Primary Care Provider:  Jojo Barry MD  Referring Physician:  Keyon Renae MD    Subjective:     Chief Complaint: lung nodule    History of the Present Illness: The patient is a 70 y.o. male with EVA lung nodule who presents for needle localization in preparation for VATS. EBUS 11/2019 biopsy + for adenocarcinoma. Plavix held. He was recently discharged from the hospital having been treated for pneumonia. He is coughing less. NPO. Past Medical History:   Diagnosis Date    COPD     inhalers    Depression     managed with medications    Diabetes ([address]     takes insulin, A1c on 12/31/19 was 10.3; unsure of avg blood sugar    HTN (hypertension) 4/29/2015    managed wtih medications    Malignant neoplasm of upper lobe of left lung ([address] 12/12/2019    Other ill-defined conditions(799.89)     cholesterol     Past Surgical History:   Procedure Laterality Date    ABDOMEN SURGERY PROC UNLISTED  5/2015    explor lap    HX COLONOSCOPY      HX ORTHOPAEDIC      bilateral shoulder repairs, both knees repaired-no hardware    HX OTHER SURGICAL  12/2019    lung biopsy        Review of Systems:    Pertinent items are noted in the History of Present Illness. Prior to Admission medications    Medication Sig Start Date End Date Taking? Authorizing Provider   insulin aspart protamine/insulin aspart (NOVOLOG MIX 70/30) 100 unit/mL (70-30) inpn 10 Units by SubCUTAneous route three (3) times daily. Take with each meal.   Yes Provider, Historical   albuterol-ipratropium (DUO-NEB) 2.5 mg-0.5 mg/3 ml nebu 3 mL by Nebulization route three (3) times daily.  1/4/20  Yes Jayla Jarrett MD   fluticasone propion-salmeterol (ADVAIR/WIXELA) 250-50 mcg/dose diskus inhaler Take 1 Puff by inhalation two (2) times a day. Indications: Bronchospasm Prevention with COPD 1/4/20  Yes Amber Patel MD   azithromycin [facility]) 250 mg tablet Take 2 Tabs by mouth daily. 1/4/20  Yes Amber Patel MD   dronabinol (MARINOL) 5 mg capsule Take 1 Cap by mouth two (2) times a day. Max Daily Amount: 10 mg. 1/4/20  Yes Amber Patel MD   ferrous sulfate 325 mg (65 mg iron) tablet Take 1 Tab by mouth two (2) times daily (with meals). 1/4/20  Yes Amber Patel MD   guaiFENesin ER (MUCINEX) 600 mg ER tablet Take 1 Tab by mouth every twelve (12) hours. 1/4/20  Yes Amber Patel MD   tiotropium [facility]) 18 mcg inhalation capsule Take 1 Cap by inhalation daily. 1/5/20  Yes Amber Patel MD   metoprolol tartrate (LOPRESSOR) 25 mg tablet Take 50 mg by mouth two (2) times a day. Yes Provider, Historical   LANTUS 100 unit/mL injection 20 Units nightly. 4/11/16  Yes Provider, Historical   LIPITOR 10 mg Tab Take 10 mg by mouth daily. Yes Provider, Historical   lisinopril (PRINIVIL, ZESTRIL) 5 mg tablet take 5 mg by mouth daily. Yes Provider, Historical   ATIVAN 1 mg Tab Take 1 mg by mouth every eight (8) hours as needed. Yes Provider, Historical   WELLBUTRIN PO take 150 mg by mouth two (2) times a day.     Yes         No Known Allergies    Family History   Problem Relation Age of Onset    Heart Disease Mother     Heart Disease Father      Social History     Tobacco Use    Smoking status: Current Every Day Smoker     Packs/day: 1.00     Years: 40.00     Pack years: 40.00     Types: Cigarettes    Smokeless tobacco: Never Used   Substance Use Topics    Alcohol use: No        Objective:       Physical Examination:    Vitals:    01/15/20 0842 01/15/20 1000   BP: 139/65 130/62   Pulse: 62 61   Resp: 16 16   Temp: 98.1 °F (36.7 °C) 98.2 °F (36.8 °C)   SpO2: 97% 97%   Weight: 71.4 kg (157 lb 6.4 oz)    Height: 6' 1\" (1.854 m)        Pain Assessment  Pain Intensity 1: 0 (01/15/20 6024) HEART: regular rate and rhythm  LUNG: clear to auscultation bilaterally  ABDOMEN: normal findings: soft, non-tender  EXTREMITIES: normal strength, tone, and muscle mass    Laboratory:     Lab Results   Component Value Date/Time    Sodium 132 (L) 01/08/2020 11:31 AM    Sodium 136 01/04/2020 08:28 AM    Potassium 4.2 01/08/2020 11:31 AM    Potassium 4.5 01/04/2020 08:28 AM    Chloride 98 01/08/2020 11:31 AM    Chloride 99 01/04/2020 08:28 AM    CO2 27 01/08/2020 11:31 AM    CO2 31 01/04/2020 08:28 AM    Anion gap 7 01/08/2020 11:31 AM    Anion gap 6 (L) 01/04/2020 08:28 AM    Glucose 420 (H) 01/08/2020 11:31 AM    Glucose 89 01/04/2020 08:28 AM    BUN 24 (H) 01/08/2020 11:31 AM    BUN 11 01/04/2020 08:28 AM    Creatinine 1.49 01/08/2020 11:31 AM    Creatinine 0.96 01/04/2020 08:28 AM    GFR est AA 59 (L) 01/08/2020 11:31 AM    GFR est AA >60 01/04/2020 08:28 AM    GFR est non-AA 49 (L) 01/08/2020 11:31 AM    GFR est non-AA >60 01/04/2020 08:28 AM    Calcium 8.7 01/08/2020 11:31 AM    Calcium 8.9 01/04/2020 08:28 AM    Magnesium 1.7 (L) 01/04/2020 08:28 AM    Magnesium 1.6 (L) 01/02/2020 07:57 AM    Phosphorus 3.5 01/04/2020 08:28 AM    Phosphorus 2.9 01/02/2020 07:57 AM    Albumin 2.0 (L) 01/08/2020 11:31 AM    Albumin 1.7 (L) 01/01/2020 09:13 AM    Protein, total 7.2 01/08/2020 11:31 AM    Protein, total 6.7 01/01/2020 09:13 AM    Globulin 5.2 (H) 01/08/2020 11:31 AM    Globulin 5.0 (H) 01/01/2020 09:13 AM    A-G Ratio 0.4 (L) 01/08/2020 11:31 AM    A-G Ratio 0.3 (L) 01/01/2020 09:13 AM    AST (SGOT) 32 01/08/2020 11:31 AM    AST (SGOT) 29 01/01/2020 09:13 AM    ALT (SGPT) 26 01/08/2020 11:31 AM    ALT (SGPT) 20 01/01/2020 09:13 AM     Lab Results   Component Value Date/Time    WBC 5.4 01/08/2020 11:31 AM    WBC 8.4 01/04/2020 08:28 AM    HGB 12.3 (L) 01/08/2020 11:31 AM    HGB 11.4 (L) 01/04/2020 08:28 AM    HCT 37.0 (L) 01/08/2020 11:31 AM    HCT 32.8 (L) 01/04/2020 08:28 AM    PLATELET 469 81/10/6927 11:31 AM    PLATELET 005 35/10/5635 08:28 AM     Lab Results   Component Value Date/Time    aPTT 30.1 01/01/2020 09:13 AM    Prothrombin time 13.4 01/08/2020 11:31 AM    Prothrombin time 14.7 (H) 01/01/2020 09:13 AM    INR 1.0 01/08/2020 11:31 AM    INR 1.1 01/01/2020 09:13 AM       Assessment:     EVA lung nodule, adenocarcinoma    Hospital Problems  Date Reviewed: 1/2/2020          Codes Class Noted POA    Lung cancer [facility]) ICD-10-CM: C34.90  ICD-9-CM: 162.9  1/15/2020 Unknown              Plan:     Planned Procedure:  Needle localization    Risks, benefits, and alternatives reviewed with patient and he agrees to proceed with the procedure.       Signed By: Jyoti Dorantes PA-C     January 15, 2020

## 2020-01-15 NOTE — PROGRESS NOTES
Dr. Johanna Haley notified after repeat blood glucose check of 216 mg/dl. No further action needed at this time.

## 2020-01-15 NOTE — PERIOP NOTES
Patient returned to preop. Per IR RN, surgery cancelled for today. Dr. Cody Cagle spoke with patient while patient was in radiology.

## 2020-01-15 NOTE — PERIOP NOTES
Notified Laura Davis RN in IR to recheck blood sugar at 1000 and to notify Dr. Sue Pearl with result if low.

## 2020-01-16 ENCOUNTER — TELEPHONE (OUTPATIENT)
Dept: HOME HEALTH SERVICES | Facility: HOME HEALTH | Age: 80
End: 2020-01-16

## 2020-01-16 PROCEDURE — 3331090002 HH PPS REVENUE DEBIT

## 2020-01-16 PROCEDURE — 3331090001 HH PPS REVENUE CREDIT

## 2020-01-16 NOTE — TELEPHONE ENCOUNTER
Mr. Raymond Weems said his needle localization of Lung node yesterday was cancelled because they said he still had some PNA showing. I asked if he now understood how to use his inhalers. He said his son handled his medications and showed him how to use the inhalers. He said he was feeling pretty good today. His son was not there right now. He had no medication questions at this time. I asked him to tell his son I called and to call me with any medication questions or issues. He verified understanding.

## 2020-01-17 ENCOUNTER — HOME CARE VISIT (OUTPATIENT)
Dept: SCHEDULING | Facility: HOME HEALTH | Age: 80
End: 2020-01-17
Payer: MEDICARE

## 2020-01-17 PROCEDURE — 3331090001 HH PPS REVENUE CREDIT

## 2020-01-17 PROCEDURE — G0299 HHS/HOSPICE OF RN EA 15 MIN: HCPCS

## 2020-01-17 PROCEDURE — 3331090002 HH PPS REVENUE DEBIT

## 2020-01-18 VITALS
OXYGEN SATURATION: 99 % | HEART RATE: 64 BPM | DIASTOLIC BLOOD PRESSURE: 60 MMHG | TEMPERATURE: 97.5 F | SYSTOLIC BLOOD PRESSURE: 120 MMHG | RESPIRATION RATE: 18 BRPM

## 2020-01-18 PROCEDURE — 3331090002 HH PPS REVENUE DEBIT

## 2020-01-18 PROCEDURE — 3331090001 HH PPS REVENUE CREDIT

## 2020-01-19 PROCEDURE — 3331090001 HH PPS REVENUE CREDIT

## 2020-01-19 PROCEDURE — 3331090002 HH PPS REVENUE DEBIT

## 2020-01-20 PROCEDURE — 3331090002 HH PPS REVENUE DEBIT

## 2020-01-20 PROCEDURE — 3331090001 HH PPS REVENUE CREDIT

## 2020-01-21 ENCOUNTER — HOME CARE VISIT (OUTPATIENT)
Dept: SCHEDULING | Facility: HOME HEALTH | Age: 80
End: 2020-01-21
Payer: MEDICARE

## 2020-01-21 VITALS
HEART RATE: 75 BPM | SYSTOLIC BLOOD PRESSURE: 110 MMHG | OXYGEN SATURATION: 97 % | TEMPERATURE: 97.3 F | RESPIRATION RATE: 18 BRPM | DIASTOLIC BLOOD PRESSURE: 68 MMHG

## 2020-01-21 PROCEDURE — 3331090001 HH PPS REVENUE CREDIT

## 2020-01-21 PROCEDURE — G0299 HHS/HOSPICE OF RN EA 15 MIN: HCPCS

## 2020-01-21 PROCEDURE — 3331090002 HH PPS REVENUE DEBIT

## 2020-01-22 PROCEDURE — 3331090002 HH PPS REVENUE DEBIT

## 2020-01-22 PROCEDURE — 3331090001 HH PPS REVENUE CREDIT

## 2020-01-23 ENCOUNTER — HOME CARE VISIT (OUTPATIENT)
Dept: SCHEDULING | Facility: HOME HEALTH | Age: 80
End: 2020-01-23
Payer: MEDICARE

## 2020-01-23 VITALS
SYSTOLIC BLOOD PRESSURE: 122 MMHG | HEART RATE: 62 BPM | DIASTOLIC BLOOD PRESSURE: 54 MMHG | OXYGEN SATURATION: 98 % | RESPIRATION RATE: 18 BRPM | TEMPERATURE: 97.3 F

## 2020-01-23 VITALS
DIASTOLIC BLOOD PRESSURE: 64 MMHG | RESPIRATION RATE: 18 BRPM | HEART RATE: 84 BPM | SYSTOLIC BLOOD PRESSURE: 128 MMHG | TEMPERATURE: 98.1 F

## 2020-01-23 PROCEDURE — 3331090002 HH PPS REVENUE DEBIT

## 2020-01-23 PROCEDURE — G0151 HHCP-SERV OF PT,EA 15 MIN: HCPCS

## 2020-01-23 PROCEDURE — 3331090001 HH PPS REVENUE CREDIT

## 2020-01-23 PROCEDURE — G0299 HHS/HOSPICE OF RN EA 15 MIN: HCPCS

## 2020-01-24 ENCOUNTER — APPOINTMENT (OUTPATIENT)
Dept: CT IMAGING | Age: 80
DRG: 871 | End: 2020-01-24
Attending: EMERGENCY MEDICINE
Payer: MEDICARE

## 2020-01-24 ENCOUNTER — HOSPITAL ENCOUNTER (INPATIENT)
Age: 80
LOS: 6 days | Discharge: HOME HEALTH | DRG: 871 | End: 2020-01-30
Attending: EMERGENCY MEDICINE | Admitting: INTERNAL MEDICINE
Payer: MEDICARE

## 2020-01-24 ENCOUNTER — APPOINTMENT (OUTPATIENT)
Dept: GENERAL RADIOLOGY | Age: 80
DRG: 871 | End: 2020-01-24
Attending: EMERGENCY MEDICINE
Payer: MEDICARE

## 2020-01-24 DIAGNOSIS — T68.XXXA HYPOTHERMIA, INITIAL ENCOUNTER: ICD-10-CM

## 2020-01-24 DIAGNOSIS — E10.649 TYPE 1 DIABETES MELLITUS WITH HYPOGLYCEMIA AND WITHOUT COMA (HCC): Primary | ICD-10-CM

## 2020-01-24 PROBLEM — E16.2 HYPOGLYCEMIA: Status: ACTIVE | Noted: 2020-01-24

## 2020-01-24 PROBLEM — A41.9 SEPSIS (HCC): Status: ACTIVE | Noted: 2020-01-24

## 2020-01-24 LAB
ALBUMIN SERPL-MCNC: 3.1 G/DL (ref 3.2–4.6)
ALBUMIN/GLOB SERPL: 0.7 {RATIO} (ref 1.2–3.5)
ALP SERPL-CCNC: 260 U/L (ref 50–136)
ALT SERPL-CCNC: 56 U/L (ref 12–65)
ANION GAP SERPL CALC-SCNC: 3 MMOL/L (ref 7–16)
AST SERPL-CCNC: 60 U/L (ref 15–37)
BASOPHILS # BLD: 0.1 K/UL (ref 0–0.2)
BASOPHILS NFR BLD: 1 % (ref 0–2)
BILIRUB SERPL-MCNC: 0.3 MG/DL (ref 0.2–1.1)
BUN SERPL-MCNC: 16 MG/DL (ref 8–23)
CALCIUM SERPL-MCNC: 9.4 MG/DL (ref 8.3–10.4)
CHLORIDE SERPL-SCNC: 104 MMOL/L (ref 98–107)
CO2 SERPL-SCNC: 32 MMOL/L (ref 21–32)
CREAT SERPL-MCNC: 1.24 MG/DL (ref 0.8–1.5)
DIFFERENTIAL METHOD BLD: ABNORMAL
EOSINOPHIL # BLD: 0.1 K/UL (ref 0–0.8)
EOSINOPHIL NFR BLD: 1 % (ref 0.5–7.8)
ERYTHROCYTE [DISTWIDTH] IN BLOOD BY AUTOMATED COUNT: 15.7 % (ref 11.9–14.6)
GLOBULIN SER CALC-MCNC: 4.6 G/DL (ref 2.3–3.5)
GLUCOSE BLD STRIP.AUTO-MCNC: 149 MG/DL (ref 65–100)
GLUCOSE BLD STRIP.AUTO-MCNC: 241 MG/DL (ref 65–100)
GLUCOSE BLD STRIP.AUTO-MCNC: 30 MG/DL (ref 65–100)
GLUCOSE BLD STRIP.AUTO-MCNC: 44 MG/DL (ref 65–100)
GLUCOSE BLD STRIP.AUTO-MCNC: 57 MG/DL (ref 65–100)
GLUCOSE BLD STRIP.AUTO-MCNC: 87 MG/DL (ref 65–100)
GLUCOSE SERPL-MCNC: 36 MG/DL (ref 65–100)
HCT VFR BLD AUTO: 37.6 % (ref 41.1–50.3)
HGB BLD-MCNC: 12.6 G/DL (ref 13.6–17.2)
IMM GRANULOCYTES # BLD AUTO: 0 K/UL (ref 0–0.5)
IMM GRANULOCYTES NFR BLD AUTO: 0 % (ref 0–5)
LACTATE SERPL-SCNC: 1.5 MMOL/L (ref 0.4–2)
LYMPHOCYTES # BLD: 3.3 K/UL (ref 0.5–4.6)
LYMPHOCYTES NFR BLD: 51 % (ref 13–44)
MCH RBC QN AUTO: 28.3 PG (ref 26.1–32.9)
MCHC RBC AUTO-ENTMCNC: 33.5 G/DL (ref 31.4–35)
MCV RBC AUTO: 84.5 FL (ref 79.6–97.8)
MONOCYTES # BLD: 0.6 K/UL (ref 0.1–1.3)
MONOCYTES NFR BLD: 9 % (ref 4–12)
NEUTS SEG # BLD: 2.5 K/UL (ref 1.7–8.2)
NEUTS SEG NFR BLD: 38 % (ref 43–78)
NRBC # BLD: 0 K/UL (ref 0–0.2)
PLATELET # BLD AUTO: 315 K/UL (ref 150–450)
PMV BLD AUTO: 8.3 FL (ref 9.4–12.3)
POTASSIUM SERPL-SCNC: 4 MMOL/L (ref 3.5–5.1)
PROCALCITONIN SERPL-MCNC: 0.5 NG/ML
PROT SERPL-MCNC: 7.7 G/DL (ref 6.3–8.2)
RBC # BLD AUTO: 4.45 M/UL (ref 4.23–5.6)
SODIUM SERPL-SCNC: 139 MMOL/L (ref 136–145)
TROPONIN I SERPL-MCNC: <0.02 NG/ML (ref 0.02–0.05)
TSH SERPL DL<=0.005 MIU/L-ACNC: 3.96 UIU/ML (ref 0.36–3.74)
WBC # BLD AUTO: 6.5 K/UL (ref 4.3–11.1)

## 2020-01-24 PROCEDURE — 74011250637 HC RX REV CODE- 250/637: Performed by: INTERNAL MEDICINE

## 2020-01-24 PROCEDURE — 84443 ASSAY THYROID STIM HORMONE: CPT

## 2020-01-24 PROCEDURE — 74011000250 HC RX REV CODE- 250

## 2020-01-24 PROCEDURE — 80053 COMPREHEN METABOLIC PANEL: CPT

## 2020-01-24 PROCEDURE — 87040 BLOOD CULTURE FOR BACTERIA: CPT

## 2020-01-24 PROCEDURE — 65660000000 HC RM CCU STEPDOWN

## 2020-01-24 PROCEDURE — 3331090002 HH PPS REVENUE DEBIT

## 2020-01-24 PROCEDURE — 74011636637 HC RX REV CODE- 636/637: Performed by: INTERNAL MEDICINE

## 2020-01-24 PROCEDURE — 74011000302 HC RX REV CODE- 302: Performed by: INTERNAL MEDICINE

## 2020-01-24 PROCEDURE — 3331090001 HH PPS REVENUE CREDIT

## 2020-01-24 PROCEDURE — 70450 CT HEAD/BRAIN W/O DYE: CPT

## 2020-01-24 PROCEDURE — 85025 COMPLETE CBC W/AUTO DIFF WBC: CPT

## 2020-01-24 PROCEDURE — 84145 PROCALCITONIN (PCT): CPT

## 2020-01-24 PROCEDURE — 83605 ASSAY OF LACTIC ACID: CPT

## 2020-01-24 PROCEDURE — 96365 THER/PROPH/DIAG IV INF INIT: CPT

## 2020-01-24 PROCEDURE — 93005 ELECTROCARDIOGRAM TRACING: CPT | Performed by: EMERGENCY MEDICINE

## 2020-01-24 PROCEDURE — 96376 TX/PRO/DX INJ SAME DRUG ADON: CPT

## 2020-01-24 PROCEDURE — 82962 GLUCOSE BLOOD TEST: CPT

## 2020-01-24 PROCEDURE — 99285 EMERGENCY DEPT VISIT HI MDM: CPT

## 2020-01-24 PROCEDURE — 96375 TX/PRO/DX INJ NEW DRUG ADDON: CPT

## 2020-01-24 PROCEDURE — 86580 TB INTRADERMAL TEST: CPT | Performed by: INTERNAL MEDICINE

## 2020-01-24 PROCEDURE — 84484 ASSAY OF TROPONIN QUANT: CPT

## 2020-01-24 PROCEDURE — 71045 X-RAY EXAM CHEST 1 VIEW: CPT

## 2020-01-24 PROCEDURE — 74011000258 HC RX REV CODE- 258: Performed by: EMERGENCY MEDICINE

## 2020-01-24 PROCEDURE — 74011000250 HC RX REV CODE- 250: Performed by: EMERGENCY MEDICINE

## 2020-01-24 PROCEDURE — 74011250636 HC RX REV CODE- 250/636: Performed by: EMERGENCY MEDICINE

## 2020-01-24 RX ORDER — DEXTROSE 50 % IN WATER (D50W) INTRAVENOUS SYRINGE
50
Status: COMPLETED | OUTPATIENT
Start: 2020-01-24 | End: 2020-01-24

## 2020-01-24 RX ORDER — SODIUM CHLORIDE 0.9 % (FLUSH) 0.9 %
5-40 SYRINGE (ML) INJECTION AS NEEDED
Status: DISCONTINUED | OUTPATIENT
Start: 2020-01-24 | End: 2020-01-30 | Stop reason: HOSPADM

## 2020-01-24 RX ORDER — NALOXONE HYDROCHLORIDE 0.4 MG/ML
0.4 INJECTION, SOLUTION INTRAMUSCULAR; INTRAVENOUS; SUBCUTANEOUS AS NEEDED
Status: DISCONTINUED | OUTPATIENT
Start: 2020-01-24 | End: 2020-01-30 | Stop reason: HOSPADM

## 2020-01-24 RX ORDER — ONDANSETRON 2 MG/ML
4 INJECTION INTRAMUSCULAR; INTRAVENOUS
Status: DISCONTINUED | OUTPATIENT
Start: 2020-01-24 | End: 2020-01-30 | Stop reason: HOSPADM

## 2020-01-24 RX ORDER — BUPROPION HYDROCHLORIDE 150 MG/1
150 TABLET, EXTENDED RELEASE ORAL 2 TIMES DAILY
Status: DISCONTINUED | OUTPATIENT
Start: 2020-01-25 | End: 2020-01-30 | Stop reason: HOSPADM

## 2020-01-24 RX ORDER — DEXTROSE 50 % IN WATER (D50W) INTRAVENOUS SYRINGE
Status: COMPLETED
Start: 2020-01-24 | End: 2020-01-24

## 2020-01-24 RX ORDER — INSULIN LISPRO 100 [IU]/ML
INJECTION, SOLUTION INTRAVENOUS; SUBCUTANEOUS EVERY 4 HOURS
Status: DISCONTINUED | OUTPATIENT
Start: 2020-01-24 | End: 2020-01-25

## 2020-01-24 RX ORDER — DEXTROSE MONOHYDRATE AND SODIUM CHLORIDE 5; .45 G/100ML; G/100ML
100 INJECTION, SOLUTION INTRAVENOUS CONTINUOUS
Status: DISCONTINUED | OUTPATIENT
Start: 2020-01-24 | End: 2020-01-24

## 2020-01-24 RX ORDER — IPRATROPIUM BROMIDE AND ALBUTEROL SULFATE 2.5; .5 MG/3ML; MG/3ML
3 SOLUTION RESPIRATORY (INHALATION) 3 TIMES DAILY
Status: DISCONTINUED | OUTPATIENT
Start: 2020-01-24 | End: 2020-01-25

## 2020-01-24 RX ORDER — DRONABINOL 2.5 MG/1
5 CAPSULE ORAL 2 TIMES DAILY
Status: DISCONTINUED | OUTPATIENT
Start: 2020-01-25 | End: 2020-01-30 | Stop reason: HOSPADM

## 2020-01-24 RX ORDER — BUDESONIDE 0.5 MG/2ML
500 INHALANT ORAL
Status: DISCONTINUED | OUTPATIENT
Start: 2020-01-25 | End: 2020-01-30 | Stop reason: HOSPADM

## 2020-01-24 RX ORDER — ATORVASTATIN CALCIUM 10 MG/1
10 TABLET, FILM COATED ORAL DAILY
Status: DISCONTINUED | OUTPATIENT
Start: 2020-01-25 | End: 2020-01-30 | Stop reason: HOSPADM

## 2020-01-24 RX ORDER — DEXTROSE 50 % IN WATER (D50W) INTRAVENOUS SYRINGE
Status: ACTIVE
Start: 2020-01-24 | End: 2020-01-25

## 2020-01-24 RX ORDER — LISINOPRIL 5 MG/1
5 TABLET ORAL DAILY
Status: DISCONTINUED | OUTPATIENT
Start: 2020-01-25 | End: 2020-01-30 | Stop reason: HOSPADM

## 2020-01-24 RX ORDER — HYDROCODONE BITARTRATE AND ACETAMINOPHEN 5; 325 MG/1; MG/1
1 TABLET ORAL
Status: DISCONTINUED | OUTPATIENT
Start: 2020-01-24 | End: 2020-01-30 | Stop reason: HOSPADM

## 2020-01-24 RX ORDER — SODIUM CHLORIDE 0.9 % (FLUSH) 0.9 %
5-40 SYRINGE (ML) INJECTION EVERY 8 HOURS
Status: DISCONTINUED | OUTPATIENT
Start: 2020-01-24 | End: 2020-01-30 | Stop reason: HOSPADM

## 2020-01-24 RX ORDER — LANOLIN ALCOHOL/MO/W.PET/CERES
325 CREAM (GRAM) TOPICAL 2 TIMES DAILY WITH MEALS
Status: DISCONTINUED | OUTPATIENT
Start: 2020-01-25 | End: 2020-01-30 | Stop reason: HOSPADM

## 2020-01-24 RX ORDER — ENOXAPARIN SODIUM 100 MG/ML
40 INJECTION SUBCUTANEOUS EVERY 24 HOURS
Status: DISCONTINUED | OUTPATIENT
Start: 2020-01-25 | End: 2020-01-30

## 2020-01-24 RX ORDER — ACETAMINOPHEN 325 MG/1
650 TABLET ORAL
Status: DISCONTINUED | OUTPATIENT
Start: 2020-01-24 | End: 2020-01-30 | Stop reason: HOSPADM

## 2020-01-24 RX ORDER — GUAIFENESIN 600 MG/1
600 TABLET, EXTENDED RELEASE ORAL EVERY 12 HOURS
Status: DISCONTINUED | OUTPATIENT
Start: 2020-01-24 | End: 2020-01-30 | Stop reason: HOSPADM

## 2020-01-24 RX ADMIN — TUBERCULIN PURIFIED PROTEIN DERIVATIVE 5 UNITS: 5 INJECTION, SOLUTION INTRADERMAL at 23:26

## 2020-01-24 RX ADMIN — DEXTROSE 50 % IN WATER (D50W) INTRAVENOUS SYRINGE 25 G: at 17:45

## 2020-01-24 RX ADMIN — GUAIFENESIN 600 MG: 600 TABLET ORAL at 23:27

## 2020-01-24 RX ADMIN — DEXTROSE 25 G: 50 INJECTION, SOLUTION INTRAVENOUS at 17:45

## 2020-01-24 RX ADMIN — PIPERACILLIN AND TAZOBACTAM 4.5 G: 4; .5 INJECTION, POWDER, FOR SOLUTION INTRAVENOUS at 18:21

## 2020-01-24 RX ADMIN — DEXTROSE MONOHYDRATE AND SODIUM CHLORIDE 100 ML/HR: 5; .45 INJECTION, SOLUTION INTRAVENOUS at 19:13

## 2020-01-24 RX ADMIN — SODIUM CHLORIDE 1000 ML: 900 INJECTION, SOLUTION INTRAVENOUS at 17:54

## 2020-01-24 RX ADMIN — Medication 10 ML: at 23:00

## 2020-01-24 RX ADMIN — DEXTROSE 50 % IN WATER (D50W) INTRAVENOUS SYRINGE 25 G: at 20:00

## 2020-01-24 RX ADMIN — INSULIN LISPRO 4 UNITS: 100 INJECTION, SOLUTION INTRAVENOUS; SUBCUTANEOUS at 23:26

## 2020-01-24 NOTE — ED TRIAGE NOTES
Patient arrives via EMS from home. States called out because patient was unconscious. Had to break into patient's home and patient was unresponsive. EMS unable to obtain IV access. States BGL read \"low\" on monitor. Glucagon administered, states BGL up to 51 when pulling in to ED. Patient minimally responsive when arrived to ED.

## 2020-01-25 LAB
APPEARANCE UR: CLEAR
ATRIAL RATE: 50 BPM
ATRIAL RATE: 78 BPM
BILIRUB UR QL: NEGATIVE
CALCULATED P AXIS, ECG09: 80 DEGREES
CALCULATED P AXIS, ECG09: 91 DEGREES
CALCULATED R AXIS, ECG10: -39 DEGREES
CALCULATED R AXIS, ECG10: 68 DEGREES
CALCULATED T AXIS, ECG11: 75 DEGREES
CALCULATED T AXIS, ECG11: 75 DEGREES
COLOR UR: YELLOW
DIAGNOSIS, 93000: NORMAL
DIAGNOSIS, 93000: NORMAL
GLUCOSE BLD STRIP.AUTO-MCNC: 155 MG/DL (ref 65–100)
GLUCOSE BLD STRIP.AUTO-MCNC: 259 MG/DL (ref 65–100)
GLUCOSE BLD STRIP.AUTO-MCNC: 280 MG/DL (ref 65–100)
GLUCOSE BLD STRIP.AUTO-MCNC: 300 MG/DL (ref 65–100)
GLUCOSE BLD STRIP.AUTO-MCNC: 324 MG/DL (ref 65–100)
GLUCOSE UR STRIP.AUTO-MCNC: >1000 MG/DL
HGB UR QL STRIP: NEGATIVE
KETONES UR QL STRIP.AUTO: NEGATIVE MG/DL
LEUKOCYTE ESTERASE UR QL STRIP.AUTO: NEGATIVE
NITRITE UR QL STRIP.AUTO: NEGATIVE
P-R INTERVAL, ECG05: 146 MS
P-R INTERVAL, ECG05: 178 MS
PH UR STRIP: 6 [PH] (ref 5–9)
PROT UR STRIP-MCNC: NEGATIVE MG/DL
Q-T INTERVAL, ECG07: 398 MS
Q-T INTERVAL, ECG07: 484 MS
QRS DURATION, ECG06: 112 MS
QRS DURATION, ECG06: 128 MS
QTC CALCULATION (BEZET), ECG08: 441 MS
QTC CALCULATION (BEZET), ECG08: 453 MS
SP GR UR REFRACTOMETRY: 1.02 (ref 1–1.02)
UROBILINOGEN UR QL STRIP.AUTO: 0.2 EU/DL (ref 0.2–1)
VENTRICULAR RATE, ECG03: 50 BPM
VENTRICULAR RATE, ECG03: 78 BPM

## 2020-01-25 PROCEDURE — 74011000250 HC RX REV CODE- 250: Performed by: INTERNAL MEDICINE

## 2020-01-25 PROCEDURE — 94760 N-INVAS EAR/PLS OXIMETRY 1: CPT

## 2020-01-25 PROCEDURE — 74011636637 HC RX REV CODE- 636/637: Performed by: INTERNAL MEDICINE

## 2020-01-25 PROCEDURE — 82962 GLUCOSE BLOOD TEST: CPT

## 2020-01-25 PROCEDURE — 74011250637 HC RX REV CODE- 250/637: Performed by: INTERNAL MEDICINE

## 2020-01-25 PROCEDURE — 74011000258 HC RX REV CODE- 258: Performed by: INTERNAL MEDICINE

## 2020-01-25 PROCEDURE — 81003 URINALYSIS AUTO W/O SCOPE: CPT

## 2020-01-25 PROCEDURE — 65660000000 HC RM CCU STEPDOWN

## 2020-01-25 PROCEDURE — 74011250636 HC RX REV CODE- 250/636: Performed by: INTERNAL MEDICINE

## 2020-01-25 PROCEDURE — 94640 AIRWAY INHALATION TREATMENT: CPT

## 2020-01-25 PROCEDURE — 93005 ELECTROCARDIOGRAM TRACING: CPT | Performed by: FAMILY MEDICINE

## 2020-01-25 PROCEDURE — 3331090002 HH PPS REVENUE DEBIT

## 2020-01-25 PROCEDURE — 74011636637 HC RX REV CODE- 636/637: Performed by: FAMILY MEDICINE

## 2020-01-25 PROCEDURE — 74011000250 HC RX REV CODE- 250: Performed by: FAMILY MEDICINE

## 2020-01-25 PROCEDURE — 3331090001 HH PPS REVENUE CREDIT

## 2020-01-25 RX ORDER — IPRATROPIUM BROMIDE AND ALBUTEROL SULFATE 2.5; .5 MG/3ML; MG/3ML
3 SOLUTION RESPIRATORY (INHALATION) 3 TIMES DAILY
Status: DISCONTINUED | OUTPATIENT
Start: 2020-01-25 | End: 2020-01-26

## 2020-01-25 RX ORDER — VANCOMYCIN HYDROCHLORIDE
1250
Status: DISCONTINUED | OUTPATIENT
Start: 2020-01-25 | End: 2020-01-26

## 2020-01-25 RX ORDER — VANCOMYCIN 2 GRAM/500 ML IN 0.9 % SODIUM CHLORIDE INTRAVENOUS
2000 ONCE
Status: DISPENSED | OUTPATIENT
Start: 2020-01-25 | End: 2020-01-25

## 2020-01-25 RX ORDER — INSULIN GLARGINE 100 [IU]/ML
15 INJECTION, SOLUTION SUBCUTANEOUS DAILY
Status: DISCONTINUED | OUTPATIENT
Start: 2020-01-25 | End: 2020-01-26

## 2020-01-25 RX ORDER — INSULIN LISPRO 100 [IU]/ML
0-10 INJECTION, SOLUTION INTRAVENOUS; SUBCUTANEOUS
Status: DISCONTINUED | OUTPATIENT
Start: 2020-01-25 | End: 2020-01-30 | Stop reason: HOSPADM

## 2020-01-25 RX ADMIN — BUDESONIDE 500 MCG: 0.5 INHALANT RESPIRATORY (INHALATION) at 07:55

## 2020-01-25 RX ADMIN — INSULIN LISPRO 8 UNITS: 100 INJECTION, SOLUTION INTRAVENOUS; SUBCUTANEOUS at 11:50

## 2020-01-25 RX ADMIN — Medication 10 ML: at 15:04

## 2020-01-25 RX ADMIN — BUDESONIDE 500 MCG: 0.5 INHALANT RESPIRATORY (INHALATION) at 21:14

## 2020-01-25 RX ADMIN — DRONABINOL 5 MG: 2.5 CAPSULE ORAL at 17:58

## 2020-01-25 RX ADMIN — IPRATROPIUM BROMIDE AND ALBUTEROL SULFATE 3 ML: .5; 3 SOLUTION RESPIRATORY (INHALATION) at 07:55

## 2020-01-25 RX ADMIN — VANCOMYCIN HYDROCHLORIDE 1250 MG: 10 INJECTION, POWDER, LYOPHILIZED, FOR SOLUTION INTRAVENOUS at 20:08

## 2020-01-25 RX ADMIN — Medication 10 ML: at 21:18

## 2020-01-25 RX ADMIN — PIPERACILLIN AND TAZOBACTAM 3.38 G: 3; .375 INJECTION, POWDER, FOR SOLUTION INTRAVENOUS at 15:04

## 2020-01-25 RX ADMIN — INSULIN GLARGINE 15 UNITS: 100 INJECTION, SOLUTION SUBCUTANEOUS at 15:18

## 2020-01-25 RX ADMIN — PIPERACILLIN AND TAZOBACTAM 3.38 G: 3; .375 INJECTION, POWDER, FOR SOLUTION INTRAVENOUS at 05:44

## 2020-01-25 RX ADMIN — GUAIFENESIN 600 MG: 600 TABLET ORAL at 20:13

## 2020-01-25 RX ADMIN — LISINOPRIL 5 MG: 5 TABLET ORAL at 10:04

## 2020-01-25 RX ADMIN — PIPERACILLIN AND TAZOBACTAM 3.38 G: 3; .375 INJECTION, POWDER, FOR SOLUTION INTRAVENOUS at 21:18

## 2020-01-25 RX ADMIN — IPRATROPIUM BROMIDE AND ALBUTEROL SULFATE 3 ML: .5; 3 SOLUTION RESPIRATORY (INHALATION) at 13:34

## 2020-01-25 RX ADMIN — FERROUS SULFATE TAB 325 MG (65 MG ELEMENTAL FE) 325 MG: 325 (65 FE) TAB at 17:27

## 2020-01-25 RX ADMIN — DRONABINOL 5 MG: 2.5 CAPSULE ORAL at 10:04

## 2020-01-25 RX ADMIN — BUPROPION HYDROCHLORIDE 150 MG: 150 TABLET, EXTENDED RELEASE ORAL at 17:27

## 2020-01-25 RX ADMIN — IPRATROPIUM BROMIDE AND ALBUTEROL SULFATE 3 ML: .5; 3 SOLUTION RESPIRATORY (INHALATION) at 21:14

## 2020-01-25 RX ADMIN — BUPROPION HYDROCHLORIDE 150 MG: 150 TABLET, EXTENDED RELEASE ORAL at 10:04

## 2020-01-25 RX ADMIN — ATORVASTATIN CALCIUM 10 MG: 10 TABLET, FILM COATED ORAL at 10:04

## 2020-01-25 RX ADMIN — TIOTROPIUM BROMIDE 18 MCG: 18 CAPSULE ORAL; RESPIRATORY (INHALATION) at 11:45

## 2020-01-25 RX ADMIN — INSULIN LISPRO 6 UNITS: 100 INJECTION, SOLUTION INTRAVENOUS; SUBCUTANEOUS at 17:27

## 2020-01-25 RX ADMIN — ENOXAPARIN SODIUM 40 MG: 40 INJECTION SUBCUTANEOUS at 10:05

## 2020-01-25 RX ADMIN — Medication 10 ML: at 05:44

## 2020-01-25 RX ADMIN — INSULIN LISPRO 6 UNITS: 100 INJECTION, SOLUTION INTRAVENOUS; SUBCUTANEOUS at 22:30

## 2020-01-25 RX ADMIN — GUAIFENESIN 600 MG: 600 TABLET ORAL at 10:04

## 2020-01-25 RX ADMIN — INSULIN LISPRO 8 UNITS: 100 INJECTION, SOLUTION INTRAVENOUS; SUBCUTANEOUS at 02:15

## 2020-01-25 RX ADMIN — FERROUS SULFATE TAB 325 MG (65 MG ELEMENTAL FE) 325 MG: 325 (65 FE) TAB at 10:04

## 2020-01-25 NOTE — PROGRESS NOTES
Pharmacokinetic Consult to Pharmacist    [address] is a 70 y.o. male being treated for sepsis of unknown etiology with vancomycin. Height: 6' 1\" (185.4 cm)  Weight: 71.7 kg (158 lb)  Lab Results   Component Value Date/Time    BUN 16 01/24/2020 05:57 PM    Creatinine 1.24 01/24/2020 05:57 PM    WBC 6.5 01/24/2020 05:57 PM    Procalcitonin 0.50 01/24/2020 05:57 PM    Lactic acid 1.5 01/24/2020 06:05 PM      Estimated Creatinine Clearance: 50.6 mL/min (based on SCr of 1.24 mg/dL). Day 1 of vancomycin. Goal trough is 15-20. Vancomycin dose initiated at 2000 mg x 1 dose; followed by Vanc 1250 mg IV q18h. Will continue to follow patient. Thank you,  Shravan Do, PharmD.

## 2020-01-25 NOTE — PROGRESS NOTES
Pt is a 69 yo male admitted due to hypoglycemia. SW was consulted because \"Elderly patient lives alone and son concerned he can not take care of himself at home\". JEN met with pt and his dtr. Pt confirmed that he lives alone in his own home and is independent with his ADL's and ambulation PTA. Pt confirms his insurance and states that medications are affordable. Pt is currently receiving HH RN and PT services through [facility]. Pt is on the waiting list for Meals on Wheels services through [facility]. Pt's dtr shared she and her brother's concerns that the pt is not managing his medications correctly and needs help in the home. SW explained the [facility] and that insurance does not cover intermediate care which is what she described as their concerns. According to the dtr, the pt can not move in with she or her brother and they can not move in with him. Pt has other family support in the area, I.e. brother and sister and they do check on him. The pt/family has not explored [facility] as an option. Dtr expressed an interest in the pt applying for Medicaid and CLTC services. SW provided them with a medicaid application for community waiver services. JEN also provided them with the book \"All About Seniors\" which has information about hiring non-medical home care and other community resources. JEN sent order to resume [place] services to [facility] [place] and added [facility] to the order. SW remains available to assist as needed. Care Management Interventions  PCP Verified by CM: Yes(pt states he has seen MD in the last month)  Mode of Transport at Discharge:  Other (see comment)(family)  Transition of Care Consult (CM Consult): Discharge Planning, Home Health(\"Elderly patient lives alone and son concerned he can not take care of himself at home\")  [facility] - INPATIENT: Yes  Discharge Durable Medical Equipment: No  Physical Therapy Consult: No  Occupational Therapy Consult: No  Speech Therapy Consult: No  Current Support Network: Own Home, Family Lives [place], Lives Alone(dtr lives near [facility])  Confirm Follow Up Transport: Family  The Plan for Transition of Care is Related to the Following Treatment Goals : [facility] nursing and physcial therapy services for pt/family education and improved physical functioning.  services for assistance with applying for medicaid and accessing other available community resources.    The Patient and/or Patient Representative was Provided with a Choice of Provider and Agrees with the Discharge Plan?: Yes  Freedom of Choice List was Provided with Basic Dialogue that Supports the Patient's Individualized Plan of Care/Goals, Treatment Preferences and Shares the Quality Data Associated with the Providers?: No(pt requested to resume current services through [facility])  Discharge Location  Discharge Placement: Home with home health([facility])

## 2020-01-25 NOTE — ED PROVIDER NOTES
EMS was called to the patient's house because he was unconscious. They had to break into the patient's home, found him unresponsive lying on a couch. They checked his blood sugar and it read low. They were unable to obtain IV access and gave him glucagon. His blood sugar increased to 51 in route though he remains minimally responsive. The patient is unable to provide a meaningful history with his altered mental status.            Past Medical History:   Diagnosis Date    COPD     inhalers    Depression     managed with medications    Diabetes ([address]     takes insulin, A1c on 12/31/19 was 10.3; unsure of avg blood sugar    HTN (hypertension) 4/29/2015    managed wtih medications    Malignant neoplasm of upper lobe of left lung ([address] 12/12/2019    Other ill-defined conditions(799.89)     cholesterol       Past Surgical History:   Procedure Laterality Date    ABDOMEN SURGERY PROC UNLISTED  5/2015    explor lap    HX COLONOSCOPY      HX ORTHOPAEDIC      bilateral shoulder repairs, both knees repaired-no hardware    HX OTHER SURGICAL  12/2019    lung biopsy         Family History:   Problem Relation Age of Onset    Heart Disease Mother     Heart Disease Father        Social History     Socioeconomic History    Marital status: SINGLE     Spouse name: Not on file    Number of children: Not on file    Years of education: Not on file    Highest education level: Not on file   Occupational History    Not on file   Social Needs    Financial resource strain: Not on file    Food insecurity:     Worry: Not on file     Inability: Not on file    Transportation needs:     Medical: Not on file     Non-medical: Not on file   Tobacco Use    Smoking status: Current Every Day Smoker     Packs/day: 1.00     Years: 40.00     Pack years: 40.00     Types: Cigarettes    Smokeless tobacco: Never Used   Substance and Sexual Activity    Alcohol use: No    Drug use: No    Sexual activity: Not on file   Lifestyle    Physical activity:     Days per week: Not on file     Minutes per session: Not on file    Stress: Not on file   Relationships    Social connections:     Talks on phone: Not on file     Gets together: Not on file     Attends [employer] service: Not on file     Active member of club or organization: Not on file     Attends meetings of clubs or organizations: Not on file     Relationship status: Not on file    Intimate partner violence:     Fear of current or ex partner: Not on file     Emotionally abused: Not on file     Physically abused: Not on file     Forced sexual activity: Not on file   Other Topics Concern    Not on file   Social History Narrative    Not on file         ALLERGIES: Patient has no known allergies. Review of Systems   Unable to perform ROS: Mental status change       Vitals:    01/24/20 1750 01/24/20 1818 01/24/20 1859 01/24/20 1902   BP: 192/74 175/78 148/65 166/74   Pulse:  (!) 52 (!) 45 (!) 48   Resp: 24 20 17 18   Temp: (!) 91.9 °F (33.3 °C)   (!) 94.3 °F (34.6 °C)   SpO2:  100%  97%   Weight:  71.7 kg (158 lb)     Height:  6' 1\" (1.854 m)              Physical Exam  Vitals signs and nursing note reviewed. Constitutional:       Appearance: Normal appearance. He is well-developed and normal weight. Comments: Patient is unresponsive   HENT:      Head: Normocephalic and atraumatic. Eyes:      Conjunctiva/sclera: Conjunctivae normal.      Pupils: Pupils are equal, round, and reactive to light. Cardiovascular:      Rate and Rhythm: Regular rhythm. Bradycardia present. Pulmonary:      Effort: Pulmonary effort is normal. No respiratory distress. Abdominal:      General: There is no distension. Palpations: Abdomen is soft. Tenderness: There is no tenderness. Musculoskeletal: Normal range of motion. Skin:     General: Skin is warm and dry.           MDM  Number of Diagnoses or Management Options  Hypothermia, initial encounter: new and requires workup  Type 1 diabetes mellitus with hypoglycemia and without coma [facility]): new and requires workup  Diagnosis management comments: 7:19 PM  Patient was initially doing better with improved blood sugar, he was becoming more alert and conversant. Repeat blood sugar is down to 50, was in the 130 range after dextrose. He will be placed on a D5 half-normal drip.    7:42 PM  Spoke with patient's family, additional history obtained. He lives by himself and is very reliable. He has a history of lung cancer, was recently admitted for pneumonia and completed a 10-day regimen of antibiotics. Spoke with hospitalist, to see patient for admission. He is doing better on the warming blanket. Total critical care time spent on initial evaluation, repeat evaluations, obtaining additional history from the family, looking up old records, speaking with the consultant and managing his unstable vital signs was 45 minutes.        Amount and/or Complexity of Data Reviewed  Clinical lab tests: ordered and reviewed  Tests in the radiology section of CPT®: ordered and reviewed  Obtain history from someone other than the patient: yes  Discuss the patient with other providers: yes    Risk of Complications, Morbidity, and/or Mortality  Presenting problems: high  Diagnostic procedures: high  Management options: high    Critical Care  Total time providing critical care: 30-74 minutes    Patient Progress  Patient progress: improved         Procedures

## 2020-01-25 NOTE — PROGRESS NOTES
Has been up in the chair and ambulating in the room today when family was present. Tolerated without problems. Remote telemetry remains on NS at 83. L elbow with slight swelling noted. Dr Mendez Manning aware and checked per pts request will continue to monitor swelling. Hourly rounds completed, all needs met this shift. Will continue to monitor until night shift nurse takes over.

## 2020-01-25 NOTE — PROGRESS NOTES
TRANSFER - IN REPORT:    Verbal report received from Mariela(name) on Bakari Cabrales  being received from ER(unit) for routine progression of care      Report consisted of patients Situation, Background, Assessment and   Recommendations(SBAR). Information from the following report(s) SBAR, ED Summary, Intake/Output, MAR and Recent Results was reviewed with the receiving nurse. Opportunity for questions and clarification was provided. Assessment completed upon patients arrival to unit and care assumed.

## 2020-01-25 NOTE — ED NOTES
TRANSFER - OUT REPORT:    Verbal report given to Chente Antunez RN(name) on Brice  being transferred to [facility](unit) for routine progression of care       Report consisted of patients Situation, Background, Assessment and   Recommendations(SBAR). Information from the following report(s) SBAR, ED Summary, MAR, Recent Results and Cardiac Rhythm SR was reviewed with the receiving nurse. Lines:   Peripheral IV 01/24/20 Left External jugular (Active)   Site Assessment Clean, dry, & intact 1/24/2020  6:34 PM   Phlebitis Assessment 0 1/24/2020  6:34 PM   Dressing Status Clean, dry, & intact 1/24/2020  6:34 PM        Opportunity for questions and clarification was provided.       Patient transported with:   [facility]

## 2020-01-25 NOTE — PROGRESS NOTES
01/24/20 2151   Dual Skin Pressure Injury Assessment   Dual Skin Pressure Injury Assessment WDL   Second Care Provider (Based on [address] [place] [address]) Hayley Abad RN        01/24/20 2151   Skin Integumentary   Skin Integumentary (WDL) WDL   Skin Integrity Scars (comment); Other (comment)  (mid abd surgical scar. large mole on R lower leg)     Pt also has small scattered scars to extremities. No S/sx of pressure breakdown.  Ambulatory at baseline with no hx of falls

## 2020-01-25 NOTE — ED NOTES
Todd santos remains in place. Family at bedside. Updated on patient condition. Patient now appears alert and oriented. On BP, O2, Cardiac, and temperature monitoring. Dr. Geo Cardona to speak with family.

## 2020-01-25 NOTE — ED NOTES
Around 1955, BGL 44. Dr. Chika Rocha paged and Dr. Clem Llanos notified. 1 amp D50 given at 2002. Patient remains alert and oriented.  at this time and patient given apple juice after OK by Dr. Chika Rocha.

## 2020-01-25 NOTE — PROGRESS NOTES
Patient with improved in hypoglycemia.     Plan:  -Stop D10  -Continue q4 POC Glucose  -Start Sliding Scale

## 2020-01-25 NOTE — H&P
Hospitalist Note     Admit Date:  2020  5:36 PM   Name:  Albert Carey   Age:  70 y.o.  :  1940   MRN:  [id number]   PCP:  Giorgio Moe MD  Treatment Team: Attending Provider: Annia Langford MD; Primary Nurse: Memo Norwood RN    HPI/Subjective: The patient is a 82-year-old male with a past medical history of hypertension, diabetes, COPD, CKD stage III, and recently diagnosed lung adenocarcinoma who presents to the emergency department after being found minimally responsive at home with hypoglycemia. Patient reports he is not sure what happened. He reports he ate today and took his insulin as normal.  He reports earlier in the day his blood glucose was 400 and treated with 20 of Humalog. Family present at bedside. They report patient was last known well at 1 PM.  Of note patient with recent admission  for hemoptysis and treated for pneumonia. He completed 4-day course of IV antibiotics and was discharged. He was seen 1/15 for surgical evaluation for lung adenocarcinoma, and surgery was postponed secondary to pneumonia. Patient reports he has been taking oral antibiotics for the past 9 days. At time of examination patient alert and oriented x4. He has no active complaints at this time. He denies any chest pain, shortness of breath, cough, dysuria, fever or chills. ED course:  Patient found to be severely hypoglycemic and treated with glucose  Found to be hypothermic with temperature <92    10 systems reviewed and negative except as noted in HPI.   Past Medical History:   Diagnosis Date    COPD     inhalers    Depression     managed with medications    Diabetes ([address]     takes insulin, A1c on 19 was 10.3; unsure of avg blood sugar    HTN (hypertension) 2015    managed wt medications    Malignant neoplasm of upper lobe of left lung ([address] 2019    Other ill-defined conditions(799.89)     cholesterol      Past Surgical History:   Procedure Laterality Date    ABDOMEN SURGERY PROC UNLISTED  2015    explor lap    HX COLONOSCOPY      HX ORTHOPAEDIC      bilateral shoulder repairs, both knees repaired-no hardware    HX OTHER SURGICAL  2019    lung biopsy      No Known Allergies   Social History     Tobacco Use    Smoking status: Current Every Day Smoker     Packs/day: 1.00     Years: 40.00     Pack years: 40.00     Types: Cigarettes    Smokeless tobacco: Never Used   Substance Use Topics    Alcohol use: No      Family History   Problem Relation Age of Onset    Heart Disease Mother     Heart Disease Father       Immunization History   Administered Date(s) Administered    Influenza Vaccine 2019     PTA Medications:  Prior to Admission Medications   Prescriptions Last Dose Informant Patient Reported? Taking? ATIVAN 1 mg Tab  Self Yes No   Sig: Take 1 mg by mouth two (2) times a day. LANTUS 100 unit/mL injection   Yes No   Si Units by SubCUTAneous route nightly. LIPITOR 10 mg Tab  Self Yes No   Sig: Take 10 mg by mouth daily. WELLBUTRIN PO  Self Yes No   Sig: take 150 mg by mouth two (2) times a day. albuterol-ipratropium (DUO-NEB) 2.5 mg-0.5 mg/3 ml nebu   No No   Sig: 3 mL by Nebulization route three (3) times daily. azithromycin (ZITHROMAX) 250 mg tablet   No No   Sig: Take 2 Tabs by mouth daily. dronabinol (MARINOL) 5 mg capsule   No No   Sig: Take 1 Cap by mouth two (2) times a day. Max Daily Amount: 10 mg.   ferrous sulfate 325 mg (65 mg iron) tablet   No No   Sig: Take 1 Tab by mouth two (2) times daily (with meals). fluticasone propion-salmeterol (ADVAIR/WIXELA) 250-50 mcg/dose diskus inhaler   No No   Sig: Take 1 Puff by inhalation two (2) times a day. Indications: Bronchospasm Prevention with COPD   glipiZIDE (GLUCOTROL) 5 mg tablet   Yes No   Sig: Take 5 mg by mouth two (2) times a day. guaiFENesin ER (MUCINEX) 600 mg ER tablet   No No   Sig: Take 1 Tab by mouth every twelve (12) hours.    insulin aspart protamine/insulin aspart (NOVOLOG MIX 70/30) 100 unit/mL (70-30) inpn   Yes No   Si Units by SubCUTAneous route three (3) times daily. Take with each meal.    lisinopril (PRINIVIL, ZESTRIL) 5 mg tablet  Self Yes No   Sig: take 5 mg by mouth daily. metoprolol tartrate (LOPRESSOR) 25 mg tablet   Yes No   Sig: Take 50 mg by mouth two (2) times a day. tiotropium (SPIRIVA) 18 mcg inhalation capsule   No No   Sig: Take 1 Cap by inhalation daily. Facility-Administered Medications: None       Objective:     Patient Vitals for the past 24 hrs:   Temp Pulse Resp BP SpO2   20 -- (!) 51 -- 147/69 99 %   20 (!) 94.8 °F (34.9 °C) (!) 47 -- 149/64 99 %   20 (!) 94.5 °F (34.7 °C) -- -- -- --   20 -- (!) 50 18 153/66 97 %   20 -- (!) 46 18 149/64 98 %   20 (!) 94.3 °F (34.6 °C) (!) 48 18 166/74 97 %   20 -- (!) 45 17 148/65 --   20 -- (!) 52 20 175/78 100 %   20 (!) 91.9 °F (33.3 °C) --  192 --     Oxygen Therapy  O2 Sat (%): 99 % (20)  Pulse via Oximetry: 52 beats per minute (20)  O2 Device: Nasal cannula (20)  O2 Flow Rate (L/min): 2 l/min (20)    Estimated body mass index is 20.85 kg/m² as calculated from the following:    Height as of this encounter: 6' 1\" (1.854 m). Weight as of this encounter: 71.7 kg (158 lb). No intake or output data in the 24 hours ending 20    *Note that automatically entered I/Os may not be accurate; dependent on patient compliance with collection and accurate  by assistants.     Physical Exam:  General: Lethargic elderly male no acute distress  Eyes: EOMI, nonicteric  ENT: Moist mucous membranes  Cardiovascular: RRR, no significant rubs or murmurs appreciated  Respiratory: No wheezes, rales, or rhonchi; clear to auscultation bilaterally  Gastrointestinal: Soft, nontender, nondistended, bowel sounds active  Genitourinary: No suprapubic tenderness  Musculoskeletal: No joint swelling appreciated  Skin: No lesions on examined area  Neurological: Alert and oriented, no focal deficits noted  Psychiatric: Normal mood and affect    I reviewed the labs, imaging, EKGs, telemetry, and other studies done this admission. Data Review:   Recent Results (from the past 24 hour(s))   GLUCOSE, POC    Collection Time: 01/24/20  5:45 PM   Result Value Ref Range    Glucose (POC) 30 (LL) 65 - 100 mg/dL   CBC WITH AUTOMATED DIFF    Collection Time: 01/24/20  5:57 PM   Result Value Ref Range    WBC 6.5 4.3 - 11.1 K/uL    RBC 4.45 4.23 - 5.6 M/uL    HGB 12.6 (L) 13.6 - 17.2 g/dL    HCT 37.6 (L) 41.1 - 50.3 %    MCV 84.5 79.6 - 97.8 FL    MCH 28.3 26.1 - 32.9 PG    MCHC 33.5 31.4 - 35.0 g/dL    RDW 15.7 (H) 11.9 - 14.6 %    PLATELET 760 971 - 314 K/uL    MPV 8.3 (L) 9.4 - 12.3 FL    ABSOLUTE NRBC 0.00 0.0 - 0.2 K/uL    DF AUTOMATED      NEUTROPHILS 38 (L) 43 - 78 %    LYMPHOCYTES 51 (H) 13 - 44 %    MONOCYTES 9 4.0 - 12.0 %    EOSINOPHILS 1 0.5 - 7.8 %    BASOPHILS 1 0.0 - 2.0 %    IMMATURE GRANULOCYTES 0 0.0 - 5.0 %    ABS. NEUTROPHILS 2.5 1.7 - 8.2 K/UL    ABS. LYMPHOCYTES 3.3 0.5 - 4.6 K/UL    ABS. MONOCYTES 0.6 0.1 - 1.3 K/UL    ABS. EOSINOPHILS 0.1 0.0 - 0.8 K/UL    ABS. BASOPHILS 0.1 0.0 - 0.2 K/UL    ABS. IMM. GRANS. 0.0 0.0 - 0.5 K/UL   METABOLIC PANEL, COMPREHENSIVE    Collection Time: 01/24/20  5:57 PM   Result Value Ref Range    Sodium 139 136 - 145 mmol/L    Potassium 4.0 3.5 - 5.1 mmol/L    Chloride 104 98 - 107 mmol/L    CO2 32 21 - 32 mmol/L    Anion gap 3 (L) 7 - 16 mmol/L    Glucose 36 (LL) 65 - 100 mg/dL    BUN 16 8 - 23 MG/DL    Creatinine 1.24 0.8 - 1.5 MG/DL    GFR est AA >60 >60 ml/min/1.73m2    GFR est non-AA >60 >60 ml/min/1.73m2    Calcium 9.4 8.3 - 10.4 MG/DL    Bilirubin, total 0.3 0.2 - 1.1 MG/DL    ALT (SGPT) 56 12 - 65 U/L    AST (SGOT) 60 (H) 15 - 37 U/L    Alk.  phosphatase 260 (H) 50 - 136 U/L Protein, total 7.7 6.3 - 8.2 g/dL    Albumin 3.1 (L) 3.2 - 4.6 g/dL    Globulin 4.6 (H) 2.3 - 3.5 g/dL    A-G Ratio 0.7 (L) 1.2 - 3.5     TROPONIN I    Collection Time: 01/24/20  5:57 PM   Result Value Ref Range    Troponin-I, Qt. <0.02 (L) 0.02 - 0.05 NG/ML   PROCALCITONIN    Collection Time: 01/24/20  5:57 PM   Result Value Ref Range    Procalcitonin 0.50 ng/mL   GLUCOSE, POC    Collection Time: 01/24/20  6:01 PM   Result Value Ref Range    Glucose (POC) 87 65 - 100 mg/dL   LACTIC ACID    Collection Time: 01/24/20  6:05 PM   Result Value Ref Range    Lactic acid 1.5 0.4 - 2.0 MMOL/L   EKG, 12 LEAD, INITIAL    Collection Time: 01/24/20  6:20 PM   Result Value Ref Range    Ventricular Rate 50 BPM    Atrial Rate 50 BPM    P-R Interval 178 ms    QRS Duration 128 ms    Q-T Interval 484 ms    QTC Calculation (Bezet) 441 ms    Calculated P Axis 91 degrees    Calculated R Axis 68 degrees    Calculated T Axis 75 degrees    Diagnosis       !! AGE AND GENDER SPECIFIC ECG ANALYSIS !!   Sinus bradycardia  Non-specific intra-ventricular conduction block  Cannot rule out Septal infarct , age undetermined  Abnormal ECG  When compared with ECG of 08-JAN-2020 12:57,  Non-specific intra-ventricular conduction block has replaced Right bundle   branch block  Minimal criteria for Septal infarct are now Present     GLUCOSE, POC    Collection Time: 01/24/20  7:04 PM   Result Value Ref Range    Glucose (POC) 57 (L) 65 - 100 mg/dL   GLUCOSE, POC    Collection Time: 01/24/20  7:53 PM   Result Value Ref Range    Glucose (POC) 44 (L) 65 - 100 mg/dL       All Micro Results     Procedure Component Value Units Date/Time    CULTURE, BLOOD [[id number]] Collected:  01/24/20 1835    Order Status:  Completed Specimen:  Blood Updated:  01/24/20 1949    CULTURE, BLOOD [[id number]] Collected:  01/24/20 1807    Order Status:  Completed Specimen:  Blood Updated:  01/24/20 1822          Current Facility-Administered Medications   Medication Dose Route Frequency    dextrose 5 % - 0.45% NaCl infusion  100 mL/hr IntraVENous CONTINUOUS    dextrose (D50W) 50% injection syrg         Current Outpatient Medications   Medication Sig    glipiZIDE (GLUCOTROL) 5 mg tablet Take 5 mg by mouth two (2) times a day.  insulin aspart protamine/insulin aspart (NOVOLOG MIX 70/30) 100 unit/mL (70-30) inpn 20 Units by SubCUTAneous route three (3) times daily. Take with each meal.     albuterol-ipratropium (DUO-NEB) 2.5 mg-0.5 mg/3 ml nebu 3 mL by Nebulization route three (3) times daily.  fluticasone propion-salmeterol (ADVAIR/WIXELA) 250-50 mcg/dose diskus inhaler Take 1 Puff by inhalation two (2) times a day. Indications: Bronchospasm Prevention with COPD    azithromycin (ZITHROMAX) 250 mg tablet Take 2 Tabs by mouth daily.  dronabinol (MARINOL) 5 mg capsule Take 1 Cap by mouth two (2) times a day. Max Daily Amount: 10 mg.    ferrous sulfate 325 mg (65 mg iron) tablet Take 1 Tab by mouth two (2) times daily (with meals).  guaiFENesin ER (MUCINEX) 600 mg ER tablet Take 1 Tab by mouth every twelve (12) hours.  tiotropium (SPIRIVA) 18 mcg inhalation capsule Take 1 Cap by inhalation daily.  metoprolol tartrate (LOPRESSOR) 25 mg tablet Take 50 mg by mouth two (2) times a day.  LANTUS 100 unit/mL injection 25 Units by SubCUTAneous route nightly.  LIPITOR 10 mg Tab Take 10 mg by mouth daily.  lisinopril (PRINIVIL, ZESTRIL) 5 mg tablet take 5 mg by mouth daily.  ATIVAN 1 mg Tab Take 1 mg by mouth two (2) times a day.  WELLBUTRIN PO take 150 mg by mouth two (2) times a day. Other Studies:  Ct Head Wo Cont    Result Date: 1/24/2020  CT of the head without contrast. CLINICAL INDICATION: Unresponsive PROCEDURE: Serial thin section axial images are obtained from the cranial vertex through the skull base without the administration of intravenous contrast. Radiation dose reduction techniques were used for this study.  Our CT scanners use one or all of the following: Automated exposure control, adjusted of the mA and/or kV according to patient size, iterative reconstruction COMPARISON: Head CT dated 7/26/2016. FINDINGS: There is no acute intracranial hemorrhage, mass, or mass effect. No abnormal extra-axial fluid collections identified. There is no hydrocephalus. The basilar cisterns are widely patent. Moderate bilateral hemispheric atrophy is appreciated. The gray-white brain parenchymal interface is maintained. 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 . No skull fracture or aggressive osseous lesion noted. The mastoid air cells and included paranasal sinuses are clear. IMPRESSION: No acute intracranial abnormality. Xr Chest Port    Result Date: 1/24/2020  Portable chest x-ray CLINICAL INDICATION: Unresponsive FINDINGS: Single AP view of the chest compared to a similar exam dated 1/8/2020 shows persistent airspace density in the left upper lobe that is unchanged. The remainder the lung parenchyma is clear. The cardiac silhouette and mediastinum are stable. IMPRESSION: Persistent airspace density in the left upper lobe. No new areas of consolidation.       Assessment and Plan:     Hospital Problems as of 1/24/2020 Date Reviewed: 1/2/2020          Codes Class Noted - Resolved POA    Hypoglycemia ICD-10-CM: E16.2  ICD-9-CM: 251.2  1/24/2020 - Present Unknown        Sepsis ([facility] ICD-10-CM: A41.9  ICD-9-CM: 038.9, 995.91  1/24/2020 - Present Unknown              Plan:    Sepsis  Reports recent antibiotic therapy for pneumonia  CXR: No new consolidation  Blood cultures drawn  Urinalysis pending  Procal: 0.50  Lactic Acid: 1.5     Plan:  -Vancomycin and Zosyn  -Follow-up cultures    Hypoglycemia/diabetes mellitus  Patient with persistent hypoglycemia despite D5 and glucose pushes  Reports normal usage of insulin at home and eating    Plan:  -Hold all diabetes medications  -Glucose every 4  -D10 + 0.45 saline      Bradycardia  EKG: Intraventricular conduction block  Possibly related to hypoglycemia    Plan:  -Telemetry monitoring  -Hold home dose metoprolol    COPD  No wheezing on examination    Plan:  -Continue home medications    CKD stage III  At baseline serum creatinine    Plan:  -Monitor BMP  -Avoid nephrotoxic medications    Lung cancer  -Continued outpatient follow-up    Anxiety  -Hold home dose benzos given lethargy      Discharge planning: Patient's son reports patient lives alone at home alone and most likely cannot take care of himself there  DVT ppx ordered  Code status:  Full  Estimated LOS:  Greater than 2 midnights  Risk:  high    Signed:  Fatuma Jarvis MD

## 2020-01-25 NOTE — ED NOTES
Patient sitting up in bed. Given apple sauce as requested. No sandwich trays in patient nourishment room. Family at bedside.

## 2020-01-26 PROBLEM — Z79.4 TYPE 2 DIABETES MELLITUS, WITH LONG-TERM CURRENT USE OF INSULIN (HCC): Status: ACTIVE | Noted: 2020-01-26

## 2020-01-26 PROBLEM — E11.9 TYPE 2 DIABETES MELLITUS, WITH LONG-TERM CURRENT USE OF INSULIN (HCC): Status: ACTIVE | Noted: 2020-01-26

## 2020-01-26 LAB
ANION GAP SERPL CALC-SCNC: 6 MMOL/L (ref 7–16)
BASOPHILS # BLD: 0 K/UL (ref 0–0.2)
BASOPHILS NFR BLD: 1 % (ref 0–2)
BUN SERPL-MCNC: 13 MG/DL (ref 8–23)
CALCIUM SERPL-MCNC: 8.6 MG/DL (ref 8.3–10.4)
CHLORIDE SERPL-SCNC: 105 MMOL/L (ref 98–107)
CO2 SERPL-SCNC: 27 MMOL/L (ref 21–32)
CREAT SERPL-MCNC: 1.19 MG/DL (ref 0.8–1.5)
DIFFERENTIAL METHOD BLD: ABNORMAL
EOSINOPHIL # BLD: 0.1 K/UL (ref 0–0.8)
EOSINOPHIL NFR BLD: 1 % (ref 0.5–7.8)
ERYTHROCYTE [DISTWIDTH] IN BLOOD BY AUTOMATED COUNT: 15.2 % (ref 11.9–14.6)
GLUCOSE BLD STRIP.AUTO-MCNC: 197 MG/DL (ref 65–100)
GLUCOSE BLD STRIP.AUTO-MCNC: 222 MG/DL (ref 65–100)
GLUCOSE BLD STRIP.AUTO-MCNC: 271 MG/DL (ref 65–100)
GLUCOSE BLD STRIP.AUTO-MCNC: 279 MG/DL (ref 65–100)
GLUCOSE BLD STRIP.AUTO-MCNC: 36 MG/DL (ref 65–100)
GLUCOSE SERPL-MCNC: 196 MG/DL (ref 65–100)
HCT VFR BLD AUTO: 31.2 % (ref 41.1–50.3)
HGB BLD-MCNC: 10.8 G/DL (ref 13.6–17.2)
IMM GRANULOCYTES # BLD AUTO: 0 K/UL (ref 0–0.5)
IMM GRANULOCYTES NFR BLD AUTO: 0 % (ref 0–5)
LYMPHOCYTES # BLD: 1.9 K/UL (ref 0.5–4.6)
LYMPHOCYTES NFR BLD: 37 % (ref 13–44)
MCH RBC QN AUTO: 28.4 PG (ref 26.1–32.9)
MCHC RBC AUTO-ENTMCNC: 34.6 G/DL (ref 31.4–35)
MCV RBC AUTO: 82.1 FL (ref 79.6–97.8)
MM INDURATION POC: 0 MM (ref 0–5)
MONOCYTES # BLD: 0.4 K/UL (ref 0.1–1.3)
MONOCYTES NFR BLD: 8 % (ref 4–12)
NEUTS SEG # BLD: 2.7 K/UL (ref 1.7–8.2)
NEUTS SEG NFR BLD: 53 % (ref 43–78)
NRBC # BLD: 0 K/UL (ref 0–0.2)
PLATELET # BLD AUTO: 232 K/UL (ref 150–450)
PMV BLD AUTO: 8.5 FL (ref 9.4–12.3)
POTASSIUM SERPL-SCNC: 4.3 MMOL/L (ref 3.5–5.1)
PPD POC: NEGATIVE NEGATIVE
RBC # BLD AUTO: 3.8 M/UL (ref 4.23–5.6)
SODIUM SERPL-SCNC: 138 MMOL/L (ref 136–145)
WBC # BLD AUTO: 5 K/UL (ref 4.3–11.1)

## 2020-01-26 PROCEDURE — 74011000250 HC RX REV CODE- 250: Performed by: INTERNAL MEDICINE

## 2020-01-26 PROCEDURE — 74011000250 HC RX REV CODE- 250

## 2020-01-26 PROCEDURE — 3331090001 HH PPS REVENUE CREDIT

## 2020-01-26 PROCEDURE — 74011636637 HC RX REV CODE- 636/637: Performed by: FAMILY MEDICINE

## 2020-01-26 PROCEDURE — 85025 COMPLETE CBC W/AUTO DIFF WBC: CPT

## 2020-01-26 PROCEDURE — 65660000000 HC RM CCU STEPDOWN

## 2020-01-26 PROCEDURE — 74011250637 HC RX REV CODE- 250/637: Performed by: INTERNAL MEDICINE

## 2020-01-26 PROCEDURE — 74011250636 HC RX REV CODE- 250/636: Performed by: INTERNAL MEDICINE

## 2020-01-26 PROCEDURE — 36415 COLL VENOUS BLD VENIPUNCTURE: CPT

## 2020-01-26 PROCEDURE — 94640 AIRWAY INHALATION TREATMENT: CPT

## 2020-01-26 PROCEDURE — 80048 BASIC METABOLIC PNL TOTAL CA: CPT

## 2020-01-26 PROCEDURE — 74011000258 HC RX REV CODE- 258: Performed by: INTERNAL MEDICINE

## 2020-01-26 PROCEDURE — 82962 GLUCOSE BLOOD TEST: CPT

## 2020-01-26 PROCEDURE — 3331090002 HH PPS REVENUE DEBIT

## 2020-01-26 PROCEDURE — 74011000250 HC RX REV CODE- 250: Performed by: FAMILY MEDICINE

## 2020-01-26 PROCEDURE — 94760 N-INVAS EAR/PLS OXIMETRY 1: CPT

## 2020-01-26 RX ORDER — INSULIN GLARGINE 100 [IU]/ML
15 INJECTION, SOLUTION SUBCUTANEOUS
Status: DISCONTINUED | OUTPATIENT
Start: 2020-01-26 | End: 2020-01-26

## 2020-01-26 RX ORDER — DEXTROSE 50 % IN WATER (D50W) INTRAVENOUS SYRINGE
Status: COMPLETED
Start: 2020-01-26 | End: 2020-01-26

## 2020-01-26 RX ORDER — IPRATROPIUM BROMIDE AND ALBUTEROL SULFATE 2.5; .5 MG/3ML; MG/3ML
3 SOLUTION RESPIRATORY (INHALATION) 3 TIMES DAILY
Status: DISCONTINUED | OUTPATIENT
Start: 2020-01-26 | End: 2020-01-30 | Stop reason: HOSPADM

## 2020-01-26 RX ORDER — DEXTROSE 40 %
15 GEL (GRAM) ORAL AS NEEDED
Status: DISCONTINUED | OUTPATIENT
Start: 2020-01-26 | End: 2020-01-30 | Stop reason: HOSPADM

## 2020-01-26 RX ORDER — INSULIN GLARGINE 100 [IU]/ML
10 INJECTION, SOLUTION SUBCUTANEOUS DAILY
Status: DISCONTINUED | OUTPATIENT
Start: 2020-01-26 | End: 2020-01-27

## 2020-01-26 RX ORDER — DEXTROSE 50 % IN WATER (D50W) INTRAVENOUS SYRINGE
25-50 AS NEEDED
Status: DISCONTINUED | OUTPATIENT
Start: 2020-01-26 | End: 2020-01-30 | Stop reason: HOSPADM

## 2020-01-26 RX ADMIN — INSULIN GLARGINE 10 UNITS: 100 INJECTION, SOLUTION SUBCUTANEOUS at 09:05

## 2020-01-26 RX ADMIN — INSULIN LISPRO 6 UNITS: 100 INJECTION, SOLUTION INTRAVENOUS; SUBCUTANEOUS at 17:03

## 2020-01-26 RX ADMIN — ENOXAPARIN SODIUM 40 MG: 40 INJECTION SUBCUTANEOUS at 09:03

## 2020-01-26 RX ADMIN — GUAIFENESIN 600 MG: 600 TABLET ORAL at 09:02

## 2020-01-26 RX ADMIN — BUPROPION HYDROCHLORIDE 150 MG: 150 TABLET, EXTENDED RELEASE ORAL at 09:02

## 2020-01-26 RX ADMIN — GUAIFENESIN 600 MG: 600 TABLET ORAL at 21:58

## 2020-01-26 RX ADMIN — TIOTROPIUM BROMIDE 18 MCG: 18 CAPSULE ORAL; RESPIRATORY (INHALATION) at 08:24

## 2020-01-26 RX ADMIN — LISINOPRIL 5 MG: 5 TABLET ORAL at 09:03

## 2020-01-26 RX ADMIN — INSULIN LISPRO 6 UNITS: 100 INJECTION, SOLUTION INTRAVENOUS; SUBCUTANEOUS at 12:54

## 2020-01-26 RX ADMIN — PIPERACILLIN AND TAZOBACTAM 3.38 G: 3; .375 INJECTION, POWDER, FOR SOLUTION INTRAVENOUS at 06:28

## 2020-01-26 RX ADMIN — DRONABINOL 5 MG: 2.5 CAPSULE ORAL at 17:03

## 2020-01-26 RX ADMIN — Medication 10 ML: at 21:59

## 2020-01-26 RX ADMIN — FERROUS SULFATE TAB 325 MG (65 MG ELEMENTAL FE) 325 MG: 325 (65 FE) TAB at 09:02

## 2020-01-26 RX ADMIN — Medication 10 ML: at 06:30

## 2020-01-26 RX ADMIN — FERROUS SULFATE TAB 325 MG (65 MG ELEMENTAL FE) 325 MG: 325 (65 FE) TAB at 17:02

## 2020-01-26 RX ADMIN — IPRATROPIUM BROMIDE AND ALBUTEROL SULFATE 3 ML: .5; 3 SOLUTION RESPIRATORY (INHALATION) at 08:22

## 2020-01-26 RX ADMIN — IPRATROPIUM BROMIDE AND ALBUTEROL SULFATE 3 ML: .5; 3 SOLUTION RESPIRATORY (INHALATION) at 14:36

## 2020-01-26 RX ADMIN — PIPERACILLIN AND TAZOBACTAM 3.38 G: 3; .375 INJECTION, POWDER, FOR SOLUTION INTRAVENOUS at 21:59

## 2020-01-26 RX ADMIN — BUPROPION HYDROCHLORIDE 150 MG: 150 TABLET, EXTENDED RELEASE ORAL at 17:02

## 2020-01-26 RX ADMIN — INSULIN LISPRO 4 UNITS: 100 INJECTION, SOLUTION INTRAVENOUS; SUBCUTANEOUS at 21:59

## 2020-01-26 RX ADMIN — BUDESONIDE 500 MCG: 0.5 INHALANT RESPIRATORY (INHALATION) at 08:22

## 2020-01-26 RX ADMIN — ATORVASTATIN CALCIUM 10 MG: 10 TABLET, FILM COATED ORAL at 09:02

## 2020-01-26 RX ADMIN — IPRATROPIUM BROMIDE AND ALBUTEROL SULFATE 3 ML: .5; 3 SOLUTION RESPIRATORY (INHALATION) at 19:17

## 2020-01-26 RX ADMIN — Medication 10 ML: at 14:59

## 2020-01-26 RX ADMIN — PIPERACILLIN AND TAZOBACTAM 3.38 G: 3; .375 INJECTION, POWDER, FOR SOLUTION INTRAVENOUS at 14:59

## 2020-01-26 RX ADMIN — DRONABINOL 5 MG: 2.5 CAPSULE ORAL at 09:03

## 2020-01-26 RX ADMIN — DEXTROSE: 50 INJECTION, SOLUTION INTRAVENOUS at 04:00

## 2020-01-26 RX ADMIN — BUDESONIDE 500 MCG: 0.5 INHALANT RESPIRATORY (INHALATION) at 19:17

## 2020-01-26 NOTE — PROGRESS NOTES
Resting in bed at present time watching TV. Appetite good this shift. Has been up in room. Denies feeling any periods of where he felt as if his sugar was starting to drop. Hourly rounds completed, all needs met this shift. Will continue to monitor until night shift nurse takes over.

## 2020-01-26 NOTE — PROGRESS NOTES
Patient blood glucose was 36, patient was responsive and drinking juice and coffee. MD was notified and patient received IV dextrose.

## 2020-01-26 NOTE — PROGRESS NOTES
Hospitalist Progress Note     Admit Date:  2020  5:36 PM   Name:  Shannen Barkley   Age:  70 y.o.  :  1940   MRN:  [id number]   PCP:  Sienna Layne MD  Treatment Team: Attending Provider: Franco Leija MD; Utilization Review: Jhony Macias RN; Care Manager: Palma Crandall LM    Subjective: The patient is a 80-year-old male with a past medical history of hypertension, diabetes, COPD, CKD stage III, and recently diagnosed lung adenocarcinoma who presents to the emergency department after being found minimally responsive at home with hypoglycemia. Patient reports he is not sure what happened. He reports he ate today and took his insulin as normal.  He reports earlier in the day his blood glucose was 400 and treated with 20 of Humalog. Family present at bedside. They report patient was last known well at 1 PM.  Of note patient with recent admission  for hemoptysis and treated for pneumonia. He completed 4-day course of IV antibiotics and was discharged. He was seen 1/15 for surgical evaluation for lung adenocarcinoma, and surgery was postponed secondary to pneumonia. Patient reports he has been taking oral antibiotics for the past 9 days. At time of examination patient alert and oriented x4. He has no active complaints at this time.   He denies any chest pain, shortness of breath, cough, dysuria, fever or chills.     ED course:  Patient found to be severely hypoglycemic and treated with glucose  Found to be hypothermic with temperature <92    2020  Off D10 earlier this morning  Sugars improving    2020  Episode of hypoglycemia earlier this morning  Presently glucose at 197  Says doing good      Objective:     Patient Vitals for the past 24 hrs:   Temp Pulse Resp BP SpO2   20 0830 -- -- -- -- 97 %   20 0822 98.6 °F (37 °C) 80 18 139/65 97 %   20 0334 97.6 °F (36.4 °C) 73 18 158/66 98 %   207 -- -- -- -- 97 %   20 98.1 °F (36.7 °C) 77 18 127/53 96 %   01/25/20 1612 98.2 °F (36.8 °C) 88 20 152/72 97 %   01/25/20 1334 -- -- -- -- 98 %   01/25/20 1130 98.4 °F (36.9 °C) 64 18 132/68 96 %     Oxygen Therapy  O2 Sat (%): 97 % (01/26/20 0830)  Pulse via Oximetry: 60 beats per minute (01/25/20 2117)  O2 Device: Room air (01/26/20 0830)  O2 Flow Rate (L/min): 0 l/min (01/25/20 0755)  No intake or output data in the 24 hours ending 01/26/20 1057      General:    Well nourished. Alert. HEENT-normal  CV:   RRR. No murmur, rub, or gallop. Lungs:   Clear to auscultation bilaterally. No wheezing, rhonchi, or rales. Abdomen:   Soft, nontender, nondistended. CNS-no focal neurologic deficit  Extremities: Warm and dry. No cyanosis. Minimal pitting edema left elbow region improving, nontender. Skin:     No rashes or jaundice. Data Review:  I have reviewed all labs, meds, telemetry events, and studies from the last 24 hours. Recent Results (from the past 24 hour(s))   GLUCOSE, POC    Collection Time: 01/25/20 11:12 AM   Result Value Ref Range    Glucose (POC) 300 (H) 65 - 100 mg/dL   EKG, 12 LEAD, SUBSEQUENT    Collection Time: 01/25/20  2:50 PM   Result Value Ref Range    Ventricular Rate 78 BPM    Atrial Rate 78 BPM    P-R Interval 146 ms    QRS Duration 112 ms    Q-T Interval 398 ms    QTC Calculation (Bezet) 453 ms    Calculated P Axis 80 degrees    Calculated R Axis -39 degrees    Calculated T Axis 75 degrees    Diagnosis       Normal sinus rhythm with sinus arrhythmia  Left axis deviation  Incomplete right bundle branch block  Cannot rule out Anterior infarct (cited on or before 30-NOV-2010)  Abnormal ECG  When compared with ECG of 24-JAN-2020 18:20,  Vent.  rate has increased BY  28 BPM  Incomplete right bundle branch block has replaced Non-specific   intra-ventricular conduction block  Questionable change in initial forces of Anteroseptal leads  Confirmed by Shalini Wray ([id number]) on 1/25/2020 6:30:11 PM     GLUCOSE, POC Collection Time: 01/25/20  3:36 PM   Result Value Ref Range    Glucose (POC) 259 (H) 65 - 100 mg/dL   GLUCOSE, POC    Collection Time: 01/25/20  8:16 PM   Result Value Ref Range    Glucose (POC) 280 (H) 65 - 100 mg/dL   GLUCOSE, POC    Collection Time: 01/26/20  3:43 AM   Result Value Ref Range    Glucose (POC) 36 (LL) 65 - 100 mg/dL   GLUCOSE, POC    Collection Time: 01/26/20  5:31 AM   Result Value Ref Range    Glucose (POC) 197 (H) 65 - 100 mg/dL   CBC WITH AUTOMATED DIFF    Collection Time: 01/26/20  5:58 AM   Result Value Ref Range    WBC 5.0 4.3 - 11.1 K/uL    RBC 3.80 (L) 4.23 - 5.6 M/uL    HGB 10.8 (L) 13.6 - 17.2 g/dL    HCT 31.2 (L) 41.1 - 50.3 %    MCV 82.1 79.6 - 97.8 FL    MCH 28.4 26.1 - 32.9 PG    MCHC 34.6 31.4 - 35.0 g/dL    RDW 15.2 (H) 11.9 - 14.6 %    PLATELET 591 200 - 038 K/uL    MPV 8.5 (L) 9.4 - 12.3 FL    ABSOLUTE NRBC 0.00 0.0 - 0.2 K/uL    DF AUTOMATED      NEUTROPHILS 53 43 - 78 %    LYMPHOCYTES 37 13 - 44 %    MONOCYTES 8 4.0 - 12.0 %    EOSINOPHILS 1 0.5 - 7.8 %    BASOPHILS 1 0.0 - 2.0 %    IMMATURE GRANULOCYTES 0 0.0 - 5.0 %    ABS. NEUTROPHILS 2.7 1.7 - 8.2 K/UL    ABS. LYMPHOCYTES 1.9 0.5 - 4.6 K/UL    ABS. MONOCYTES 0.4 0.1 - 1.3 K/UL    ABS. EOSINOPHILS 0.1 0.0 - 0.8 K/UL    ABS. BASOPHILS 0.0 0.0 - 0.2 K/UL    ABS. IMM.  GRANS. 0.0 0.0 - 0.5 K/UL   METABOLIC PANEL, BASIC    Collection Time: 01/26/20  5:58 AM   Result Value Ref Range    Sodium 138 136 - 145 mmol/L    Potassium 4.3 3.5 - 5.1 mmol/L    Chloride 105 98 - 107 mmol/L    CO2 27 21 - 32 mmol/L    Anion gap 6 (L) 7 - 16 mmol/L    Glucose 196 (H) 65 - 100 mg/dL    BUN 13 8 - 23 MG/DL    Creatinine 1.19 0.8 - 1.5 MG/DL    GFR est AA >60 >60 ml/min/1.73m2    GFR est non-AA >60 >60 ml/min/1.73m2    Calcium 8.6 8.3 - 10.4 MG/DL        All Micro Results     Procedure Component Value Units Date/Time    CULTURE, BLOOD [[id number]] Collected:  01/24/20 1807    Order Status:  Completed Specimen:  Blood Updated:  01/26/20 7634 Special Requests: --        LEFT  ARM       Culture result: NO GROWTH 2 DAYS       CULTURE, BLOOD [[id number]] Collected:  01/24/20 1835    Order Status:  Completed Specimen:  Blood Updated:  01/26/20 0619     Special Requests: --        LEFT  JUGULAR VEIN       Culture result: NO GROWTH 2 DAYS             Current Meds:  Current Facility-Administered Medications   Medication Dose Route Frequency    dextrose 40% (GLUTOSE) oral gel 1 Tube  15 g Oral PRN    glucagon (GLUCAGEN) injection 1 mg  1 mg IntraMUSCular PRN    dextrose (D50W) injection syrg 12.5-25 g  25-50 mL IntraVENous PRN    insulin glargine (LANTUS) injection 10 Units  10 Units SubCUTAneous DAILY    albuterol-ipratropium (DUO-NEB) 2.5 MG-0.5 MG/3 ML  3 mL Nebulization TID    insulin lispro (HUMALOG) injection 0-10 Units  0-10 Units SubCUTAneous AC&HS    dronabinoL (MARINOL) capsule 5 mg  5 mg Oral BID    ferrous sulfate tablet 325 mg  325 mg Oral BID WITH MEALS    guaiFENesin ER (MUCINEX) tablet 600 mg  600 mg Oral Q12H    atorvastatin (LIPITOR) tablet 10 mg  10 mg Oral DAILY    lisinopril (PRINIVIL, ZESTRIL) tablet 5 mg  5 mg Oral DAILY    tiotropium (SPIRIVA) inhalation capsule 18 mcg  1 Cap Inhalation DAILY    buPROPion SR (WELLBUTRIN SR) tablet 150 mg  150 mg Oral BID    sodium chloride (NS) flush 5-40 mL  5-40 mL IntraVENous Q8H    sodium chloride (NS) flush 5-40 mL  5-40 mL IntraVENous PRN    acetaminophen (TYLENOL) tablet 650 mg  650 mg Oral Q4H PRN    HYDROcodone-acetaminophen (NORCO) 5-325 mg per tablet 1 Tab  1 Tab Oral Q4H PRN    naloxone (NARCAN) injection 0.4 mg  0.4 mg IntraVENous PRN    ondansetron (ZOFRAN) injection 4 mg  4 mg IntraVENous Q4H PRN    enoxaparin (LOVENOX) injection 40 mg  40 mg SubCUTAneous Q24H    piperacillin-tazobactam (ZOSYN) 3.375 g in 0.9% sodium chloride (MBP/ADV) 100 mL  3.375 g IntraVENous Q8H    budesonide (PULMICORT) 500 mcg/2 ml nebulizer suspension  500 mcg Nebulization BID RT       Other Studies (last 24 hours):  No results found. Assessment and Plan:     Hospital Problems as of 1/26/2020 Date Reviewed: 1/2/2020          Codes Class Noted - Resolved POA    Type 2 diabetes mellitus, with long-term current use of insulin ([facility] ICD-10-CM: E11.9, Z79.4  ICD-9-CM: 250.00, V58.67  1/26/2020 - Present Unknown        Hypoglycemia ICD-10-CM: E16.2  ICD-9-CM: 251.2  1/24/2020 - Present Unknown        Sepsis ([facility] ICD-10-CM: A41.9  ICD-9-CM: 038.9, 995.91  1/24/2020 - Present Unknown        Lung cancer ([facility] ICD-10-CM: C34.90  ICD-9-CM: 162.9  1/15/2020 - Present Yes        Pneumonia of left upper lobe due to infectious organism [facility]) ICD-10-CM: J18.1  ICD-9-CM: 486  12/31/2019 - Present Yes    Overview Signed 1/1/2020 12:23 PM by Kole Allen MD     Presumptive non-small cell lung cancer stage IA3 based on the bronchoscopy finding done last month             HTN (hypertension) (Chronic) ICD-10-CM: I10  ICD-9-CM: 401.9  4/29/2015 - Present Yes        Hyperlipidemia (Chronic) ICD-10-CM: E78.5  ICD-9-CM: 272.4  4/29/2015 - Present Yes        Anxiety disorder (Chronic) ICD-10-CM: F41.9  ICD-9-CM: 300.00  4/29/2015 - Present Yes              PLAN:    Sepsis  Reports recent antibiotic therapy for pneumonia  CXR: No new consolidation  Elevated prolactin. DC vancomycin, continue Zosyn for now.     Hypoglycemia/diabetes mellitus  Continue Lantus, held sliding scale for this morning.        Bradycardia  Resolved     COPD     CKD stage III-normal kidney function.     Lung cancer  -Continued outpatient follow-up     Anxiety    DVT ppx ordered  Code status:  Full  Estimated LOS:  Greater than 2 midnights  Risk:  high  Signed:  Nitesh Mello MD

## 2020-01-26 NOTE — PROGRESS NOTES
Hospitalist Progress Note     Admit Date:  2020  5:36 PM   Name:  Stacie Calderon   Age:  70 y.o.  :  1940   MRN:  [id number]   PCP:  Cathleen Leyden, MD  Treatment Team: Attending Provider: Delmus Burkitt, MD; Utilization Review: Ector Escalante RN; Care Manager: Jamel Anaya LMSW    Subjective: The patient is a 82-year-old male with a past medical history of hypertension, diabetes, COPD, CKD stage III, and recently diagnosed lung adenocarcinoma who presents to the emergency department after being found minimally responsive at home with hypoglycemia. Patient reports he is not sure what happened. He reports he ate today and took his insulin as normal.  He reports earlier in the day his blood glucose was 400 and treated with 20 of Humalog. Family present at bedside. They report patient was last known well at 1 PM.  Of note patient with recent admission  for hemoptysis and treated for pneumonia. He completed 4-day course of IV antibiotics and was discharged. He was seen 1/15 for surgical evaluation for lung adenocarcinoma, and surgery was postponed secondary to pneumonia. Patient reports he has been taking oral antibiotics for the past 9 days. At time of examination patient alert and oriented x4. He has no active complaints at this time.   He denies any chest pain, shortness of breath, cough, dysuria, fever or chills.     ED course:  Patient found to be severely hypoglycemic and treated with glucose  Found to be hypothermic with temperature <92    2020  Off D10 earlier this morning  Sugars improving      Objective:     Patient Vitals for the past 24 hrs:   Temp Pulse Resp BP SpO2   20 0830 -- -- -- -- 97 %   20 0822 98.6 °F (37 °C) 80 18 139/65 97 %   20 0334 97.6 °F (36.4 °C) 73 18 158/66 98 %   20 2117 -- -- -- -- 97 %   20 98.1 °F (36.7 °C) 77 18 127/53 96 %   20 1612 98.2 °F (36.8 °C) 88 20 152/72 97 %   20 1334 -- -- -- -- 98 %   01/25/20 1130 98.4 °F (36.9 °C) 64 18 132/68 96 %     Oxygen Therapy  O2 Sat (%): 97 % (01/26/20 0830)  Pulse via Oximetry: 60 beats per minute (01/25/20 2117)  O2 Device: Room air (01/26/20 0830)  O2 Flow Rate (L/min): 0 l/min (01/25/20 0755)  No intake or output data in the 24 hours ending 01/26/20 1053      General:    Well nourished. Alert. HEENT-normal  CV:   RRR. No murmur, rub, or gallop. Lungs:   Clear to auscultation bilaterally. No wheezing, rhonchi, or rales. Abdomen:   Soft, nontender, nondistended. CNS-no focal neurologic deficit  Extremities: Warm and dry. No cyanosis. Minimal pitting edema left elbow region nontender  Skin:     No rashes or jaundice. Data Review:  I have reviewed all labs, meds, telemetry events, and studies from the last 24 hours. Recent Results (from the past 24 hour(s))   GLUCOSE, POC    Collection Time: 01/25/20 11:12 AM   Result Value Ref Range    Glucose (POC) 300 (H) 65 - 100 mg/dL   EKG, 12 LEAD, SUBSEQUENT    Collection Time: 01/25/20  2:50 PM   Result Value Ref Range    Ventricular Rate 78 BPM    Atrial Rate 78 BPM    P-R Interval 146 ms    QRS Duration 112 ms    Q-T Interval 398 ms    QTC Calculation (Bezet) 453 ms    Calculated P Axis 80 degrees    Calculated R Axis -39 degrees    Calculated T Axis 75 degrees    Diagnosis       Normal sinus rhythm with sinus arrhythmia  Left axis deviation  Incomplete right bundle branch block  Cannot rule out Anterior infarct (cited on or before 30-NOV-2010)  Abnormal ECG  When compared with ECG of 24-JAN-2020 18:20,  Vent.  rate has increased BY  28 BPM  Incomplete right bundle branch block has replaced Non-specific   intra-ventricular conduction block  Questionable change in initial forces of Anteroseptal leads  Confirmed by Osvaldo Mortimer ([id number]) on 1/25/2020 6:30:11 PM     GLUCOSE, POC    Collection Time: 01/25/20  3:36 PM   Result Value Ref Range    Glucose (POC) 259 (H) 65 - 100 mg/dL   GLUCOSE, POC Collection Time: 01/25/20  8:16 PM   Result Value Ref Range    Glucose (POC) 280 (H) 65 - 100 mg/dL   GLUCOSE, POC    Collection Time: 01/26/20  3:43 AM   Result Value Ref Range    Glucose (POC) 36 (LL) 65 - 100 mg/dL   GLUCOSE, POC    Collection Time: 01/26/20  5:31 AM   Result Value Ref Range    Glucose (POC) 197 (H) 65 - 100 mg/dL   CBC WITH AUTOMATED DIFF    Collection Time: 01/26/20  5:58 AM   Result Value Ref Range    WBC 5.0 4.3 - 11.1 K/uL    RBC 3.80 (L) 4.23 - 5.6 M/uL    HGB 10.8 (L) 13.6 - 17.2 g/dL    HCT 31.2 (L) 41.1 - 50.3 %    MCV 82.1 79.6 - 97.8 FL    MCH 28.4 26.1 - 32.9 PG    MCHC 34.6 31.4 - 35.0 g/dL    RDW 15.2 (H) 11.9 - 14.6 %    PLATELET 604 062 - 586 K/uL    MPV 8.5 (L) 9.4 - 12.3 FL    ABSOLUTE NRBC 0.00 0.0 - 0.2 K/uL    DF AUTOMATED      NEUTROPHILS 53 43 - 78 %    LYMPHOCYTES 37 13 - 44 %    MONOCYTES 8 4.0 - 12.0 %    EOSINOPHILS 1 0.5 - 7.8 %    BASOPHILS 1 0.0 - 2.0 %    IMMATURE GRANULOCYTES 0 0.0 - 5.0 %    ABS. NEUTROPHILS 2.7 1.7 - 8.2 K/UL    ABS. LYMPHOCYTES 1.9 0.5 - 4.6 K/UL    ABS. MONOCYTES 0.4 0.1 - 1.3 K/UL    ABS. EOSINOPHILS 0.1 0.0 - 0.8 K/UL    ABS. BASOPHILS 0.0 0.0 - 0.2 K/UL    ABS. IMM.  GRANS. 0.0 0.0 - 0.5 K/UL   METABOLIC PANEL, BASIC    Collection Time: 01/26/20  5:58 AM   Result Value Ref Range    Sodium 138 136 - 145 mmol/L    Potassium 4.3 3.5 - 5.1 mmol/L    Chloride 105 98 - 107 mmol/L    CO2 27 21 - 32 mmol/L    Anion gap 6 (L) 7 - 16 mmol/L    Glucose 196 (H) 65 - 100 mg/dL    BUN 13 8 - 23 MG/DL    Creatinine 1.19 0.8 - 1.5 MG/DL    GFR est AA >60 >60 ml/min/1.73m2    GFR est non-AA >60 >60 ml/min/1.73m2    Calcium 8.6 8.3 - 10.4 MG/DL        All Micro Results     Procedure Component Value Units Date/Time    CULTURE, BLOOD [[id number]] Collected:  01/24/20 1807    Order Status:  Completed Specimen:  Blood Updated:  01/26/20 0619     Special Requests: --        LEFT  ARM       Culture result: NO GROWTH 2 DAYS       CULTURE, BLOOD [[id number]] Collected: 01/24/20 1835    Order Status:  Completed Specimen:  Blood Updated:  01/26/20 0619     Special Requests: --        LEFT  JUGULAR VEIN       Culture result: NO GROWTH 2 DAYS             Current Meds:  Current Facility-Administered Medications   Medication Dose Route Frequency    dextrose 40% (GLUTOSE) oral gel 1 Tube  15 g Oral PRN    glucagon (GLUCAGEN) injection 1 mg  1 mg IntraMUSCular PRN    dextrose (D50W) injection syrg 12.5-25 g  25-50 mL IntraVENous PRN    insulin glargine (LANTUS) injection 10 Units  10 Units SubCUTAneous DAILY    albuterol-ipratropium (DUO-NEB) 2.5 MG-0.5 MG/3 ML  3 mL Nebulization TID    insulin lispro (HUMALOG) injection 0-10 Units  0-10 Units SubCUTAneous AC&HS    dronabinoL (MARINOL) capsule 5 mg  5 mg Oral BID    ferrous sulfate tablet 325 mg  325 mg Oral BID WITH MEALS    guaiFENesin ER (MUCINEX) tablet 600 mg  600 mg Oral Q12H    atorvastatin (LIPITOR) tablet 10 mg  10 mg Oral DAILY    lisinopril (PRINIVIL, ZESTRIL) tablet 5 mg  5 mg Oral DAILY    tiotropium (SPIRIVA) inhalation capsule 18 mcg  1 Cap Inhalation DAILY    buPROPion SR (WELLBUTRIN SR) tablet 150 mg  150 mg Oral BID    sodium chloride (NS) flush 5-40 mL  5-40 mL IntraVENous Q8H    sodium chloride (NS) flush 5-40 mL  5-40 mL IntraVENous PRN    acetaminophen (TYLENOL) tablet 650 mg  650 mg Oral Q4H PRN    HYDROcodone-acetaminophen (NORCO) 5-325 mg per tablet 1 Tab  1 Tab Oral Q4H PRN    naloxone (NARCAN) injection 0.4 mg  0.4 mg IntraVENous PRN    ondansetron (ZOFRAN) injection 4 mg  4 mg IntraVENous Q4H PRN    enoxaparin (LOVENOX) injection 40 mg  40 mg SubCUTAneous Q24H    piperacillin-tazobactam (ZOSYN) 3.375 g in 0.9% sodium chloride (MBP/ADV) 100 mL  3.375 g IntraVENous Q8H    budesonide (PULMICORT) 500 mcg/2 ml nebulizer suspension  500 mcg Nebulization BID RT       Other Studies (last 24 hours):  No results found.     Assessment and Plan:     Hospital Problems as of 1/26/2020 Date Reviewed: 1/2/2020          Codes Class Noted - Resolved POA    Type 2 diabetes mellitus, with long-term current use of insulin ([facility] ICD-10-CM: E11.9, Z79.4  ICD-9-CM: 250.00, V58.67  1/26/2020 - Present Unknown        Hypoglycemia ICD-10-CM: E16.2  ICD-9-CM: 251.2  1/24/2020 - Present Unknown        Sepsis ([facility] ICD-10-CM: A41.9  ICD-9-CM: 038.9, 995.91  1/24/2020 - Present Unknown        Lung cancer ([facility] ICD-10-CM: C34.90  ICD-9-CM: 162.9  1/15/2020 - Present Yes        Pneumonia of left upper lobe due to infectious organism [facility]) ICD-10-CM: J18.1  ICD-9-CM: 486  12/31/2019 - Present Yes    Overview Signed 1/1/2020 12:23 PM by Radha Schmidt MD     Presumptive non-small cell lung cancer stage IA3 based on the bronchoscopy finding done last month             HTN (hypertension) (Chronic) ICD-10-CM: I10  ICD-9-CM: 401.9  4/29/2015 - Present Yes        Hyperlipidemia (Chronic) ICD-10-CM: E78.5  ICD-9-CM: 272.4  4/29/2015 - Present Yes        Anxiety disorder (Chronic) ICD-10-CM: F41.9  ICD-9-CM: 300.00  4/29/2015 - Present Yes              PLAN:    Sepsis  Reports recent antibiotic therapy for pneumonia  CXR: No new consolidation  Elevated prolactin. Continue vancomycin Zosyn for now.     Hypoglycemia/diabetes mellitus  Improving glucose, off D10. Continue sliding scale and Lantus        Bradycardia  Resolved     COPD     CKD stage III-normal kidney function.     Lung cancer  -Continued outpatient follow-up     Anxiety    DVT ppx ordered  Code status:  Full  Estimated LOS:  Greater than 2 midnights  Risk:  high  Signed:  Sarah Phillip MD

## 2020-01-26 NOTE — PROGRESS NOTES
Nutrition:  Best Practice Alert for Malnutrition Screening Tool: Recently Lost Weight Without Tryin - 23 lbs, Eating Poorly Due to Decreased Appetite: No   Assessment:  Anthropometrics:  Height: 6' 1\" (185.4 cm), wgt - 71.7 kg (estimated), BMI - 20.8 BMI class of Normal weight, edema - none. Macronutrient Needs (72 kg):  Estimated calorie needs - 4034-2150 steve/day (25-30 steve/kg/day)   Estimated protein needs - 72-90 gm pro/day (1-1.25 gm pro/kg/day)   Intake/Comparative Standards:   No oral intake is documented and the patient is a vague historian. He reports, \"I'm eating pretty good. \" An empty carton of Glucerna Shake is at his bedside. Pertinent Labs:   AM glucose 196; POC glucose (2020) 15,103,521,108. Pertinent Medications:   Marinol, Ferrous Sulfate, Lantus, Humalog, Zosyn  Diet:   DIET DIABETIC CONSISTENT CARB Regular  DIET NUTRITIONAL SUPPLEMENTS All Meals; Glucerna Shake  Food/Nutrition History:   70year old gentleman with a h/o diabetes and recently diagnosed with lung cancer admitted with hypoglycemia and sepsis/pneumonia. He reports a 20 pound weight loss in the last 3 months but weights listed in the EMR do not support this statement. He reports that he has been eating the same amount of food, but \"food just isn't doing me any good these days. \" He reports eating 3 meals per day. He cooks breakfast but buys his lunch and dinner. His arms show evidence of weight loss/muscle loss with loose skin. He has been started on Marinol, therefore some change in his appetite and intake was seen. Diagnosis (Nutrition): Inadequate oral intake related to decreased ability to consume sufficient energy as evidenced by subjective report of weight loss. .    Intervention:  Meals and Snacks: [facility]. A copy of the menu was given to the patient to assist him in choosing appropriate meals to optimize his oral intake.   Medical Food Supplement Therapy: Commercial Beverage: Continue Glucerna Shake (220 calories, 10 gm protein per carton) with all meals. Nutrition Discharge Plan: Continue Oral Nutrition Supplement (ONS) at discharge. Recommend Glucerna or a comparable/similar product With meals for 30 days unless otherwise directed by your Primary Care Physician. Daylin Mcgrath.  Rusty Ramirez  [phone]

## 2020-01-26 NOTE — PROGRESS NOTES
Bedside shift change report given to Vicente Hilton (oncoming nurse) by Jaya Balderas (offgoing nurse). Report included the following information SBAR and Kardex.

## 2020-01-27 ENCOUNTER — HOME CARE VISIT (OUTPATIENT)
Dept: HOME HEALTH SERVICES | Facility: HOME HEALTH | Age: 80
End: 2020-01-27
Payer: MEDICARE

## 2020-01-27 LAB
ANION GAP SERPL CALC-SCNC: 10 MMOL/L (ref 7–16)
BUN SERPL-MCNC: 10 MG/DL (ref 8–23)
CALCIUM SERPL-MCNC: 9.1 MG/DL (ref 8.3–10.4)
CHLORIDE SERPL-SCNC: 98 MMOL/L (ref 98–107)
CO2 SERPL-SCNC: 25 MMOL/L (ref 21–32)
CREAT SERPL-MCNC: 1.18 MG/DL (ref 0.8–1.5)
GLUCOSE BLD STRIP.AUTO-MCNC: 260 MG/DL (ref 65–100)
GLUCOSE BLD STRIP.AUTO-MCNC: 284 MG/DL (ref 65–100)
GLUCOSE BLD STRIP.AUTO-MCNC: 346 MG/DL (ref 65–100)
GLUCOSE BLD STRIP.AUTO-MCNC: 366 MG/DL (ref 65–100)
GLUCOSE BLD STRIP.AUTO-MCNC: 41 MG/DL (ref 65–100)
GLUCOSE BLD STRIP.AUTO-MCNC: 98 MG/DL (ref 65–100)
GLUCOSE SERPL-MCNC: 345 MG/DL (ref 65–100)
MM INDURATION POC: NORMAL (ref 0–5)
POTASSIUM SERPL-SCNC: 4.4 MMOL/L (ref 3.5–5.1)
PPD POC: NORMAL
SODIUM SERPL-SCNC: 133 MMOL/L (ref 136–145)

## 2020-01-27 PROCEDURE — 74011636637 HC RX REV CODE- 636/637: Performed by: FAMILY MEDICINE

## 2020-01-27 PROCEDURE — 36415 COLL VENOUS BLD VENIPUNCTURE: CPT

## 2020-01-27 PROCEDURE — 80048 BASIC METABOLIC PNL TOTAL CA: CPT

## 2020-01-27 PROCEDURE — 94640 AIRWAY INHALATION TREATMENT: CPT

## 2020-01-27 PROCEDURE — 94760 N-INVAS EAR/PLS OXIMETRY 1: CPT

## 2020-01-27 PROCEDURE — 3331090001 HH PPS REVENUE CREDIT

## 2020-01-27 PROCEDURE — 74011000258 HC RX REV CODE- 258: Performed by: INTERNAL MEDICINE

## 2020-01-27 PROCEDURE — 3331090002 HH PPS REVENUE DEBIT

## 2020-01-27 PROCEDURE — 74011000250 HC RX REV CODE- 250: Performed by: INTERNAL MEDICINE

## 2020-01-27 PROCEDURE — 74011250637 HC RX REV CODE- 250/637: Performed by: INTERNAL MEDICINE

## 2020-01-27 PROCEDURE — 65660000000 HC RM CCU STEPDOWN

## 2020-01-27 PROCEDURE — 74011250636 HC RX REV CODE- 250/636: Performed by: INTERNAL MEDICINE

## 2020-01-27 PROCEDURE — 82962 GLUCOSE BLOOD TEST: CPT

## 2020-01-27 RX ORDER — INSULIN GLARGINE 100 [IU]/ML
10 INJECTION, SOLUTION SUBCUTANEOUS
COMMUNITY
End: 2020-10-01 | Stop reason: ALTCHOICE

## 2020-01-27 RX ORDER — INSULIN GLARGINE 100 [IU]/ML
25 INJECTION, SOLUTION SUBCUTANEOUS DAILY
Status: DISCONTINUED | OUTPATIENT
Start: 2020-01-27 | End: 2020-01-29

## 2020-01-27 RX ORDER — INSULIN LISPRO 100 [IU]/ML
6 INJECTION, SOLUTION INTRAVENOUS; SUBCUTANEOUS
Status: DISCONTINUED | OUTPATIENT
Start: 2020-01-27 | End: 2020-01-28

## 2020-01-27 RX ORDER — INSULIN ASPART 100 [IU]/ML
10 INJECTION, SOLUTION INTRAVENOUS; SUBCUTANEOUS
Status: ON HOLD | COMMUNITY
End: 2020-01-30 | Stop reason: SDUPTHER

## 2020-01-27 RX ORDER — INSULIN LISPRO 100 [IU]/ML
10 INJECTION, SOLUTION INTRAVENOUS; SUBCUTANEOUS
Status: ON HOLD | COMMUNITY
End: 2020-01-27

## 2020-01-27 RX ADMIN — GUAIFENESIN 600 MG: 600 TABLET ORAL at 08:22

## 2020-01-27 RX ADMIN — DRONABINOL 5 MG: 2.5 CAPSULE ORAL at 17:05

## 2020-01-27 RX ADMIN — Medication 10 ML: at 13:43

## 2020-01-27 RX ADMIN — LISINOPRIL 5 MG: 5 TABLET ORAL at 08:22

## 2020-01-27 RX ADMIN — IPRATROPIUM BROMIDE AND ALBUTEROL SULFATE 3 ML: .5; 3 SOLUTION RESPIRATORY (INHALATION) at 14:12

## 2020-01-27 RX ADMIN — INSULIN LISPRO 6 UNITS: 100 INJECTION, SOLUTION INTRAVENOUS; SUBCUTANEOUS at 12:03

## 2020-01-27 RX ADMIN — PIPERACILLIN AND TAZOBACTAM 3.38 G: 3; .375 INJECTION, POWDER, FOR SOLUTION INTRAVENOUS at 22:29

## 2020-01-27 RX ADMIN — Medication 10 ML: at 06:25

## 2020-01-27 RX ADMIN — INSULIN GLARGINE 25 UNITS: 100 INJECTION, SOLUTION SUBCUTANEOUS at 08:22

## 2020-01-27 RX ADMIN — FERROUS SULFATE TAB 325 MG (65 MG ELEMENTAL FE) 325 MG: 325 (65 FE) TAB at 08:22

## 2020-01-27 RX ADMIN — Medication 10 ML: at 23:38

## 2020-01-27 RX ADMIN — ATORVASTATIN CALCIUM 10 MG: 10 TABLET, FILM COATED ORAL at 08:22

## 2020-01-27 RX ADMIN — GUAIFENESIN 600 MG: 600 TABLET ORAL at 23:28

## 2020-01-27 RX ADMIN — INSULIN LISPRO 8 UNITS: 100 INJECTION, SOLUTION INTRAVENOUS; SUBCUTANEOUS at 08:23

## 2020-01-27 RX ADMIN — PIPERACILLIN AND TAZOBACTAM 3.38 G: 3; .375 INJECTION, POWDER, FOR SOLUTION INTRAVENOUS at 13:42

## 2020-01-27 RX ADMIN — FERROUS SULFATE TAB 325 MG (65 MG ELEMENTAL FE) 325 MG: 325 (65 FE) TAB at 17:05

## 2020-01-27 RX ADMIN — DRONABINOL 5 MG: 2.5 CAPSULE ORAL at 08:22

## 2020-01-27 RX ADMIN — BUPROPION HYDROCHLORIDE 150 MG: 150 TABLET, EXTENDED RELEASE ORAL at 17:05

## 2020-01-27 RX ADMIN — INSULIN LISPRO 6 UNITS: 100 INJECTION, SOLUTION INTRAVENOUS; SUBCUTANEOUS at 23:32

## 2020-01-27 RX ADMIN — BUDESONIDE 500 MCG: 0.5 INHALANT RESPIRATORY (INHALATION) at 20:15

## 2020-01-27 RX ADMIN — BUPROPION HYDROCHLORIDE 150 MG: 150 TABLET, EXTENDED RELEASE ORAL at 08:22

## 2020-01-27 RX ADMIN — ENOXAPARIN SODIUM 40 MG: 40 INJECTION SUBCUTANEOUS at 08:22

## 2020-01-27 RX ADMIN — IPRATROPIUM BROMIDE AND ALBUTEROL SULFATE 3 ML: .5; 3 SOLUTION RESPIRATORY (INHALATION) at 09:12

## 2020-01-27 RX ADMIN — IPRATROPIUM BROMIDE AND ALBUTEROL SULFATE 3 ML: .5; 3 SOLUTION RESPIRATORY (INHALATION) at 20:15

## 2020-01-27 RX ADMIN — TIOTROPIUM BROMIDE 18 MCG: 18 CAPSULE ORAL; RESPIRATORY (INHALATION) at 09:12

## 2020-01-27 RX ADMIN — BUDESONIDE 500 MCG: 0.5 INHALANT RESPIRATORY (INHALATION) at 09:12

## 2020-01-27 RX ADMIN — INSULIN LISPRO 6 UNITS: 100 INJECTION, SOLUTION INTRAVENOUS; SUBCUTANEOUS at 18:17

## 2020-01-27 RX ADMIN — PIPERACILLIN AND TAZOBACTAM 3.38 G: 3; .375 INJECTION, POWDER, FOR SOLUTION INTRAVENOUS at 06:25

## 2020-01-27 NOTE — PROGRESS NOTES
Hospitalist Progress Note     Admit Date:  2020  5:36 PM   Name:  Sam Danielson   Age:  70 y.o.  :  1940   MRN:  [id number]   PCP:  Silvio Becker MD  Treatment Team: Attending Provider: Zoltan Verma MD; Utilization Review: Manny Major RN    Subjective: The patient is a 82-year-old male with a past medical history of hypertension, diabetes, COPD, CKD stage III, and recently diagnosed lung adenocarcinoma who presents to the emergency department after being found minimally responsive at home with hypoglycemia. Patient reports he is not sure what happened. He reports he ate today and took his insulin as normal.  He reports earlier in the day his blood glucose was 400 and treated with 20 of Humalog. Family present at bedside. They report patient was last known well at 1 PM.  Of note patient with recent admission  for hemoptysis and treated for pneumonia. He completed 4-day course of IV antibiotics and was discharged. He was seen 1/15 for surgical evaluation for lung adenocarcinoma, and surgery was postponed secondary to pneumonia. Patient reports he has been taking oral antibiotics for the past 9 days. At time of examination patient alert and oriented x4. He has no active complaints at this time.   He denies any chest pain, shortness of breath, cough, dysuria, fever or chills.     ED course:  Patient found to be severely hypoglycemic and treated with glucose  Found to be hypothermic with temperature <92    2020  Off D10 earlier this morning  Sugars improving    2020  Episode of hypoglycemia earlier this morning  Presently glucose at 197  Says doing good    2020  Complaining of productive cough  Elevated glucose of 325 this morning      Objective:     Patient Vitals for the past 24 hrs:   Temp Pulse Resp BP SpO2   20 0912 -- -- -- -- 96 %   20 0748 99 °F (37.2 °C) 98 18 174/52 97 %   20 0510 98.1 °F (36.7 °C) 93 18 135/50 95 %   20 2341 99.2 °F (37.3 °C) 98 19 134/60 99 %   01/26/20 2058 97.8 °F (36.6 °C) (!) 107 18 123/58 97 %   01/26/20 1917 -- -- -- -- 96 %   01/26/20 1642 97.6 °F (36.4 °C) 69 18 133/64 99 %   01/26/20 1436 -- -- -- -- 99 %   01/26/20 1200 97.5 °F (36.4 °C) 78 18 136/65 98 %     Oxygen Therapy  O2 Sat (%): 96 % (01/27/20 0912)  Pulse via Oximetry: 95 beats per minute (01/26/20 1917)  O2 Device: Room air (01/27/20 0912)  O2 Flow Rate (L/min): 0 l/min (01/25/20 0755)  No intake or output data in the 24 hours ending 01/27/20 1152      General:    Well nourished. Alert. HEENT-normal  CV:   RRR. No murmur, rub, or gallop. Lungs:   Mild coarse breath sounds, no wheezing  Abdomen:   Soft, nontender, nondistended. CNS-no focal neurologic deficit  Extremities: Warm and dry. No cyanosis. Minimal pitting edema left elbow region improving, nontender. Skin:     No rashes or jaundice. Data Review:  I have reviewed all labs, meds, telemetry events, and studies from the last 24 hours.     Recent Results (from the past 24 hour(s))   GLUCOSE, POC    Collection Time: 01/26/20 11:57 AM   Result Value Ref Range    Glucose (POC) 271 (H) 65 - 100 mg/dL   GLUCOSE, POC    Collection Time: 01/26/20  3:28 PM   Result Value Ref Range    Glucose (POC) 279 (H) 65 - 100 mg/dL   GLUCOSE, POC    Collection Time: 01/26/20  9:01 PM   Result Value Ref Range    Glucose (POC) 222 (H) 65 - 100 mg/dL   PLEASE READ & DOCUMENT PPD TEST IN 48 HRS    Collection Time: 01/26/20 11:36 PM   Result Value Ref Range    PPD      mm Induration     GLUCOSE, POC    Collection Time: 01/27/20  4:57 AM   Result Value Ref Range    Glucose (POC) 366 (H) 65 - 880 mg/dL   METABOLIC PANEL, BASIC    Collection Time: 01/27/20  5:40 AM   Result Value Ref Range    Sodium 133 (L) 136 - 145 mmol/L    Potassium 4.4 3.5 - 5.1 mmol/L    Chloride 98 98 - 107 mmol/L    CO2 25 21 - 32 mmol/L    Anion gap 10 7 - 16 mmol/L    Glucose 345 (H) 65 - 100 mg/dL    BUN 10 8 - 23 MG/DL Creatinine 1.18 0.8 - 1.5 MG/DL    GFR est AA >60 >60 ml/min/1.73m2    GFR est non-AA >60 >60 ml/min/1.73m2    Calcium 9.1 8.3 - 10.4 MG/DL   GLUCOSE, POC    Collection Time: 01/27/20  7:42 AM   Result Value Ref Range    Glucose (POC) 346 (H) 65 - 100 mg/dL   GLUCOSE, POC    Collection Time: 01/27/20 11:27 AM   Result Value Ref Range    Glucose (POC) 260 (H) 65 - 100 mg/dL        All Micro Results     Procedure Component Value Units Date/Time    CULTURE, BLOOD [[id number]] Collected:  01/24/20 1807    Order Status:  Completed Specimen:  Blood Updated:  01/27/20 0847     Special Requests: --        LEFT  ARM       Culture result: NO GROWTH 3 DAYS       CULTURE, BLOOD [[id number]] Collected:  01/24/20 1835    Order Status:  Completed Specimen:  Blood Updated:  01/27/20 0847     Special Requests: --        LEFT  JUGULAR VEIN       Culture result: NO GROWTH 3 DAYS             Current Meds:  Current Facility-Administered Medications   Medication Dose Route Frequency    insulin glargine (LANTUS) injection 25 Units  25 Units SubCUTAneous DAILY    dextrose 40% (GLUTOSE) oral gel 1 Tube  15 g Oral PRN    glucagon (GLUCAGEN) injection 1 mg  1 mg IntraMUSCular PRN    dextrose (D50W) injection syrg 12.5-25 g  25-50 mL IntraVENous PRN    albuterol-ipratropium (DUO-NEB) 2.5 MG-0.5 MG/3 ML  3 mL Nebulization TID    insulin lispro (HUMALOG) injection 0-10 Units  0-10 Units SubCUTAneous AC&HS    dronabinoL (MARINOL) capsule 5 mg  5 mg Oral BID    ferrous sulfate tablet 325 mg  325 mg Oral BID WITH MEALS    guaiFENesin ER (MUCINEX) tablet 600 mg  600 mg Oral Q12H    atorvastatin (LIPITOR) tablet 10 mg  10 mg Oral DAILY    lisinopril (PRINIVIL, ZESTRIL) tablet 5 mg  5 mg Oral DAILY    tiotropium (SPIRIVA) inhalation capsule 18 mcg  1 Cap Inhalation DAILY    buPROPion SR (WELLBUTRIN SR) tablet 150 mg  150 mg Oral BID    sodium chloride (NS) flush 5-40 mL  5-40 mL IntraVENous Q8H    sodium chloride (NS) flush 5-40 mL  5-40 mL IntraVENous PRN    acetaminophen (TYLENOL) tablet 650 mg  650 mg Oral Q4H PRN    HYDROcodone-acetaminophen (NORCO) 5-325 mg per tablet 1 Tab  1 Tab Oral Q4H PRN    naloxone (NARCAN) injection 0.4 mg  0.4 mg IntraVENous PRN    ondansetron (ZOFRAN) injection 4 mg  4 mg IntraVENous Q4H PRN    enoxaparin (LOVENOX) injection 40 mg  40 mg SubCUTAneous Q24H    piperacillin-tazobactam (ZOSYN) 3.375 g in 0.9% sodium chloride (MBP/ADV) 100 mL  3.375 g IntraVENous Q8H    budesonide (PULMICORT) 500 mcg/2 ml nebulizer suspension  500 mcg Nebulization BID RT       Other Studies (last 24 hours):  No results found. Assessment and Plan:     Hospital Problems as of 1/27/2020 Date Reviewed: 1/2/2020          Codes Class Noted - Resolved POA    Type 2 diabetes mellitus, with long-term current use of insulin ([facility] ICD-10-CM: E11.9, Z79.4  ICD-9-CM: 250.00, V58.67  1/26/2020 - Present Unknown        Hypoglycemia ICD-10-CM: E16.2  ICD-9-CM: 251.2  1/24/2020 - Present Unknown        Sepsis ([facility] ICD-10-CM: A41.9  ICD-9-CM: 038.9, 995.91  1/24/2020 - Present Unknown        Lung cancer ([facility] ICD-10-CM: C34.90  ICD-9-CM: 162.9  1/15/2020 - Present Yes        Pneumonia of left upper lobe due to infectious organism [facility]) ICD-10-CM: J18.1  ICD-9-CM: 486  12/31/2019 - Present Yes    Overview Signed 1/1/2020 12:23 PM by Carla Acharya MD     Presumptive non-small cell lung cancer stage IA3 based on the bronchoscopy finding done last month             HTN (hypertension) (Chronic) ICD-10-CM: I10  ICD-9-CM: 401.9  4/29/2015 - Present Yes        Hyperlipidemia (Chronic) ICD-10-CM: E78.5  ICD-9-CM: 272.4  4/29/2015 - Present Yes        Anxiety disorder (Chronic) ICD-10-CM: F41.9  ICD-9-CM: 300.00  4/29/2015 - Present Yes              PLAN:    Sepsis  Reports recent antibiotic therapy for pneumonia  CXR: No new consolidation  Elevated prolactin.   DC vancomycin, continue Zosyn for now.     Hypoglycemia/diabetes mellitus  Continue Lantus and sliding scale. Hypoglycemic this morning. Will do adjustments on insulin.        Bradycardia  Resolved     COPD     CKD stage III-normal kidney function.     Lung cancer  -Continued outpatient follow-up     Anxiety    DVT ppx ordered  Code status:  Full  Estimated LOS:  Greater than 2 midnights  Risk:  high  Signed:  Debra Dill MD

## 2020-01-27 NOTE — PROGRESS NOTES
No complaints this shift, BS ranged from 346 this am to 98 this evening, hourly rounding completed though shift, will report off to oncoming nurse.

## 2020-01-27 NOTE — PROGRESS NOTES
Interdisciplinary Rounds completed. Nursing, Case Management, and Physician  present. Plan of care reviewed and updated.     Probable d/c in am.

## 2020-01-27 NOTE — DIABETES MGMT
Patient admitted with hypoglycemia. Admitting blood glucose 36. HbA1c 10.3 (eAG 249). Blood glucose range yesterday  with pt receiving Lantus 10 units, D50W, and Humalog 16 units. This AM blood glucose 346. Creatinine 1.18. GFR >60. Most recent FSBS 260. Pt sitting on side of bed. Pt states he was diagnosed with diabetes \"over 40 years ago\" and voices a positive family history of diabetes. Pt states he checks his blood glucose \"2-3 times a day\" at home. Pt states he currently takes Humalog 10 units TID with meals and Lantus \"I think I take 25 units. \" Pt unsure if he takes Glipizide. Pt also states \"if my sugar is above 300 I take Humalog 8 units. \" Spoke with pt pharmacy. Spoke with pharmacy, pt has filled Novolog 10 units with meals, Lantus, and Glipizide. Reviewed hypoglycemia signs, symptoms, and treatment with pt. Pt states he keeps applesauce and ritz for when his blood glucose is low. Pt denies a history of formal diabetes education. Reviewed the relationship between hyperglycemia and infection. Also educated pt regarding causes of hypo/hyperglcyemia. Pt states he has \"a pretty good appetite. \" Discussed if pt would be interested in further diabetes education at this time. Pt politely refused stating his comfortable managing his diabetes. Reviewed target blood glucose levels. Updated provider via [facility] regarding pt PTA diabetic medications. Would recommend d/c of glipizide at discharge as pt is on basal/bolus regimen to reduce pt risk of hypoglycemia. Would also recommend initiating prandial insulin with meals to help improve glycemic control related to caloric intake during the day.

## 2020-01-28 LAB
GLUCOSE BLD STRIP.AUTO-MCNC: 104 MG/DL (ref 65–100)
GLUCOSE BLD STRIP.AUTO-MCNC: 108 MG/DL (ref 65–100)
GLUCOSE BLD STRIP.AUTO-MCNC: 116 MG/DL (ref 65–100)
GLUCOSE BLD STRIP.AUTO-MCNC: 164 MG/DL (ref 65–100)
GLUCOSE BLD STRIP.AUTO-MCNC: 240 MG/DL (ref 65–100)
GLUCOSE BLD STRIP.AUTO-MCNC: 363 MG/DL (ref 65–100)

## 2020-01-28 PROCEDURE — 74011000250 HC RX REV CODE- 250: Performed by: INTERNAL MEDICINE

## 2020-01-28 PROCEDURE — 3331090002 HH PPS REVENUE DEBIT

## 2020-01-28 PROCEDURE — 94640 AIRWAY INHALATION TREATMENT: CPT

## 2020-01-28 PROCEDURE — 3331090001 HH PPS REVENUE CREDIT

## 2020-01-28 PROCEDURE — 65660000000 HC RM CCU STEPDOWN

## 2020-01-28 PROCEDURE — 74011250636 HC RX REV CODE- 250/636: Performed by: INTERNAL MEDICINE

## 2020-01-28 PROCEDURE — 74011250637 HC RX REV CODE- 250/637: Performed by: FAMILY MEDICINE

## 2020-01-28 PROCEDURE — 74011000258 HC RX REV CODE- 258: Performed by: INTERNAL MEDICINE

## 2020-01-28 PROCEDURE — 82962 GLUCOSE BLOOD TEST: CPT

## 2020-01-28 PROCEDURE — 74011250637 HC RX REV CODE- 250/637: Performed by: INTERNAL MEDICINE

## 2020-01-28 PROCEDURE — 94760 N-INVAS EAR/PLS OXIMETRY 1: CPT

## 2020-01-28 PROCEDURE — 74011636637 HC RX REV CODE- 636/637: Performed by: FAMILY MEDICINE

## 2020-01-28 RX ORDER — INSULIN LISPRO 100 [IU]/ML
8 INJECTION, SOLUTION INTRAVENOUS; SUBCUTANEOUS
Status: DISCONTINUED | OUTPATIENT
Start: 2020-01-28 | End: 2020-01-28

## 2020-01-28 RX ORDER — NYSTATIN 100000 [USP'U]/ML
500000 SUSPENSION ORAL 4 TIMES DAILY
Status: DISCONTINUED | OUTPATIENT
Start: 2020-01-28 | End: 2020-01-30 | Stop reason: HOSPADM

## 2020-01-28 RX ORDER — INSULIN LISPRO 100 [IU]/ML
10 INJECTION, SOLUTION INTRAVENOUS; SUBCUTANEOUS
Status: DISCONTINUED | OUTPATIENT
Start: 2020-01-28 | End: 2020-01-29

## 2020-01-28 RX ADMIN — IPRATROPIUM BROMIDE AND ALBUTEROL SULFATE 3 ML: .5; 3 SOLUTION RESPIRATORY (INHALATION) at 19:56

## 2020-01-28 RX ADMIN — TIOTROPIUM BROMIDE 18 MCG: 18 CAPSULE ORAL; RESPIRATORY (INHALATION) at 07:30

## 2020-01-28 RX ADMIN — BUDESONIDE 500 MCG: 0.5 INHALANT RESPIRATORY (INHALATION) at 19:56

## 2020-01-28 RX ADMIN — INSULIN LISPRO 6 UNITS: 100 INJECTION, SOLUTION INTRAVENOUS; SUBCUTANEOUS at 12:21

## 2020-01-28 RX ADMIN — PIPERACILLIN AND TAZOBACTAM 3.38 G: 3; .375 INJECTION, POWDER, FOR SOLUTION INTRAVENOUS at 14:26

## 2020-01-28 RX ADMIN — FERROUS SULFATE TAB 325 MG (65 MG ELEMENTAL FE) 325 MG: 325 (65 FE) TAB at 17:50

## 2020-01-28 RX ADMIN — PIPERACILLIN AND TAZOBACTAM 3.38 G: 3; .375 INJECTION, POWDER, FOR SOLUTION INTRAVENOUS at 23:00

## 2020-01-28 RX ADMIN — PIPERACILLIN AND TAZOBACTAM 3.38 G: 3; .375 INJECTION, POWDER, FOR SOLUTION INTRAVENOUS at 05:57

## 2020-01-28 RX ADMIN — Medication 10 ML: at 23:02

## 2020-01-28 RX ADMIN — DRONABINOL 5 MG: 2.5 CAPSULE ORAL at 08:32

## 2020-01-28 RX ADMIN — INSULIN LISPRO 15 UNITS: 100 INJECTION, SOLUTION INTRAVENOUS; SUBCUTANEOUS at 12:19

## 2020-01-28 RX ADMIN — LISINOPRIL 5 MG: 5 TABLET ORAL at 08:33

## 2020-01-28 RX ADMIN — FERROUS SULFATE TAB 325 MG (65 MG ELEMENTAL FE) 325 MG: 325 (65 FE) TAB at 08:32

## 2020-01-28 RX ADMIN — INSULIN LISPRO 6 UNITS: 100 INJECTION, SOLUTION INTRAVENOUS; SUBCUTANEOUS at 08:37

## 2020-01-28 RX ADMIN — ATORVASTATIN CALCIUM 10 MG: 10 TABLET, FILM COATED ORAL at 08:30

## 2020-01-28 RX ADMIN — BUDESONIDE 500 MCG: 0.5 INHALANT RESPIRATORY (INHALATION) at 07:22

## 2020-01-28 RX ADMIN — NYSTATIN 500000 UNITS: 100000 SUSPENSION ORAL at 17:50

## 2020-01-28 RX ADMIN — Medication 10 ML: at 06:01

## 2020-01-28 RX ADMIN — IPRATROPIUM BROMIDE AND ALBUTEROL SULFATE 3 ML: .5; 3 SOLUTION RESPIRATORY (INHALATION) at 07:21

## 2020-01-28 RX ADMIN — ENOXAPARIN SODIUM 40 MG: 40 INJECTION SUBCUTANEOUS at 08:34

## 2020-01-28 RX ADMIN — DRONABINOL 5 MG: 2.5 CAPSULE ORAL at 17:50

## 2020-01-28 RX ADMIN — GUAIFENESIN 600 MG: 600 TABLET ORAL at 23:00

## 2020-01-28 RX ADMIN — INSULIN LISPRO 10 UNITS: 100 INJECTION, SOLUTION INTRAVENOUS; SUBCUTANEOUS at 17:51

## 2020-01-28 RX ADMIN — IPRATROPIUM BROMIDE AND ALBUTEROL SULFATE 3 ML: .5; 3 SOLUTION RESPIRATORY (INHALATION) at 13:13

## 2020-01-28 RX ADMIN — INSULIN GLARGINE 25 UNITS: 100 INJECTION, SOLUTION SUBCUTANEOUS at 08:38

## 2020-01-28 RX ADMIN — BUPROPION HYDROCHLORIDE 150 MG: 150 TABLET, EXTENDED RELEASE ORAL at 08:30

## 2020-01-28 RX ADMIN — GUAIFENESIN 600 MG: 600 TABLET ORAL at 08:34

## 2020-01-28 RX ADMIN — Medication 10 ML: at 14:29

## 2020-01-28 RX ADMIN — NYSTATIN 500000 UNITS: 100000 SUSPENSION ORAL at 23:00

## 2020-01-28 RX ADMIN — NYSTATIN 500000 UNITS: 100000 SUSPENSION ORAL at 12:27

## 2020-01-28 RX ADMIN — BUPROPION HYDROCHLORIDE 150 MG: 150 TABLET, EXTENDED RELEASE ORAL at 17:50

## 2020-01-28 NOTE — PROGRESS NOTES
Chart screened by  for discharge planning. During rounds today physician stated patient will likely discharge home tomorrow 1/28/2020. Pending diabetes management education. No CM needs have been identified at this time. CM will continue to follow patient during hospitalization for discharge planning and needs. Please consult  if any new issues arise.

## 2020-01-28 NOTE — PROGRESS NOTES
Hospitalist Progress Note     Admit Date:  2020  5:36 PM   Name:  Nidhi Piña   Age:  70 y.o.  :  1940   MRN:  [id number]   PCP:  Josue Black MD  Treatment Team: Attending Provider: Sebastian Sheldon MD; Utilization Review: Jazmyne Cleveland RN; Care Manager: Melissa Villavicencio RN    Subjective: The patient is a 82-year-old male with a past medical history of hypertension, diabetes, COPD, CKD stage III, and recently diagnosed lung adenocarcinoma who presents to the emergency department after being found minimally responsive at home with hypoglycemia. Patient reports he is not sure what happened. He reports he ate today and took his insulin as normal.  He reports earlier in the day his blood glucose was 400 and treated with 20 of Humalog. Family present at bedside. They report patient was last known well at 1 PM.  Of note patient with recent admission  for hemoptysis and treated for pneumonia. He completed 4-day course of IV antibiotics and was discharged. He was seen 1/15 for surgical evaluation for lung adenocarcinoma, and surgery was postponed secondary to pneumonia. Patient reports he has been taking oral antibiotics for the past 9 days. At time of examination patient alert and oriented x4. He has no active complaints at this time. He denies any chest pain, shortness of breath, cough, dysuria, fever or chills.     ED course:  Patient found to be severely hypoglycemic and treated with glucose  Found to be hypothermic with temperature <92    2020  Off D10 earlier this morning  Sugars improving    2020  Episode of hypoglycemia earlier this morning  Presently glucose at 197  Says doing good    2020  Complaining of productive cough  Elevated glucose of 325 this morning    2020  Complaining of productive cough, sore throat. Elevated glucose levels.       Objective:     Patient Vitals for the past 24 hrs:   Temp Pulse Resp BP SpO2   20 1611 98.8 °F (37.1 °C) 95 18 132/62 94 %   01/28/20 1315 -- -- -- -- 96 %   01/28/20 1135 98.9 °F (37.2 °C) 98 18 125/66 96 %   01/28/20 0728 -- -- -- -- 94 %   01/28/20 0718 97.9 °F (36.6 °C) 85 16 129/59 --   01/28/20 0457 99.1 °F (37.3 °C) 94 18 138/55 95 %   01/28/20 0124 98.2 °F (36.8 °C) 88 18 147/76 97 %   01/27/20 2015 -- -- -- -- 97 %   01/27/20 1959 98.9 °F (37.2 °C) 91 18 149/66 96 %   01/27/20 1635 99.2 °F (37.3 °C) (!) 103 18 143/57 94 %     Oxygen Therapy  O2 Sat (%): 94 % (01/28/20 1611)  Pulse via Oximetry: 102 beats per minute (01/28/20 1315)  O2 Device: Room air (01/28/20 1315)  O2 Flow Rate (L/min): 0 l/min (01/25/20 0755)    Intake/Output Summary (Last 24 hours) at 1/28/2020 1623  Last data filed at 1/28/2020 1301  Gross per 24 hour   Intake 240 ml   Output --   Net 240 ml         General:    Well nourished. Alert. HEENT-normal  CV:   RRR. No murmur, rub, or gallop. Lungs:   Mild coarse breath sounds, no wheezing  Abdomen:   Soft, nontender, nondistended. CNS-no focal neurologic deficit  Extremities: Warm and dry. No cyanosis. Minimal pitting edema left elbow region improving, nontender. Skin:     No rashes or jaundice. Data Review:  I have reviewed all labs, meds, telemetry events, and studies from the last 24 hours.     Recent Results (from the past 24 hour(s))   GLUCOSE, POC    Collection Time: 01/27/20  4:32 PM   Result Value Ref Range    Glucose (POC) 98 65 - 100 mg/dL   GLUCOSE, POC    Collection Time: 01/27/20  8:35 PM   Result Value Ref Range    Glucose (POC) 284 (H) 65 - 100 mg/dL   GLUCOSE, POC    Collection Time: 01/28/20 12:25 AM   Result Value Ref Range    Glucose (POC) 240 (H) 65 - 100 mg/dL   GLUCOSE, POC    Collection Time: 01/28/20  4:29 AM   Result Value Ref Range    Glucose (POC) 104 (H) 65 - 100 mg/dL   GLUCOSE, POC    Collection Time: 01/28/20  7:23 AM   Result Value Ref Range    Glucose (POC) 164 (H) 65 - 100 mg/dL   GLUCOSE, POC    Collection Time: 01/28/20 11:35 AM Result Value Ref Range    Glucose (POC) 363 (H) 65 - 100 mg/dL        All Micro Results     Procedure Component Value Units Date/Time    CULTURE, BLOOD [[id number]] Collected:  01/24/20 1807    Order Status:  Completed Specimen:  Blood Updated:  01/28/20 0948     Special Requests: --        LEFT  ARM       Culture result: NO GROWTH 4 DAYS       CULTURE, BLOOD [[id number]] Collected:  01/24/20 1835    Order Status:  Completed Specimen:  Blood Updated:  01/28/20 0948     Special Requests: --        LEFT  JUGULAR VEIN       Culture result: NO GROWTH 4 DAYS             Current Meds:  Current Facility-Administered Medications   Medication Dose Route Frequency    nystatin (MYCOSTATIN) 100,000 unit/mL oral suspension 500,000 Units  500,000 Units Oral QID    phenol throat spray (CHLORASEPTIC) 1 Spray  1 Spray Oral PRN    insulin lispro (HUMALOG) injection 8 Units  8 Units SubCUTAneous TID WITH MEALS    insulin glargine (LANTUS) injection 25 Units  25 Units SubCUTAneous DAILY    dextrose 40% (GLUTOSE) oral gel 1 Tube  15 g Oral PRN    glucagon (GLUCAGEN) injection 1 mg  1 mg IntraMUSCular PRN    dextrose (D50W) injection syrg 12.5-25 g  25-50 mL IntraVENous PRN    albuterol-ipratropium (DUO-NEB) 2.5 MG-0.5 MG/3 ML  3 mL Nebulization TID    insulin lispro (HUMALOG) injection 0-10 Units  0-10 Units SubCUTAneous AC&HS    dronabinoL (MARINOL) capsule 5 mg  5 mg Oral BID    ferrous sulfate tablet 325 mg  325 mg Oral BID WITH MEALS    guaiFENesin ER (MUCINEX) tablet 600 mg  600 mg Oral Q12H    atorvastatin (LIPITOR) tablet 10 mg  10 mg Oral DAILY    lisinopril (PRINIVIL, ZESTRIL) tablet 5 mg  5 mg Oral DAILY    tiotropium (SPIRIVA) inhalation capsule 18 mcg  1 Cap Inhalation DAILY    buPROPion SR (WELLBUTRIN SR) tablet 150 mg  150 mg Oral BID    sodium chloride (NS) flush 5-40 mL  5-40 mL IntraVENous Q8H    sodium chloride (NS) flush 5-40 mL  5-40 mL IntraVENous PRN    acetaminophen (TYLENOL) tablet 650 mg  650 mg Oral Q4H PRN    HYDROcodone-acetaminophen (NORCO) 5-325 mg per tablet 1 Tab  1 Tab Oral Q4H PRN    naloxone (NARCAN) injection 0.4 mg  0.4 mg IntraVENous PRN    ondansetron (ZOFRAN) injection 4 mg  4 mg IntraVENous Q4H PRN    enoxaparin (LOVENOX) injection 40 mg  40 mg SubCUTAneous Q24H    piperacillin-tazobactam (ZOSYN) 3.375 g in 0.9% sodium chloride (MBP/ADV) 100 mL  3.375 g IntraVENous Q8H    budesonide (PULMICORT) 500 mcg/2 ml nebulizer suspension  500 mcg Nebulization BID RT       Other Studies (last 24 hours):  No results found. Assessment and Plan:     Hospital Problems as of 1/28/2020 Date Reviewed: 1/2/2020          Codes Class Noted - Resolved POA    Type 2 diabetes mellitus, with long-term current use of insulin ([facility] ICD-10-CM: E11.9, Z79.4  ICD-9-CM: 250.00, V58.67  1/26/2020 - Present Unknown        Hypoglycemia ICD-10-CM: E16.2  ICD-9-CM: 251.2  1/24/2020 - Present Unknown        Sepsis ([facility] ICD-10-CM: A41.9  ICD-9-CM: 038.9, 995.91  1/24/2020 - Present Unknown        Lung cancer ([facility] ICD-10-CM: C34.90  ICD-9-CM: 162.9  1/15/2020 - Present Yes        Pneumonia of left upper lobe due to infectious organism [facility]) ICD-10-CM: J18.1  ICD-9-CM: 486  12/31/2019 - Present Yes    Overview Signed 1/1/2020 12:23 PM by Fany Montelongo MD     Presumptive non-small cell lung cancer stage IA3 based on the bronchoscopy finding done last month             HTN (hypertension) (Chronic) ICD-10-CM: I10  ICD-9-CM: 401.9  4/29/2015 - Present Yes        Hyperlipidemia (Chronic) ICD-10-CM: E78.5  ICD-9-CM: 272.4  4/29/2015 - Present Yes        Anxiety disorder (Chronic) ICD-10-CM: F41.9  ICD-9-CM: 300.00  4/29/2015 - Present Yes              PLAN:    Sepsis  Reports recent antibiotic therapy for pneumonia  CXR: No new consolidation  Elevated prolactin. DC vancomycin, continue Zosyn for now.     Hypoglycemia/diabetes mellitus  Continue Lantus and sliding scale. Will increase with meals insulin.     Sore throat-ordered Chloraseptic spray. Will also add nystatin.        Bradycardia  Resolved     COPD     CKD stage III-normal kidney function.     Lung cancer  -Continued outpatient follow-up     Anxiety    DVT ppx ordered  Code status:  Full  Estimated LOS:  Greater than 2 midnights  Risk:  high  Signed:  Naomi Dyer MD

## 2020-01-28 NOTE — DIABETES MGMT
Patient admitted with hypoglycemia. Blood glucose range yesterday  with pt receiving Lantus 25 units and Humalog 26 units. This AM blood glucose 164. Pt in bed, has finished eating >80% of breakfast and drank a regular ensure. Educated pt re: current basal/bolus regimen of Lantus 25 units daily and Humalog 6 units with meals and SSI including type of insulin, timing with meals, onset, duration of effect, and peak of insulin dose. Pt verbalizes understanding and voices no further questions regarding diabetes management.

## 2020-01-28 NOTE — DIABETES MGMT
Spoke with provider most recent FSBS 363. New orders received to increase prandial insulin to Humalog 8 units with meals. Noted this AM pt had Ensure on his tray with >40g of total carbs. Per chart review pt has Glucerna ordered. Spoke with dietary to discuss possible alternative supplemental beverages. Dietician to assess.

## 2020-01-28 NOTE — PROGRESS NOTES
Resting in bed watching TV. Had quietl shift without complaints of pain or feeling as if sugar was dropping. Appetite good. Hourly rounds completed,all needs met this shift. Will continue to monitor until night shift nurse takes over.

## 2020-01-28 NOTE — PROGRESS NOTES
Interdisciplinary Rounds completed. Nursing, Case Management, and Physician  present. Plan of care reviewed and updated. Blood sugars still out of range.   Probable d/c in am.

## 2020-01-29 LAB
ANION GAP SERPL CALC-SCNC: 7 MMOL/L (ref 7–16)
BACTERIA SPEC CULT: NORMAL
BACTERIA SPEC CULT: NORMAL
BASOPHILS # BLD: 0 K/UL (ref 0–0.2)
BASOPHILS NFR BLD: 1 % (ref 0–2)
BUN SERPL-MCNC: 18 MG/DL (ref 8–23)
CALCIUM SERPL-MCNC: 8.5 MG/DL (ref 8.3–10.4)
CHLORIDE SERPL-SCNC: 100 MMOL/L (ref 98–107)
CO2 SERPL-SCNC: 28 MMOL/L (ref 21–32)
CREAT SERPL-MCNC: 1.47 MG/DL (ref 0.8–1.5)
DIFFERENTIAL METHOD BLD: ABNORMAL
EOSINOPHIL # BLD: 0 K/UL (ref 0–0.8)
EOSINOPHIL NFR BLD: 1 % (ref 0.5–7.8)
ERYTHROCYTE [DISTWIDTH] IN BLOOD BY AUTOMATED COUNT: 15.6 % (ref 11.9–14.6)
GLUCOSE BLD STRIP.AUTO-MCNC: 151 MG/DL (ref 65–100)
GLUCOSE BLD STRIP.AUTO-MCNC: 159 MG/DL (ref 65–100)
GLUCOSE BLD STRIP.AUTO-MCNC: 273 MG/DL (ref 65–100)
GLUCOSE BLD STRIP.AUTO-MCNC: 276 MG/DL (ref 65–100)
GLUCOSE BLD STRIP.AUTO-MCNC: 328 MG/DL (ref 65–100)
GLUCOSE BLD STRIP.AUTO-MCNC: 380 MG/DL (ref 65–100)
GLUCOSE BLD STRIP.AUTO-MCNC: 57 MG/DL (ref 65–100)
GLUCOSE BLD STRIP.AUTO-MCNC: 60 MG/DL (ref 65–100)
GLUCOSE SERPL-MCNC: 267 MG/DL (ref 65–100)
HCT VFR BLD AUTO: 32 % (ref 41.1–50.3)
HGB BLD-MCNC: 11.1 G/DL (ref 13.6–17.2)
IMM GRANULOCYTES # BLD AUTO: 0 K/UL (ref 0–0.5)
IMM GRANULOCYTES NFR BLD AUTO: 0 % (ref 0–5)
LYMPHOCYTES # BLD: 1.9 K/UL (ref 0.5–4.6)
LYMPHOCYTES NFR BLD: 47 % (ref 13–44)
MCH RBC QN AUTO: 28.5 PG (ref 26.1–32.9)
MCHC RBC AUTO-ENTMCNC: 34.7 G/DL (ref 31.4–35)
MCV RBC AUTO: 82.3 FL (ref 79.6–97.8)
MONOCYTES # BLD: 0.6 K/UL (ref 0.1–1.3)
MONOCYTES NFR BLD: 14 % (ref 4–12)
NEUTS SEG # BLD: 1.5 K/UL (ref 1.7–8.2)
NEUTS SEG NFR BLD: 38 % (ref 43–78)
NRBC # BLD: 0 K/UL (ref 0–0.2)
PLATELET # BLD AUTO: 177 K/UL (ref 150–450)
PMV BLD AUTO: 8.7 FL (ref 9.4–12.3)
POTASSIUM SERPL-SCNC: 4.5 MMOL/L (ref 3.5–5.1)
RBC # BLD AUTO: 3.89 M/UL (ref 4.23–5.6)
SERVICE CMNT-IMP: NORMAL
SERVICE CMNT-IMP: NORMAL
SODIUM SERPL-SCNC: 135 MMOL/L (ref 136–145)
WBC # BLD AUTO: 4.1 K/UL (ref 4.3–11.1)

## 2020-01-29 PROCEDURE — 74011000258 HC RX REV CODE- 258: Performed by: INTERNAL MEDICINE

## 2020-01-29 PROCEDURE — 74011636637 HC RX REV CODE- 636/637: Performed by: FAMILY MEDICINE

## 2020-01-29 PROCEDURE — 74011250637 HC RX REV CODE- 250/637: Performed by: INTERNAL MEDICINE

## 2020-01-29 PROCEDURE — 3331090001 HH PPS REVENUE CREDIT

## 2020-01-29 PROCEDURE — 85025 COMPLETE CBC W/AUTO DIFF WBC: CPT

## 2020-01-29 PROCEDURE — 74011250636 HC RX REV CODE- 250/636: Performed by: INTERNAL MEDICINE

## 2020-01-29 PROCEDURE — 65660000000 HC RM CCU STEPDOWN

## 2020-01-29 PROCEDURE — 94760 N-INVAS EAR/PLS OXIMETRY 1: CPT

## 2020-01-29 PROCEDURE — 36415 COLL VENOUS BLD VENIPUNCTURE: CPT

## 2020-01-29 PROCEDURE — 94640 AIRWAY INHALATION TREATMENT: CPT

## 2020-01-29 PROCEDURE — 82962 GLUCOSE BLOOD TEST: CPT

## 2020-01-29 PROCEDURE — 74011250637 HC RX REV CODE- 250/637: Performed by: FAMILY MEDICINE

## 2020-01-29 PROCEDURE — 3331090002 HH PPS REVENUE DEBIT

## 2020-01-29 PROCEDURE — 74011000250 HC RX REV CODE- 250: Performed by: INTERNAL MEDICINE

## 2020-01-29 PROCEDURE — 80048 BASIC METABOLIC PNL TOTAL CA: CPT

## 2020-01-29 RX ORDER — INSULIN GLARGINE 100 [IU]/ML
15 INJECTION, SOLUTION SUBCUTANEOUS
Status: DISCONTINUED | OUTPATIENT
Start: 2020-01-29 | End: 2020-01-30 | Stop reason: HOSPADM

## 2020-01-29 RX ORDER — BENZONATATE 100 MG/1
100 CAPSULE ORAL
Status: DISCONTINUED | OUTPATIENT
Start: 2020-01-29 | End: 2020-01-30 | Stop reason: HOSPADM

## 2020-01-29 RX ORDER — INSULIN GLARGINE 100 [IU]/ML
27 INJECTION, SOLUTION SUBCUTANEOUS DAILY
Status: DISCONTINUED | OUTPATIENT
Start: 2020-01-30 | End: 2020-01-29

## 2020-01-29 RX ORDER — INSULIN LISPRO 100 [IU]/ML
12 INJECTION, SOLUTION INTRAVENOUS; SUBCUTANEOUS
Status: DISCONTINUED | OUTPATIENT
Start: 2020-01-29 | End: 2020-01-30

## 2020-01-29 RX ADMIN — IPRATROPIUM BROMIDE AND ALBUTEROL SULFATE 3 ML: .5; 3 SOLUTION RESPIRATORY (INHALATION) at 13:36

## 2020-01-29 RX ADMIN — INSULIN GLARGINE 25 UNITS: 100 INJECTION, SOLUTION SUBCUTANEOUS at 08:59

## 2020-01-29 RX ADMIN — DRONABINOL 5 MG: 2.5 CAPSULE ORAL at 17:16

## 2020-01-29 RX ADMIN — BUDESONIDE 500 MCG: 0.5 INHALANT RESPIRATORY (INHALATION) at 19:45

## 2020-01-29 RX ADMIN — INSULIN LISPRO 10 UNITS: 100 INJECTION, SOLUTION INTRAVENOUS; SUBCUTANEOUS at 08:00

## 2020-01-29 RX ADMIN — NYSTATIN 500000 UNITS: 100000 SUSPENSION ORAL at 17:16

## 2020-01-29 RX ADMIN — PIPERACILLIN AND TAZOBACTAM 3.38 G: 3; .375 INJECTION, POWDER, FOR SOLUTION INTRAVENOUS at 14:41

## 2020-01-29 RX ADMIN — NYSTATIN 500000 UNITS: 100000 SUSPENSION ORAL at 12:26

## 2020-01-29 RX ADMIN — IPRATROPIUM BROMIDE AND ALBUTEROL SULFATE 3 ML: .5; 3 SOLUTION RESPIRATORY (INHALATION) at 07:33

## 2020-01-29 RX ADMIN — ENOXAPARIN SODIUM 40 MG: 40 INJECTION SUBCUTANEOUS at 08:58

## 2020-01-29 RX ADMIN — Medication 10 ML: at 22:18

## 2020-01-29 RX ADMIN — TIOTROPIUM BROMIDE 18 MCG: 18 CAPSULE ORAL; RESPIRATORY (INHALATION) at 07:33

## 2020-01-29 RX ADMIN — LISINOPRIL 5 MG: 5 TABLET ORAL at 08:57

## 2020-01-29 RX ADMIN — INSULIN LISPRO 9 UNITS: 100 INJECTION, SOLUTION INTRAVENOUS; SUBCUTANEOUS at 08:00

## 2020-01-29 RX ADMIN — Medication 10 ML: at 06:31

## 2020-01-29 RX ADMIN — BUPROPION HYDROCHLORIDE 150 MG: 150 TABLET, EXTENDED RELEASE ORAL at 08:57

## 2020-01-29 RX ADMIN — IPRATROPIUM BROMIDE AND ALBUTEROL SULFATE 3 ML: .5; 3 SOLUTION RESPIRATORY (INHALATION) at 19:45

## 2020-01-29 RX ADMIN — INSULIN GLARGINE 15 UNITS: 100 INJECTION, SOLUTION SUBCUTANEOUS at 22:17

## 2020-01-29 RX ADMIN — FERROUS SULFATE TAB 325 MG (65 MG ELEMENTAL FE) 325 MG: 325 (65 FE) TAB at 17:16

## 2020-01-29 RX ADMIN — NYSTATIN 500000 UNITS: 100000 SUSPENSION ORAL at 22:17

## 2020-01-29 RX ADMIN — GUAIFENESIN 600 MG: 600 TABLET ORAL at 22:16

## 2020-01-29 RX ADMIN — BUDESONIDE 500 MCG: 0.5 INHALANT RESPIRATORY (INHALATION) at 07:33

## 2020-01-29 RX ADMIN — DRONABINOL 5 MG: 2.5 CAPSULE ORAL at 08:57

## 2020-01-29 RX ADMIN — BUPROPION HYDROCHLORIDE 150 MG: 150 TABLET, EXTENDED RELEASE ORAL at 17:16

## 2020-01-29 RX ADMIN — ATORVASTATIN CALCIUM 10 MG: 10 TABLET, FILM COATED ORAL at 08:58

## 2020-01-29 RX ADMIN — FERROUS SULFATE TAB 325 MG (65 MG ELEMENTAL FE) 325 MG: 325 (65 FE) TAB at 08:58

## 2020-01-29 RX ADMIN — PIPERACILLIN AND TAZOBACTAM 3.38 G: 3; .375 INJECTION, POWDER, FOR SOLUTION INTRAVENOUS at 22:15

## 2020-01-29 RX ADMIN — PIPERACILLIN AND TAZOBACTAM 3.38 G: 3; .375 INJECTION, POWDER, FOR SOLUTION INTRAVENOUS at 06:31

## 2020-01-29 RX ADMIN — INSULIN LISPRO 10 UNITS: 100 INJECTION, SOLUTION INTRAVENOUS; SUBCUTANEOUS at 12:24

## 2020-01-29 RX ADMIN — NYSTATIN 500000 UNITS: 100000 SUSPENSION ORAL at 08:58

## 2020-01-29 RX ADMIN — INSULIN LISPRO 15 UNITS: 100 INJECTION, SOLUTION INTRAVENOUS; SUBCUTANEOUS at 12:23

## 2020-01-29 RX ADMIN — Medication 10 ML: at 14:42

## 2020-01-29 RX ADMIN — GUAIFENESIN 600 MG: 600 TABLET ORAL at 08:57

## 2020-01-29 NOTE — DIABETES MGMT
Patient admitted with hypoglycemia. Blood glucose range yesterday 108-363 with pt receiving Lantus 25 units and Humalog 37 units. This AM blood glucose 328. Most recent FSBS 276. Pt denies eating a snack in the middle of the night. Pt states his pain level is controlled, but pt complaining of \"sore throat\" and facial tenderness. RN updated. Reviewed pt current regimen: Lantus 25 units daily, Humalog 10 units with meals, and Humalog SSI. Discussed the role of an endocrinologist. Pt states he has never been to an endocrinologist. Given pt labile blood glucose may benefit from an outpatient endocrinologist referral or CGM. Pt verbalizes understanding and voices no further questions regarding diabetes management.

## 2020-01-29 NOTE — PROGRESS NOTES
Hospitalist Progress Note     Admit Date:  2020  5:36 PM   Name:  Lainey Cortes   Age:  70 y.o.  :  1940   MRN:  [id number]   PCP:  Cintia Garnett MD  Treatment Team: Attending Provider: Rosas Stewart MD; Utilization Review: Alejandra Padron RN; Care Manager: Wilfrido Layton RN    Subjective: The patient is a 17-year-old male with a past medical history of hypertension, diabetes, COPD, CKD stage III, and recently diagnosed lung adenocarcinoma who presents to the emergency department after being found minimally responsive at home with hypoglycemia. Patient reports he is not sure what happened. He reports he ate today and took his insulin as normal.  He reports earlier in the day his blood glucose was 400 and treated with 20 of Humalog. Family present at bedside. They report patient was last known well at 1 PM.  Of note patient with recent admission  for hemoptysis and treated for pneumonia. He completed 4-day course of IV antibiotics and was discharged. He was seen 1/15 for surgical evaluation for lung adenocarcinoma, and surgery was postponed secondary to pneumonia. Patient reports he has been taking oral antibiotics for the past 9 days. At time of examination patient alert and oriented x4. He has no active complaints at this time. He denies any chest pain, shortness of breath, cough, dysuria, fever or chills.     ED course:  Patient found to be severely hypoglycemic and treated with glucose  Found to be hypothermic with temperature <92    2020  Off D10 earlier this morning  Sugars improving    2020  Episode of hypoglycemia earlier this morning  Presently glucose at 197  Says doing good    2020  Complaining of productive cough  Elevated glucose of 325 this morning    2020  Complaining of productive cough, sore throat. Elevated glucose levels.     2020  Complaining of dry cough, sore throat  Elevated glucose level this morning-380      Objective: Patient Vitals for the past 24 hrs:   Temp Pulse Resp BP SpO2   01/29/20 1540 98.2 °F (36.8 °C) 82 20 126/79 98 %   01/29/20 1337 -- -- -- -- 95 %   01/29/20 1139 98.5 °F (36.9 °C) 91 18 136/72 95 %   01/29/20 0748 98.2 °F (36.8 °C) 85 20 132/62 96 %   01/29/20 0734 -- -- -- -- 92 %   01/29/20 0338 97.9 °F (36.6 °C) 88 18 144/68 94 %   01/28/20 2355 97.9 °F (36.6 °C) 94 18 123/63 94 %   01/28/20 2105 98.1 °F (36.7 °C) 81 18 128/65 95 %   01/28/20 1957 -- -- -- -- 94 %     Oxygen Therapy  O2 Sat (%): 98 % (01/29/20 1540)  Pulse via Oximetry: 96 beats per minute (01/29/20 1337)  O2 Device: Room air (01/29/20 1337)  O2 Flow Rate (L/min): 0 l/min (01/25/20 0755)    Intake/Output Summary (Last 24 hours) at 1/29/2020 1717  Last data filed at 1/28/2020 1810  Gross per 24 hour   Intake 240 ml   Output --   Net 240 ml         General:    Well nourished. Alert. HEENT-normal  CV:   RRR. No murmur, rub, or gallop. Lungs:   Mild coarse breath sounds, no wheezing  Abdomen:   Soft, nontender, nondistended. CNS-no focal neurologic deficit  Extremities: Warm and dry. No cyanosis. Minimal pitting edema left elbow region improving, nontender. Skin:     No rashes or jaundice. Data Review:  I have reviewed all labs, meds, telemetry events, and studies from the last 24 hours.     Recent Results (from the past 24 hour(s))   GLUCOSE, POC    Collection Time: 01/28/20  9:36 PM   Result Value Ref Range    Glucose (POC) 116 (H) 65 - 100 mg/dL   GLUCOSE, POC    Collection Time: 01/29/20 12:23 AM   Result Value Ref Range    Glucose (POC) 151 (H) 65 - 100 mg/dL   GLUCOSE, POC    Collection Time: 01/29/20  5:24 AM   Result Value Ref Range    Glucose (POC) 328 (H) 65 - 100 mg/dL   GLUCOSE, POC    Collection Time: 01/29/20  7:30 AM   Result Value Ref Range    Glucose (POC) 276 (H) 65 - 100 mg/dL   GLUCOSE, POC    Collection Time: 01/29/20 10:38 AM   Result Value Ref Range    Glucose (POC) 380 (H) 65 - 100 mg/dL   GLUCOSE, POC Collection Time: 01/29/20  3:49 PM   Result Value Ref Range    Glucose (POC) 57 (L) 65 - 100 mg/dL   GLUCOSE, POC    Collection Time: 01/29/20  4:12 PM   Result Value Ref Range    Glucose (POC) 60 (L) 65 - 100 mg/dL   GLUCOSE, POC    Collection Time: 01/29/20  5:00 PM   Result Value Ref Range    Glucose (POC) 159 (H) 65 - 100 mg/dL        All Micro Results     Procedure Component Value Units Date/Time    CULTURE, BLOOD [[id number]] Collected:  01/24/20 1835    Order Status:  Completed Specimen:  Blood Updated:  01/29/20 1125     Special Requests: --        LEFT  JUGULAR VEIN       Culture result: NO GROWTH 5 DAYS       CULTURE, BLOOD [[id number]] Collected:  01/24/20 1807    Order Status:  Completed Specimen:  Blood Updated:  01/29/20 1125     Special Requests: --        LEFT  ARM       Culture result: NO GROWTH 5 DAYS             Current Meds:  Current Facility-Administered Medications   Medication Dose Route Frequency    benzonatate (TESSALON) capsule 100 mg  100 mg Oral TID PRN    [START ON 1/30/2020] insulin glargine (LANTUS) injection 27 Units  27 Units SubCUTAneous DAILY    insulin lispro (HUMALOG) injection 12 Units  12 Units SubCUTAneous TID WITH MEALS    nystatin (MYCOSTATIN) 100,000 unit/mL oral suspension 500,000 Units  500,000 Units Oral QID    phenol throat spray (CHLORASEPTIC) 1 Spray  1 Spray Oral PRN    dextrose 40% (GLUTOSE) oral gel 1 Tube  15 g Oral PRN    glucagon (GLUCAGEN) injection 1 mg  1 mg IntraMUSCular PRN    dextrose (D50W) injection syrg 12.5-25 g  25-50 mL IntraVENous PRN    albuterol-ipratropium (DUO-NEB) 2.5 MG-0.5 MG/3 ML  3 mL Nebulization TID    insulin lispro (HUMALOG) injection 0-10 Units  0-10 Units SubCUTAneous AC&HS    dronabinoL (MARINOL) capsule 5 mg  5 mg Oral BID    ferrous sulfate tablet 325 mg  325 mg Oral BID WITH MEALS    guaiFENesin ER (MUCINEX) tablet 600 mg  600 mg Oral Q12H    atorvastatin (LIPITOR) tablet 10 mg  10 mg Oral DAILY    lisinopril (PRINIVIL, ZESTRIL) tablet 5 mg  5 mg Oral DAILY    tiotropium (SPIRIVA) inhalation capsule 18 mcg  1 Cap Inhalation DAILY    buPROPion SR (WELLBUTRIN SR) tablet 150 mg  150 mg Oral BID    sodium chloride (NS) flush 5-40 mL  5-40 mL IntraVENous Q8H    sodium chloride (NS) flush 5-40 mL  5-40 mL IntraVENous PRN    acetaminophen (TYLENOL) tablet 650 mg  650 mg Oral Q4H PRN    HYDROcodone-acetaminophen (NORCO) 5-325 mg per tablet 1 Tab  1 Tab Oral Q4H PRN    naloxone (NARCAN) injection 0.4 mg  0.4 mg IntraVENous PRN    ondansetron (ZOFRAN) injection 4 mg  4 mg IntraVENous Q4H PRN    enoxaparin (LOVENOX) injection 40 mg  40 mg SubCUTAneous Q24H    piperacillin-tazobactam (ZOSYN) 3.375 g in 0.9% sodium chloride (MBP/ADV) 100 mL  3.375 g IntraVENous Q8H    budesonide (PULMICORT) 500 mcg/2 ml nebulizer suspension  500 mcg Nebulization BID RT       Other Studies (last 24 hours):  No results found.     Assessment and Plan:     Hospital Problems as of 1/29/2020 Date Reviewed: 1/2/2020          Codes Class Noted - Resolved POA    Type 2 diabetes mellitus, with long-term current use of insulin ([facility] ICD-10-CM: E11.9, Z79.4  ICD-9-CM: 250.00, V58.67  1/26/2020 - Present Unknown        Hypoglycemia ICD-10-CM: E16.2  ICD-9-CM: 251.2  1/24/2020 - Present Unknown        Sepsis ([facility] ICD-10-CM: A41.9  ICD-9-CM: 038.9, 995.91  1/24/2020 - Present Unknown        Lung cancer ([facility] ICD-10-CM: C34.90  ICD-9-CM: 162.9  1/15/2020 - Present Yes        Pneumonia of left upper lobe due to infectious organism [facility]) ICD-10-CM: J18.1  ICD-9-CM: 486  12/31/2019 - Present Yes    Overview Signed 1/1/2020 12:23 PM by Raj Hardin MD     Presumptive non-small cell lung cancer stage IA3 based on the bronchoscopy finding done last month             HTN (hypertension) (Chronic) ICD-10-CM: I10  ICD-9-CM: 401.9  4/29/2015 - Present Yes        Hyperlipidemia (Chronic) ICD-10-CM: E78.5  ICD-9-CM: 272.4  4/29/2015 - Present Yes        Anxiety disorder (Chronic) ICD-10-CM: F41.9  ICD-9-CM: 300.00  4/29/2015 - Present Yes              PLAN:    Sepsis  Reports recent antibiotic therapy for pneumonia  CXR: No new consolidation  Elevated prolactin. DC vancomycin, continue Zosyn for now.     Hypoglycemia/diabetes mellitus  Elevated, glucose at 380 continue Lantus and sliding scale. Will increase with meals insulin. Sore throat-ordered Chloraseptic spray. Will also add nystatin.        Bradycardia  Resolved     COPD     CKD stage III-normal kidney function.     Lung cancer  -Continued outpatient follow-up     Anxiety    DVT ppx ordered  Code status:  Full  Estimated LOS:  Greater than 2 midnights  Risk:  high  Signed:  Inez Hernández MD

## 2020-01-29 NOTE — PROGRESS NOTES
Interdisciplinary Rounds completed. Nursing, Case Management, and Physician  present. Plan of care reviewed and updated.     Discharge held d/t increased uncontrolled blood sugar

## 2020-01-29 NOTE — DIABETES MGMT
Spoke with provider updated on pt most recent FSBS 380. Questioned possible worsening infection given pt hyperglycemia and increased insulin needs. Updated on pt complaints of sore throat and facial tenderness. New orders received to increase basal insulin to Lantus 27 units and increase prandial insulin to Humalog 12 units with meals.

## 2020-01-29 NOTE — PROGRESS NOTES
BS 57 Pt asymptomatic given orange juice and peanut butter crackers rechecked after 20 mins up to 60 given 2nd juice and peanut butter crackers rechecked again up to 159. Dr Smith Kemp notified and orders received.

## 2020-01-29 NOTE — PROGRESS NOTES
Hourly rounds complete this shift, no new complaints at this time,: bed in low, locked position, call light and bedside table within reach,  all needs met. Will continue to monitor Report to day shift nurse.

## 2020-01-30 VITALS
SYSTOLIC BLOOD PRESSURE: 156 MMHG | HEART RATE: 90 BPM | WEIGHT: 158 LBS | TEMPERATURE: 97.4 F | DIASTOLIC BLOOD PRESSURE: 70 MMHG | OXYGEN SATURATION: 96 % | HEIGHT: 73 IN | BODY MASS INDEX: 20.94 KG/M2 | RESPIRATION RATE: 17 BRPM

## 2020-01-30 LAB
GLUCOSE BLD STRIP.AUTO-MCNC: 142 MG/DL (ref 65–100)
GLUCOSE BLD STRIP.AUTO-MCNC: 254 MG/DL (ref 65–100)
GLUCOSE BLD STRIP.AUTO-MCNC: 296 MG/DL (ref 65–100)

## 2020-01-30 PROCEDURE — 94640 AIRWAY INHALATION TREATMENT: CPT

## 2020-01-30 PROCEDURE — 3331090002 HH PPS REVENUE DEBIT

## 2020-01-30 PROCEDURE — 74011250636 HC RX REV CODE- 250/636: Performed by: FAMILY MEDICINE

## 2020-01-30 PROCEDURE — 74011250637 HC RX REV CODE- 250/637: Performed by: INTERNAL MEDICINE

## 2020-01-30 PROCEDURE — 74011250636 HC RX REV CODE- 250/636: Performed by: INTERNAL MEDICINE

## 2020-01-30 PROCEDURE — 82962 GLUCOSE BLOOD TEST: CPT

## 2020-01-30 PROCEDURE — 74011636637 HC RX REV CODE- 636/637: Performed by: FAMILY MEDICINE

## 2020-01-30 PROCEDURE — 74011250637 HC RX REV CODE- 250/637: Performed by: FAMILY MEDICINE

## 2020-01-30 PROCEDURE — 94760 N-INVAS EAR/PLS OXIMETRY 1: CPT

## 2020-01-30 PROCEDURE — 77030020263 HC SOL INJ SOD CL0.9% LFCR 1000ML

## 2020-01-30 PROCEDURE — 3331090001 HH PPS REVENUE CREDIT

## 2020-01-30 PROCEDURE — 74011000250 HC RX REV CODE- 250: Performed by: INTERNAL MEDICINE

## 2020-01-30 PROCEDURE — 74011000258 HC RX REV CODE- 258: Performed by: INTERNAL MEDICINE

## 2020-01-30 RX ORDER — INSULIN ASPART 100 [IU]/ML
INJECTION, SOLUTION INTRAVENOUS; SUBCUTANEOUS
Qty: 1 PEN | Refills: 0 | Status: SHIPPED | OUTPATIENT
Start: 2020-01-30 | End: 2020-03-22

## 2020-01-30 RX ORDER — INSULIN LISPRO 100 [IU]/ML
6 INJECTION, SOLUTION INTRAVENOUS; SUBCUTANEOUS
Status: DISCONTINUED | OUTPATIENT
Start: 2020-01-30 | End: 2020-01-30 | Stop reason: HOSPADM

## 2020-01-30 RX ORDER — HEPARIN SODIUM 5000 [USP'U]/ML
5000 INJECTION, SOLUTION INTRAVENOUS; SUBCUTANEOUS EVERY 8 HOURS
Status: DISCONTINUED | OUTPATIENT
Start: 2020-01-30 | End: 2020-01-30 | Stop reason: HOSPADM

## 2020-01-30 RX ORDER — METOPROLOL TARTRATE 25 MG/1
25 TABLET, FILM COATED ORAL 2 TIMES DAILY
Qty: 60 TAB | Refills: 0 | Status: SHIPPED | OUTPATIENT
Start: 2020-01-30 | End: 2020-02-25 | Stop reason: CLARIF

## 2020-01-30 RX ORDER — NYSTATIN 100000 [USP'U]/ML
500000 SUSPENSION ORAL 4 TIMES DAILY
Qty: 60 ML | Refills: 0 | Status: SHIPPED | OUTPATIENT
Start: 2020-01-30 | End: 2020-02-25 | Stop reason: CLARIF

## 2020-01-30 RX ORDER — BENZONATATE 100 MG/1
100 CAPSULE ORAL
Qty: 21 CAP | Refills: 0 | Status: SHIPPED | OUTPATIENT
Start: 2020-01-30 | End: 2020-02-06

## 2020-01-30 RX ORDER — SODIUM CHLORIDE 9 MG/ML
150 INJECTION, SOLUTION INTRAVENOUS CONTINUOUS
Status: DISCONTINUED | OUTPATIENT
Start: 2020-01-30 | End: 2020-01-30 | Stop reason: HOSPADM

## 2020-01-30 RX ORDER — PREDNISONE 20 MG/1
20 TABLET ORAL ONCE
Status: COMPLETED | OUTPATIENT
Start: 2020-01-30 | End: 2020-01-30

## 2020-01-30 RX ORDER — PREDNISONE 20 MG/1
20 TABLET ORAL DAILY
Qty: 4 TAB | Refills: 0 | Status: SHIPPED | OUTPATIENT
Start: 2020-01-30 | End: 2020-02-25 | Stop reason: CLARIF

## 2020-01-30 RX ORDER — AMOXICILLIN AND CLAVULANATE POTASSIUM 875; 125 MG/1; MG/1
1 TABLET, FILM COATED ORAL 2 TIMES DAILY
Qty: 6 TAB | Refills: 0 | Status: SHIPPED | OUTPATIENT
Start: 2020-01-30 | End: 2020-02-25 | Stop reason: CLARIF

## 2020-01-30 RX ORDER — SODIUM CHLORIDE 9 MG/ML
150 INJECTION, SOLUTION INTRAVENOUS CONTINUOUS
Status: DISCONTINUED | OUTPATIENT
Start: 2020-01-30 | End: 2020-01-30

## 2020-01-30 RX ADMIN — IPRATROPIUM BROMIDE AND ALBUTEROL SULFATE 3 ML: .5; 3 SOLUTION RESPIRATORY (INHALATION) at 07:25

## 2020-01-30 RX ADMIN — HEPARIN SODIUM 5000 UNITS: 5000 INJECTION INTRAVENOUS; SUBCUTANEOUS at 08:26

## 2020-01-30 RX ADMIN — SODIUM CHLORIDE 150 ML/HR: 900 INJECTION, SOLUTION INTRAVENOUS at 08:44

## 2020-01-30 RX ADMIN — PIPERACILLIN AND TAZOBACTAM 3.38 G: 3; .375 INJECTION, POWDER, FOR SOLUTION INTRAVENOUS at 05:08

## 2020-01-30 RX ADMIN — NYSTATIN 500000 UNITS: 100000 SUSPENSION ORAL at 13:13

## 2020-01-30 RX ADMIN — IPRATROPIUM BROMIDE AND ALBUTEROL SULFATE 3 ML: .5; 3 SOLUTION RESPIRATORY (INHALATION) at 13:02

## 2020-01-30 RX ADMIN — BUPROPION HYDROCHLORIDE 150 MG: 150 TABLET, EXTENDED RELEASE ORAL at 08:26

## 2020-01-30 RX ADMIN — GUAIFENESIN 600 MG: 600 TABLET ORAL at 08:26

## 2020-01-30 RX ADMIN — INSULIN LISPRO 6 UNITS: 100 INJECTION, SOLUTION INTRAVENOUS; SUBCUTANEOUS at 08:48

## 2020-01-30 RX ADMIN — TIOTROPIUM BROMIDE 18 MCG: 18 CAPSULE ORAL; RESPIRATORY (INHALATION) at 07:25

## 2020-01-30 RX ADMIN — FERROUS SULFATE TAB 325 MG (65 MG ELEMENTAL FE) 325 MG: 325 (65 FE) TAB at 08:26

## 2020-01-30 RX ADMIN — NYSTATIN 500000 UNITS: 100000 SUSPENSION ORAL at 08:46

## 2020-01-30 RX ADMIN — Medication 10 ML: at 05:09

## 2020-01-30 RX ADMIN — INSULIN LISPRO 6 UNITS: 100 INJECTION, SOLUTION INTRAVENOUS; SUBCUTANEOUS at 12:00

## 2020-01-30 RX ADMIN — DRONABINOL 5 MG: 2.5 CAPSULE ORAL at 08:25

## 2020-01-30 RX ADMIN — LISINOPRIL 5 MG: 5 TABLET ORAL at 08:25

## 2020-01-30 RX ADMIN — Medication 10 ML: at 13:07

## 2020-01-30 RX ADMIN — ATORVASTATIN CALCIUM 10 MG: 10 TABLET, FILM COATED ORAL at 08:26

## 2020-01-30 RX ADMIN — BUDESONIDE 500 MCG: 0.5 INHALANT RESPIRATORY (INHALATION) at 07:25

## 2020-01-30 RX ADMIN — PREDNISONE 20 MG: 20 TABLET ORAL at 08:44

## 2020-01-30 RX ADMIN — INSULIN LISPRO 9 UNITS: 100 INJECTION, SOLUTION INTRAVENOUS; SUBCUTANEOUS at 12:00

## 2020-01-30 NOTE — PROGRESS NOTES
Care Management Interventions  PCP Verified by CM: Yes(pt states he has seen MD in the last month)  Mode of Transport at Discharge: Other (see comment)(family)  Transition of Care Consult (CM Consult): Discharge Planning, Home Health(\"Elderly patient lives alone and son concerned he can not take care of himself at home\")  [address]: Yes  Discharge Durable Medical Equipment: No  Physical Therapy Consult: No  Occupational Therapy Consult: No  Speech Therapy Consult: No  Current Support Network: Own Home, Family Lives [place], Lives Alone(dtr lives near [place], [place])  Confirm Follow Up Transport: Family  The Plan for Transition of Care is Related to the Following Treatment Goals : [id number] [place] [address] Blvd and physcial therapy services for pt/family education and improved physical functioning.  services for assistance with t withapplying or medicaid and accessing other available community resources. The Patient and/or Patient Representative was Provided with a Choice of Provider and Agrees with the Discharge Plan?: Yes  Freedom of Choice List was Provided with Basic Dialogue that Supports the Patient's Individualized Plan of Care/Goals, Treatment Preferences and Shares the Quality Data Associated with the Providers?: No(pt requested to resume current services through [facility])  Discharge Location  Discharge Placement: Home with home health([facility])    Patient to discharge today with [facility]. All milestones have been met for discharge. Patient will transport home with family.

## 2020-01-30 NOTE — PROGRESS NOTES
Bedside shift change report given to [address] [place] [address] (oncoming nurse) by Denzel Caballero (offgoing nurse). Report included the following information SBAR and Kardex.

## 2020-01-30 NOTE — PROGRESS NOTES
Discharge instructions given. Education provided. All questions answered and verbally voiced understanding. Medication changes and follow up appointments discussed. Script provided. AVS reviewed and E-signed. Copy provided for pt. Awaiting IV fluids to complete. Pt reports not knowing who will pick him but that he will call family to come. Pt aware to call desk when fluids are complete so this nurse can remove IV and tele monitor for discharge.

## 2020-01-30 NOTE — PROGRESS NOTES
Resting quietly in bed at present time watching TV. Appetite good. Tolerated all meals. Has productive cough noted. Continues to c/o throat feeling sore. Hourly rounds completed, all needs met this shift. Will continue to monitor until night shift nurse takes over.

## 2020-01-30 NOTE — DISCHARGE SUMMARY
Hospitalist Discharge Summary     Admit Date:  2020  5:36 PM   Name:  Shannon Mtz   Age:  70 y.o.  :  1940   MRN:  [id number]   PCP:  Shanna Araiza MD  Treatment Team: Attending Provider: Ambar Jiménez MD; Utilization Review: Mojgan Hanson, RN; Care Manager: Tod Colby RN    Problem List for this Hospitalization:  Hospital Problems as of 2020 Date Reviewed: 2020          Codes Class Noted - Resolved POA    Type 2 diabetes mellitus, with long-term current use of insulin ([facility] ICD-10-CM: E11.9, Z79.4  ICD-9-CM: 250.00, V58.67  2020 - Present Unknown        Hypoglycemia ICD-10-CM: E16.2  ICD-9-CM: 251.2  2020 - Present Unknown        Sepsis ([facility] ICD-10-CM: A41.9  ICD-9-CM: 038.9, 995.91  2020 - Present Unknown        Lung cancer ([facility] ICD-10-CM: C34.90  ICD-9-CM: 162.9  1/15/2020 - Present Yes        Pneumonia of left upper lobe due to infectious organism [facility]) ICD-10-CM: J18.1  ICD-9-CM: 549  2019 - Present Yes    Overview Signed 2020 12:23 PM by Akhil Ramirez MD     Presumptive non-small cell lung cancer stage IA3 based on the bronchoscopy finding done last month             HTN (hypertension) (Chronic) ICD-10-CM: I10  ICD-9-CM: 401.9  2015 - Present Yes        Hyperlipidemia (Chronic) ICD-10-CM: E78.5  ICD-9-CM: 272.4  2015 - Present Yes        Anxiety disorder (Chronic) ICD-10-CM: F41.9  ICD-9-CM: 300.00  2015 - Present Yes                Admission HPI from 2020: The patient is a 82-year-old male with a past medical history of hypertension, diabetes, COPD, CKD stage III, and recently diagnosed lung adenocarcinoma who presents to the emergency department after being found minimally responsive at home with hypoglycemia. Patient reports he is not sure what happened. He reports he ate today and took his insulin as normal.  He reports earlier in the day his blood glucose was 400 and treated with 20 of Humalog.   Family present at bedside. They report patient was last known well at 1 PM.  Of note patient with recent admission 12/31 for hemoptysis and treated for pneumonia. He completed 4-day course of IV antibiotics and was discharged. He was seen 1/15 for surgical evaluation for lung adenocarcinoma, and surgery was postponed secondary to pneumonia. Patient reports he has been taking oral antibiotics for the past 9 days. At time of examination patient alert and oriented x4. He has no active complaints at this time. He denies any chest pain, shortness of breath, cough, dysuria, fever or chills.     ED course:  Patient found to be severely hypoglycemic and treated with glucose  Found to be hypothermic with temperature <92    Hospital Course:  Patient did meet sepsis criteria, chest x-ray did not show any worsening pneumonia, was also found to be hypothermic, hypoglycemic and bradycardia. Patient bradycardia resolved, repeat EKG no acute etiology, heart rate ranging from 79 to a high of 96 prior to discharge. His metoprolol was initially DC'd, was started at discharge. Decrease the dose to 25 mg twice daily from 50 mg twice daily. Patient was continued on broad-spectrum antibiotics with vancomycin and Zosyn, vancomycin was later on discontinued. Patient started developing dry cough, he was given Chloraseptic spray and nystatin swish and swallow. Patient said he did not really help his dry cough. He was then started on low-dose prednisone. For his hypoglycemia he was initially started on D5 and later on had to put on D10 and once his Blood sugars improved that D10 IV fluids were discontinued. He was gradually started back on Lantus sliding scale insulin and insulin with meals. Patient has been episodes of failure low normal or high sugars during the stay in the hospital.  Diabetic management nurse was also consulted. His insulin regimen had to be changed almost every day secondary to his fluctuating glucose.   At the day of discharge the patient glucose improved. His cough is dry still there but better. Mildly increased creatinine but normal GFR, was given 1 L of fluids prior to discharge, advised to avoid NSAIDs, advised to drink at least 2 L of fluids per day, advised to do a BMP in a week with the primary care physician. High emphasis on following with endocrinologist was explained to the patient and the family by at the bedside. Follow up instructions below. Plan was discussed with patient and the family member. All questions answered. Patient was stable at time of discharge and was instructed to call or return if there are any concerns or recurrence of symptoms. Diagnostic Imaging/Tests:   Ct Head Wo Cont    Result Date: 1/24/2020  CT of the head without contrast. CLINICAL INDICATION: Unresponsive PROCEDURE: Serial thin section axial images are obtained from the cranial vertex through the skull base without the administration of intravenous contrast. Radiation dose reduction techniques were used for this study. Our CT scanners use one or all of the following: Automated exposure control, adjusted of the mA and/or kV according to patient size, iterative reconstruction COMPARISON: Head CT dated 7/26/2016. FINDINGS: There is no acute intracranial hemorrhage, mass, or mass effect. No abnormal extra-axial fluid collections identified. There is no hydrocephalus. The basilar cisterns are widely patent. Moderate bilateral hemispheric atrophy is appreciated. The gray-white brain parenchymal interface is maintained. 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 . No skull fracture or aggressive osseous lesion noted. The mastoid air cells and included paranasal sinuses are clear. IMPRESSION: No acute intracranial abnormality.      Xr Chest Port    Result Date: 1/24/2020  Portable chest x-ray CLINICAL INDICATION: Unresponsive FINDINGS: Single AP view of the chest compared to a similar exam dated 1/8/2020 shows persistent airspace density in the left upper lobe that is unchanged. The remainder the lung parenchyma is clear. The cardiac silhouette and mediastinum are stable. IMPRESSION: Persistent airspace density in the left upper lobe. No new areas of consolidation. Echocardiogram results:  No results found for this visit on 01/24/20. All Micro Results     Procedure Component Value Units Date/Time    CULTURE, BLOOD [[id number]] Collected:  01/24/20 1835    Order Status:  Completed Specimen:  Blood Updated:  01/29/20 1125     Special Requests: --        LEFT  JUGULAR VEIN       Culture result: NO GROWTH 5 DAYS       CULTURE, BLOOD [[id number]] Collected:  01/24/20 1807    Order Status:  Completed Specimen:  Blood Updated:  01/29/20 1125     Special Requests: --        LEFT  ARM       Culture result: NO GROWTH 5 DAYS             Labs: Results:       BMP, Mg, Phos Recent Labs     01/29/20 1905   *   K 4.5      CO2 28   AGAP 7   BUN 18   CREA 1.47   CA 8.5   *      CBC Recent Labs     01/29/20 1905   WBC 4.1*   RBC 3.89*   HGB 11.1*   HCT 32.0*      GRANS 38*   LYMPH 47*   EOS 1   MONOS 14*   BASOS 1   IG 0   ANEU 1.5*   ABL 1.9   ALEXIA 0.0   ABM 0.6   ABB 0.0   AIG 0.0      LFT No results for input(s): SGOT, ALT, TBIL, AP, TP, ALB, GLOB, AGRAT, GPT in the last 72 hours.    Cardiac Testing Lab Results   Component Value Date/Time    BNP <5 11/30/2010 11:10 AM     01/01/2020 09:13 AM    Troponin-I <0.05 11/30/2010 02:02 PM    Troponin-I <0.05 11/30/2010 11:10 AM    Troponin-I, Qt. <0.02 (L) 01/24/2020 05:57 PM      Coagulation Tests Lab Results   Component Value Date/Time    Prothrombin time 13.4 01/08/2020 11:31 AM    Prothrombin time 14.7 (H) 01/01/2020 09:13 AM    Prothrombin time 13.4 08/01/2019 08:16 AM    INR 1.0 01/08/2020 11:31 AM    INR 1.1 01/01/2020 09:13 AM    INR 1.0 08/01/2019 08:16 AM    aPTT 30.1 01/01/2020 09:13 AM      A1c Lab Results   Component Value Date/Time    Hemoglobin A1c 10.3 (H) 01/01/2020 09:13 AM      Lipid Panel No results found for: CHOL, CHOLPOCT, CHOLX, CHLST, CHOLV, 663101, HDL, HDLP, LDL, LDLC, DLDLP, 222987, VLDLC, VLDL, TGLX, TRIGL, TRIGP, TGLPOCT, CHHD, [facility]   Thyroid Panel Lab Results   Component Value Date/Time    TSH 3.960 (H) 01/24/2020 05:56 PM        Most Recent UA Lab Results   Component Value Date/Time    Color YELLOW 01/25/2020 02:29 AM    Appearance CLEAR 01/25/2020 02:29 AM    Specific gravity 1.020 01/25/2020 02:29 AM    pH (UA) 6.0 01/25/2020 02:29 AM    Protein NEGATIVE  01/25/2020 02:29 AM    Glucose >1,000 01/25/2020 02:29 AM    Ketone NEGATIVE  01/25/2020 02:29 AM    Bilirubin NEGATIVE  01/25/2020 02:29 AM    Blood NEGATIVE  01/25/2020 02:29 AM    Urobilinogen 0.2 01/25/2020 02:29 AM    Nitrites NEGATIVE  01/25/2020 02:29 AM    Leukocyte Esterase NEGATIVE  01/25/2020 02:29 AM        No Known Allergies  Immunization History   Administered Date(s) Administered    Influenza Vaccine 09/27/2019    TB Skin Test (PPD) Intradermal 01/24/2020       All Labs from Last 24 Hrs:  Recent Results (from the past 24 hour(s))   GLUCOSE, POC    Collection Time: 01/29/20 10:38 AM   Result Value Ref Range    Glucose (POC) 380 (H) 65 - 100 mg/dL   GLUCOSE, POC    Collection Time: 01/29/20  3:49 PM   Result Value Ref Range    Glucose (POC) 57 (L) 65 - 100 mg/dL   GLUCOSE, POC    Collection Time: 01/29/20  4:12 PM   Result Value Ref Range    Glucose (POC) 60 (L) 65 - 100 mg/dL   GLUCOSE, POC    Collection Time: 01/29/20  5:00 PM   Result Value Ref Range    Glucose (POC) 159 (H) 65 - 100 mg/dL   CBC WITH AUTOMATED DIFF    Collection Time: 01/29/20  7:05 PM   Result Value Ref Range    WBC 4.1 (L) 4.3 - 11.1 K/uL    RBC 3.89 (L) 4.23 - 5.6 M/uL    HGB 11.1 (L) 13.6 - 17.2 g/dL    HCT 32.0 (L) 41.1 - 50.3 %    MCV 82.3 79.6 - 97.8 FL    MCH 28.5 26.1 - 32.9 PG    MCHC 34.7 31.4 - 35.0 g/dL    RDW 15.6 (H) 11.9 - 14.6 %    PLATELET 744 536 - 464 K/uL MPV 8.7 (L) 9.4 - 12.3 FL    ABSOLUTE NRBC 0.00 0.0 - 0.2 K/uL    DF AUTOMATED      NEUTROPHILS 38 (L) 43 - 78 %    LYMPHOCYTES 47 (H) 13 - 44 %    MONOCYTES 14 (H) 4.0 - 12.0 %    EOSINOPHILS 1 0.5 - 7.8 %    BASOPHILS 1 0.0 - 2.0 %    IMMATURE GRANULOCYTES 0 0.0 - 5.0 %    ABS. NEUTROPHILS 1.5 (L) 1.7 - 8.2 K/UL    ABS. LYMPHOCYTES 1.9 0.5 - 4.6 K/UL    ABS. MONOCYTES 0.6 0.1 - 1.3 K/UL    ABS. EOSINOPHILS 0.0 0.0 - 0.8 K/UL    ABS. BASOPHILS 0.0 0.0 - 0.2 K/UL    ABS. IMM.  GRANS. 0.0 0.0 - 0.5 K/UL   METABOLIC PANEL, BASIC    Collection Time: 01/29/20  7:05 PM   Result Value Ref Range    Sodium 135 (L) 136 - 145 mmol/L    Potassium 4.5 3.5 - 5.1 mmol/L    Chloride 100 98 - 107 mmol/L    CO2 28 21 - 32 mmol/L    Anion gap 7 7 - 16 mmol/L    Glucose 267 (H) 65 - 100 mg/dL    BUN 18 8 - 23 MG/DL    Creatinine 1.47 0.8 - 1.5 MG/DL    GFR est AA 60 (L) >60 ml/min/1.73m2    GFR est non-AA 49 (L) >60 ml/min/1.73m2    Calcium 8.5 8.3 - 10.4 MG/DL   GLUCOSE, POC    Collection Time: 01/29/20  7:49 PM   Result Value Ref Range    Glucose (POC) 273 (H) 65 - 100 mg/dL   GLUCOSE, POC    Collection Time: 01/30/20  7:09 AM   Result Value Ref Range    Glucose (POC) 142 (H) 65 - 100 mg/dL       Discharge Exam:  Patient Vitals for the past 24 hrs:   Temp Pulse Resp BP SpO2   01/30/20 0743 97.6 °F (36.4 °C) 96 17 135/63 97 %   01/30/20 0725 -- -- -- -- 97 %   01/30/20 0327 97.8 °F (36.6 °C) 79 17 166/67 98 %   01/29/20 2359 97.8 °F (36.6 °C) 85 18 148/65 96 %   01/29/20 1946 -- -- -- -- 99 %   01/1940 97.7 °F (36.5 °C) 86 17 131/64 95 %   01/29/20 1540 98.2 °F (36.8 °C) 82 20 126/79 98 %   01/29/20 1337 -- -- -- -- 95 %   01/29/20 1139 98.5 °F (36.9 °C) 91 18 136/72 95 %     Oxygen Therapy  O2 Sat (%): 97 % (01/30/20 0743)  Pulse via Oximetry: 91 beats per minute (01/30/20 0725)  O2 Device: Room air (01/30/20 0743)  O2 Flow Rate (L/min): 0 l/min (01/25/20 0755)  No intake or output data in the 24 hours ending 01/30/20 1005 General:    Well nourished. Alert. No distress. Eyes:   Normal sclera. Extraocular movements intact. ENT:  Normocephalic, atraumatic. Moist mucous membranes  CV:   Regular rate and rhythm. No murmur, rub, or gallop. Lungs:  Minimal coarse breath sounds, improving. No wheezing. Abdomen: Soft, nontender, nondistended. Bowel sounds normal.   Extremities: Warm and dry. No cyanosis or edema. Neurologic: CN II-XII grossly intact. Sensation intact. Skin:     No rashes or jaundice. Psych:  Normal mood and affect. Discharge Info:   Current Discharge Medication List      START taking these medications    Details   nystatin (MYCOSTATIN) 100,000 unit/mL suspension Take 5 mL by mouth four (4) times daily. swish and spit  Qty: 60 mL, Refills: 0      phenol throat spray (CHLORASEPTIC) 1.4 % spray Take 1 Spray by mouth as needed for Sore throat. Qty: 20 mL, Refills: 0      benzonatate (TESSALON) 100 mg capsule Take 1 Cap by mouth three (3) times daily as needed for Cough for up to 7 days. Qty: 21 Cap, Refills: 0      amoxicillin-clavulanate (AUGMENTIN) 875-125 mg per tablet Take 1 Tab by mouth two (2) times a day. Qty: 6 Tab, Refills: 0      predniSONE (DELTASONE) 20 mg tablet Take 20 mg by mouth daily. Qty: 4 Tab, Refills: 0         CONTINUE these medications which have CHANGED    Details   insulin aspart U-100 (NOVOLOG FLEXPEN U-100 INSULIN) 100 unit/mL (3 mL) inpn With meals tid. For Blood Sugar (mg/dL) of:     Less than 150 =   0 units           150 -199 =   2 units  200 -249 =   5 units  250 -299 =   8 units  300 -349 =   10 units  350 and above = 12 units  Qty: 1 Pen, Refills: 0         CONTINUE these medications which have NOT CHANGED    Details   insulin glargine (LANTUS SOLOSTAR U-100 INSULIN) 100 unit/mL (3 mL) inpn 25 Units by SubCUTAneous route nightly. albuterol-ipratropium (DUO-NEB) 2.5 mg-0.5 mg/3 ml nebu 3 mL by Nebulization route three (3) times daily.   Qty: 90 Nebule, Refills: 0 fluticasone propion-salmeterol (ADVAIR/WIXELA) 250-50 mcg/dose diskus inhaler Take 1 Puff by inhalation two (2) times a day. Indications: Bronchospasm Prevention with COPD  Qty: 1 Inhaler, Refills: 0      dronabinol (MARINOL) 5 mg capsule Take 1 Cap by mouth two (2) times a day. Max Daily Amount: 10 mg.  Qty: 60 Cap, Refills: 0    Associated Diagnoses: Malignant neoplasm of upper lobe of left lung (HCC)      ferrous sulfate 325 mg (65 mg iron) tablet Take 1 Tab by mouth two (2) times daily (with meals). Qty: 60 Tab, Refills: 0      guaiFENesin ER (MUCINEX) 600 mg ER tablet Take 1 Tab by mouth every twelve (12) hours. Qty: 40 Tab, Refills: 0      tiotropium (SPIRIVA) 18 mcg inhalation capsule Take 1 Cap by inhalation daily. Qty: 30 Cap, Refills: 0      metoprolol tartrate (LOPRESSOR) 25 mg tablet Take 25 mg by mouth two (2) times a day. LIPITOR 10 mg Tab Take 10 mg by mouth daily. lisinopril (PRINIVIL, ZESTRIL) 5 mg tablet take 5 mg by mouth daily. ATIVAN 1 mg Tab Take 1 mg by mouth two (2) times a day. WELLBUTRIN PO take 150 mg by mouth two (2) times a day. STOP taking these medications       glipiZIDE (GLUCOTROL) 5 mg tablet Comments:   Reason for Stopping:         azithromycin (ZITHROMAX) 250 mg tablet Comments:   Reason for Stopping:                 Disposition: Home  Activity: Advised not to do any strenuous work  Diet: DIET DIABETIC CONSISTENT CARB Regular  DIET NUTRITIONAL SUPPLEMENTS All Meals; Glucergena [place]    Follow-up Appointments   Procedures    FOLLOW UP VISIT Appointment in: One Week F/u with pcp in a week with bmp. Need to see endocrinology as out pt through pcp. Keep a log of blood sugar levels(check 4 times/day) and show it to pcp. Drink atleast 2 lit of fluids /day. Avoid NSAID. F/u with pcp in a week with bmp. Need to see endocrinology as out pt through pcp. Keep a log of blood sugar levels(check 4 times/day) and show it to pcp.   Drink atleast 2 lit of fluids /day. Avoid NSAID. Standing Status:   Standing     Number of Occurrences:   1     Order Specific Question:   Appointment in     Answer: One Week         Follow-up Information     Follow up With Specialties Details Why Contact Info    Skinny Vital MD Cardiology   [address] [place] [address]  [phone]            Time spent in patient discharge planning and coordination 35 minutes.     Signed:  Sraah Phillip MD

## 2020-01-30 NOTE — DISCHARGE INSTRUCTIONS
Keep a log of blood sugar levels(check 4 times/day) and show it to pcp. Drink atleast 2 lit of fluids /day. DISCHARGE SUMMARY from Nurse    PATIENT INSTRUCTIONS:    After general anesthesia or intravenous sedation, for 24 hours or while taking prescription Narcotics:  · Limit your activities  · Do not drive and operate hazardous machinery  · Do not make important personal or business decisions  · Do  not drink alcoholic beverages  · If you have not urinated within 8 hours after discharge, please contact your surgeon on call. Report the following to your surgeon:  · Excessive pain, swelling, redness or odor of or around the surgical area  · Temperature over 100.5  · Nausea and vomiting lasting longer than 4 hours or if unable to take medications  · Any signs of decreased circulation or nerve impairment to extremity: change in color, persistent  numbness, tingling, coldness or increase pain  · Any questions    What to do at Home:  Recommended activity: Activity as tolerated    If you experience any of the following symptoms unrelieved fever, worsening cough, blood sugar uncontrolled, please follow up with PCP or Emergency Room. *  Please give a list of your current medications to your Primary Care Provider. *  Please update this list whenever your medications are discontinued, doses are      changed, or new medications (including over-the-counter products) are added. *  Please carry medication information at all times in case of emergency situations. These are general instructions for a healthy lifestyle:    No smoking/ No tobacco products/ Avoid exposure to second hand smoke  Surgeon General's Warning:  Quitting smoking now greatly reduces serious risk to your health.     Obesity, smoking, and sedentary lifestyle greatly increases your risk for illness    A healthy diet, regular physical exercise & weight monitoring are important for maintaining a healthy lifestyle    You may be retaining fluid if you have a history of heart failure or if you experience any of the following symptoms:  Weight gain of 3 pounds or more overnight or 5 pounds in a week, increased swelling in our hands or feet or shortness of breath while lying flat in bed. Please call your doctor as soon as you notice any of these symptoms; do not wait until your next office visit. The discharge information has been reviewed with the patient. The patient verbalized understanding. Discharge medications reviewed with the patient and appropriate educational materials and side effects teaching were provided. ___________________________________________________________________________________________________________________________________    Patient Education        Sepsis: Care Instructions  Your Care Instructions    Sepsis is an intense reaction to an infection. It can cause deadly damage to the body and lead to a dangerously low blood pressure. You may have inflammation across large areas of your body. It can damage tissue and even go deep into your organs. Infections that can lead to sepsis include:  · A skin infection such as from a cut. · A lung infection like pneumonia. · A kidney infection. · A gut infection such as E. coli. It's important to care for yourself and try to avoid infections so that you don't get sepsis again. Follow-up care is a key part of your treatment and safety. Be sure to make and go to all appointments, and call your doctor if you are having problems. It's also a good idea to know your test results and keep a list of the medicines you take. How can you care for yourself at home? · If your doctor prescribed antibiotics, take them as directed. Do not stop taking them just because you feel better. You need to take the full course of antibiotics. · Help prevent infections that could lead to sepsis:  ? Try to avoid colds and flu.  If you must be around people who have a cold or the flu, wash your hands often. And get a flu vaccine every year. ? Get a pneumococcal vaccine shot (to prevent pneumonia, meningitis, and other infections). If you have had one before, ask your doctor if you need another dose. ? Clean any wounds or scrapes. · Do not smoke or use other tobacco products. When you quit smoking, you are less likely to get a cold, the flu, bronchitis, and pneumonia. If you need help quitting, talk to your doctor about stop-smoking programs and medicines. These can increase your chances of quitting for good. · To prevent dehydration, drink plenty of fluids. Choose water and other caffeine-free clear liquids until you feel better. If you have kidney, heart, or liver disease and have to limit fluids, talk with your doctor before you increase the amount of fluids you drink. · Eat a healthy diet. Include fruits, vegetables, and whole grains in your diet every day. · If your doctor recommends it, try doing some physical activity. Walking is a good choice. Bit by bit, increase the amount you walk every day. When should you call for help? Call 911 anytime you think you may need emergency care. For example, call if:    · You passed out (lost consciousness).    Call your doctor now or seek immediate medical care if:    · You have symptoms of sepsis. These may include:  ? Shortness of breath. ? A fast heart rate. ? Cool, pale, or clammy skin. ? Feeling confused.     · You are dizzy or lightheaded, or you feel like you may faint.     · You have a fever or chills.    Watch closely for changes in your health, and be sure to contact your doctor if:    · You do not get better as expected. Where can you learn more? Go to http://anastasia-lexis.info/. Enter R177 in the search box to learn more about \"Sepsis: Care Instructions. \"  Current as of: June 26, 2019  Content Version: 12.2  © 5137-8520 [employer].  Care instructions adapted under license by [employer] (which disclaims liability or warranty for this information). If you have questions about a medical condition or this instruction, always ask your healthcare professional. [address] any warranty or liability for your use of this information. Patient Education        Diabetes Blood Sugar Emergencies: Your Action Plan  How can you prevent a blood sugar emergency? An important part of living with diabetes is keeping your blood sugar in your target range. You'll need to know what to do if it's too high or too low. Managing your blood sugar levels helps you avoid emergencies. This care sheet will teach you about the signs of high and low blood sugar. It will help you make an action plan with your doctor for when these signs occur. Low blood sugar is more likely to happen if you take certain medicines for diabetes. It can also happen if you skip a meal, drink alcohol, or exercise more than usual.  You may get high blood sugar if you eat differently than you normally do. One example is eating more carbohydrate than usual. Having a cold, the flu, or other sudden illness can also cause high blood sugar levels. Levels can also rise if you miss a dose of medicine. Any change in how you take your medicine may affect your blood sugar level. So it's important to work with your doctor before you make any changes. Check your blood sugar  Work with your doctor to fill in the blank spaces below that apply to you. Track your levels, know your target range, and write down ways you can get your blood sugar back in your target range. A log book can help you track your levels. Take the book to all of your medical appointments. · Check your blood sugar _____ times a day, at these times:________________________________________________. (For example: Before meals, at bedtime, before exercise, during exercise, other.)  · Your blood sugar target range before a meal is ___________________.  Your blood sugar target range after a meal is _______________________. · Do this--___________________________________________________--to get your blood sugar back within your safe range if your blood sugar results are _________________________________________. (For example: Less than 70 or above 250 mg/dL.)  Call your doctor when your blood sugar results are ___________________________________. (For example: Less than 70 or above 250 mg/dL.)  What are the symptoms of low and high blood sugar? Common symptoms of low blood sugar are sweating and feeling shaky, weak, hungry, or confused. Symptoms can start quickly. Common symptoms of high blood sugar are feeling very thirsty or very hungry. You may also pass urine more often than usual. You may have blurry vision and may lose weight without trying. But some people may have high or low blood sugar without having any symptoms. That's a good reason to check your blood sugar on a regular schedule. What should you do if you have symptoms? Work with your doctor to fill in the blank spaces below that apply to you. Low blood sugar  If you have symptoms of low blood sugar, check your blood sugar. If it's below _____ ( for example, below 70), eat or drink a quick-sugar food that has about 15 grams of carbohydrate. Your goal is to get your level back to your safe range. Check your blood sugar again 15 minutes later. If it's still not in your target range, take another 15 grams of carbohydrate and check your blood sugar again in 15 minutes. Repeat this until you reach your target. Then go back to your regular testing schedule. When you have low blood sugar, it's best to stop or reduce any physical activity until your blood sugar is back in your target range and is stable. If you must stay active, eat or drink 30 grams of carbohydrate. Then check your blood sugar again in 15 minutes. If it's not in your target range, take another 30 grams of carbohydrates.  Check your blood sugar again in 15 minutes. Keep doing this until you reach your target. You can then go back to your regular testing schedule. If your symptoms or blood sugar levels are getting worse or have not improved after 15 minutes, seek medical care right away. Here are some examples of quick-sugar foods with 15 grams of carbohydrate:  · 3 or 4 glucose tablets  · 1 tube of glucose gel  · Hard candy (such as 3 Jolly Ranchers or 5 to 7 Life Savers)  · ½ cup to ¾ cup (4 to 6 ounces) of fruit juice or regular (not diet) soda  High blood sugar  If you have symptoms of high blood sugar, check your blood sugar. Your goal is to get your level back to your target range. If it's above ______ ( for example, above 250), follow these steps:  · If you missed a dose of your diabetes medicine, take it now. Take only the amount of medicine that you have been prescribed. Do not take more or less medicine. · Give yourself insulin if your doctor has prescribed it for high blood sugar. · Test for ketones, if the doctor told you to do so. If the results of the ketone test show a moderate-to-large amount of ketones, call the doctor for advice. · Wait 30 minutes after you take the extra insulin or the missed medicine. Check your blood sugar again. If your symptoms or blood sugar levels are getting worse or have not improved after taking these steps, seek medical care right away. Follow-up care is a key part of your treatment and safety. Be sure to make and go to all appointments, and call your doctor if you are having problems. It's also a good idea to know your test results and keep a list of the medicines you take. Where can you learn more? Go to http://anastasia-lexis.info/. Enter I654 in the search box to learn more about \"Diabetes Blood Sugar Emergencies: Your Action Plan. \"  Current as of: April 16, 2019  Content Version: 12.2  © 6488-2229 [employer].  Care instructions adapted under license by [employer] (which disclaims liability or warranty for this information). If you have questions about a medical condition or this instruction, always ask your healthcare professional. [address] any warranty or liability for your use of this information.

## 2020-01-31 ENCOUNTER — HOME CARE VISIT (OUTPATIENT)
Dept: SCHEDULING | Facility: HOME HEALTH | Age: 80
End: 2020-01-31
Payer: MEDICARE

## 2020-01-31 ENCOUNTER — PATIENT OUTREACH (OUTPATIENT)
Dept: CASE MANAGEMENT | Age: 80
End: 2020-01-31

## 2020-01-31 ENCOUNTER — HOME CARE VISIT (OUTPATIENT)
Dept: HOME HEALTH SERVICES | Facility: HOME HEALTH | Age: 80
End: 2020-01-31
Payer: MEDICARE

## 2020-01-31 VITALS
OXYGEN SATURATION: 98 % | RESPIRATION RATE: 14 BRPM | DIASTOLIC BLOOD PRESSURE: 50 MMHG | TEMPERATURE: 98 F | SYSTOLIC BLOOD PRESSURE: 110 MMHG | HEART RATE: 68 BPM

## 2020-01-31 PROCEDURE — 3331090001 HH PPS REVENUE CREDIT

## 2020-01-31 PROCEDURE — 3331090002 HH PPS REVENUE DEBIT

## 2020-01-31 PROCEDURE — G0299 HHS/HOSPICE OF RN EA 15 MIN: HCPCS

## 2020-01-31 NOTE — PROGRESS NOTES
This note will not be viewable in [address] Second MARGIE outreach attempt made to patient's home/mobile number was unsuccessful. Unable to leave message to return call. Will attempt third outreach within 5 business days.

## 2020-01-31 NOTE — PROGRESS NOTES
This note will not be viewable in [address] Transition of Care Discharge Follow-up Questionnaire Date/Time of Call: 
 1/31/2020 
 137pm  
What was the patient hospitalized for? Chest pain Does the patient understand his/her diagnosis and/or treatment and what happened during the hospitalization? Yes, spoke with patients daughter, she states understanding of diagnosis and treatment; and is agreeable to call. Neptali Magaña states patient is doing well still a little weak and dizzy Did the patient receive discharge instructions? Yes   
CM Assessed Risk for Readmission:  
 
 
Patient stated Risk for Readmission:  
 
 moderate r/t diagnosis and/or comorbidities None stated Review any discharge instructions (see discharge instructions/AVS in [facility]). Ask patient if they understand these. Do they have any questions? Reviewed, understanding is stated, no questions at this time Were home services ordered (nursing, PT, OT, ST, etc.)? Yes, [facility] SN/PT If so, has the first visit occurred? If not, why? (Assist with coordination of services if necessary.) SOC today Was any DME ordered? No 
Patient has walker and cane If so, has it been received? If not, why?  (Assist patient in obtaining DME orders &/or equipment if necessary.) N/A Complete a review of all medications (new, continued and discontinued meds per the D/C instructions and medication tab in [facility]). Completed START taking: 
ranolazine  mg SR tablet (RANEXA) STOP taking: 
colchicine 0.6 mg tablet Were all new prescriptions filled? If not, why?  (Assist patient in obtaining medications if necessary  escalate for CCM &/or SW if ongoing issues are verbalized by pt or anticipated) Yes Does the patient understand the purpose and dosing instructions for all medications? (If patient has questions, provide explanation and education.) Yes Does the patient have any problems in performing ADLs? (If patient is unable to perform ADLs  what is the limiting factor(s)? Do they have a support system that can assist? If no support system is present, discuss possible assistance that they may be able to obtain. Escalate for CCM/SW if ongoing issues are verbalized by pt or anticipated) Independent with ADLs, strong support of family Does the patient have all follow-up appointments scheduled? 7 day f/up with PCP?  
(f/up with PCP may be w/in 14 days if patient has a f/up with their specialist w/in 7 days) 7-14 day f/up with specialist?  
(or per discharge instructions) If f/up has not been made  what actions has the care coordinator made to accomplish this? Has transportation been arranged? Yes Dr. Lee Skill 2/5/20 [facility] 2/12/20 Yes, no transportation needs are identified at this time Any other questions or concerns expressed by the patient? No other needs or concerns identified. Preston Grimes states her gratitude for follow up. Contact information for Care Coordinator was given, instructed to call with new questions or concerns. Schedule next appointment with KAMLESH Clark or refer to RN Case Manager/ per the workflow guidelines. When is care coordinators next follow-up call scheduled? If referred for CCM  what RN care manager was the referral assigned? Care Coordinator will follow per workflow guidelines Within 14 days MARGIE Call Completed By: Pamela Felton LPN Care Coordinator

## 2020-01-31 NOTE — PROGRESS NOTES
This note will not be viewable in [address] Initial MARGIE outreach attempt to patient's home/mobile number was unsuccessful. Unable to leave message message to return call. Will attempt second outreach within 24 hours.

## 2020-02-01 PROCEDURE — 3331090001 HH PPS REVENUE CREDIT

## 2020-02-01 PROCEDURE — 3331090002 HH PPS REVENUE DEBIT

## 2020-02-02 PROCEDURE — 3331090001 HH PPS REVENUE CREDIT

## 2020-02-02 PROCEDURE — 3331090002 HH PPS REVENUE DEBIT

## 2020-02-03 ENCOUNTER — PATIENT OUTREACH (OUTPATIENT)
Dept: CASE MANAGEMENT | Age: 80
End: 2020-02-03

## 2020-02-03 ENCOUNTER — HOME CARE VISIT (OUTPATIENT)
Dept: SCHEDULING | Facility: HOME HEALTH | Age: 80
End: 2020-02-03
Payer: MEDICARE

## 2020-02-03 ENCOUNTER — TELEPHONE (OUTPATIENT)
Dept: HOME HEALTH SERVICES | Facility: HOME HEALTH | Age: 80
End: 2020-02-03

## 2020-02-03 VITALS
TEMPERATURE: 98.8 F | DIASTOLIC BLOOD PRESSURE: 64 MMHG | OXYGEN SATURATION: 98 % | SYSTOLIC BLOOD PRESSURE: 130 MMHG | HEART RATE: 61 BPM | RESPIRATION RATE: 18 BRPM

## 2020-02-03 PROCEDURE — G0299 HHS/HOSPICE OF RN EA 15 MIN: HCPCS

## 2020-02-03 PROCEDURE — 3331090001 HH PPS REVENUE CREDIT

## 2020-02-03 PROCEDURE — 3331090002 HH PPS REVENUE DEBIT

## 2020-02-03 NOTE — PROGRESS NOTES
This note will not be viewable in [address] Third MARGIE outreach attempt made to home/mobile numbers. Unable to leave message to return calls. Unable to reach for [facility] program, will close case. Will reopen if call is returned.

## 2020-02-03 NOTE — TELEPHONE ENCOUNTER
[address] Pharmacist consult. Mr. Jaquelin Patel was discharged from [facility] after readmit for hypoglycemia. I have called, but get the voice mail message that the mailbox is full and cannot accept any messages. No other number is listed. I will try again another time.

## 2020-02-04 ENCOUNTER — HOME CARE VISIT (OUTPATIENT)
Dept: SCHEDULING | Facility: HOME HEALTH | Age: 80
End: 2020-02-04
Payer: MEDICARE

## 2020-02-04 ENCOUNTER — HOME CARE VISIT (OUTPATIENT)
Dept: HOME HEALTH SERVICES | Facility: HOME HEALTH | Age: 80
End: 2020-02-04
Payer: MEDICARE

## 2020-02-04 PROCEDURE — 3331090001 HH PPS REVENUE CREDIT

## 2020-02-04 PROCEDURE — 3331090002 HH PPS REVENUE DEBIT

## 2020-02-04 PROCEDURE — G0155 HHCP-SVS OF CSW,EA 15 MIN: HCPCS

## 2020-02-04 PROCEDURE — 400013 HH SOC

## 2020-02-05 ENCOUNTER — HOME CARE VISIT (OUTPATIENT)
Dept: SCHEDULING | Facility: HOME HEALTH | Age: 80
End: 2020-02-05
Payer: MEDICARE

## 2020-02-05 VITALS
HEART RATE: 78 BPM | DIASTOLIC BLOOD PRESSURE: 66 MMHG | TEMPERATURE: 97.9 F | OXYGEN SATURATION: 95 % | RESPIRATION RATE: 18 BRPM | SYSTOLIC BLOOD PRESSURE: 122 MMHG

## 2020-02-05 PROCEDURE — 3331090001 HH PPS REVENUE CREDIT

## 2020-02-05 PROCEDURE — G0151 HHCP-SERV OF PT,EA 15 MIN: HCPCS

## 2020-02-05 PROCEDURE — 3331090002 HH PPS REVENUE DEBIT

## 2020-02-05 NOTE — TELEPHONE ENCOUNTER
I spoke with MrTato Jerry Campbell and reminded him my part of the [facility] team.  He did not want to review his medications. He said he was doing OK and taking meds per orders. I asked him to call with any medication questions or issues. He really did not want to talk and seemed to be [facility], as I had to repeat myself often.

## 2020-02-06 ENCOUNTER — HOME CARE VISIT (OUTPATIENT)
Dept: SCHEDULING | Facility: HOME HEALTH | Age: 80
End: 2020-02-06
Payer: MEDICARE

## 2020-02-06 VITALS
RESPIRATION RATE: 18 BRPM | HEART RATE: 63 BPM | TEMPERATURE: 97.9 F | DIASTOLIC BLOOD PRESSURE: 80 MMHG | OXYGEN SATURATION: 98 % | SYSTOLIC BLOOD PRESSURE: 118 MMHG

## 2020-02-06 PROCEDURE — G0299 HHS/HOSPICE OF RN EA 15 MIN: HCPCS

## 2020-02-06 PROCEDURE — 3331090001 HH PPS REVENUE CREDIT

## 2020-02-06 PROCEDURE — 3331090002 HH PPS REVENUE DEBIT

## 2020-02-07 ENCOUNTER — HOME CARE VISIT (OUTPATIENT)
Dept: SCHEDULING | Facility: HOME HEALTH | Age: 80
End: 2020-02-07
Payer: MEDICARE

## 2020-02-07 VITALS
RESPIRATION RATE: 18 BRPM | DIASTOLIC BLOOD PRESSURE: 60 MMHG | SYSTOLIC BLOOD PRESSURE: 106 MMHG | HEART RATE: 60 BPM | TEMPERATURE: 98.3 F

## 2020-02-07 PROCEDURE — 3331090002 HH PPS REVENUE DEBIT

## 2020-02-07 PROCEDURE — G0152 HHCP-SERV OF OT,EA 15 MIN: HCPCS

## 2020-02-07 PROCEDURE — 3331090001 HH PPS REVENUE CREDIT

## 2020-02-08 ENCOUNTER — HOME CARE VISIT (OUTPATIENT)
Dept: SCHEDULING | Facility: HOME HEALTH | Age: 80
End: 2020-02-08
Payer: MEDICARE

## 2020-02-08 PROCEDURE — G0151 HHCP-SERV OF PT,EA 15 MIN: HCPCS

## 2020-02-08 PROCEDURE — 3331090002 HH PPS REVENUE DEBIT

## 2020-02-08 PROCEDURE — 3331090001 HH PPS REVENUE CREDIT

## 2020-02-09 VITALS
HEART RATE: 73 BPM | TEMPERATURE: 97.9 F | DIASTOLIC BLOOD PRESSURE: 74 MMHG | SYSTOLIC BLOOD PRESSURE: 128 MMHG | RESPIRATION RATE: 18 BRPM

## 2020-02-09 PROCEDURE — 3331090001 HH PPS REVENUE CREDIT

## 2020-02-09 PROCEDURE — 3331090002 HH PPS REVENUE DEBIT

## 2020-02-10 PROCEDURE — 3331090001 HH PPS REVENUE CREDIT

## 2020-02-10 PROCEDURE — 3331090002 HH PPS REVENUE DEBIT

## 2020-02-11 PROCEDURE — 3331090001 HH PPS REVENUE CREDIT

## 2020-02-11 PROCEDURE — 3331090002 HH PPS REVENUE DEBIT

## 2020-02-12 ENCOUNTER — HOME CARE VISIT (OUTPATIENT)
Dept: SCHEDULING | Facility: HOME HEALTH | Age: 80
End: 2020-02-12
Payer: MEDICARE

## 2020-02-12 VITALS
RESPIRATION RATE: 16 BRPM | TEMPERATURE: 97.8 F | HEART RATE: 68 BPM | DIASTOLIC BLOOD PRESSURE: 60 MMHG | SYSTOLIC BLOOD PRESSURE: 100 MMHG | OXYGEN SATURATION: 97 %

## 2020-02-12 PROCEDURE — G0299 HHS/HOSPICE OF RN EA 15 MIN: HCPCS

## 2020-02-12 PROCEDURE — 3331090001 HH PPS REVENUE CREDIT

## 2020-02-12 PROCEDURE — 3331090002 HH PPS REVENUE DEBIT

## 2020-02-12 RX ORDER — SODIUM CHLORIDE 0.9 % (FLUSH) 0.9 %
5-40 SYRINGE (ML) INJECTION AS NEEDED
Status: CANCELLED | OUTPATIENT
Start: 2020-02-12

## 2020-02-12 RX ORDER — SODIUM CHLORIDE 0.9 % (FLUSH) 0.9 %
5-40 SYRINGE (ML) INJECTION EVERY 8 HOURS
Status: CANCELLED | OUTPATIENT
Start: 2020-02-12

## 2020-02-13 ENCOUNTER — HOME CARE VISIT (OUTPATIENT)
Dept: SCHEDULING | Facility: HOME HEALTH | Age: 80
End: 2020-02-13
Payer: MEDICARE

## 2020-02-13 VITALS
OXYGEN SATURATION: 98 % | TEMPERATURE: 97.3 F | DIASTOLIC BLOOD PRESSURE: 78 MMHG | HEART RATE: 63 BPM | SYSTOLIC BLOOD PRESSURE: 132 MMHG | RESPIRATION RATE: 16 BRPM

## 2020-02-13 PROCEDURE — G0299 HHS/HOSPICE OF RN EA 15 MIN: HCPCS

## 2020-02-13 PROCEDURE — 3331090001 HH PPS REVENUE CREDIT

## 2020-02-13 PROCEDURE — 3331090002 HH PPS REVENUE DEBIT

## 2020-02-14 ENCOUNTER — TELEPHONE (OUTPATIENT)
Dept: HOME HEALTH SERVICES | Facility: HOME HEALTH | Age: 80
End: 2020-02-14

## 2020-02-14 ENCOUNTER — HOME CARE VISIT (OUTPATIENT)
Dept: SCHEDULING | Facility: HOME HEALTH | Age: 80
End: 2020-02-14
Payer: MEDICARE

## 2020-02-14 PROCEDURE — 3331090002 HH PPS REVENUE DEBIT

## 2020-02-14 PROCEDURE — 3331090001 HH PPS REVENUE CREDIT

## 2020-02-14 PROCEDURE — G0151 HHCP-SERV OF PT,EA 15 MIN: HCPCS

## 2020-02-14 NOTE — TELEPHONE ENCOUNTER
Mr. Jerry Campbell said he was doing better. He saw Dr. Jose Oviedo yesterday. Had swelling in feet and [facility] So far [facility] does not have anything called in. I told him to just keep [facility] as I had no way to verify with MD that anything had been called in. If still [facility], he should call Dr. Jose Oviedo. He verified understanding. No other medication questions. He said his BS was a little high and he is following the sliding scale insulin per orders. I asked him to call with any medication issues.

## 2020-02-15 ENCOUNTER — HOME CARE VISIT (OUTPATIENT)
Dept: SCHEDULING | Facility: HOME HEALTH | Age: 80
End: 2020-02-15
Payer: MEDICARE

## 2020-02-15 VITALS
RESPIRATION RATE: 18 BRPM | DIASTOLIC BLOOD PRESSURE: 74 MMHG | TEMPERATURE: 97.9 F | HEART RATE: 78 BPM | SYSTOLIC BLOOD PRESSURE: 126 MMHG

## 2020-02-15 VITALS
SYSTOLIC BLOOD PRESSURE: 128 MMHG | RESPIRATION RATE: 16 BRPM | DIASTOLIC BLOOD PRESSURE: 62 MMHG | HEART RATE: 63 BPM | TEMPERATURE: 98.3 F | OXYGEN SATURATION: 98 %

## 2020-02-15 PROCEDURE — 3331090002 HH PPS REVENUE DEBIT

## 2020-02-15 PROCEDURE — G0299 HHS/HOSPICE OF RN EA 15 MIN: HCPCS

## 2020-02-15 PROCEDURE — 3331090001 HH PPS REVENUE CREDIT

## 2020-02-16 PROCEDURE — 3331090001 HH PPS REVENUE CREDIT

## 2020-02-16 PROCEDURE — 3331090002 HH PPS REVENUE DEBIT

## 2020-02-17 PROCEDURE — 3331090001 HH PPS REVENUE CREDIT

## 2020-02-17 PROCEDURE — 3331090002 HH PPS REVENUE DEBIT

## 2020-02-18 PROCEDURE — 3331090001 HH PPS REVENUE CREDIT

## 2020-02-18 PROCEDURE — 3331090002 HH PPS REVENUE DEBIT

## 2020-02-19 ENCOUNTER — HOME CARE VISIT (OUTPATIENT)
Dept: SCHEDULING | Facility: HOME HEALTH | Age: 80
End: 2020-02-19
Payer: MEDICARE

## 2020-02-19 VITALS
DIASTOLIC BLOOD PRESSURE: 69 MMHG | RESPIRATION RATE: 18 BRPM | HEART RATE: 64 BPM | TEMPERATURE: 97.5 F | SYSTOLIC BLOOD PRESSURE: 132 MMHG | OXYGEN SATURATION: 98 %

## 2020-02-19 PROCEDURE — 3331090002 HH PPS REVENUE DEBIT

## 2020-02-19 PROCEDURE — 3331090001 HH PPS REVENUE CREDIT

## 2020-02-19 PROCEDURE — G0299 HHS/HOSPICE OF RN EA 15 MIN: HCPCS

## 2020-02-20 ENCOUNTER — HOME CARE VISIT (OUTPATIENT)
Dept: HOME HEALTH SERVICES | Facility: HOME HEALTH | Age: 80
End: 2020-02-20
Payer: MEDICARE

## 2020-02-20 PROCEDURE — 3331090002 HH PPS REVENUE DEBIT

## 2020-02-20 PROCEDURE — 3331090001 HH PPS REVENUE CREDIT

## 2020-02-21 ENCOUNTER — HOME CARE VISIT (OUTPATIENT)
Dept: SCHEDULING | Facility: HOME HEALTH | Age: 80
End: 2020-02-21
Payer: MEDICARE

## 2020-02-21 ENCOUNTER — HOSPITAL ENCOUNTER (OUTPATIENT)
Dept: GENERAL RADIOLOGY | Age: 80
Discharge: HOME | End: 2020-02-21
Payer: MEDICARE

## 2020-02-21 DIAGNOSIS — C34.12 MALIGNANT NEOPLASM OF UPPER LOBE OF LEFT LUNG (HCC): ICD-10-CM

## 2020-02-21 PROCEDURE — 71046 X-RAY EXAM CHEST 2 VIEWS: CPT

## 2020-02-21 PROCEDURE — 3331090001 HH PPS REVENUE CREDIT

## 2020-02-21 PROCEDURE — 3331090002 HH PPS REVENUE DEBIT

## 2020-02-21 PROCEDURE — G0299 HHS/HOSPICE OF RN EA 15 MIN: HCPCS

## 2020-02-22 VITALS
HEART RATE: 65 BPM | SYSTOLIC BLOOD PRESSURE: 122 MMHG | DIASTOLIC BLOOD PRESSURE: 50 MMHG | TEMPERATURE: 98.5 F | OXYGEN SATURATION: 99 %

## 2020-02-22 PROCEDURE — 3331090001 HH PPS REVENUE CREDIT

## 2020-02-22 PROCEDURE — 3331090002 HH PPS REVENUE DEBIT

## 2020-02-23 PROCEDURE — 3331090001 HH PPS REVENUE CREDIT

## 2020-02-23 PROCEDURE — 3331090002 HH PPS REVENUE DEBIT

## 2020-02-24 ENCOUNTER — HOME CARE VISIT (OUTPATIENT)
Dept: SCHEDULING | Facility: HOME HEALTH | Age: 80
End: 2020-02-24
Payer: MEDICARE

## 2020-02-24 VITALS
DIASTOLIC BLOOD PRESSURE: 50 MMHG | SYSTOLIC BLOOD PRESSURE: 105 MMHG | TEMPERATURE: 98.4 F | HEART RATE: 63 BPM | OXYGEN SATURATION: 99 %

## 2020-02-24 PROCEDURE — 3331090002 HH PPS REVENUE DEBIT

## 2020-02-24 PROCEDURE — G0299 HHS/HOSPICE OF RN EA 15 MIN: HCPCS

## 2020-02-24 PROCEDURE — 3331090001 HH PPS REVENUE CREDIT

## 2020-02-25 ENCOUNTER — ANESTHESIA EVENT (OUTPATIENT)
Dept: SURGERY | Age: 80
DRG: 164 | End: 2020-02-25
Payer: MEDICARE

## 2020-02-25 ENCOUNTER — TELEPHONE (OUTPATIENT)
Dept: HOME HEALTH SERVICES | Facility: HOME HEALTH | Age: 80
End: 2020-02-25

## 2020-02-25 ENCOUNTER — HOSPITAL ENCOUNTER (OUTPATIENT)
Dept: SURGERY | Age: 80
Discharge: HOME | DRG: 164 | End: 2020-02-25
Payer: MEDICARE

## 2020-02-25 ENCOUNTER — HOME CARE VISIT (OUTPATIENT)
Dept: SCHEDULING | Facility: HOME HEALTH | Age: 80
End: 2020-02-25
Payer: MEDICARE

## 2020-02-25 VITALS
WEIGHT: 159.56 LBS | OXYGEN SATURATION: 99 % | BODY MASS INDEX: 21.15 KG/M2 | RESPIRATION RATE: 16 BRPM | DIASTOLIC BLOOD PRESSURE: 49 MMHG | SYSTOLIC BLOOD PRESSURE: 119 MMHG | TEMPERATURE: 98 F | HEIGHT: 73 IN

## 2020-02-25 VITALS — RESPIRATION RATE: 18 BRPM

## 2020-02-25 LAB
ALBUMIN SERPL-MCNC: 3.3 G/DL (ref 3.2–4.6)
ALBUMIN/GLOB SERPL: 0.9 {RATIO} (ref 1.2–3.5)
ALP SERPL-CCNC: 241 U/L (ref 50–136)
ALT SERPL-CCNC: 63 U/L (ref 12–65)
ANION GAP SERPL CALC-SCNC: 2 MMOL/L (ref 7–16)
APPEARANCE UR: CLEAR
AST SERPL-CCNC: 36 U/L (ref 15–37)
BACTERIA URNS QL MICRO: 0 /HPF
BASOPHILS # BLD: 0 K/UL (ref 0–0.2)
BASOPHILS NFR BLD: 0 % (ref 0–2)
BILIRUB SERPL-MCNC: 0.3 MG/DL (ref 0.2–1.1)
BILIRUB UR QL: NEGATIVE
BUN SERPL-MCNC: 21 MG/DL (ref 8–23)
CALCIUM SERPL-MCNC: 9 MG/DL (ref 8.3–10.4)
CHLORIDE SERPL-SCNC: 101 MMOL/L (ref 98–107)
CO2 SERPL-SCNC: 32 MMOL/L (ref 21–32)
COLOR UR: YELLOW
CREAT SERPL-MCNC: 1.65 MG/DL (ref 0.8–1.5)
DIFFERENTIAL METHOD BLD: ABNORMAL
EOSINOPHIL # BLD: 0.1 K/UL (ref 0–0.8)
EOSINOPHIL NFR BLD: 1 % (ref 0.5–7.8)
EPI CELLS #/AREA URNS HPF: ABNORMAL /HPF
ERYTHROCYTE [DISTWIDTH] IN BLOOD BY AUTOMATED COUNT: 14.4 % (ref 11.9–14.6)
GLOBULIN SER CALC-MCNC: 3.8 G/DL (ref 2.3–3.5)
GLUCOSE BLD STRIP.AUTO-MCNC: 295 MG/DL (ref 65–100)
GLUCOSE SERPL-MCNC: 288 MG/DL (ref 65–100)
GLUCOSE UR STRIP.AUTO-MCNC: 500 MG/DL
HCT VFR BLD AUTO: 34.2 % (ref 41.1–50.3)
HGB BLD-MCNC: 11.8 G/DL (ref 13.6–17.2)
HGB UR QL STRIP: NEGATIVE
IMM GRANULOCYTES # BLD AUTO: 0 K/UL (ref 0–0.5)
IMM GRANULOCYTES NFR BLD AUTO: 0 % (ref 0–5)
INR PPP: 1
KETONES UR QL STRIP.AUTO: NEGATIVE MG/DL
LEUKOCYTE ESTERASE UR QL STRIP.AUTO: NEGATIVE
LYMPHOCYTES # BLD: 2.9 K/UL (ref 0.5–4.6)
LYMPHOCYTES NFR BLD: 56 % (ref 13–44)
MCH RBC QN AUTO: 28.4 PG (ref 26.1–32.9)
MCHC RBC AUTO-ENTMCNC: 34.5 G/DL (ref 31.4–35)
MCV RBC AUTO: 82.2 FL (ref 79.6–97.8)
MONOCYTES # BLD: 0.3 K/UL (ref 0.1–1.3)
MONOCYTES NFR BLD: 7 % (ref 4–12)
NEUTS SEG # BLD: 1.8 K/UL (ref 1.7–8.2)
NEUTS SEG NFR BLD: 36 % (ref 43–78)
NITRITE UR QL STRIP.AUTO: NEGATIVE
NRBC # BLD: 0 K/UL (ref 0–0.2)
OTHER OBSERVATIONS,UCOM: ABNORMAL
PH UR STRIP: 6 [PH] (ref 5–9)
PLATELET # BLD AUTO: 196 K/UL (ref 150–450)
PMV BLD AUTO: 9.2 FL (ref 9.4–12.3)
POTASSIUM SERPL-SCNC: 4.7 MMOL/L (ref 3.5–5.1)
PROT SERPL-MCNC: 7.1 G/DL (ref 6.3–8.2)
PROT UR STRIP-MCNC: ABNORMAL MG/DL
PROTHROMBIN TIME: 13.1 SEC (ref 12–14.7)
RBC # BLD AUTO: 4.16 M/UL (ref 4.23–5.6)
RBC #/AREA URNS HPF: ABNORMAL /HPF
SODIUM SERPL-SCNC: 135 MMOL/L (ref 136–145)
SP GR UR REFRACTOMETRY: 1 (ref 1–1.02)
UROBILINOGEN UR QL STRIP.AUTO: 0.2 EU/DL (ref 0.2–1)
WBC # BLD AUTO: 5.1 K/UL (ref 4.3–11.1)
WBC URNS QL MICRO: ABNORMAL /HPF

## 2020-02-25 PROCEDURE — 3331090001 HH PPS REVENUE CREDIT

## 2020-02-25 PROCEDURE — 80053 COMPREHEN METABOLIC PANEL: CPT

## 2020-02-25 PROCEDURE — 85025 COMPLETE CBC W/AUTO DIFF WBC: CPT

## 2020-02-25 PROCEDURE — 81003 URINALYSIS AUTO W/O SCOPE: CPT

## 2020-02-25 PROCEDURE — 82962 GLUCOSE BLOOD TEST: CPT

## 2020-02-25 PROCEDURE — 3331090002 HH PPS REVENUE DEBIT

## 2020-02-25 PROCEDURE — 85610 PROTHROMBIN TIME: CPT

## 2020-02-25 RX ORDER — BUPROPION HYDROCHLORIDE 150 MG/1
150 TABLET ORAL
COMMUNITY

## 2020-02-25 RX ORDER — METOPROLOL TARTRATE 50 MG/1
TABLET ORAL DAILY
COMMUNITY

## 2020-02-25 NOTE — PERIOP NOTES
Patient verified name and     Order for consent was found in EHR and matches case posting; patient verified. Type III surgery, walk-in assessment complete. Labs per surgeon: cbc,cmp,UA, PT/INR  Labs per anesthesia protocol: poc glucose, T&S on dos;   EKG: on chart 20 and reviewed by Dr. Denise Hui and [facility] approved surgical skin cleanser and instructions given per hospital policy. Patient provided with and instructed on educational handouts including Guide to Surgery, Pain Management, Hand Hygiene, Blood Transfusion Education, and [facility] Brochure. Patient answered medical/surgical history questions at their best of ability. All prior to admission medications documented in [facility]. Original medication prescription bottles were visualized during patient appointment. Patient instructed to hold all vitamins 7 days prior to surgery and NSAIDS 5 days prior to surgery, patient verbalized understanding. Patient teach back successful and patient demonstrates knowledge of instructions. POC glucose 294. Instructed  Patient that if blood sugar 300 or > , surgery may be cancelled.

## 2020-02-25 NOTE — PERIOP NOTES
PLEASE CONTINUE TAKING ALL PRESCRIPTION MEDICATIONS UP TO THE DAY OF SURGERY UNLESS OTHERWISE DIRECTED BELOW. DISCONTINUE all vitamins and supplements 7 days prior to surgery. DISCONTINUE Non-Steriodal Anti-Inflammatory (NSAIDS) such as Advil and Aleve 5 days prior to surgery. Home Medications to take  the day of surgery    albuterol   Lorazepam (ativan)   Bupropion  advair   Metoprolol  spiriva     Home Medications   to Hold   Tonight only take 8 units of Lantus        Comments                Please do not bring home medications with you on the day of surgery unless otherwise directed by your nurse. If you are instructed to bring home medications, please give them to your nurse as they will be administered by the nursing staff. If you have any questions, please call [facility] [phone] or [facility] [phone].

## 2020-02-25 NOTE — TELEPHONE ENCOUNTER
Maryann Parsons said he was at [address] [place] [address] now for pre-assessment for his procedure tomorrow in interventional radiology for lung nodule biopsy. He said no medication changes and no questions at this time. I wished him good luck with his procedure and that I would pray for him. He asked me to be sure to remember him tomorrow.

## 2020-02-25 NOTE — PERIOP NOTES
Recent Results (from the past 12 hour(s))   CBC WITH AUTOMATED DIFF    Collection Time: 02/25/20 12:17 PM   Result Value Ref Range    WBC 5.1 4.3 - 11.1 K/uL    RBC 4.16 (L) 4.23 - 5.6 M/uL    HGB 11.8 (L) 13.6 - 17.2 g/dL    HCT 34.2 (L) 41.1 - 50.3 %    MCV 82.2 79.6 - 97.8 FL    MCH 28.4 26.1 - 32.9 PG    MCHC 34.5 31.4 - 35.0 g/dL    RDW 14.4 11.9 - 14.6 %    PLATELET 814 885 - 008 K/uL    MPV 9.2 (L) 9.4 - 12.3 FL    ABSOLUTE NRBC 0.00 0.0 - 0.2 K/uL    DF AUTOMATED      NEUTROPHILS 36 (L) 43 - 78 %    LYMPHOCYTES 56 (H) 13 - 44 %    MONOCYTES 7 4.0 - 12.0 %    EOSINOPHILS 1 0.5 - 7.8 %    BASOPHILS 0 0.0 - 2.0 %    IMMATURE GRANULOCYTES 0 0.0 - 5.0 %    ABS. NEUTROPHILS 1.8 1.7 - 8.2 K/UL    ABS. LYMPHOCYTES 2.9 0.5 - 4.6 K/UL    ABS. MONOCYTES 0.3 0.1 - 1.3 K/UL    ABS. EOSINOPHILS 0.1 0.0 - 0.8 K/UL    ABS. BASOPHILS 0.0 0.0 - 0.2 K/UL    ABS. IMM. GRANS. 0.0 0.0 - 0.5 K/UL   METABOLIC PANEL, COMPREHENSIVE    Collection Time: 02/25/20 12:17 PM   Result Value Ref Range    Sodium 135 (L) 136 - 145 mmol/L    Potassium 4.7 3.5 - 5.1 mmol/L    Chloride 101 98 - 107 mmol/L    CO2 32 21 - 32 mmol/L    Anion gap 2 (L) 7 - 16 mmol/L    Glucose 288 (H) 65 - 100 mg/dL    BUN 21 8 - 23 MG/DL    Creatinine 1.65 (H) 0.8 - 1.5 MG/DL    GFR est AA 52 (L) >60 ml/min/1.73m2    GFR est non-AA 43 (L) >60 ml/min/1.73m2    Calcium 9.0 8.3 - 10.4 MG/DL    Bilirubin, total 0.3 0.2 - 1.1 MG/DL    ALT (SGPT) 63 12 - 65 U/L    AST (SGOT) 36 15 - 37 U/L    Alk.  phosphatase 241 (H) 50 - 136 U/L    Protein, total 7.1 6.3 - 8.2 g/dL    Albumin 3.3 3.2 - 4.6 g/dL    Globulin 3.8 (H) 2.3 - 3.5 g/dL    A-G Ratio 0.9 (L) 1.2 - 3.5     URINALYSIS W/ RFLX MICROSCOPIC    Collection Time: 02/25/20 12:17 PM   Result Value Ref Range    Color YELLOW      Appearance CLEAR      Specific gravity 1.005 1.001 - 1.023      pH (UA) 6.0 5.0 - 9.0      Protein TRACE (A) NEG mg/dL    Glucose 500 (A) NEG mg/dL    Ketone NEGATIVE  NEG mg/dL    Bilirubin NEGATIVE  NEG      Blood NEGATIVE  NEG      Urobilinogen 0.2 0.2 - 1.0 EU/dL    Nitrites NEGATIVE  NEG      Leukocyte Esterase NEGATIVE  NEG      WBC 0-3 0 /hpf    RBC 0-3 0 /hpf    Epithelial cells 0-3 0 /hpf    Bacteria 0 0 /hpf    Other observations RESULTS VERIFIED MANUALLY     PROTHROMBIN TIME + INR    Collection Time: 02/25/20 12:17 PM   Result Value Ref Range    Prothrombin time 13.1 12.0 - 14.7 sec    INR 1.0     GLUCOSE, POC    Collection Time: 02/25/20 12:24 PM   Result Value Ref Range    Glucose (POC) 295 (H) 65 - 100 mg/dL     Exam Information     Status Exam Begun  Exam Ended    Final [99] 2/21/2020 10:28 2/21/2020 10:34   Study Result     PA AND LATERAL CHEST X-RAY.     Clinical Indication: Lung cancer, preoperative exam     Comparison: No prior     Findings: 2 views of the chest submitted demonstrate masslike opacity in the  peripheral aspect of the left upper lobe in keeping with the patient's known  lung cancer. The remainder of the lung parenchyma is clear. No additional masses  noted. There is no pleural effusion or pneumothorax. The cardiac silhouette and  mediastinum are unremarkable.     IMPRESSION  IMPRESSION: Masslike opacity in the left upper lobe most in keeping with the  patient's known lung cancer.

## 2020-02-25 NOTE — ANESTHESIA PREPROCEDURE EVALUATION
Relevant Problems   No relevant active problems       Anesthetic History   No history of anesthetic complications            Review of Systems / Medical History  Patient summary reviewed and pertinent labs reviewed    Pulmonary    COPD: moderate      Pneumonia      Comments: L lung mass  PNA 1/2020 with resolution   Neuro/Psych         Psychiatric history (Depression)     Cardiovascular    Hypertension: well controlled          CAD (Non obstructive on 8/2019 [facility])    Exercise tolerance: <4 METS  Comments: 8/19 cath preserved EF, no obstructive CAD  Denies recent CP  SOB with PNA  Syncopal episodes thought to be related to hypoglycemia   GI/Hepatic/Renal         Renal disease (Crt 1.47): CRI       Endo/Other    Diabetes (A1C 10.3): poorly controlled, type 2, using insulin         Other Findings   Comments: HgbA1C 10.2  Recent hypoglycemia around time of PNA           Physical Exam    Airway  Mallampati: II  TM Distance: > 6 cm  Neck ROM: decreased range of motion   Mouth opening: Normal     Cardiovascular    Rhythm: regular  Rate: normal         Dental    Dentition: Upper partial plate and Lower partial plate     Pulmonary      Decreased breath sounds: bilateral           Abdominal  GI exam deferred       Other Findings            Anesthetic Plan    ASA: 3  Anesthesia type: general    Monitoring Plan: Arterial line      Induction: Intravenous  Anesthetic plan and risks discussed with: Patient and Family

## 2020-02-26 ENCOUNTER — APPOINTMENT (OUTPATIENT)
Dept: GENERAL RADIOLOGY | Age: 80
DRG: 164 | End: 2020-02-26
Attending: SURGERY
Payer: MEDICARE

## 2020-02-26 ENCOUNTER — HOME CARE VISIT (OUTPATIENT)
Dept: HOME HEALTH SERVICES | Facility: HOME HEALTH | Age: 80
End: 2020-02-26
Payer: MEDICARE

## 2020-02-26 ENCOUNTER — ANESTHESIA (OUTPATIENT)
Dept: SURGERY | Age: 80
DRG: 164 | End: 2020-02-26
Payer: MEDICARE

## 2020-02-26 ENCOUNTER — APPOINTMENT (OUTPATIENT)
Dept: CT IMAGING | Age: 80
DRG: 164 | End: 2020-02-26
Attending: SURGERY
Payer: MEDICARE

## 2020-02-26 ENCOUNTER — HOSPITAL ENCOUNTER (INPATIENT)
Age: 80
LOS: 13 days | Discharge: NURSING, REHAB OR LONG-TERM CARE | DRG: 164 | End: 2020-03-10
Attending: SURGERY | Admitting: SURGERY
Payer: MEDICARE

## 2020-02-26 DIAGNOSIS — F41.9 ANXIETY DISORDER, UNSPECIFIED TYPE: Primary | Chronic | ICD-10-CM

## 2020-02-26 DIAGNOSIS — C34.12 MALIGNANT NEOPLASM OF UPPER LOBE OF LEFT LUNG (HCC): ICD-10-CM

## 2020-02-26 LAB
ABO + RH BLD: NORMAL
ARTERIAL PATENCY WRIST A: YES
ATRIAL RATE: 59 BPM
BASE EXCESS BLD CALC-SCNC: 1 MMOL/L
BDY SITE: ABNORMAL
BLOOD GROUP ANTIBODIES SERPL: NORMAL
CA-I BLD-MCNC: 1.21 MMOL/L (ref 1.12–1.32)
CALCULATED P AXIS, ECG09: 73 DEGREES
CALCULATED R AXIS, ECG10: -38 DEGREES
CALCULATED T AXIS, ECG11: -46 DEGREES
COLLECT TIME,HTIME: 1840
DIAGNOSIS, 93000: NORMAL
FLOW RATE ISTAT,IFRATE: 2 L/MIN
GAS FLOW.O2 O2 DELIVERY SYS: ABNORMAL L/MIN
GLUCOSE BLD STRIP.AUTO-MCNC: 104 MG/DL (ref 65–100)
GLUCOSE BLD STRIP.AUTO-MCNC: 115 MG/DL (ref 65–100)
GLUCOSE BLD STRIP.AUTO-MCNC: 123 MG/DL (ref 65–100)
GLUCOSE BLD STRIP.AUTO-MCNC: 123 MG/DL (ref 65–100)
GLUCOSE BLD STRIP.AUTO-MCNC: 135 MG/DL (ref 65–100)
GLUCOSE BLD STRIP.AUTO-MCNC: 144 MG/DL (ref 65–100)
GLUCOSE BLD STRIP.AUTO-MCNC: 160 MG/DL (ref 65–100)
GLUCOSE BLD STRIP.AUTO-MCNC: 176 MG/DL (ref 65–100)
GLUCOSE BLD STRIP.AUTO-MCNC: 221 MG/DL (ref 65–100)
GLUCOSE BLD STRIP.AUTO-MCNC: 233 MG/DL (ref 65–100)
GLUCOSE BLD STRIP.AUTO-MCNC: 279 MG/DL (ref 65–100)
GLUCOSE BLD STRIP.AUTO-MCNC: 57 MG/DL (ref 65–100)
GLUCOSE BLD STRIP.AUTO-MCNC: 58 MG/DL (ref 65–100)
GLUCOSE BLD STRIP.AUTO-MCNC: 77 MG/DL (ref 65–100)
HCO3 BLD-SCNC: 27.5 MMOL/L (ref 22–26)
MAGNESIUM SERPL-MCNC: 1.5 MG/DL (ref 1.8–2.4)
P-R INTERVAL, ECG05: 170 MS
PCO2 BLD: 50.9 MMHG (ref 35–45)
PH BLD: 7.34 [PH] (ref 7.35–7.45)
PO2 BLD: 133 MMHG (ref 75–100)
POTASSIUM BLD-SCNC: 3.9 MMOL/L (ref 3.5–5.1)
Q-T INTERVAL, ECG07: 418 MS
QRS DURATION, ECG06: 112 MS
QTC CALCULATION (BEZET), ECG08: 413 MS
SAO2 % BLD: 99 % (ref 95–98)
SERVICE CMNT-IMP: ABNORMAL
SERVICE CMNT-IMP: ABNORMAL
SODIUM BLD-SCNC: 137 MMOL/L (ref 136–145)
SPECIMEN EXP DATE BLD: NORMAL
SPECIMEN TYPE: ABNORMAL
VENTRICULAR RATE, ECG03: 59 BPM

## 2020-02-26 PROCEDURE — 77030008462 HC STPLR SKN PROX J&J -A: Performed by: SURGERY

## 2020-02-26 PROCEDURE — 77030013140 HC MSK NEB VYRM -A

## 2020-02-26 PROCEDURE — 03HY32Z INSERTION OF MONITORING DEVICE INTO UPPER ARTERY, PERCUTANEOUS APPROACH: ICD-10-PCS | Performed by: ANESTHESIOLOGY

## 2020-02-26 PROCEDURE — 93005 ELECTROCARDIOGRAM TRACING: CPT | Performed by: SURGERY

## 2020-02-26 PROCEDURE — 74011000250 HC RX REV CODE- 250: Performed by: RADIOLOGY

## 2020-02-26 PROCEDURE — 74011250636 HC RX REV CODE- 250/636: Performed by: ANESTHESIOLOGY

## 2020-02-26 PROCEDURE — 74011250636 HC RX REV CODE- 250/636: Performed by: REGISTERED NURSE

## 2020-02-26 PROCEDURE — 4A133B1 MONITORING OF ARTERIAL PRESSURE, PERIPHERAL, PERCUTANEOUS APPROACH: ICD-10-PCS | Performed by: ANESTHESIOLOGY

## 2020-02-26 PROCEDURE — C2618 PROBE/NEEDLE, CRYO: HCPCS | Performed by: SURGERY

## 2020-02-26 PROCEDURE — 77030027876 HC STPLR ENDOSC FLX PWR J&J -G1: Performed by: SURGERY

## 2020-02-26 PROCEDURE — 82962 GLUCOSE BLOOD TEST: CPT

## 2020-02-26 PROCEDURE — 77030040922 HC BLNKT HYPOTHRM STRY -A: Performed by: ANESTHESIOLOGY

## 2020-02-26 PROCEDURE — 77030020829: Performed by: SURGERY

## 2020-02-26 PROCEDURE — 77030003460 CT GUIDANCE STEREO LOC

## 2020-02-26 PROCEDURE — 77030034850: Performed by: SURGERY

## 2020-02-26 PROCEDURE — 3331090002 HH PPS REVENUE DEBIT

## 2020-02-26 PROCEDURE — 77030040361 HC SLV COMPR DVT MDII -B: Performed by: SURGERY

## 2020-02-26 PROCEDURE — 77030037378 HC BRONCHOSCOPE DISP TRAN -D: Performed by: ANESTHESIOLOGY

## 2020-02-26 PROCEDURE — 77030013292 HC BOWL MX PRSM J&J -A: Performed by: ANESTHESIOLOGY

## 2020-02-26 PROCEDURE — 77030027138 HC INCENT SPIROMETER -A

## 2020-02-26 PROCEDURE — 0BNP0ZZ RELEASE LEFT PLEURA, OPEN APPROACH: ICD-10-PCS | Performed by: SURGERY

## 2020-02-26 PROCEDURE — 74011250636 HC RX REV CODE- 250/636: Performed by: NURSE ANESTHETIST, CERTIFIED REGISTERED

## 2020-02-26 PROCEDURE — 86900 BLOOD TYPING SEROLOGIC ABO: CPT

## 2020-02-26 PROCEDURE — 76060000041 HC ANESTHESIA 5 TO 5.5 HR: Performed by: SURGERY

## 2020-02-26 PROCEDURE — 77030019908 HC STETH ESOPH SIMS -A: Performed by: ANESTHESIOLOGY

## 2020-02-26 PROCEDURE — 88341 IMHCHEM/IMCYTCHM EA ADD ANTB: CPT

## 2020-02-26 PROCEDURE — 77030012770 HC TRCR OPT FX AMR -B: Performed by: SURGERY

## 2020-02-26 PROCEDURE — 77030010512 HC APPL CLP LIG J&J -C: Performed by: SURGERY

## 2020-02-26 PROCEDURE — 77030012390 HC DRN CHST BTL GTNG -B: Performed by: SURGERY

## 2020-02-26 PROCEDURE — 71045 X-RAY EXAM CHEST 1 VIEW: CPT

## 2020-02-26 PROCEDURE — 74011000250 HC RX REV CODE- 250: Performed by: REGISTERED NURSE

## 2020-02-26 PROCEDURE — 82803 BLOOD GASES ANY COMBINATION: CPT

## 2020-02-26 PROCEDURE — 4A133J1 MONITORING OF ARTERIAL PULSE, PERIPHERAL, PERCUTANEOUS APPROACH: ICD-10-PCS | Performed by: ANESTHESIOLOGY

## 2020-02-26 PROCEDURE — 0BTG0ZZ RESECTION OF LEFT UPPER LUNG LOBE, OPEN APPROACH: ICD-10-PCS | Performed by: SURGERY

## 2020-02-26 PROCEDURE — 3331090001 HH PPS REVENUE CREDIT

## 2020-02-26 PROCEDURE — 77030011264 HC ELECTRD BLD EXT COVD -A: Performed by: SURGERY

## 2020-02-26 PROCEDURE — 88342 IMHCHEM/IMCYTCHM 1ST ANTB: CPT

## 2020-02-26 PROCEDURE — 77030009968 HC RELD STPLR ENDOSC J&J -D: Performed by: SURGERY

## 2020-02-26 PROCEDURE — 74011250636 HC RX REV CODE- 250/636: Performed by: RADIOLOGY

## 2020-02-26 PROCEDURE — 77030018673: Performed by: SURGERY

## 2020-02-26 PROCEDURE — 74011250637 HC RX REV CODE- 250/637: Performed by: ANESTHESIOLOGY

## 2020-02-26 PROCEDURE — 76010000177 HC OR TIME 5 TO 5.5 HR INTENSV-TIER 1: Performed by: SURGERY

## 2020-02-26 PROCEDURE — 88305 TISSUE EXAM BY PATHOLOGIST: CPT

## 2020-02-26 PROCEDURE — 65610000001 HC ROOM ICU GENERAL

## 2020-02-26 PROCEDURE — 07B70ZX EXCISION OF THORAX LYMPHATIC, OPEN APPROACH, DIAGNOSTIC: ICD-10-PCS | Performed by: SURGERY

## 2020-02-26 PROCEDURE — 99152 MOD SED SAME PHYS/QHP 5/>YRS: CPT

## 2020-02-26 PROCEDURE — 77030013794 HC KT TRNSDUC BLD EDWD -B: Performed by: ANESTHESIOLOGY

## 2020-02-26 PROCEDURE — 74011250637 HC RX REV CODE- 250/637: Performed by: SURGERY

## 2020-02-26 PROCEDURE — 74011000250 HC RX REV CODE- 250: Performed by: SURGERY

## 2020-02-26 PROCEDURE — 74011636637 HC RX REV CODE- 636/637: Performed by: ANESTHESIOLOGY

## 2020-02-26 PROCEDURE — 74011250636 HC RX REV CODE- 250/636: Performed by: SURGERY

## 2020-02-26 PROCEDURE — 77030016151 HC PROTCTR LNS DFOG COVD -B: Performed by: SURGERY

## 2020-02-26 PROCEDURE — 77030031139 HC SUT VCRL2 J&J -A: Performed by: SURGERY

## 2020-02-26 PROCEDURE — 94640 AIRWAY INHALATION TREATMENT: CPT

## 2020-02-26 PROCEDURE — 74011000250 HC RX REV CODE- 250: Performed by: NURSE ANESTHETIST, CERTIFIED REGISTERED

## 2020-02-26 PROCEDURE — 77030018846 HC SOL IRR STRL H20 ICUM -A: Performed by: SURGERY

## 2020-02-26 PROCEDURE — 77030038079 HC RELD STPLR ENDOSC J&J -G: Performed by: SURGERY

## 2020-02-26 PROCEDURE — 77030039425 HC BLD LARYNG TRULITE DISP TELE -A: Performed by: ANESTHESIOLOGY

## 2020-02-26 PROCEDURE — C1729 CATH, DRAINAGE: HCPCS | Performed by: SURGERY

## 2020-02-26 PROCEDURE — 77030008756 HC TU IRR SUC STRY -B: Performed by: SURGERY

## 2020-02-26 PROCEDURE — 0WB80ZZ EXCISION OF CHEST WALL, OPEN APPROACH: ICD-10-PCS | Performed by: SURGERY

## 2020-02-26 PROCEDURE — 82947 ASSAY GLUCOSE BLOOD QUANT: CPT

## 2020-02-26 PROCEDURE — 74011000258 HC RX REV CODE- 258: Performed by: RADIOLOGY

## 2020-02-26 PROCEDURE — 77030008671 HC TU ENDO/BRNC CUF COVD -B: Performed by: ANESTHESIOLOGY

## 2020-02-26 PROCEDURE — C2615 SEALANT, PULMONARY, LIQUID: HCPCS | Performed by: SURGERY

## 2020-02-26 PROCEDURE — 99153 MOD SED SAME PHYS/QHP EA: CPT

## 2020-02-26 PROCEDURE — 77030008703 HC TU ET UNCUF COVD -A: Performed by: ANESTHESIOLOGY

## 2020-02-26 PROCEDURE — 77030005401 HC CATH RAD ARRO -A: Performed by: ANESTHESIOLOGY

## 2020-02-26 PROCEDURE — 83735 ASSAY OF MAGNESIUM: CPT

## 2020-02-26 PROCEDURE — 0BBJ0ZX EXCISION OF LEFT LOWER LUNG LOBE, OPEN APPROACH, DIAGNOSTIC: ICD-10-PCS | Performed by: SURGERY

## 2020-02-26 PROCEDURE — 77030002996 HC SUT SLK J&J -A: Performed by: SURGERY

## 2020-02-26 PROCEDURE — 88307 TISSUE EXAM BY PATHOLOGIST: CPT

## 2020-02-26 PROCEDURE — 36600 WITHDRAWAL OF ARTERIAL BLOOD: CPT

## 2020-02-26 PROCEDURE — 74011250636 HC RX REV CODE- 250/636

## 2020-02-26 PROCEDURE — 77030008522 HC TBNG INSUF LAPRO STRY -B: Performed by: SURGERY

## 2020-02-26 RX ORDER — METOPROLOL TARTRATE 50 MG/1
50 TABLET ORAL 2 TIMES DAILY
Status: DISCONTINUED | OUTPATIENT
Start: 2020-02-26 | End: 2020-03-10 | Stop reason: HOSPADM

## 2020-02-26 RX ORDER — EPHEDRINE SULFATE 50 MG/ML
INJECTION, SOLUTION INTRAVENOUS AS NEEDED
Status: DISCONTINUED | OUTPATIENT
Start: 2020-02-26 | End: 2020-02-26 | Stop reason: HOSPADM

## 2020-02-26 RX ORDER — SODIUM CHLORIDE, SODIUM LACTATE, POTASSIUM CHLORIDE, CALCIUM CHLORIDE 600; 310; 30; 20 MG/100ML; MG/100ML; MG/100ML; MG/100ML
75 INJECTION, SOLUTION INTRAVENOUS CONTINUOUS
Status: DISCONTINUED | OUTPATIENT
Start: 2020-02-26 | End: 2020-02-26 | Stop reason: HOSPADM

## 2020-02-26 RX ORDER — HYDROMORPHONE HYDROCHLORIDE 2 MG/ML
INJECTION, SOLUTION INTRAMUSCULAR; INTRAVENOUS; SUBCUTANEOUS AS NEEDED
Status: DISCONTINUED | OUTPATIENT
Start: 2020-02-26 | End: 2020-02-26 | Stop reason: HOSPADM

## 2020-02-26 RX ORDER — DEXAMETHASONE SODIUM PHOSPHATE 4 MG/ML
INJECTION, SOLUTION INTRA-ARTICULAR; INTRALESIONAL; INTRAMUSCULAR; INTRAVENOUS; SOFT TISSUE AS NEEDED
Status: DISCONTINUED | OUTPATIENT
Start: 2020-02-26 | End: 2020-02-26 | Stop reason: HOSPADM

## 2020-02-26 RX ORDER — PANTOPRAZOLE SODIUM 40 MG/1
40 TABLET, DELAYED RELEASE ORAL
Status: DISCONTINUED | OUTPATIENT
Start: 2020-02-27 | End: 2020-03-10 | Stop reason: HOSPADM

## 2020-02-26 RX ORDER — MIDAZOLAM HYDROCHLORIDE 1 MG/ML
.5-2 INJECTION, SOLUTION INTRAMUSCULAR; INTRAVENOUS
Status: DISCONTINUED | OUTPATIENT
Start: 2020-02-26 | End: 2020-02-26 | Stop reason: ALTCHOICE

## 2020-02-26 RX ORDER — DEXTROSE 50 % IN WATER (D50W) INTRAVENOUS SYRINGE
25 AS NEEDED
Status: DISCONTINUED | OUTPATIENT
Start: 2020-02-26 | End: 2020-02-26 | Stop reason: HOSPADM

## 2020-02-26 RX ORDER — DEXTROSE 50 % IN WATER (D50W) INTRAVENOUS SYRINGE
25 AS NEEDED
Status: DISCONTINUED | OUTPATIENT
Start: 2020-02-26 | End: 2020-03-10 | Stop reason: HOSPADM

## 2020-02-26 RX ORDER — DIPHENHYDRAMINE HYDROCHLORIDE 50 MG/ML
12.5 INJECTION, SOLUTION INTRAMUSCULAR; INTRAVENOUS ONCE
Status: ACTIVE | OUTPATIENT
Start: 2020-02-26 | End: 2020-02-27

## 2020-02-26 RX ORDER — SODIUM CHLORIDE 9 MG/ML
50 INJECTION, SOLUTION INTRAVENOUS CONTINUOUS
Status: DISCONTINUED | OUTPATIENT
Start: 2020-02-26 | End: 2020-02-26 | Stop reason: HOSPADM

## 2020-02-26 RX ORDER — SODIUM CHLORIDE 0.9 % (FLUSH) 0.9 %
5-40 SYRINGE (ML) INJECTION AS NEEDED
Status: DISCONTINUED | OUTPATIENT
Start: 2020-02-26 | End: 2020-02-26 | Stop reason: HOSPADM

## 2020-02-26 RX ORDER — FENTANYL CITRATE 50 UG/ML
25-50 INJECTION, SOLUTION INTRAMUSCULAR; INTRAVENOUS
Status: DISCONTINUED | OUTPATIENT
Start: 2020-02-26 | End: 2020-02-26 | Stop reason: ALTCHOICE

## 2020-02-26 RX ORDER — AMOXICILLIN 250 MG
2 CAPSULE ORAL 2 TIMES DAILY
Status: DISCONTINUED | OUTPATIENT
Start: 2020-02-26 | End: 2020-03-10 | Stop reason: HOSPADM

## 2020-02-26 RX ORDER — LIDOCAINE HYDROCHLORIDE 20 MG/ML
1-10 INJECTION, SOLUTION INFILTRATION; PERINEURAL ONCE
Status: COMPLETED | OUTPATIENT
Start: 2020-02-26 | End: 2020-02-26

## 2020-02-26 RX ORDER — INSULIN LISPRO 100 [IU]/ML
10 INJECTION, SOLUTION INTRAVENOUS; SUBCUTANEOUS ONCE
Status: COMPLETED | OUTPATIENT
Start: 2020-02-26 | End: 2020-02-26

## 2020-02-26 RX ORDER — MIDAZOLAM HYDROCHLORIDE 1 MG/ML
2 INJECTION, SOLUTION INTRAMUSCULAR; INTRAVENOUS
Status: DISCONTINUED | OUTPATIENT
Start: 2020-02-26 | End: 2020-02-26 | Stop reason: HOSPADM

## 2020-02-26 RX ORDER — OXYCODONE AND ACETAMINOPHEN 7.5; 325 MG/1; MG/1
1 TABLET ORAL
Status: DISCONTINUED | OUTPATIENT
Start: 2020-02-26 | End: 2020-02-29

## 2020-02-26 RX ORDER — GABAPENTIN 300 MG/1
300 CAPSULE ORAL 3 TIMES DAILY
Status: DISCONTINUED | OUTPATIENT
Start: 2020-02-26 | End: 2020-03-01

## 2020-02-26 RX ORDER — FAMOTIDINE 20 MG/1
20 TABLET, FILM COATED ORAL ONCE
Status: COMPLETED | OUTPATIENT
Start: 2020-02-26 | End: 2020-02-26

## 2020-02-26 RX ORDER — SODIUM CHLORIDE 9 MG/ML
25 INJECTION, SOLUTION INTRAVENOUS ONCE
Status: DISCONTINUED | OUTPATIENT
Start: 2020-02-26 | End: 2020-02-26 | Stop reason: HOSPADM

## 2020-02-26 RX ORDER — MIDAZOLAM HYDROCHLORIDE 1 MG/ML
2 INJECTION, SOLUTION INTRAMUSCULAR; INTRAVENOUS ONCE
Status: DISCONTINUED | OUTPATIENT
Start: 2020-02-26 | End: 2020-02-26 | Stop reason: HOSPADM

## 2020-02-26 RX ORDER — DEXTROSE, SODIUM CHLORIDE, SODIUM LACTATE, POTASSIUM CHLORIDE, AND CALCIUM CHLORIDE 5; .6; .31; .03; .02 G/100ML; G/100ML; G/100ML; G/100ML; G/100ML
INJECTION, SOLUTION INTRAVENOUS
Status: DISCONTINUED | OUTPATIENT
Start: 2020-02-26 | End: 2020-02-26 | Stop reason: HOSPADM

## 2020-02-26 RX ORDER — SODIUM CHLORIDE 0.9 % (FLUSH) 0.9 %
5-40 SYRINGE (ML) INJECTION EVERY 8 HOURS
Status: DISCONTINUED | OUTPATIENT
Start: 2020-02-26 | End: 2020-02-26 | Stop reason: HOSPADM

## 2020-02-26 RX ORDER — ALBUTEROL SULFATE 0.83 MG/ML
2.5 SOLUTION RESPIRATORY (INHALATION)
Status: DISCONTINUED | OUTPATIENT
Start: 2020-02-26 | End: 2020-03-03

## 2020-02-26 RX ORDER — CEFAZOLIN SODIUM/WATER 2 G/20 ML
2 SYRINGE (ML) INTRAVENOUS EVERY 8 HOURS
Status: COMPLETED | OUTPATIENT
Start: 2020-02-27 | End: 2020-02-27

## 2020-02-26 RX ORDER — SODIUM CHLORIDE 0.9 % (FLUSH) 0.9 %
5-40 SYRINGE (ML) INJECTION AS NEEDED
Status: DISCONTINUED | OUTPATIENT
Start: 2020-02-26 | End: 2020-03-04 | Stop reason: SDUPTHER

## 2020-02-26 RX ORDER — LIDOCAINE HYDROCHLORIDE 10 MG/ML
0.1 INJECTION INFILTRATION; PERINEURAL AS NEEDED
Status: DISCONTINUED | OUTPATIENT
Start: 2020-02-26 | End: 2020-02-26 | Stop reason: HOSPADM

## 2020-02-26 RX ORDER — METOPROLOL TARTRATE 25 MG/1
25 TABLET, FILM COATED ORAL
Status: COMPLETED | OUTPATIENT
Start: 2020-02-26 | End: 2020-02-26

## 2020-02-26 RX ORDER — BUDESONIDE AND FORMOTEROL FUMARATE DIHYDRATE 160; 4.5 UG/1; UG/1
2 AEROSOL RESPIRATORY (INHALATION) 2 TIMES DAILY
Status: DISCONTINUED | OUTPATIENT
Start: 2020-02-26 | End: 2020-03-08

## 2020-02-26 RX ORDER — CEFAZOLIN SODIUM/WATER 2 G/20 ML
2 SYRINGE (ML) INTRAVENOUS ONCE
Status: COMPLETED | OUTPATIENT
Start: 2020-02-26 | End: 2020-02-26

## 2020-02-26 RX ORDER — LISINOPRIL 5 MG/1
5 TABLET ORAL DAILY
Status: DISCONTINUED | OUTPATIENT
Start: 2020-02-27 | End: 2020-03-10 | Stop reason: HOSPADM

## 2020-02-26 RX ORDER — ROCURONIUM BROMIDE 10 MG/ML
INJECTION, SOLUTION INTRAVENOUS AS NEEDED
Status: DISCONTINUED | OUTPATIENT
Start: 2020-02-26 | End: 2020-02-26 | Stop reason: HOSPADM

## 2020-02-26 RX ORDER — FENTANYL CITRATE 50 UG/ML
25 INJECTION, SOLUTION INTRAMUSCULAR; INTRAVENOUS ONCE
Status: DISCONTINUED | OUTPATIENT
Start: 2020-02-26 | End: 2020-02-26 | Stop reason: HOSPADM

## 2020-02-26 RX ORDER — BUPROPION HYDROCHLORIDE 150 MG/1
150 TABLET, EXTENDED RELEASE ORAL 2 TIMES DAILY
Status: DISCONTINUED | OUTPATIENT
Start: 2020-02-26 | End: 2020-03-10 | Stop reason: HOSPADM

## 2020-02-26 RX ORDER — OXYCODONE HYDROCHLORIDE 5 MG/1
5 TABLET ORAL
Status: DISCONTINUED | OUTPATIENT
Start: 2020-02-26 | End: 2020-02-26 | Stop reason: ALTCHOICE

## 2020-02-26 RX ORDER — SODIUM CHLORIDE, SODIUM LACTATE, POTASSIUM CHLORIDE, CALCIUM CHLORIDE 600; 310; 30; 20 MG/100ML; MG/100ML; MG/100ML; MG/100ML
50 INJECTION, SOLUTION INTRAVENOUS CONTINUOUS
Status: DISCONTINUED | OUTPATIENT
Start: 2020-02-26 | End: 2020-02-27

## 2020-02-26 RX ORDER — HYDRALAZINE HYDROCHLORIDE 20 MG/ML
20 INJECTION INTRAMUSCULAR; INTRAVENOUS
Status: DISCONTINUED | OUTPATIENT
Start: 2020-02-26 | End: 2020-03-10 | Stop reason: HOSPADM

## 2020-02-26 RX ORDER — NEOSTIGMINE METHYLSULFATE 1 MG/ML
INJECTION INTRAVENOUS AS NEEDED
Status: DISCONTINUED | OUTPATIENT
Start: 2020-02-26 | End: 2020-02-26 | Stop reason: HOSPADM

## 2020-02-26 RX ORDER — PROPOFOL 10 MG/ML
INJECTION, EMULSION INTRAVENOUS AS NEEDED
Status: DISCONTINUED | OUTPATIENT
Start: 2020-02-26 | End: 2020-02-26 | Stop reason: HOSPADM

## 2020-02-26 RX ORDER — OXYCODONE AND ACETAMINOPHEN 5; 325 MG/1; MG/1
1 TABLET ORAL AS NEEDED
Status: DISCONTINUED | OUTPATIENT
Start: 2020-02-26 | End: 2020-02-26 | Stop reason: ALTCHOICE

## 2020-02-26 RX ORDER — SODIUM CHLORIDE, SODIUM LACTATE, POTASSIUM CHLORIDE, CALCIUM CHLORIDE 600; 310; 30; 20 MG/100ML; MG/100ML; MG/100ML; MG/100ML
100 INJECTION, SOLUTION INTRAVENOUS CONTINUOUS
Status: DISCONTINUED | OUTPATIENT
Start: 2020-02-26 | End: 2020-02-26

## 2020-02-26 RX ORDER — HYDROMORPHONE HYDROCHLORIDE 2 MG/ML
0.5 INJECTION, SOLUTION INTRAMUSCULAR; INTRAVENOUS; SUBCUTANEOUS
Status: DISCONTINUED | OUTPATIENT
Start: 2020-02-26 | End: 2020-02-26 | Stop reason: ALTCHOICE

## 2020-02-26 RX ORDER — SODIUM CHLORIDE 0.9 % (FLUSH) 0.9 %
5-40 SYRINGE (ML) INJECTION EVERY 8 HOURS
Status: DISCONTINUED | OUTPATIENT
Start: 2020-02-26 | End: 2020-03-10 | Stop reason: HOSPADM

## 2020-02-26 RX ORDER — FENTANYL CITRATE 50 UG/ML
INJECTION, SOLUTION INTRAMUSCULAR; INTRAVENOUS AS NEEDED
Status: DISCONTINUED | OUTPATIENT
Start: 2020-02-26 | End: 2020-02-26 | Stop reason: HOSPADM

## 2020-02-26 RX ORDER — ENOXAPARIN SODIUM 100 MG/ML
40 INJECTION SUBCUTANEOUS EVERY 24 HOURS
Status: DISCONTINUED | OUTPATIENT
Start: 2020-02-26 | End: 2020-03-10 | Stop reason: HOSPADM

## 2020-02-26 RX ORDER — ONDANSETRON 2 MG/ML
INJECTION INTRAMUSCULAR; INTRAVENOUS AS NEEDED
Status: DISCONTINUED | OUTPATIENT
Start: 2020-02-26 | End: 2020-02-26 | Stop reason: HOSPADM

## 2020-02-26 RX ORDER — INSULIN LISPRO 100 [IU]/ML
INJECTION, SOLUTION INTRAVENOUS; SUBCUTANEOUS
Status: DISCONTINUED | OUTPATIENT
Start: 2020-02-27 | End: 2020-02-27

## 2020-02-26 RX ORDER — SODIUM CHLORIDE 0.9 % (FLUSH) 0.9 %
5-40 SYRINGE (ML) INJECTION EVERY 8 HOURS
Status: DISCONTINUED | OUTPATIENT
Start: 2020-02-26 | End: 2020-03-04 | Stop reason: SDUPTHER

## 2020-02-26 RX ORDER — MAGNESIUM SULFATE HEPTAHYDRATE 40 MG/ML
2 INJECTION, SOLUTION INTRAVENOUS ONCE
Status: COMPLETED | OUTPATIENT
Start: 2020-02-26 | End: 2020-02-27

## 2020-02-26 RX ORDER — ATORVASTATIN CALCIUM 10 MG/1
10 TABLET, FILM COATED ORAL
Status: DISCONTINUED | OUTPATIENT
Start: 2020-02-26 | End: 2020-03-10 | Stop reason: HOSPADM

## 2020-02-26 RX ORDER — SODIUM CHLORIDE 0.9 % (FLUSH) 0.9 %
5-40 SYRINGE (ML) INJECTION AS NEEDED
Status: DISCONTINUED | OUTPATIENT
Start: 2020-02-26 | End: 2020-03-10 | Stop reason: HOSPADM

## 2020-02-26 RX ORDER — LIDOCAINE HYDROCHLORIDE 20 MG/ML
INJECTION, SOLUTION EPIDURAL; INFILTRATION; INTRACAUDAL; PERINEURAL AS NEEDED
Status: DISCONTINUED | OUTPATIENT
Start: 2020-02-26 | End: 2020-02-26 | Stop reason: HOSPADM

## 2020-02-26 RX ORDER — HYDROMORPHONE HYDROCHLORIDE 1 MG/ML
1 INJECTION, SOLUTION INTRAMUSCULAR; INTRAVENOUS; SUBCUTANEOUS
Status: DISCONTINUED | OUTPATIENT
Start: 2020-02-26 | End: 2020-02-29

## 2020-02-26 RX ORDER — GLYCOPYRROLATE 0.2 MG/ML
INJECTION INTRAMUSCULAR; INTRAVENOUS AS NEEDED
Status: DISCONTINUED | OUTPATIENT
Start: 2020-02-26 | End: 2020-02-26 | Stop reason: HOSPADM

## 2020-02-26 RX ADMIN — LIDOCAINE HYDROCHLORIDE 100 MG: 20 INJECTION, SOLUTION INFILTRATION; PERINEURAL at 11:31

## 2020-02-26 RX ADMIN — EPHEDRINE SULFATE 5 MG: 50 INJECTION, SOLUTION INTRAVENOUS at 15:24

## 2020-02-26 RX ADMIN — FENTANYL CITRATE 50 MCG: 50 INJECTION, SOLUTION INTRAMUSCULAR; INTRAVENOUS at 11:06

## 2020-02-26 RX ADMIN — DEXAMETHASONE SODIUM PHOSPHATE 4 MG: 4 INJECTION, SOLUTION INTRAMUSCULAR; INTRAVENOUS at 13:46

## 2020-02-26 RX ADMIN — ROCURONIUM BROMIDE 10 MG: 10 INJECTION, SOLUTION INTRAVENOUS at 15:22

## 2020-02-26 RX ADMIN — HYDROMORPHONE HYDROCHLORIDE 0.2 MG: 2 INJECTION INTRAMUSCULAR; INTRAVENOUS; SUBCUTANEOUS at 17:30

## 2020-02-26 RX ADMIN — PHENYLEPHRINE HYDROCHLORIDE 50 MCG: 10 INJECTION INTRAVENOUS at 17:05

## 2020-02-26 RX ADMIN — FENTANYL CITRATE 25 MCG: 50 INJECTION INTRAMUSCULAR; INTRAVENOUS at 14:44

## 2020-02-26 RX ADMIN — ROCURONIUM BROMIDE 10 MG: 10 INJECTION, SOLUTION INTRAVENOUS at 16:00

## 2020-02-26 RX ADMIN — GABAPENTIN 300 MG: 300 CAPSULE ORAL at 21:03

## 2020-02-26 RX ADMIN — GLYCOPYRROLATE 0.5 MG: 0.2 INJECTION, SOLUTION INTRAMUSCULAR; INTRAVENOUS at 17:34

## 2020-02-26 RX ADMIN — ENOXAPARIN SODIUM 40 MG: 40 INJECTION SUBCUTANEOUS at 20:59

## 2020-02-26 RX ADMIN — DEXTROSE 50 % IN WATER (D50W) INTRAVENOUS SYRINGE 25 G: at 12:30

## 2020-02-26 RX ADMIN — Medication 2 G: at 17:10

## 2020-02-26 RX ADMIN — DEXTROSE MONOHYDRATE 25 G: 25 INJECTION, SOLUTION INTRAVENOUS at 11:30

## 2020-02-26 RX ADMIN — MIDAZOLAM 0.5 MG: 1 INJECTION INTRAMUSCULAR; INTRAVENOUS at 11:20

## 2020-02-26 RX ADMIN — BUDESONIDE AND FORMOTEROL FUMARATE DIHYDRATE 2 PUFF: 160; 4.5 AEROSOL RESPIRATORY (INHALATION) at 19:48

## 2020-02-26 RX ADMIN — FENTANYL CITRATE 100 MCG: 50 INJECTION INTRAMUSCULAR; INTRAVENOUS at 12:58

## 2020-02-26 RX ADMIN — PROPOFOL 130 MG: 10 INJECTION, EMULSION INTRAVENOUS at 12:58

## 2020-02-26 RX ADMIN — Medication 10 ML: at 21:04

## 2020-02-26 RX ADMIN — SENNOSIDES AND DOCUSATE SODIUM 2 TABLET: 8.6; 5 TABLET ORAL at 20:58

## 2020-02-26 RX ADMIN — METHYLENE BLUE 1 ML: 10 INJECTION INTRAVENOUS at 11:00

## 2020-02-26 RX ADMIN — EPHEDRINE SULFATE 10 MG: 50 INJECTION, SOLUTION INTRAVENOUS at 13:11

## 2020-02-26 RX ADMIN — HYDROMORPHONE HYDROCHLORIDE 0.4 MG: 2 INJECTION INTRAMUSCULAR; INTRAVENOUS; SUBCUTANEOUS at 17:18

## 2020-02-26 RX ADMIN — ONDANSETRON 4 MG: 2 INJECTION INTRAMUSCULAR; INTRAVENOUS at 17:19

## 2020-02-26 RX ADMIN — FENTANYL CITRATE 25 MCG: 50 INJECTION, SOLUTION INTRAMUSCULAR; INTRAVENOUS at 11:20

## 2020-02-26 RX ADMIN — METOPROLOL TARTRATE 50 MG: 50 TABLET, FILM COATED ORAL at 20:58

## 2020-02-26 RX ADMIN — MAGNESIUM SULFATE HEPTAHYDRATE 2 G: 40 INJECTION, SOLUTION INTRAVENOUS at 20:47

## 2020-02-26 RX ADMIN — MIDAZOLAM 1 MG: 1 INJECTION INTRAMUSCULAR; INTRAVENOUS at 11:06

## 2020-02-26 RX ADMIN — HYDROMORPHONE HYDROCHLORIDE 1 MG: 1 INJECTION, SOLUTION INTRAMUSCULAR; INTRAVENOUS; SUBCUTANEOUS at 23:27

## 2020-02-26 RX ADMIN — LIDOCAINE HYDROCHLORIDE 100 MG: 20 INJECTION, SOLUTION EPIDURAL; INFILTRATION; INTRACAUDAL; PERINEURAL at 12:58

## 2020-02-26 RX ADMIN — ROCURONIUM BROMIDE 50 MG: 10 INJECTION, SOLUTION INTRAVENOUS at 12:58

## 2020-02-26 RX ADMIN — FAMOTIDINE 20 MG: 20 TABLET, FILM COATED ORAL at 09:03

## 2020-02-26 RX ADMIN — BUPROPION HYDROCHLORIDE 150 MG: 150 TABLET, EXTENDED RELEASE ORAL at 20:58

## 2020-02-26 RX ADMIN — ROCURONIUM BROMIDE 10 MG: 10 INJECTION, SOLUTION INTRAVENOUS at 16:35

## 2020-02-26 RX ADMIN — Medication 2 G: at 13:15

## 2020-02-26 RX ADMIN — SODIUM CHLORIDE, SODIUM LACTATE, POTASSIUM CHLORIDE, AND CALCIUM CHLORIDE 75 ML/HR: 600; 310; 30; 20 INJECTION, SOLUTION INTRAVENOUS at 09:10

## 2020-02-26 RX ADMIN — ROCURONIUM BROMIDE 10 MG: 10 INJECTION, SOLUTION INTRAVENOUS at 14:02

## 2020-02-26 RX ADMIN — EPHEDRINE SULFATE 5 MG: 50 INJECTION, SOLUTION INTRAVENOUS at 15:21

## 2020-02-26 RX ADMIN — INSULIN LISPRO 10 UNITS: 100 INJECTION, SOLUTION INTRAVENOUS; SUBCUTANEOUS at 10:04

## 2020-02-26 RX ADMIN — SODIUM CHLORIDE, SODIUM LACTATE, POTASSIUM CHLORIDE, CALCIUM CHLORIDE, AND DEXTROSE MONOHYDRATE: 600; 310; 30; 20; 5 INJECTION, SOLUTION INTRAVENOUS at 12:51

## 2020-02-26 RX ADMIN — ROCURONIUM BROMIDE 10 MG: 10 INJECTION, SOLUTION INTRAVENOUS at 14:45

## 2020-02-26 RX ADMIN — ALBUTEROL SULFATE 2.5 MG: 2.5 SOLUTION RESPIRATORY (INHALATION) at 19:48

## 2020-02-26 RX ADMIN — ATORVASTATIN CALCIUM 10 MG: 10 TABLET, FILM COATED ORAL at 21:03

## 2020-02-26 RX ADMIN — EPHEDRINE SULFATE 10 MG: 50 INJECTION, SOLUTION INTRAVENOUS at 15:26

## 2020-02-26 RX ADMIN — FENTANYL CITRATE 50 MCG: 50 INJECTION INTRAMUSCULAR; INTRAVENOUS at 13:46

## 2020-02-26 RX ADMIN — HYDROMORPHONE HYDROCHLORIDE 0.4 MG: 2 INJECTION INTRAMUSCULAR; INTRAVENOUS; SUBCUTANEOUS at 17:23

## 2020-02-26 RX ADMIN — SODIUM CHLORIDE, SODIUM LACTATE, POTASSIUM CHLORIDE, AND CALCIUM CHLORIDE 50 ML/HR: 600; 310; 30; 20 INJECTION, SOLUTION INTRAVENOUS at 20:15

## 2020-02-26 RX ADMIN — FENTANYL CITRATE 50 MCG: 50 INJECTION INTRAMUSCULAR; INTRAVENOUS at 14:12

## 2020-02-26 RX ADMIN — INSULIN LISPRO 10 UNITS: 100 INJECTION, SOLUTION INTRAVENOUS; SUBCUTANEOUS at 09:09

## 2020-02-26 RX ADMIN — EPHEDRINE SULFATE 5 MG: 50 INJECTION, SOLUTION INTRAVENOUS at 17:03

## 2020-02-26 RX ADMIN — FENTANYL CITRATE 25 MCG: 50 INJECTION INTRAMUSCULAR; INTRAVENOUS at 16:39

## 2020-02-26 RX ADMIN — METOPROLOL TARTRATE 25 MG: 25 TABLET ORAL at 09:17

## 2020-02-26 RX ADMIN — NEOSTIGMINE METHYLSULFATE 3.5 MG: 1 INJECTION INTRAVENOUS at 17:34

## 2020-02-26 RX ADMIN — OXYCODONE HYDROCHLORIDE AND ACETAMINOPHEN 1 TABLET: 7.5; 325 TABLET ORAL at 20:59

## 2020-02-26 RX ADMIN — SODIUM CHLORIDE, SODIUM LACTATE, POTASSIUM CHLORIDE, AND CALCIUM CHLORIDE: 600; 310; 30; 20 INJECTION, SOLUTION INTRAVENOUS at 12:55

## 2020-02-26 NOTE — ANESTHESIA POSTPROCEDURE EVALUATION
Procedure(s):  LEFT VATS WITH LEFT UPPER LOBECTOMY/ , MEDIASTINAL LYMPHADENECTOMY/CYRO.    general    Anesthesia Post Evaluation      Multimodal analgesia: multimodal analgesia used between 6 hours prior to anesthesia start to PACU discharge  Patient location during evaluation: ICU  Patient participation: complete - patient participated  Level of consciousness: awake and awake and alert  Pain management: adequate  Airway patency: patent  Anesthetic complications: no  Cardiovascular status: acceptable  Respiratory status: acceptable  Hydration status: acceptable  Post anesthesia nausea and vomiting:  controlled      Vitals Value Taken Time   /77 2/26/2020  6:07 PM   Temp 36.4 °C (97.6 °F) 2/26/2020  6:02 PM   Pulse 65 2/26/2020  6:07 PM   Resp 0 2/26/2020  6:07 PM   SpO2 100 % 2/26/2020  6:07 PM   Vitals shown include unvalidated device data.

## 2020-02-26 NOTE — H&P
[facility]  [facility] [address] [place] [address], [address] [facility]  [phone]      Patient:  Diogo Park  : 1940     HPI  Diogo Park is a 70 y.o. male who is back to discuss his surgery. His surgery was canceled last month due to active pneumonia. He was also admitted recently due to hypoglycemic and syncope episode. He is still weak and he lives alone. Today, he has no respiratory symptoms, he feels well overall.      On 12/10/2019, he was referred by pulmonary for evaluation of a lung mass involving the Left upper lobe.  The tumor was identified incidentally by routine chest xray and then proceeded to CT scan.  The pathology showed a adenocarcinoma and lymph node 10L suspicious for malignancy.  Currently, he complains of cough with clear secreations.  He denies chest pain, shortness of breath and hemoptysis. He  does have a history of smoking and quit 1 month ago.  He does not have a history of drinking. Denies any chest pain     He does not have a family history of cancer.  Medical history - DM, Htn, COPD . He has had previous chest surgery- collapsed left lung with chest tubes.  Other surgeries include exploratory laparotomy for SBO.  does take blood thinner.- Plavix, but unsure why he takes this, denies any stent or a-fib, denies history of CVA, states has been taking for a long time.                      Past Medical History:   Diagnosis Date    CAD (coronary artery disease) 2019     non obstructive     Chronic kidney disease       Stage 3    COPD       inhalers    Depression       managed with medications    Diabetes ([facility] [address]       takes insulin, A1c on 19 was 10.3; unsure of avg blood sugar    HTN (hypertension) 2015     managed [facility] medications    Malignant neoplasm of upper lobe of left lung ([address] 2019    Other ill-defined conditions(799.89)       cholesterol    Sepsis ([address] 2019             Current Outpatient Medications   Medication Sig Dispense Refill    albuterol (PROVENTIL VENTOLIN) 2.5 mg /3 mL (0.083 %) nebu 2.5 mg by Nebulization route every four to six (4-6) hours as needed for Other (For SOB or wheezing ).        insulin aspart U-100 (NOVOLOG FLEXPEN U-100 INSULIN) 100 unit/mL (3 mL) inpn With meals tid.     For Blood Sugar (mg/dL) of:     Less than 150 =   0 units           150 -199 =   2 units  200 -249 =   5 units  250 -299 =   8 units  300 -349 =   10 units  350 and above = 12 units (Patient taking differently: With meals tid.     For Blood Sugar (mg/dL) of:     1-2 units 130-150  3-4 units 151-249  5-6 units 250-and over  ) 1 Pen 0    metoprolol tartrate (LOPRESSOR) 25 mg tablet Take 1 Tab by mouth two (2) times a day. 60 Tab 0    insulin glargine (LANTUS SOLOSTAR U-100 INSULIN) 100 unit/mL (3 mL) inpn 10 Units by SubCUTAneous route nightly.        albuterol-ipratropium (DUO-NEB) 2.5 mg-0.5 mg/3 ml nebu 3 mL by Nebulization route three (3) times daily. 90 Nebule 0    ferrous sulfate 325 mg (65 mg iron) tablet Take 1 Tab by mouth two (2) times daily (with meals). 60 Tab 0    LIPITOR 10 mg Tab Take 10 mg by mouth daily.        lisinopril (PRINIVIL, ZESTRIL) 5 mg tablet take 5 mg by mouth daily.         ATIVAN 1 mg Tab Take 1 mg by mouth two (2) times a day.        WELLBUTRIN PO take 150 mg by mouth two (2) times a day.         nystatin (MYCOSTATIN) 100,000 unit/mL suspension Take 5 mL by mouth four (4) times daily. swish and spit 60 mL 0    phenol throat spray (CHLORASEPTIC) 1.4 % spray Take 1 Spray by mouth as needed for Sore throat. 20 mL 0    amoxicillin-clavulanate (AUGMENTIN) 875-125 mg per tablet Take 1 Tab by mouth two (2) times a day. 6 Tab 0    predniSONE (DELTASONE) 20 mg tablet Take 20 mg by mouth daily. 4 Tab 0    fluticasone propion-salmeterol (ADVAIR/WIXELA) 250-50 mcg/dose diskus inhaler Take 1 Puff by inhalation two (2) times a day.  Indications: Bronchospasm Prevention with COPD 1 Inhaler 0    dronabinol (MARINOL) 5 mg capsule Take 1 Cap by mouth two (2) times a day. Max Daily Amount: 10 mg. 60 Cap 0    guaiFENesin ER (MUCINEX) 600 mg ER tablet Take 1 Tab by mouth every twelve (12) hours. 40 Tab 0    tiotropium (SPIRIVA) 18 mcg inhalation capsule Take 1 Cap by inhalation daily. 30 Cap 0      Not on File        Past Surgical History:   Procedure Laterality Date    ABDOMEN SURGERY PROC UNLISTED   5/2015     explor lap    HX COLONOSCOPY        HX ORTHOPAEDIC         bilateral shoulder repairs, both knees repaired-no hardware    HX OTHER SURGICAL   12/2019     lung biopsy            Family History   Problem Relation Age of Onset    Heart Disease Mother      Heart Disease Father        Social History            Tobacco Use    Smoking status: Current Every Day Smoker       Packs/day: 1.00       Years: 40.00       Pack years: 40.00       Types: Cigarettes    Smokeless tobacco: Never Used   Substance Use Topics    Alcohol use: No         Review of Systems   A comprehensive review of systems was negative except for that written in the HPI.      Physical Exam  Visit Vitals  /52   Pulse 72   Ht 6' 1\" (1.854 m)   Wt 158 lb (71.7 kg)   SpO2 97%   BMI 20.85 kg/m²         General:          Alert, oriented, cooperative, awake patient in no acute distress   Skin:                Warm, moist with good texture   Eyes:               Sclera are clear, extraocular muscles intact  HENT:             Normocephalic; oral mucosa moist, nares patent; neck is supple; trachea midline  Respiratory:     Lungs clear to auscultation bilaterally, breathing is non-labored   Chest:              Symmetric throughout one respiratory excursion; no supraclavicular lymphadenopathy  CV:                  Regular rate and rhythm, no appreciable murmurs, rubs, gallops  Abdomen:        Soft, protuberant but non-distended; bowel sounds are normoactive   Extremities:     No cyanosis, clubbing or edema  Neurological:   No focal signs        CT:  CT Results (most recent):       Results from [place] encounter on 01/24/20   CT HEAD WO CONT     Narrative CT of the head without contrast.     CLINICAL INDICATION: Unresponsive     PROCEDURE: Serial thin section axial images are obtained from the cranial vertex  through the skull base without the administration of intravenous contrast.   Radiation dose reduction techniques were used for this study. Our CT scanners  use one or all of the following: Automated exposure control, adjusted of the mA  and/or kV according to patient size, iterative reconstruction     COMPARISON: Head CT dated 7/26/2016.     FINDINGS: There is no acute intracranial hemorrhage, mass, or mass effect. No  abnormal extra-axial fluid collections identified. There is no hydrocephalus. The basilar cisterns are widely patent. Moderate bilateral hemispheric atrophy  is appreciated. The gray-white brain parenchymal interface is maintained. There  is an old lacunar infarct in the right cerebellar hemisphere. . No skull fracture  or aggressive osseous lesion noted. The mastoid air cells and included paranasal  sinuses are clear.        Impression IMPRESSION: No acute intracranial abnormality.            CT chest: 1/15/2020  Lungs: Persistent but improved infiltrate in the left upper lobe. The previously  described mass is identified in the medial aspect of the lung measuring  approximately 3.5 x 2.8 cm. There is small left effusion. Mediastinum: Normal.  Cardiac: Normal.        IMPRESSION  IMPRESSION:Limited study. There is persistent infiltrate in the left upper lobe  however this is improved. Given the persistent infiltrate, no needle  localization procedure was performed. The findings were discussed with the  treating surgeon.  The patient will be rescheduled once the pneumonia is cleared.           PET  PET Results (most recent):       Results from Hospital Encounter encounter on 10/10/19   PET/CT TUMOR IMAGE SKULL THIGH W (INI)     Narrative F-18 FDG PET/CT Imaging.      Indication: lung mass, 70 years Male initial staging     Comparison: CT chest outside facility September 23, 2019     Radiopharmaceutical: 17.2 mCi of F-18 FDG.    Technique: Delayed PET post oral contrast CT images from skull base to mid  thighs were performed. Patient received 10 cc of Gastroview for oral contrast.  F-18 FDG PET/CT has limited sensitivity for low grade malignancies, small tumor  deposits, mucin producing neoplasms.     Findings: Persistent mild bilateral upper lobe predominant panlobular emphysema. Again visualized is the spiculated mass in the medial left upper lobe measuring  2.8 x 2.5 cm, associated with intense FDG uptake, maximum SUV of 6.9, consistent  with a small primary bronchogenic carcinoma. Small perifissural nodule in the  right middle lobe measuring up to 6 mm is also relatively unchanged, without  significant FDG uptake but too small to evaluate by PET. Asymmetric focal  intense FDG uptake is seen in the left hilum, maximum SUV of 3.6, suspicious for  local cristhian metastasis. There is physiologic distribution of FDG in head and  neck region, abdomen and pelvis. No focal uptake identified in the bone. Non  diagnostic CT demonstrates grossly unremarkable heart and mediastinum as well as  liver, spleen, adrenals, kidneys, pancreas and bowel.        Impression Impression:   1. Relatively unchanged spiculated mass in the medial left upper lobe  associated with intense FDG uptake, consistent with a small primary bronchogenic  neoplasm. 2.  Findings suspicious for left hilar cristhian metastasis. 3.  Unchanged 6 mm perifissural right middle lobe nodule, without significant  FDG uptake but too small to evaluate by PET.             CXR:  XR Results (most recent):       Results from Hospital Encounter encounter on 02/21/20   XR CHEST PA LAT     Narrative PA AND LATERAL CHEST X-RAY.     Clinical Indication: Lung cancer, preoperative exam     Comparison: No prior     Findings: 2 views of the chest submitted demonstrate masslike opacity in the  peripheral aspect of the left upper lobe in keeping with the patient's known  lung cancer. The remainder of the lung parenchyma is clear. No additional masses  noted. There is no pleural effusion or pneumothorax. The cardiac silhouette and  mediastinum are unremarkable.        Impression IMPRESSION: Masslike opacity in the left upper lobe most in keeping with the  patient's known lung cancer.      Assessment/Plan:   Adonis Estevez is a 70 y.o. male who has signs and symptoms consistent with left upper lobe lung cancer, complicated with recent pneumonia. His pneumonia symptoms appears resolved. However, I believe his cancer is growing. (last CT 3.5cm, from 2.8 cm from last year PET)     He is recovering from recently hospital stay, still weak, along with other chronic medical issues. However, I don't think he has too much time to wait to get stronger. Will proceed with surgery as planned next week. Given the concern of growing cancer, he might need left upper lobectomy.  Long term oxygen might be needed.         Miladis Aguayo MD

## 2020-02-26 NOTE — PROCEDURES
Department of Interventional Radiology  [phone]        Interventional Radiology Brief Procedure Note    Patient: Kimberly Julian MRN: [id number]  SSN: [id number]    YOB: 1940  Age: 70 y.o. Sex: male      Date of Procedure: 2/26/2020    Pre-Procedure Diagnosis: EVA lung mass    Post-Procedure Diagnosis: SAME    Procedure(s): Image guided wire localization of a EVA lung mass    Brief Description of Procedure: Successful CT guided needle location of a EVA lung mass with a Kopans wire. Wire is within the nodule with the distal aspect near the deep margin. Approximately 1 ml of MB was also injected for staining. No immediate complications.      Performed By: Margarito Esquivel MD     Assistants: None    Anesthesia:Moderate Sedation    Estimated Blood Loss: Less than 10ml    Specimens:  None    Implants:  None    Findings: As above    Complications: None    Signed By: Margarito Esquivel MD     February 26, 2020

## 2020-02-26 NOTE — PROGRESS NOTES
TRANSFER - IN REPORT:    Verbal report received from gloria pena(name) on [place]  being received from OR(unit) for routine post - op      Report consisted of patients Situation, Background, Assessment and   Recommendations(SBAR). Information from the following report(s) OR Summary was reviewed with the receiving nurse. Opportunity for questions and clarification was provided. Assessment completed upon patients arrival to unit and care assumed. Pt to icu and placed on monitor. HR  60s nsr, sbp 18s, spo2 100% on NRB. 2 CT in place and connected to suction. Anterior CT with small intermittent airleak. Pt is able to follow commands. No skin breakdown noted.

## 2020-02-26 NOTE — H&P
Department of Interventional Radiology  [phone]    History and Physical    Patient:  Gissel Jose MRN:  [id number]  SSN:  [id number]    YOB: 1940  Age:  70 y.o. Sex:  male      Primary Care Provider:  Linda Burdick MD  Referring Physician:  Adina Rose MD    Subjective:     Chief Complaint: Request for needle localization, EVA    History of the Present Illness: The patient is a 70 y.o. male with hx of COPD who was found to have a suspicious EVA pulmonary nodule. Previously presented for needle localization but was diagnosed with pneumonia and required re-scheduling. Reports improved cough since prior visit. Denies fever. Past Medical History:   Diagnosis Date    CAD (coronary artery disease) 08/2019    non obstructive     Chronic kidney disease     Stage 3    COPD     inhalers    Depression     managed with medications    Diabetes ([facility] [address]     takes insulin, A1c on 12/31/19 was 10.3; unsure of avg blood sugar    HTN (hypertension) 4/29/2015    managed wtih medications    Malignant neoplasm of upper lobe of left lung ([address] 12/12/2019    Other ill-defined conditions(799.89)     cholesterol    Sepsis ([address] 12/2019     Past Surgical History:   Procedure Laterality Date    ABDOMEN SURGERY PROC UNLISTED  5/2015    explor lap    HX COLONOSCOPY      HX ORTHOPAEDIC      bilateral shoulder repairs, both knees repaired-no hardware    HX OTHER SURGICAL  12/2019    lung biopsy        Review of Systems:    Pertinent items are noted in the History of Present Illness. Prior to Admission medications    Medication Sig Start Date End Date Taking? Authorizing Provider   metoprolol tartrate (LOPRESSOR) 50 mg tablet Take  by mouth two (2) times a day. Yes Provider, Historical   buPROPion XL (WELLBUTRIN XL) 150 mg tablet Take 150 mg by mouth every morning. Yes Provider, Historical   insulin aspart U-100 (NOVOLOG FLEXPEN U-100 INSULIN) 100 unit/mL (3 mL) inpn With meals tid.     For Blood Sugar (mg/dL) of:     Less than 150 =   0 units           150 -199 =   2 units  200 -249 =   5 units  250 -299 =   8 units  300 -349 =   10 units  350 and above = 12 units  Patient taking differently: With meals tid. For Blood Sugar (mg/dL) of:     1-2 units 130-150  3-4 units 151-249  5-6 units 250-and over   1/30/20  Yes Jeromy Fritz MD   insulin glargine (LANTUS SOLOSTAR U-100 INSULIN) 100 unit/mL (3 mL) inpn 10 Units by SubCUTAneous route nightly. Yes Provider, Historical   ferrous sulfate 325 mg (65 mg iron) tablet Take 1 Tab by mouth two (2) times daily (with meals). Patient taking differently: Take 325 mg by mouth Daily (before breakfast). 1/4/20  Yes Jose Barriga MD   LIPITOR 10 mg Tab Take 10 mg by mouth nightly. Yes Provider, Historical   lisinopril (PRINIVIL, ZESTRIL) 5 mg tablet take 5 mg by mouth daily. Yes Provider, Historical   ATIVAN 1 mg Tab Take 1 mg by mouth two (2) times a day. Yes Provider, Historical   albuterol (PROVENTIL VENTOLIN) 2.5 mg /3 mL (0.083 %) nebu 2.5 mg by Nebulization route every four to six (4-6) hours as needed for Other (For SOB or wheezing ). Provider, Historical   albuterol-ipratropium (DUO-NEB) 2.5 mg-0.5 mg/3 ml nebu 3 mL by Nebulization route three (3) times daily. 1/4/20   Jose Barriga MD   fluticasone propion-salmeterol (ADVAIR/WIXELA) 250-50 mcg/dose diskus inhaler Take 1 Puff by inhalation two (2) times a day. Indications: Bronchospasm Prevention with COPD 1/4/20   Jose Barriga MD   tiotropium [facility]) 18 mcg inhalation capsule Take 1 Cap by inhalation daily.  1/5/20   Jose Barriga MD        No Known Allergies    Family History   Problem Relation Age of Onset    Heart Disease Mother     Heart Disease Father     Diabetes Sister      Social History     Tobacco Use    Smoking status: Former Smoker     Packs/day: 1.00     Years: 40.00     Pack years: 40.00     Types: Cigarettes     Last attempt to quit: 2019     Years since quittin.1    Smokeless tobacco: Never Used   Substance Use Topics    Alcohol use: No        Objective:       Physical Examination:    Vitals:    20 0759 20 0806 20 1025   BP:  144/64 125/60   Pulse:  60 60   Resp:  16 16   Temp:  98 °F (36.7 °C) 98.1 °F (36.7 °C)   SpO2:  98% 98%   Weight: 71.3 kg (157 lb 3.2 oz)     Height: 6' 1\" (1.854 m)         Pain Assessment  Pain Intensity 1: 0 (20 08)           General: WDWN male in NAD. AAOx4   Lungs: Respirations non-labored.      Laboratory:     Lab Results   Component Value Date/Time    Sodium 135 (L) 2020 12:17 PM    Sodium 135 (L) 2020 07:05 PM    Potassium 4.7 2020 12:17 PM    Potassium 4.5 2020 07:05 PM    Chloride 101 2020 12:17 PM    Chloride 100 2020 07:05 PM    CO2 32 2020 12:17 PM    CO2 28 2020 07:05 PM    Anion gap 2 (L) 2020 12:17 PM    Anion gap 7 2020 07:05 PM    Glucose 288 (H) 2020 12:17 PM    Glucose 267 (H) 2020 07:05 PM    BUN 21 2020 12:17 PM    BUN 18 2020 07:05 PM    Creatinine 1.65 (H) 2020 12:17 PM    Creatinine 1.47 2020 07:05 PM    GFR est AA 52 (L) 2020 12:17 PM    GFR est AA 60 (L) 2020 07:05 PM    GFR est non-AA 43 (L) 2020 12:17 PM    GFR est non-AA 49 (L) 2020 07:05 PM    Calcium 9.0 2020 12:17 PM    Calcium 8.5 2020 07:05 PM    Magnesium 1.7 (L) 2020 08:28 AM    Magnesium 1.6 (L) 2020 07:57 AM    Phosphorus 3.5 2020 08:28 AM    Phosphorus 2.9 2020 07:57 AM    Albumin 3.3 2020 12:17 PM    Albumin 3.1 (L) 2020 05:57 PM    Protein, total 7.1 2020 12:17 PM    Protein, total 7.7 2020 05:57 PM    Globulin 3.8 (H) 2020 12:17 PM    Globulin 4.6 (H) 2020 05:57 PM    A-G Ratio 0.9 (L) 2020 12:17 PM    A-G Ratio 0.7 (L) 2020 05:57 PM    AST (SGOT) 36 2020 12:17 PM    AST (SGOT) 60 (H) 01/24/2020 05:57 PM    ALT (SGPT) 63 02/25/2020 12:17 PM    ALT (SGPT) 56 01/24/2020 05:57 PM     Lab Results   Component Value Date/Time    WBC 5.1 02/25/2020 12:17 PM    WBC 4.1 (L) 01/29/2020 07:05 PM    HGB 11.8 (L) 02/25/2020 12:17 PM    HGB 11.1 (L) 01/29/2020 07:05 PM    HCT 34.2 (L) 02/25/2020 12:17 PM    HCT 32.0 (L) 01/29/2020 07:05 PM    PLATELET 270 33/31/4126 12:17 PM    PLATELET 893 93/34/4413 07:05 PM     Lab Results   Component Value Date/Time    aPTT 30.1 01/01/2020 09:13 AM    Prothrombin time 13.1 02/25/2020 12:17 PM    Prothrombin time 13.4 01/08/2020 11:31 AM    INR 1.0 02/25/2020 12:17 PM    INR 1.0 01/08/2020 11:31 AM       Assessment:     71 yo male with COPD and EVA suspicious pulmonary nodule with plans for wedge resection versus left upper lobectomy today in the OR. Request for needle localization. Hospital Problems  Date Reviewed: 2/21/2020          Codes Class Noted POA    Lung cancer [facility]) ICD-10-CM: C34.90  ICD-9-CM: 162.9  1/15/2020 Unknown              Plan:     Planned Procedure:  CT guided needle localization with MB injection. Risks, benefits, and alternatives reviewed with patient and he agrees to proceed with the procedure.       Signed By: Yanci Camacho MD     February 26, 2020

## 2020-02-26 NOTE — PERIOP NOTES
Patient transported to IR. Patient's family instructed to wait in main surgical waiting area.   IR - Stevie notified of necessity to recheck blood sugar at 1030 and to call Preop with result; per Dr. Minda Kuo request.

## 2020-02-26 NOTE — ANESTHESIA PROCEDURE NOTES
Arterial Line Placement    Start time: 2/26/2020 1:05 PM  End time: 2/26/2020 1:10 PM  Performed by:  Yaakov Anthony CRNA  Authorized by: Jose Og MD     Pre-Procedure  Indications:  Arterial pressure monitoring and blood sampling  Preanesthetic Checklist: patient identified, risks and benefits discussed, anesthesia consent, site marked, patient being monitored, timeout performed and patient being monitored    Timeout Time: 13:05        Procedure:   Prep:  ChloraPrep  Seldinger Technique?: No    Orientation:  Right  Location:  Radial artery  Catheter size:  20 G  Number of attempts:  1    Assessment:   Post-procedure:  Line secured and sterile dressing applied  Patient Tolerance:  Patient tolerated the procedure well with no immediate complications  Comment:   Placed by Pearl Castro

## 2020-02-26 NOTE — BRIEF OP NOTE
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 COMBINED LEFT VATS and thoracotomy for extensive lysis of adhesion over 3 hours  2. LEFT UPPER LOBECTOMY  3. Left lower lobe wedge resection  4. Chest wall resection  5. MEDIASTINAL LYMPHADENECTOMY  6. Intercostal nerve CYRO  Surgeon(s) and Role:     Michelle Ho MD - Primary         Surgical Assistant: Stephanie Michaels NP    Surgical Staff:  Circ-1: Rajiv Peterson RN  Circ-Relief: Dane Rowland RN  Scrub Tech-1: Kim Valerio  Scrub Tech-2: Jimi Paris  Scrub Tech-Relief: Tha Leo  Nurse Practitioner: Tom Acevedo NP  Event Time In Time Out   Incision Start  1:34 PM    Incision Close  5:33 PM      Anesthesia: General   Estimated Blood Loss: 200 ml  Specimens:   ID Type Source Tests Collected by Time Destination   1 : # 9 LYMPH NODE X3 Fresh Lymph Node  Lucila Ramirez MD 2/26/2020  3:15 PM Pathology   2 : #8 LYMPH NODE X 2 Fresh Lymph Node  Lucila Ramirez MD 2/26/2020  3:22 PM Pathology   3 : A P WINDOW LYMPH NODE Fresh Lymph Node  Lucila Ramirez MD 2/26/2020  4:00 PM Pathology   4 : #7 LYMPH NODE X3 Fresh Lymph Node  Lucila Ramirez MD 2/26/2020  4:00 PM Pathology   5 : JAIDA HYLAR Fresh Lung Biopsy  Lucila Ramirez MD 2/26/2020  4:10 PM Pathology   6 : LEFT UPPER LOBECTOMY Fresh Lung  Lucila Ramirez MD 2/26/2020  4:55 PM Pathology      Findings: 1.  Extensive acute on chronic pleural inflammation involving left upper lobe, which was mostly plastered onto chest wall, also positive for bullous disease;   2 the cancer is felt to be within the lung, no malignant pleural disease   Complications: none  Implants: chest tube x 2

## 2020-02-26 NOTE — ROUTINE PROCESS
TRANSFER - OUT REPORT:    Verbal report given to GUADALUPE SORIANO(name) on [place]  being transferred to [address] ICU(unit) for routine progression of care       Report consisted of patients Situation, Background, Assessment and   Recommendations(SBAR). Information from the following report(s) OR Summary was reviewed with the receiving nurse. Lines:   Peripheral IV 02/26/20 Right Hand (Active)   Site Assessment Clean, dry, & intact 2/26/2020  9:22 AM   Phlebitis Assessment 0 2/26/2020  9:22 AM   Infiltration Assessment 0 2/26/2020  9:22 AM   Dressing Status Clean, dry, & intact 2/26/2020  9:22 AM   Dressing Type Transparent;Tape 2/26/2020  9:22 AM   Hub Color/Line Status Green; Infusing 2/26/2020  9:22 AM       Peripheral IV 02/26/20 Left Arm (Active)   Site Assessment Clean, dry, & intact 2/26/2020  9:23 AM   Phlebitis Assessment 0 2/26/2020  9:23 AM   Infiltration Assessment 0 2/26/2020  9:23 AM   Dressing Status Clean, dry, & intact 2/26/2020  9:23 AM   Dressing Type Tape;Transparent 2/26/2020  9:23 AM   Hub Color/Line Status Capped;Flushed 2/26/2020  9:23 AM       Arterial Line 02/26/20 (Active)       Arterial Line 02/26/20 Right Radial artery (Active)        Opportunity for questions and clarification was provided.       Patient transported with:   O2 @ 4 liters,MONITOR ,CRNA, [address] [place] CRNA  AND RN

## 2020-02-27 ENCOUNTER — APPOINTMENT (OUTPATIENT)
Dept: GENERAL RADIOLOGY | Age: 80
DRG: 164 | End: 2020-02-27
Attending: SURGERY
Payer: MEDICARE

## 2020-02-27 LAB
ANION GAP SERPL CALC-SCNC: 9 MMOL/L (ref 7–16)
BASOPHILS # BLD: 0 K/UL (ref 0–0.2)
BASOPHILS NFR BLD: 0 % (ref 0–2)
BUN SERPL-MCNC: 18 MG/DL (ref 8–23)
CALCIUM SERPL-MCNC: 8.2 MG/DL (ref 8.3–10.4)
CHLORIDE SERPL-SCNC: 102 MMOL/L (ref 98–107)
CO2 SERPL-SCNC: 24 MMOL/L (ref 21–32)
CREAT SERPL-MCNC: 1.22 MG/DL (ref 0.8–1.5)
DIFFERENTIAL METHOD BLD: ABNORMAL
EOSINOPHIL # BLD: 0.1 K/UL (ref 0–0.8)
EOSINOPHIL NFR BLD: 2 % (ref 0.5–7.8)
ERYTHROCYTE [DISTWIDTH] IN BLOOD BY AUTOMATED COUNT: 14.2 % (ref 11.9–14.6)
EST. AVERAGE GLUCOSE BLD GHB EST-MCNC: 214 MG/DL
GLUCOSE BLD STRIP.AUTO-MCNC: 239 MG/DL (ref 65–100)
GLUCOSE BLD STRIP.AUTO-MCNC: 297 MG/DL (ref 65–100)
GLUCOSE BLD STRIP.AUTO-MCNC: 319 MG/DL (ref 65–100)
GLUCOSE BLD STRIP.AUTO-MCNC: 364 MG/DL (ref 65–100)
GLUCOSE BLD STRIP.AUTO-MCNC: 400 MG/DL (ref 65–100)
GLUCOSE BLD STRIP.AUTO-MCNC: 408 MG/DL (ref 65–100)
GLUCOSE BLD STRIP.AUTO-MCNC: 456 MG/DL (ref 65–100)
GLUCOSE SERPL-MCNC: 313 MG/DL (ref 65–100)
HBA1C MFR BLD: 9.1 % (ref 4.8–6)
HCT VFR BLD AUTO: 35.7 % (ref 41.1–50.3)
HGB BLD-MCNC: 12.4 G/DL (ref 13.6–17.2)
IMM GRANULOCYTES # BLD AUTO: 0 K/UL (ref 0–0.5)
IMM GRANULOCYTES NFR BLD AUTO: 0 % (ref 0–5)
LYMPHOCYTES # BLD: 1.5 K/UL (ref 0.5–4.6)
LYMPHOCYTES NFR BLD: 26 % (ref 13–44)
MAGNESIUM SERPL-MCNC: 1.9 MG/DL (ref 1.8–2.4)
MCH RBC QN AUTO: 28.2 PG (ref 26.1–32.9)
MCHC RBC AUTO-ENTMCNC: 34.7 G/DL (ref 31.4–35)
MCV RBC AUTO: 81.3 FL (ref 79.6–97.8)
MONOCYTES # BLD: 0.3 K/UL (ref 0.1–1.3)
MONOCYTES NFR BLD: 5 % (ref 4–12)
NEUTS SEG # BLD: 3.8 K/UL (ref 1.7–8.2)
NEUTS SEG NFR BLD: 66 % (ref 43–78)
NRBC # BLD: 0 K/UL (ref 0–0.2)
PLATELET # BLD AUTO: 208 K/UL (ref 150–450)
PMV BLD AUTO: 9.2 FL (ref 9.4–12.3)
POTASSIUM SERPL-SCNC: 5.2 MMOL/L (ref 3.5–5.1)
POTASSIUM SERPL-SCNC: 5.3 MMOL/L (ref 3.5–5.1)
RBC # BLD AUTO: 4.39 M/UL (ref 4.23–5.6)
SODIUM SERPL-SCNC: 135 MMOL/L (ref 136–145)
WBC # BLD AUTO: 5.8 K/UL (ref 4.3–11.1)

## 2020-02-27 PROCEDURE — 65270000029 HC RM PRIVATE

## 2020-02-27 PROCEDURE — 94640 AIRWAY INHALATION TREATMENT: CPT

## 2020-02-27 PROCEDURE — 74011250636 HC RX REV CODE- 250/636: Performed by: SURGERY

## 2020-02-27 PROCEDURE — 71045 X-RAY EXAM CHEST 1 VIEW: CPT

## 2020-02-27 PROCEDURE — 74011250637 HC RX REV CODE- 250/637: Performed by: SURGERY

## 2020-02-27 PROCEDURE — 84132 ASSAY OF SERUM POTASSIUM: CPT

## 2020-02-27 PROCEDURE — 74011000250 HC RX REV CODE- 250: Performed by: SURGERY

## 2020-02-27 PROCEDURE — 99232 SBSQ HOSP IP/OBS MODERATE 35: CPT | Performed by: PHYSICAL MEDICINE & REHABILITATION

## 2020-02-27 PROCEDURE — 3331090002 HH PPS REVENUE DEBIT

## 2020-02-27 PROCEDURE — 51798 US URINE CAPACITY MEASURE: CPT

## 2020-02-27 PROCEDURE — 97110 THERAPEUTIC EXERCISES: CPT

## 2020-02-27 PROCEDURE — 3331090001 HH PPS REVENUE CREDIT

## 2020-02-27 PROCEDURE — 86580 TB INTRADERMAL TEST: CPT | Performed by: SURGERY

## 2020-02-27 PROCEDURE — 94760 N-INVAS EAR/PLS OXIMETRY 1: CPT

## 2020-02-27 PROCEDURE — 83036 HEMOGLOBIN GLYCOSYLATED A1C: CPT

## 2020-02-27 PROCEDURE — 85025 COMPLETE CBC W/AUTO DIFF WBC: CPT

## 2020-02-27 PROCEDURE — 82962 GLUCOSE BLOOD TEST: CPT

## 2020-02-27 PROCEDURE — 36415 COLL VENOUS BLD VENIPUNCTURE: CPT

## 2020-02-27 PROCEDURE — 74011636637 HC RX REV CODE- 636/637: Performed by: SURGERY

## 2020-02-27 PROCEDURE — 97161 PT EVAL LOW COMPLEX 20 MIN: CPT

## 2020-02-27 PROCEDURE — 97166 OT EVAL MOD COMPLEX 45 MIN: CPT

## 2020-02-27 PROCEDURE — 74011000302 HC RX REV CODE- 302: Performed by: SURGERY

## 2020-02-27 PROCEDURE — 80048 BASIC METABOLIC PNL TOTAL CA: CPT

## 2020-02-27 PROCEDURE — 74011636637 HC RX REV CODE- 636/637: Performed by: INTERNAL MEDICINE

## 2020-02-27 PROCEDURE — 83735 ASSAY OF MAGNESIUM: CPT

## 2020-02-27 PROCEDURE — 74011636637 HC RX REV CODE- 636/637: Performed by: NURSE PRACTITIONER

## 2020-02-27 PROCEDURE — 97535 SELF CARE MNGMENT TRAINING: CPT

## 2020-02-27 RX ORDER — LORAZEPAM 1 MG/1
1 TABLET ORAL 2 TIMES DAILY
Status: DISCONTINUED | OUTPATIENT
Start: 2020-02-27 | End: 2020-03-01

## 2020-02-27 RX ORDER — INSULIN LISPRO 100 [IU]/ML
INJECTION, SOLUTION INTRAVENOUS; SUBCUTANEOUS
Status: DISCONTINUED | OUTPATIENT
Start: 2020-02-27 | End: 2020-03-02

## 2020-02-27 RX ORDER — INSULIN LISPRO 100 [IU]/ML
5 INJECTION, SOLUTION INTRAVENOUS; SUBCUTANEOUS ONCE
Status: COMPLETED | OUTPATIENT
Start: 2020-02-27 | End: 2020-02-27

## 2020-02-27 RX ORDER — INSULIN GLARGINE 100 [IU]/ML
10 INJECTION, SOLUTION SUBCUTANEOUS
Status: DISCONTINUED | OUTPATIENT
Start: 2020-02-27 | End: 2020-02-28

## 2020-02-27 RX ORDER — INSULIN LISPRO 100 [IU]/ML
INJECTION, SOLUTION INTRAVENOUS; SUBCUTANEOUS EVERY 4 HOURS
Status: DISCONTINUED | OUTPATIENT
Start: 2020-02-27 | End: 2020-02-27

## 2020-02-27 RX ADMIN — BUPROPION HYDROCHLORIDE 150 MG: 150 TABLET, EXTENDED RELEASE ORAL at 08:33

## 2020-02-27 RX ADMIN — LORAZEPAM 1 MG: 1 TABLET ORAL at 08:54

## 2020-02-27 RX ADMIN — ALBUTEROL SULFATE 2.5 MG: 2.5 SOLUTION RESPIRATORY (INHALATION) at 15:40

## 2020-02-27 RX ADMIN — SENNOSIDES AND DOCUSATE SODIUM 2 TABLET: 8.6; 5 TABLET ORAL at 17:06

## 2020-02-27 RX ADMIN — INSULIN LISPRO 10 UNITS: 100 INJECTION, SOLUTION INTRAVENOUS; SUBCUTANEOUS at 12:16

## 2020-02-27 RX ADMIN — Medication 10 ML: at 13:28

## 2020-02-27 RX ADMIN — SENNOSIDES AND DOCUSATE SODIUM 2 TABLET: 8.6; 5 TABLET ORAL at 08:33

## 2020-02-27 RX ADMIN — INSULIN LISPRO 5 UNITS: 100 INJECTION, SOLUTION INTRAVENOUS; SUBCUTANEOUS at 12:16

## 2020-02-27 RX ADMIN — OXYCODONE HYDROCHLORIDE AND ACETAMINOPHEN 1 TABLET: 7.5; 325 TABLET ORAL at 12:09

## 2020-02-27 RX ADMIN — INSULIN GLARGINE 10 UNITS: 100 INJECTION, SOLUTION SUBCUTANEOUS at 22:41

## 2020-02-27 RX ADMIN — PANTOPRAZOLE SODIUM 40 MG: 40 TABLET, DELAYED RELEASE ORAL at 08:03

## 2020-02-27 RX ADMIN — BUDESONIDE AND FORMOTEROL FUMARATE DIHYDRATE 2 PUFF: 160; 4.5 AEROSOL RESPIRATORY (INHALATION) at 08:24

## 2020-02-27 RX ADMIN — INSULIN LISPRO 10 UNITS: 100 INJECTION, SOLUTION INTRAVENOUS; SUBCUTANEOUS at 08:53

## 2020-02-27 RX ADMIN — INSULIN LISPRO 6 UNITS: 100 INJECTION, SOLUTION INTRAVENOUS; SUBCUTANEOUS at 22:01

## 2020-02-27 RX ADMIN — LISINOPRIL 5 MG: 5 TABLET ORAL at 08:34

## 2020-02-27 RX ADMIN — OXYCODONE HYDROCHLORIDE AND ACETAMINOPHEN 1 TABLET: 7.5; 325 TABLET ORAL at 05:15

## 2020-02-27 RX ADMIN — LORAZEPAM 1 MG: 1 TABLET ORAL at 17:05

## 2020-02-27 RX ADMIN — TUBERCULIN PURIFIED PROTEIN DERIVATIVE 5 UNITS: 5 INJECTION, SOLUTION INTRADERMAL at 20:35

## 2020-02-27 RX ADMIN — GABAPENTIN 300 MG: 300 CAPSULE ORAL at 22:35

## 2020-02-27 RX ADMIN — BUPROPION HYDROCHLORIDE 150 MG: 150 TABLET, EXTENDED RELEASE ORAL at 22:35

## 2020-02-27 RX ADMIN — ENOXAPARIN SODIUM 40 MG: 40 INJECTION SUBCUTANEOUS at 22:35

## 2020-02-27 RX ADMIN — GABAPENTIN 300 MG: 300 CAPSULE ORAL at 08:33

## 2020-02-27 RX ADMIN — Medication 2 G: at 08:40

## 2020-02-27 RX ADMIN — ALBUTEROL SULFATE 2.5 MG: 2.5 SOLUTION RESPIRATORY (INHALATION) at 20:32

## 2020-02-27 RX ADMIN — INSULIN LISPRO 11 UNITS: 100 INJECTION, SOLUTION INTRAVENOUS; SUBCUTANEOUS at 17:05

## 2020-02-27 RX ADMIN — Medication 10 ML: at 22:00

## 2020-02-27 RX ADMIN — ATORVASTATIN CALCIUM 10 MG: 10 TABLET, FILM COATED ORAL at 22:35

## 2020-02-27 RX ADMIN — Medication 10 ML: at 06:26

## 2020-02-27 RX ADMIN — TIOTROPIUM BROMIDE INHALATION SPRAY 2 PUFF: 3.12 SPRAY, METERED RESPIRATORY (INHALATION) at 08:24

## 2020-02-27 RX ADMIN — GABAPENTIN 300 MG: 300 CAPSULE ORAL at 17:05

## 2020-02-27 RX ADMIN — BUDESONIDE AND FORMOTEROL FUMARATE DIHYDRATE 2 PUFF: 160; 4.5 AEROSOL RESPIRATORY (INHALATION) at 20:32

## 2020-02-27 RX ADMIN — ALBUTEROL SULFATE 2.5 MG: 2.5 SOLUTION RESPIRATORY (INHALATION) at 08:24

## 2020-02-27 RX ADMIN — Medication 2 G: at 01:31

## 2020-02-27 RX ADMIN — METOPROLOL TARTRATE 50 MG: 50 TABLET, FILM COATED ORAL at 08:34

## 2020-02-27 NOTE — PROGRESS NOTES
Pt seen in ICU s/p surgery by Dr. Ursula Shea. Confirms demographics. Discussed possible need for STR at d/c. List given for SNF and IRC. Pt would like referral to [facility] to see if meets criteria. PT/OTper MD and PPD already placed. CM will follow. Care Management Interventions  PCP Verified by CM: Yes  Mode of Transport at Discharge:  Other (see comment)  Transition of Care Consult (CM Consult): Discharge Planning  Current Support Network: Lives Alone, Own Home  The Plan for Transition of Care is Related to the Following Treatment Goals : possibly STR at SNF vs [facility]  The Patient and/or Patient Representative was Provided with a Choice of Provider and Agrees with the Discharge Plan?: Yes  Name of the Patient Representative Who was Provided with a Choice of Provider and Agrees with the Discharge Plan: pt and sister, Graham Louise -[phone]  Freedom of Choice List was Provided with Basic Dialogue that Supports the Patient's Individualized Plan of Care/Goals, Treatment Preferences and Shares the Quality Data Associated with the Providers?: Yes  [place] Resource Information Provided?: (Lionel)  Discharge Location  Discharge Placement: Unable to determine at this time

## 2020-02-27 NOTE — PROGRESS NOTES
Problem: Self Care Deficits Care Plan (Adult)  Goal: *Acute Goals and Plan of Care (Insert Text)  Description  1. Patient will complete upper body bathing and dressing with setup and adaptive equipment as needed. 2. Patient will complete lower body bathing and dressing with min A, additional time and adaptive equipment as needed. 3. Patient will complete toileting with mod I.   4. Patient will tolerate 25 minutes of OT treatment with 3 rest breaks to increase activity tolerance for ADLs. 5. Patient will complete functional transfers with CGA and adaptive equipment as needed. 6. Patient will complete grooming tasks in standing at sink level after setup. Timeframe: 7 visits      Outcome: Progressing Towards Goal       OCCUPATIONAL THERAPY: Initial Assessment, Daily Note, and AM   2/27/2020  INPATIENT: OT Visit Days: 1  Payor: Yulisa Nieto / Plan: [address] [place] [address] HMO / Product Type: Managed Care Medicare /      NAME/AGE/GENDER: Estela Valdes is a 70 y.o. male   PRIMARY DIAGNOSIS:  Lung mass [R91.8]  Lung cancer ([facility] [C34.90]  Lung cancer ([facility] [address] [C34.90] <principal problem not specified> <principal problem not specified>  Procedure(s) (LRB):  LEFT VATS WITH LEFT UPPER LOBECTOMY/ , MEDIASTINAL LYMPHADENECTOMY/CYRO (Left)  1 Day Post-Op  ICD-10: Treatment Diagnosis:    · Generalized Muscle Weakness (M62.81)  · Other lack of cordination (R27.8)  · Difficulty in walking, Not elsewhere classified (R26.2)  · Repeated Falls (R29.6)  · History of falling (Z91.81)   Precautions/Allergies:    Falls, Patient has no known allergies. ASSESSMENT:     Mr. Dory Srivastava is a 71 yo L dominant AAM admitted with above and underwent surgery. Sister and cousin present at [facility], pt seen in ICU but has orders to transfer to the floor. Pt A & Ox4, pleasant and cooperative, reports incisional pain as 8/10 in L side where 2 chest tubes are located. Pt presents supine in bed, agreeable to OT and getting up.   Pt lives alone in 1 level home with 2-3 steps at entrance, no HRs, has walk in and T/S combo but prefers to sit on the edge of the tub for washing up. Was driving up to 3 months ago. Uses a QC out in the community and sometimes at home. Checked his own BSs and gave himself shots but admits he has passed out a few times. Admits to 2-3 falls in last 6 months. Older sister Oksana Rios lives just up the road and reports pt has been needing more and more help with house management and self care the past 3 months. Has children but they live out of town. Pt admits he has not been managing well. Bills are auto drafted. Still goes to [employer] weekly. Pt supine to sit up on EOB with min A , OT managing tubing, additional time needed. Propped sitting and fair balance. Unable to cross legs to jim socks, dep by OT. Pt reported some dizziness, vitals monitored and were WNLs, though they did drop slightly. B UEs are limited at B shoulders from old injuries and OA, rest is WFLs. Admits he drops things frequently and has trouble with all fasteners and new containers and bottles. Pt able to read ID badge and board in room, has had B cataract surgeries. Pt Sit to stand with Min A from EOB, unsteady on feet, reported dizzy, so we stood there a minute, continued to have dizziness, unable to laterally move feet, OT had to give tactile cues at hips for pt to sit on EOB again. Pt admits to zoning out. BP again taken and WNLs but less than when sitting 117/60. Pt helped back to supine at min A to Mod A and woke up a little more, able to help scoot himself up in bed. Made comfortable and got fresh sheet and blanket for pt as well as fresh cup of ice water and pt able to drink it from straw himself. Kept taking and pt admits he \"zoned out and could not do what you were telling me to do. The pain I guess. \" RN alerted. Pt left supine with all needs in reach.   Pt is functioning well below his baseline and could benefit from skilled OT to maximize his independence, safety, activity tolerance and cardiopulmonary function with self care tasks and functional mobility. Do not feel like pt is safe to return home alone. Pt and sister both agree. Might be able to tolerate IRC. This section established at most recent assessment   PROBLEM LIST (Impairments causing functional limitations):  1. Decreased Strength  2. Decreased ADL/Functional Activities  3. Decreased Transfer Abilities  4. Decreased Ambulation Ability/Technique  5. Decreased Balance  6. Increased Pain  7. Decreased Activity Tolerance  8. Decreased Pacing Skills  9. Decreased Work Simplification/Energy Conservation Techniques  10. Increased Fatigue  11. Decreased Flexibility/Joint Mobility  12. Edema/Girth  13. Decreased Skin Integrity/Hygeine  14. Decreased Cognition   INTERVENTIONS PLANNED: (Benefits and precautions of occupational therapy have been discussed with the patient.)  1. Activities of daily living training  2. Adaptive equipment training  3. Balance training  4. Clothing management  5. Community reintergration  6. Therapeutic activity  7. Therapeutic exercise     TREATMENT PLAN: Frequency/Duration: Follow patient 3x per week to address above goals. Rehabilitation Potential For Stated Goals: Good     REHAB RECOMMENDATIONS (at time of discharge pending progress):    Placement: It is my opinion, based on this patient's performance to date, that Mr. Henri Deluca may benefit from intensive therapy at a [address] [place] [address] after discharge due to the functional deficits listed above that are likely to improve with skilled rehabilitation and concerns that he/she may be unsafe to be unsupervised at home due to living alone and not managing well. Equipment:   [address] chair or 3 in 1 BSC , should use RW              OCCUPATIONAL PROFILE AND HISTORY:   History of Present Injury/Illness (Reason for Referral): SWATI Drew is a 70 y.o. male who is back to discuss his surgery.  His surgery was canceled last month due to active pneumonia. He was also admitted recently due to hypoglycemic and syncope episode. He is still weak and he lives alone. Today, he has no respiratory symptoms, he feels well overall. On 12/10/2019, he was referred by pulmonary for evaluation of a lung mass involving the Left upper lobe. The tumor was identified incidentally by routine chest xray and then proceeded to CT scan. The pathology showed a adenocarcinoma and lymph node 10L suspicious for malignancy. Currently, he complains of cough with clear secreations. He denies chest pain, shortness of breath and hemoptysis. He  does have a history of smoking and quit 1 month ago. He does not have a history of drinking. Denies any chest pain     He does not have a family history of cancer. Medical history - DM, Htn, COPD . He has had previous chest surgery- collapsed left lung with chest tubes. Other surgeries include exploratory laparotomy for SBO. does take blood thinner.- Plavix, but unsure why he takes this, denies any stent or a-fib, denies history of CVA, states has been taking for a long time. Past Medical History/Comorbidities:   Mr. Ashish Joel  has a past medical history of CAD (coronary artery disease) (08/2019), Chronic kidney disease, COPD, Depression, Diabetes ([facility] HTN (hypertension) (4/29/2015), Malignant neoplasm of upper lobe of left lung ([facility] [facility] (12/12/2019), Other ill-defined conditions(799.89), and Sepsis ([facility] [facility] (12/2019). Mr. Ashish Joel  has a past surgical history that includes hx orthopaedic; pr abdomen surgery proc unlisted (5/2015); hx other surgical (12/2019); and hx colonoscopy.   Social History/Living Environment:   Home Environment: Private residence  # Steps to Enter: 2  Rails to Enter: No  One/Two Story Residence: One story  Living Alone: Yes  Patient Expects to be Discharged to[de-identified] Private residence  Current DME Used/Available at Home: [employer], [employer], quad, [place] [employer], rolling(used QC)  Tub or Shower Type: Tub/Shower combination(has walk in as well, sits on edge of tub for washing off)  Prior Level of Function/Work/Activity:  Pt lives alone in 1 level home with 2-3 steps at entrance, no HRs, has walk in and T/S combo but prefers to sit on the edge of the tub for washing up. Was driving up to 3 months ago. Uses a QC out in the community and sometimes at home. Checked his own BSs and gave himself shots but admits he has passed out a few times. Admits to 2-3 falls in last 6 months. Older sister Caryl Paul lives just up the road and reports pt has been needing more and more help with house management and self care the past 3 months. Has children but they live out of town. Pt admits he has not been managing well. Bills are auto drafted. Still goes to [employer] weekly. Dominant Side:         RIGHT   Number of Personal Factors/Comorbidities that affect the Plan of Care: Extensive review of physical, cognitive, and psychosocial performance (3+):  HIGH COMPLEXITY   ASSESSMENT OF OCCUPATIONAL PERFORMANCE[de-identified]   Activities of Daily Living:   Basic ADLs (From Assessment) Complex ADLs (From Assessment)   Feeding: Setup  Oral Facial Hygiene/Grooming: Minimum assistance  Bathing: Moderate assistance  Upper Body Dressing: Moderate assistance  Lower Body Dressing: Total assistance  Toileting: Total assistance, Adaptive equipment(ch in place) Instrumental ADL  Meal Preparation: Total assistance  Homemaking: Total assistance  Medication Management: Setup  Financial Management: Stand-by assistance   Grooming/Bathing/Dressing Activities of Daily Living     Cognitive Retraining  Safety/Judgement: Awareness of environment; Fall prevention                 Functional Transfers  Bathroom Mobility: Moderate assistance  Toilet Transfer : Moderate assistance(lower surface)     Bed/Mat Mobility  Rolling: Minimum assistance; Additional time  Supine to Sit: Minimum assistance; Additional time; Adaptive equipment;Bed Modified(pulling on bed rails,incisional pain increased at chest tube)  Sit to Supine: Minimum assistance; Additional time; Other (comment)(bed flat)  Sit to Stand: Minimum assistance; Additional time  Stand to Sit: Minimum assistance; Additional time(needed tactile cues to bend at hips and sit, ? hypotensive)  Bed to Chair: Moderate assistance  Scooting: Minimum assistance     Most Recent Physical Functioning:   Gross Assessment:  AROM: Generally decreased, functional(BUEs are limited at shoulders, rest is WFLs, L UE pain)  PROM: Generally decreased, functional(crepitus in B UEs, especially shoulders)  Strength: Generally decreased, functional(unable to tolerate any pushing at B shoulders)  Coordination: Generally decreased, functional  Tone: Normal  Sensation: Intact               Posture:     Balance:  Sitting: Impaired  Sitting - Static: Fair (occasional); Prop sitting  Sitting - Dynamic: Fair (occasional); Prop sitting  Standing: Impaired;Pull to stand; With support  Standing - Static: Constant support; Fair  Standing - Dynamic : Constant support;Fair((-)) Bed Mobility:  Rolling: Minimum assistance; Additional time  Supine to Sit: Minimum assistance; Additional time; Adaptive equipment;Bed Modified(pulling on bed rails,incisional pain increased at chest tube)  Sit to Supine: Minimum assistance; Additional time; Other (comment)(bed flat)  Scooting: Minimum assistance  Wheelchair Mobility:     Transfers:  Sit to Stand: Minimum assistance; Additional time  Stand to Sit: Minimum assistance; Additional time(needed tactile cues to bend at hips and sit, ? hypotensive)  Bed to Chair: Moderate assistance            Patient Vitals for the past 6 hrs:   BP BP Patient Position SpO2 Pulse   02/27/20 0601 142/65 -- 98 % 70   02/27/20 0616 130/59 -- 97 % 74   02/27/20 0631 129/63 -- 98 % 72   02/27/20 0645 136/60 -- 98 % 71   02/27/20 0716 142/64 -- 97 % 71   02/27/20 0816 136/63 At rest 100 % 77   02/27/20 0824 -- -- 98 % --   02/27/20 0833 155/52 -- -- 72 20 0900 132/64 -- 99 % 77   20 1017 126/80 -- 99 % 75   20 1031 140/64 -- 98 % 72   20 1045 127/58 -- 99 % 74       Mental Status  Neurologic State: Alert  Orientation Level: Oriented X4  Cognition: Appropriate decision making, Appropriate for age attention/concentration, Follows commands  Perception: Appears intact  Perseveration: No perseveration noted  Safety/Judgement: Awareness of environment, Fall prevention                          Physical Skills Involved:  1. Range of Motion  2. Balance  3. Strength  4. Activity Tolerance  5. Sensation  6. Fine Motor Control  7. Pain (acute)  8. Edema  9. Skin Integrity Cognitive Skills Affected (resulting in the inability to perform in a timely and safe manner):  1. Sustained Attention  2. Divided Attention Psychosocial Skills Affected:  1. Habits/Routines  2. Environmental Adaptation  3. Social Interaction  4. Self-Awareness   Number of elements that affect the Plan of Care: 5+:  HIGH COMPLEXITY   CLINICAL DECISION MAKIN[address] [place] [id number] AM-PAC 6 Clicks   Daily Activity Inpatient Short Form  How much help from another person does the patient currently need. .. Total A Lot A Little None   1. Putting on and taking off regular lower body clothing? [] 1   [x] 2   [] 3   [] 4   2. Bathing (including washing, rinsing, drying)? [] 1   [x] 2   [] 3   [] 4   3. Toileting, which includes using toilet, bedpan or urinal?   [] 1   [x] 2   [] 3   [] 4   4. Putting on and taking off regular upper body clothing? [] 1   [x] 2   [] 3   [] 4   5. Taking care of personal grooming such as brushing teeth? [] 1   [] 2   [x] 3   [] 4   6. Eating meals? [] 1   [] 2   [x] 3   [] 4   © , Trustees of [address] [place] [address] [address] [address], under license to [employer].  All rights reserved      Score:  Initial: 14, completed 2020 Most Recent: X (Date: -- )    Interpretation of Tool:  Represents activities that are increasingly more difficult (i.e. Bed mobility, Transfers, Gait). Medical Necessity:     · Patient demonstrates   · good  ·  rehab potential due to higher previous functional level. Reason for Services/Other Comments:  · Patient continues to require skilled intervention due to   · S/p above and decreased ADLs and mobiltiy   · . Use of outcome tool(s) and clinical judgement create a POC that gives a: MODERATE COMPLEXITY         TREATMENT:   (In addition to Assessment/Re-Assessment sessions the following treatments were rendered)     Pre-treatment Symptoms/Complaints:  \"I don't think I have been doing so well. \"  Pain: Initial:   Pain Intensity 1: 8  Pain Location 1: Incisional  Pain Orientation 1: Left, Lateral, Upper  Pain Intervention(s) 1: Ambulation/Increased Activity, Distraction, Emotional support, Environmental changes, Family Support, Exercise, Repositioned, Rest  Post Session:  \"about the same\" RN aware     Self Care: (46 mins): Procedure(s) (per grid) utilized to improve and/or restore self-care/home management as related to dressing, bathing, toileting, grooming, self feeding, and activty tolerance, bed mobility, transfers r/t ADLs . Required moderate visual, verbal, manual, tactile, physical assist, and   cueing to facilitate activities of daily living skills and compensatory activities.     Evaluation: 10 mins    Braces/Orthotics/Lines/Etc:   · drain 2 chest tubes in L chest   · ICU monitoring, but waiting for transfer to floor   · O2 Device: Room air  Treatment/Session Assessment:    · Response to Treatment:  got light headed and zoned out when standing, BPs were WNLs  · Interdisciplinary Collaboration:   o Occupational Therapist  o Registered Nurse  o   · After treatment position/precautions:   o Supine in bed  o Bed/Chair-wheels locked  o Bed in low position  o Call light within reach  o RN notified  o Family at bedside  o Nurse at bedside  o SW at bedside   · Compliance with Program/Exercises: Compliant most of the time, Will assess as treatment progresses. · Recommendations/Intent for next treatment session: \"Next visit will focus on advancements to more challenging activities and reduction in assistance provided\".   Total Treatment Duration:  56 mins  OT Patient Time In/Time Out  Time In: 1490  Time Out: [address] RACHEL Goss MS, OTR/L

## 2020-02-27 NOTE — INTERDISCIPLINARY ROUNDS
Interdisciplinary team rounds were held 2/27/2020 with the following team members:Care Management, Nursing, Nurse Practitioner, Nutrition, Occupational Therapy, Palliative Care, Pastoral Care, Pharmacy, Physical Therapy, Physician, Respiratory Therapy and Clinical Coordinator and the patient. Plan of care discussed. See clinical pathway and/or care plan for interventions and desired outcomes.

## 2020-02-27 NOTE — PROGRESS NOTES
2/27/2020    PLAN:  Diet- Diabetic  DVT Prophylactics- SCD/Lovenox  Pain control- Dilaudid/percocet  SUP prophylaxis- Protonix  Encourage C/DB/IS  Chest tubes to water seal  DC jing  Adjust insulin  Consult PT/OT  Consult Social service for discharge planning- lives alone  PPD ordered  Transfer to floor                    ASSESSMENT:  Admit Date: 2/26/2020   1 Day Post-Op  Procedure(s):  LEFT VATS WITH LEFT UPPER LOBECTOMY/ , MEDIASTINAL LYMPHADENECTOMY/CYRO      SUBJECTIVE: 02/27/2020-  No complaints. Pain controlled. On room air 100%. Chest tubes without airleak noted. Tolerating diet. Will adjust insulin dose. Transfer to floor and chest tubes to suction.       OBJECTIVE:  Patient Vitals for the past 24 hrs:   Temp Pulse Resp BP SpO2   02/27/20 1017 -- 75 28 126/80 99 %   02/27/20 0900 -- 77 22 132/64 99 %   02/27/20 0833 -- 72 -- 155/52 --   02/27/20 0824 -- -- -- -- 98 %   02/27/20 0816 98.1 °F (36.7 °C) 77 30 136/63 100 %   02/27/20 0716 -- 71 17 142/64 97 %   02/27/20 0645 -- 71 17 136/60 98 %   02/27/20 0631 -- 72 30 129/63 98 %   02/27/20 0616 -- 74 20 130/59 97 %   02/27/20 0601 -- 70 20 142/65 98 %   02/27/20 0545 -- 71 19 127/62 98 %   02/27/20 0531 -- 71 18 129/62 98 %   02/27/20 0516 -- 81 14 134/61 97 %   02/27/20 0500 -- 69 18 150/63 97 %   02/27/20 0445 -- 71 19 129/65 96 %   02/27/20 0431 -- 74 16 139/56 96 %   02/27/20 0416 -- 74 17 129/62 96 %   02/27/20 0400 -- 75 15 134/60 97 %   02/27/20 0345 -- 74 14 131/61 96 %   02/27/20 0331 -- 75 14 136/56 96 %   02/27/20 0316 98.1 °F (36.7 °C) 76 14 129/63 96 %   02/27/20 0300 -- 76 12 126/60 97 %   02/27/20 0245 -- 76 12 141/63 98 %   02/27/20 0231 -- 77 13 129/61 96 %   02/27/20 0216 -- 77 15 136/60 96 %   02/27/20 0200 -- 76 11 136/61 97 %   02/27/20 0145 -- 75 12 131/63 96 %   02/27/20 0131 -- 75 12 145/65 98 %   02/27/20 0116 -- 75 11 147/55 98 %   02/27/20 0100 -- 78 11 137/65 99 % 02/27/20 0045 -- 77 12 138/62 97 %   02/27/20 0031 -- 79 15 134/61 95 %   02/27/20 0016 -- 77 12 137/62 97 %   02/26/20 2346 -- 80 11 148/68 95 %   02/26/20 2331 -- 81 12 150/68 95 %   02/26/20 2316 98.3 °F (36.8 °C) 76 16 152/68 96 %   02/26/20 2300 -- 77 17 172/70 96 %   02/26/20 2246 -- 76 14 146/66 96 %   02/26/20 2231 -- 77 15 164/70 96 %   02/26/20 2216 -- 76 16 172/72 96 %   02/26/20 2200 -- 78 16 147/65 96 %   02/26/20 2146 -- 74 16 150/69 94 %   02/26/20 2131 -- 74 16 169/73 94 %   02/26/20 2116 -- 73 18 156/72 94 %   02/26/20 2100 -- 77 19 164/75 95 %   02/26/20 2058 -- 81 -- 172/77 --   02/26/20 2047 -- 76 -- 172/77 --   02/26/20 2046 -- 74 14 172/77 94 %   02/26/20 2033 -- 80 -- (!) 154/103 96 %   02/26/20 2016 -- 74 17 151/70 94 %   02/26/20 2000 -- 71 16 168/75 97 %   02/26/20 1950 -- -- -- -- 99 %   02/26/20 1946 -- 67 15 171/74 100 %   02/26/20 1931 -- 66 16 163/73 100 %   02/26/20 1916 97.7 °F (36.5 °C) 68 25 166/74 100 %   02/26/20 1900 -- 65 15 165/72 100 %   02/26/20 1816 -- 63 14 184/79 100 %   02/26/20 1810 97.6 °F (36.4 °C) 66 12 194/77 100 %   02/26/20 1802 97.6 °F (36.4 °C) 68 16 183/50 100 %   02/26/20 1238 -- (!) 58 16 158/71 97 %   02/26/20 1157 -- 62 16 136/60 97 %   02/26/20 1140 -- 68 16 191/71 100 %   02/26/20 1135 -- 68 18 152/65 100 %   02/26/20 1130 -- (!) 57 16 146/66 100 %   02/26/20 1125 -- (!) 57 14 143/63 100 %   02/26/20 1120 -- (!) 59 14 134/61 100 %   02/26/20 1115 -- (!) 56 14 120/60 100 %   02/26/20 1110 -- (!) 57 14 121/60 100 %   02/26/20 1105 -- (!) 58 16 129/51 100 %   02/26/20 1100 -- (!) 57 16 155/65 100 %         Date 02/26/20 0700 - 02/27/20 0659 02/27/20 0700 - 02/28/20 0659   Shift 9665-6528 0014-0900 24 Hour Total 7020-2855 4592-1435 24 Hour Total   INTAKE   P.O.  600 600        P. O.  600 600      I. V.(mL/kg/hr) 1750(2) 520.8(0.6) 2270.8(1.3)        Volume (dextrose 5% lactated ringers infusion) 750  750        Volume (lactated Ringers infusion)  450.8 450.8 Volume (lactated Ringers infusion) 1000  1000        Volume (ceFAZolin (ANCEF) 2 g/20 mL in sterile water IV syringe)  20 20        Volume (magnesium sulfate 2 g/50 ml IVPB (premix or compounded))  50 50      Shift Total(mL/kg) 1750(24.5) 1120. 8(15.7) 2870. 8(40.2)      OUTPUT   Urine(mL/kg/hr) 675(0.8) 925(1.1) 1600(0.9) 425  425     Urine Output 675  675        Urine Output (mL) (Urinary Catheter 02/26/20 2- way)  925 925 425  425   Blood 400  400        Estimated Blood Loss 400  400      Chest Tube  594 594        Output (ml) (Chest Tube #1 02/26/20 Straight; Anterior; Left)  154 154        Output (ml) (Chest Tube #2 02/26/20 Angled; Posterior; Left)  440 440      Shift Total(mL/kg) 1075(15.1) 3166(05.1) 1162(87.6) 425(5.9)  425(5.9)    -398.2 276.8 -425  -425   Weight (kg) 71.3 71.5 71.5 71.5 71.5 71.5            General:          No acute distress    Lungs:             Even and unlabored, chest tubes without airleak noted  Heart:              RRR  Abdomen:        Soft, Non distended, appropriately tender.   Extremities:     No cyanosis, clubbing or edema  Neurologic:      No focal deficits           Labs:    Recent Labs     02/27/20  0310 02/25/20  1217   WBC 5.8 5.1   HGB 12.4* 11.8*    196   * 135*   K 5.3* 4.7    101   CO2 24 32   BUN 18 21   CREA 1.22 1.65*   * 288*   PTP  --  13.1   INR  --  1.0   TBILI  --  0.3   SGOT  --  36   ALT  --  63   AP  --  241*         Jojo Peterson, MARCOS

## 2020-02-27 NOTE — PROGRESS NOTES
Problem: Mobility Impaired (Adult and Pediatric)  Goal: *Acute Goals and Plan of Care (Insert Text)  Description  LTG:  (1.)Mr. Watson Monroe will move from supine to sit and sit to supine , scoot up and down and roll side to side in flat bed without siderails with  INDEPENDENT within 7 day(s). (2.)Mr. Watson Monroe will perform all functional transfers with  MODIFIED INDEPENDENCE using the least restrictive/no device within 7 day(s). (3.)Mr. Watson Monroe will ambulate with  MODIFIED INDEPENDENCE for 250+ feet with normal vital sign response with the least restrictive/no device within 7 day(s). (4.)Mr. Watson Monroe will ambulate up/down 2 steps with bilateral  railing with  STAND BY ASSIST with no device within 7 day(s). Outcome: Progressing Towards Goal     PHYSICAL THERAPY: Initial Assessment and Daily Note 2/27/2020  INPATIENT:    Payor: Sheba Coreas / Plan: [address] [place] [address] HMO / Product Type: [email or web address] Medicare /       NAME/AGE/GENDER: Richard Jackson is a 70 y.o. male   PRIMARY DIAGNOSIS: Lung mass [R91.8]  Lung cancer ([facility] [address] [C34.90]  Lung cancer ([facility] [address] [C34.90] <principal problem not specified> <principal problem not specified>  Procedure(s) (LRB):  LEFT VATS WITH LEFT UPPER LOBECTOMY/ , MEDIASTINAL LYMPHADENECTOMY/CYRO (Left)  1 Day Post-Op  ICD-10: Treatment Diagnosis:    · Other abnormalities of gait and mobility (R26.89)  · History of falling (Z91.81)   Precaution/Allergies:  Patient has no known allergies. ASSESSMENT:     Mr. Watson Monroe presents supine in bed. Patient lives alone and ambulated independently but with quad cane in community. Patient transitioned to sit with min to mod A. He stood with CGA and ambulated 15' with RW and min A.  Mr. Watson Monroe is functioning well below baseline and is therefore appropriate for skilled PT to maximize rehab potential.   Initiated treatment to include sit to stand again and sitting exercises. Good effort.     This section established at most recent assessment   PROBLEM LIST (Impairments causing functional limitations):  1. Decreased Transfer Abilities  2. Decreased Ambulation Ability/Technique  3. Decreased Balance  4. Increased Pain  5. Decreased Activity Tolerance  6. Decreased Knowledge of Precautions  7. Decreased [place] with Home Exercise Program   INTERVENTIONS PLANNED: (Benefits and precautions of physical therapy have been discussed with the patient.)  1. Balance Exercise  2. Bed Mobility  3. Gait Training  4. Therapeutic Activites  5. Therapeutic Exercise/Strengthening  6. Transfer Training  7. education      TREATMENT PLAN: Frequency/Duration: 3 times a week for duration of hospital stay  Rehabilitation Potential For Stated Goals: Excellent     REHAB RECOMMENDATIONS (at time of discharge pending progress):    Placement: It is my opinion, based on this patient's performance to date, that Mr. Misael Moncada may benefit from intensive therapy at a [address] [place] [address] after discharge due to the functional deficits listed above that are likely to improve with skilled rehabilitation and concerns that he/she may be unsafe to be unsupervised at home due to history of falls. Equipment:    None at this time              HISTORY:   History of Present Injury/Illness (Reason for Referral):  Per MD note, \"The patient is a 70 y.o. male with hx of COPD who was found to have a suspicious EVA pulmonary nodule. Previously presented for needle localization but was diagnosed with pneumonia and required re-scheduling. Reports improved cough since prior visit. Denies fever. \"  Past Medical History/Comorbidities:   Mr. Misael Moncada  has a past medical history of CAD (coronary artery disease) (08/2019), Chronic kidney disease, COPD, Depression, Diabetes ([address] HTN (hypertension) (4/29/2015), Malignant neoplasm of upper lobe of left lung ([address] (12/12/2019), Other ill-defined conditions(799.89), and Sepsis ([address] (12/2019).   Mr. Misael Moncada  has a past surgical history that includes hx orthopaedic; pr abdomen surgery proc unlisted (5/2015); hx other surgical (12/2019); and hx colonoscopy. Social History/Living Environment:   Home Environment: Private residence  # Steps to Enter: 2  Rails to Enter: No  One/Two Story Residence: One story  Living Alone: Yes  Support Systems: Child(shayy)  Patient Expects to be Discharged to[de-identified] Private residence  Current DME Used/Available at Home: Cane, quad, Glucometer, [place], Walker, rolling  Tub or Shower Type: Tub/Shower combination(has walk in as well, sits on edge of tub for washing off)  Prior Level of Function/Work/Activity:  Pt lives alone in 1 level home with 2-3 steps at entrance, no HRs, has walk in and T/S combo but prefers to sit on the edge of the tub for washing up. Was driving up to 3 months ago. Uses a QC out in the community and sometimes at home. Checked his own BSs and gave himself shots but admits he has passed out a few times. Admits to 2-3 falls in last 6 months. Older sister Faye De Paz lives just up the road and reports pt has been needing more and more help with house management and self care the past 3 months. Has children but they live out of town. Pt admits he has not been managing well. Bills are auto drafted. Still goes to [employer] [facility]. Number of Personal Factors/Comorbidities that affect the Plan of Care: 1-2: MODERATE COMPLEXITY   EXAMINATION:   Most Recent Physical Functioning:   Gross Assessment:  AROM: Generally decreased, functional  Strength: Generally decreased, functional               Posture:  Posture (WDL): Exceptions to WDL  Posture Assessment: Forward head, Rounded shoulders  Balance:  Sitting: Impaired  Sitting - Static: Prop sitting  Sitting - Dynamic: Prop sitting  Standing: Impaired  Standing - Static: Constant support  Standing - Dynamic : Constant support Bed Mobility:  Supine to Sit: Bed Modified; Additional time;Minimum assistance; Moderate assistance  Scooting: Minimum assistance  Wheelchair Mobility:     Transfers:  Sit to Stand: Contact guard assistance  Stand to Sit: Contact guard assistance  Bed to Chair: Minimum assistance  Gait:     Base of Support: Widened  Speed/Kate: Slow  Step Length: Right shortened;Left shortened  Distance (ft): 12 Feet (ft)  Assistive Device: Walker, rolling  Ambulation - Level of Assistance: Contact guard assistance      Body Structures Involved:  1. Lungs  2. Muscles Body Functions Affected:  1. Sensory/Pain  2. Respiratory  3. Movement Related  4. Skin Related Activities and Participation Affected:  1. Mobility  2. Self Care  3. Domestic Life  4. Community, Social and [place]   Number of elements that affect the Plan of Care: 4+: HIGH COMPLEXITY   CLINICAL PRESENTATION:   Presentation: Evolving clinical presentation with changing clinical characteristics: MODERATE COMPLEXITY   CLINICAL DECISION MAKIN [facility] Inpatient Short Form  How much difficulty does the patient currently have. .. Unable A Lot A Little None   1. Turning over in bed (including adjusting bedclothes, sheets and blankets)? [] 1   [] 2   [x] 3   [] 4   2. Sitting down on and standing up from a chair with arms ( e.g., wheelchair, bedside commode, etc.)   [] 1   [] 2   [x] 3   [] 4   3. Moving from lying on back to sitting on the side of the bed? [] 1   [x] 2   [] 3   [] 4   How much help from another person does the patient currently need. .. Total A Lot A Little None   4. Moving to and from a bed to a chair (including a wheelchair)? [] 1   [] 2   [x] 3   [] 4   5. Need to walk in hospital room? [] 1   [] 2   [x] 3   [] 4   6. Climbing 3-5 steps with a railing? [] 1   [] 2   [x] 3   [] 4   © , Trustees of [address] [place] [address] [address] [id number], under license to [employer].  All rights reserved      Score:  Initial: 17 Most Recent: X (Date: -- )    Interpretation of Tool:  Represents activities that are increasingly more difficult (i.e. Bed mobility, Transfers, Gait). Medical Necessity:     · Patient is expected to demonstrate progress in   · strength, balance, and functional technique  ·  to   · increase independence with   and improve safety during all functional mobility   · . Reason for Services/Other Comments:  · Patient continues to require skilled intervention due to   · patient unable to attend/participate in therapy as expected  · . Use of outcome tool(s) and clinical judgement create a POC that gives a: Clear prediction of patient's progress: LOW COMPLEXITY            TREATMENT:   (In addition to Assessment/Re-Assessment sessions the following treatments were rendered)   Pre-treatment Symptoms/Complaints:    Pain: Initial:   Pain Intensity 1: 8  Pain Location 1: Incisional  Pain Orientation 1: Left  Post Session:  8     Therapeutic Exercise: ( 10 minutes):  Exercises per grid below to improve strength, balance, and coordination. Required minimal visual and verbal cues to promote proper body alignment and promote proper body posture. Progressed complexity of movement as indicated. DATE: 2/27/20       Ambulation        Hip Flexion        Long Arc Quads X20 AB       Hip ab/ad        Heel Raises X20 AB       Toe Raises X20 AB       Sit to stand x2'                        Key:  A=active, AA=active assisted, P=passive, B=bilaterally, R=right, L=left   DF=dorsiflexion, PF=plantarflexion      Braces/Orthotics/Lines/Etc:   · 2 chest tube to Pleurevacs   · O2 Device: Room air  Treatment/Session Assessment:    · Response to Treatment:  pleasant and cooperative. · Interdisciplinary Collaboration:   o Physical Therapist  o Registered Nurse  · After treatment position/precautions:   o Up in chair  o Bed alarm/tab alert on  o Bed/Chair-wheels locked  o Call light within reach  o RN notified   · Compliance with Program/Exercises: Compliant all of the time, Will assess as treatment progresses  · Recommendations/Intent for next treatment session:   \"Next visit will focus on advancements to more challenging activities and reduction in assistance provided\".   Total Treatment Duration:  PT Patient Time In/Time Out  Time In: 1515  Time Out: [address], PT, DPT

## 2020-02-27 NOTE — CONSULTS
Hospitalist Consult Note     Admit Date:  2020  7:11 AM   Name:  Verónica Schaefer   Age:  70 y.o.  :  1940   MRN:  [id number]   PCP:  Rosa Maria Fuentes MD  Treatment Team: Attending Provider: Maria Del Carmen Arnold MD; Utilization Review: Cindy Abad RN; Care Manager: Irish Montoya RN; Occupational Therapist: Josef Helton OT; Consulting Provider: Yen Mancia MD; Consulting Provider: Ap Samaniego MD; Physical Therapist: Viky Barcenas, PT, DPT  CC: Diabetes MGT  HPI:   79yr s/p vats for lung cancer for noted to have elevated bg post op. Patient states he has bg in 300's to 400's at home. Last a1c was > 10. 3. today it is 9.1. He feels that he is getting too many sweets on his tray and this also driving up his bg. 10 systems reviewed and negative except as noted in HPI.   Past Medical History:   Diagnosis Date    CAD (coronary artery disease) 2019    non obstructive     Chronic kidney disease     Stage 3    COPD     inhalers    Depression     managed with medications    Diabetes ([facility] [address]     takes insulin, A1c on 19 was 10.3; unsure of avg blood sugar    HTN (hypertension) 2015    managed wt medications    Malignant neoplasm of upper lobe of left lung ([address] 2019    Other ill-defined conditions(799.89)     cholesterol    Sepsis ([facility] [address] 2019      Past Surgical History:   Procedure Laterality Date    ABDOMEN SURGERY PROC UNLISTED  2015    explor lap    HX COLONOSCOPY      HX ORTHOPAEDIC      bilateral shoulder repairs, both knees repaired-no hardware    HX OTHER SURGICAL  2019    lung biopsy      Current Facility-Administered Medications   Medication Dose Route Frequency    LORazepam (ATIVAN) tablet 1 mg  1 mg Oral BID    insulin glargine (LANTUS) injection 10 Units  10 Units SubCUTAneous QHS    insulin lispro (HUMALOG) injection   SubCUTAneous Q4H    tuberculin injection 5 Units  5 Units IntraDERMal ONCE    sodium chloride (NS) flush 5-40 mL  5-40 mL IntraVENous Q8H    sodium chloride (NS) flush 5-40 mL  5-40 mL IntraVENous PRN    dextrose (D50W) injection syrg 25 g  25 g IntraVENous PRN    albuterol (PROVENTIL VENTOLIN) nebulizer solution 2.5 mg  2.5 mg Nebulization QID RT    buPROPion SR (WELLBUTRIN SR) tablet 150 mg  150 mg Oral BID    budesonide-formoteroL (SYMBICORT) 160-4.5 mcg/actuation HFA inhaler 2 Puff  2 Puff Inhalation BID    atorvastatin (LIPITOR) tablet 10 mg  10 mg Oral QHS    lisinopril (PRINIVIL, ZESTRIL) tablet 5 mg  5 mg Oral DAILY    metoprolol tartrate (LOPRESSOR) tablet 50 mg  50 mg Oral BID    sodium chloride (NS) flush 5-40 mL  5-40 mL IntraVENous Q8H    sodium chloride (NS) flush 5-40 mL  5-40 mL IntraVENous PRN    HYDROmorphone (PF) (DILAUDID) injection 1 mg  1 mg IntraVENous Q4H PRN    oxyCODONE-acetaminophen (PERCOCET 7.5) 7.5-325 mg per tablet 1 Tab  1 Tab Oral Q4H PRN    promethazine (PHENERGAN) with saline injection 12.5 mg  12.5 mg IntraVENous Q6H PRN    senna-docusate (PERICOLACE) 8.6-50 mg per tablet 2 Tab  2 Tab Oral BID    enoxaparin (LOVENOX) injection 40 mg  40 mg SubCUTAneous Q24H    gabapentin (NEURONTIN) capsule 300 mg  300 mg Oral TID    pantoprazole (PROTONIX) tablet 40 mg  40 mg Oral ACB    hydrALAZINE (APRESOLINE) 20 mg/mL injection 20 mg  20 mg IntraVENous Q6H PRN    tiotropium bromide (SPIRIVA RESPIMAT) 2.5 mcg /actuation  2 Puff Inhalation DAILY    NUTRITIONAL SUPPORT ELECTROLYTE PRN ORDERS   Does Not Apply PRN     No Known Allergies   Social History     Tobacco Use    Smoking status: Former Smoker     Packs/day: 1.00     Years: 40.00     Pack years: 40.00     Types: Cigarettes     Last attempt to quit: 2019     Years since quittin.1    Smokeless tobacco: Never Used   Substance Use Topics    Alcohol use: No      Family History   Problem Relation Age of Onset    Heart Disease Mother     Heart Disease Father     Diabetes Sister Immunization History   Administered Date(s) Administered    Influenza Vaccine 09/27/2019    TB Skin Test (PPD) Intradermal 01/24/2020       Objective:     Patient Vitals for the past 24 hrs:   Temp Pulse Resp BP SpO2   02/27/20 1540 -- -- -- -- 95 %   02/27/20 1200 -- -- -- 137/63 --   02/27/20 1045 -- 74 24 127/58 99 %   02/27/20 1031 -- 72 30 140/64 98 %   02/27/20 1017 -- 75 28 126/80 99 %   02/27/20 0900 -- 77 22 132/64 99 %   02/27/20 0833 -- 72 -- 155/52 --   02/27/20 0824 -- -- -- -- 98 %   02/27/20 0816 98.1 °F (36.7 °C) 77 30 136/63 100 %   02/27/20 0716 -- 71 17 142/64 97 %   02/27/20 0645 -- 71 17 136/60 98 %   02/27/20 0631 -- 72 30 129/63 98 %   02/27/20 0616 -- 74 20 130/59 97 %   02/27/20 0601 -- 70 20 142/65 98 %   02/27/20 0545 -- 71 19 127/62 98 %   02/27/20 0531 -- 71 18 129/62 98 %   02/27/20 0516 -- 81 14 134/61 97 %   02/27/20 0500 -- 69 18 150/63 97 %   02/27/20 0445 -- 71 19 129/65 96 %   02/27/20 0431 -- 74 16 139/56 96 %   02/27/20 0416 -- 74 17 129/62 96 %   02/27/20 0400 -- 75 15 134/60 97 %   02/27/20 0345 -- 74 14 131/61 96 %   02/27/20 0331 -- 75 14 136/56 96 %   02/27/20 0316 98.1 °F (36.7 °C) 76 14 129/63 96 %   02/27/20 0300 -- 76 12 126/60 97 %   02/27/20 0245 -- 76 12 141/63 98 %   02/27/20 0231 -- 77 13 129/61 96 %   02/27/20 0216 -- 77 15 136/60 96 %   02/27/20 0200 -- 76 11 136/61 97 %   02/27/20 0145 -- 75 12 131/63 96 %   02/27/20 0131 -- 75 12 145/65 98 %   02/27/20 0116 -- 75 11 147/55 98 %   02/27/20 0100 -- 78 11 137/65 99 %   02/27/20 0045 -- 77 12 138/62 97 %   02/27/20 0031 -- 79 15 134/61 95 %   02/27/20 0016 -- 77 12 137/62 97 %   02/26/20 2346 -- 80 11 148/68 95 %   02/26/20 2331 -- 81 12 150/68 95 %   02/26/20 2316 98.3 °F (36.8 °C) 76 16 152/68 96 %   02/26/20 2300 -- 77 17 172/70 96 %   02/26/20 2246 -- 76 14 146/66 96 %   02/26/20 2231 -- 77 15 164/70 96 %   02/26/20 2216 -- 76 16 172/72 96 %   02/26/20 2200 -- 78 16 147/65 96 %   02/26/20 2146 -- 74 16 150/69 94 %   02/26/20 2131 -- 74 16 169/73 94 %   02/26/20 2116 -- 73 18 156/72 94 %   02/26/20 2100 -- 77 19 164/75 95 %   02/26/20 2058 -- 81 -- 172/77 --   02/26/20 2047 -- 76 -- 172/77 --   02/26/20 2046 -- 74 14 172/77 94 %   02/26/20 2033 -- 80 -- (!) 154/103 96 %   02/26/20 2016 -- 74 17 151/70 94 %   02/26/20 2000 -- 71 16 168/75 97 %   02/26/20 1950 -- -- -- -- 99 %   02/26/20 1946 -- 67 15 171/74 100 %   02/26/20 1931 -- 66 16 163/73 100 %   02/26/20 1916 97.7 °F (36.5 °C) 68 25 166/74 100 %   02/26/20 1900 -- 65 15 165/72 100 %   02/26/20 1816 -- 63 14 184/79 100 %   02/26/20 1810 97.6 °F (36.4 °C) 66 12 194/77 100 %   02/26/20 1802 97.6 °F (36.4 °C) 68 16 183/50 100 %     Oxygen Therapy  O2 Sat (%): 95 % (02/27/20 1540)  Pulse via Oximetry: 74 beats per minute (02/27/20 1540)  O2 Device: Room air (02/27/20 1540)  O2 Flow Rate (L/min): 2 l/min (02/27/20 0316)  ETCO2 (mmHg): 45 mmHg (02/26/20 1140)    Intake/Output Summary (Last 24 hours) at 2/27/2020 1548  Last data filed at 2/27/2020 1321  Gross per 24 hour   Intake 1620.83 ml   Output 2810 ml   Net -1189.17 ml       Physical Exam:  General:    Well nourished. Alert. Eyes:   Normal sclera. Extraocular movements intact. ENT:  Normocephalic, atraumatic. Moist mucous membranes  CV:   RRR. No murmur, rub, or gallop. Lungs:  CTAB. No wheezing, rhonchi, or rales. Abdomen: Soft, nontender, nondistended. Bowel sounds normal.   Extremities: Warm and dry. No cyanosis or edema. Neurologic: CN II-XII grossly intact. Sensation intact. Skin:     No rashes or jaundice. Psych:  Normal mood and affect. I reviewed the labs, imaging, EKGs, telemetry, and other studies done this admission.   Data Review:   Recent Results (from the past 24 hour(s))   GLUCOSE, POC    Collection Time: 02/26/20  3:59 PM   Result Value Ref Range    Glucose (POC) 123 (H) 65 - 100 mg/dL   GLUCOSE, POC    Collection Time: 02/26/20  4:58 PM   Result Value Ref Range    Glucose (POC) 144 (H) 65 - 100 mg/dL   MAGNESIUM    Collection Time: 02/26/20  6:35 PM   Result Value Ref Range    Magnesium 1.5 (L) 1.8 - 2.4 mg/dL   POC CG8I    Collection Time: 02/26/20  6:44 PM   Result Value Ref Range    Device: NASAL CANNULA      pH (POC) 7.340 (L) 7.35 - 7.45      pCO2 (POC) 50.9 (H) 35 - 45 MMHG    pO2 (POC) 133 (H) 75 - 100 MMHG    HCO3 (POC) 27.5 (H) 22 - 26 MMOL/L    sO2 (POC) 99 (H) 95 - 98 %    Base excess (POC) 1 mmol/L    Allens test (POC) YES      Site DRAWN FROM ARTERIAL LINE      Specimen type (POC) ARTERIAL      Sodium,  136 - 145 MMOL/L    Potassium, POC 3.9 3.5 - 5.1 MMOL/L    Glucose,  (H) 65 - 100 MG/DL    Calcium, ionized (POC) 1.21 1.12 - 1.32 mmol/L    Performed by Dhavalenda Mort     Flow rate (POC) 2.000 L/min    Respiratory comment: NurseNotified     COLLECT TIME 2,696     METABOLIC PANEL, BASIC    Collection Time: 02/27/20  3:10 AM   Result Value Ref Range    Sodium 135 (L) 136 - 145 mmol/L    Potassium 5.3 (H) 3.5 - 5.1 mmol/L    Chloride 102 98 - 107 mmol/L    CO2 24 21 - 32 mmol/L    Anion gap 9 7 - 16 mmol/L    Glucose 313 (H) 65 - 100 mg/dL    BUN 18 8 - 23 MG/DL    Creatinine 1.22 0.8 - 1.5 MG/DL    GFR est AA >60 >60 ml/min/1.73m2    GFR est non-AA >60 >60 ml/min/1.73m2    Calcium 8.2 (L) 8.3 - 10.4 MG/DL   CBC WITH AUTOMATED DIFF    Collection Time: 02/27/20  3:10 AM   Result Value Ref Range    WBC 5.8 4.3 - 11.1 K/uL    RBC 4.39 4.23 - 5.6 M/uL    HGB 12.4 (L) 13.6 - 17.2 g/dL    HCT 35.7 (L) 41.1 - 50.3 %    MCV 81.3 79.6 - 97.8 FL    MCH 28.2 26.1 - 32.9 PG    MCHC 34.7 31.4 - 35.0 g/dL    RDW 14.2 11.9 - 14.6 %    PLATELET 947 541 - 217 K/uL    MPV 9.2 (L) 9.4 - 12.3 FL    ABSOLUTE NRBC 0.00 0.0 - 0.2 K/uL    DF AUTOMATED      NEUTROPHILS 66 43 - 78 %    LYMPHOCYTES 26 13 - 44 %    MONOCYTES 5 4.0 - 12.0 %    EOSINOPHILS 2 0.5 - 7.8 %    BASOPHILS 0 0.0 - 2.0 %    IMMATURE GRANULOCYTES 0 0.0 - 5.0 %    ABS. NEUTROPHILS 3.8 1.7 - 8.2 K/UL    ABS. LYMPHOCYTES 1.5 0.5 - 4.6 K/UL    ABS. MONOCYTES 0.3 0.1 - 1.3 K/UL    ABS. EOSINOPHILS 0.1 0.0 - 0.8 K/UL    ABS. BASOPHILS 0.0 0.0 - 0.2 K/UL    ABS. IMM. GRANS. 0.0 0.0 - 0.5 K/UL   MAGNESIUM    Collection Time: 02/27/20  3:10 AM   Result Value Ref Range    Magnesium 1.9 1.8 - 2.4 mg/dL   HEMOGLOBIN A1C WITH EAG    Collection Time: 02/27/20  3:10 AM   Result Value Ref Range    Hemoglobin A1c 9.1 (H) 4.8 - 6.0 %    Est. average glucose 214 mg/dL   GLUCOSE, POC    Collection Time: 02/27/20  5:08 AM   Result Value Ref Range    Glucose (POC) 297 (H) 65 - 100 mg/dL   GLUCOSE, POC    Collection Time: 02/27/20  7:46 AM   Result Value Ref Range    Glucose (POC) 456 (HH) 65 - 100 mg/dL   GLUCOSE, POC    Collection Time: 02/27/20  8:02 AM   Result Value Ref Range    Glucose (POC) 400 (H) 65 - 100 mg/dL   GLUCOSE, POC    Collection Time: 02/27/20 12:08 PM   Result Value Ref Range    Glucose (POC) 408 (H) 65 - 100 mg/dL   GLUCOSE, POC    Collection Time: 02/27/20  1:49 PM   Result Value Ref Range    Glucose (POC) 364 (H) 65 - 100 mg/dL       All Micro Results     None          Other Studies:  Xr Chest Sngl V    Result Date: 2/26/2020  CHEST X-RAY, one view. HISTORY:  Postoperative evaluation after left thoracotomy. TECHNIQUE:  AP upright portable view COMPARISON: 21 February 2020. FINDINGS:   *The lungs: Density left lung apex. . There are 2 left-sided chest tubes, one apex and one at the base. No pneumothorax. *The heart size: is normal. *The costophrenic angles: are sharp. *The pulmonary vasculature: is unremarkable. *Included portion of the upper abdomen: is unremarkable. *Bones: No gross bony lesions. *Other: None. IMPRESSION:  Postop changes. No pneumothorax. Two left-sided chest tubes. Ct Guidance Stereo Loc    Result Date: 2/26/2020  Title: CT guided lung nodule needle localization with Kopans wire and methylene blue administration.  Indication: 53-year-old male with history of left upper lobe suspicious pulmonary nodule. Previously had his procedure canceled due to acute pneumonia. Now presents following treatment for wire localization prior to going to the OR for resection. Consent: Informed written and oral consent was obtained from the patient after explanation of benefits and risks (including, but not limited to: Infection, Hemorrhage, pneumothorax). The patient's questions were answered to their satisfaction. The patient stated understanding and requested that we proceed. Technique: All CT scans at this facility are performed using dose reduction/dose modulation techniques, as appropriate the performed exam, including the following:  Automated Exposure Control; Adjustment of the mA and/or kV according to patient size (this includes techniques or standardized protocols for targeted exams where dose is matched to indication/reason for exam); and Use of Iterative Reconstruction Technique. Procedure:  Sterile barrier technique (including:  cap, mask, sterile gloves, sterile sheet, hand hygiene, and  cutaneous antisepsis) was used. With the patient supine a preliminary CT scan through the chest was performed with radio-opaque markers on the skin. Following routine prep and drape of the left upper chest, a local field block with lidocaine was achieved. Using intermittent CT technique, a 20-gauge needle was advanced into the left upper lobe pulmonary nodule. Approximately 1 mL of methylene blue was administered. A Kopans wire was then delivered and the needle removed. A bandage was applied in order to secure the wire. A post-procedure CT scan was obtained. Complications: None. Radiation Exposure Indices: Total Exam DLP = 233 mGy-cm Contrast: None. Medications:  Under physician supervision, 35 minutes of moderate intravenous conscious sedation was performed by administering Versed, Fentanyl intravenously.  Continuous pulse oximetry, heart rate, and blood pressure monitoring was performed with an independent, trained observer present. Findings: Left upper lobe pulmonary nodule measuring up to 2.7 cm. Postprocedure CT reveals a Kopan's wire within the left upper lobe pulmonary nodule with the distal aspect near the deep margin. Methylene blue appears to sustain adjacent to the nodule. No pneumothorax. Impression: Successful CT-guided left upper lobe pulmonary nodule wire localization with methylene blue injection.       Assessment and Plan:     Hospital Problems as of 2/27/2020 Date Reviewed: 2/21/2020          Codes Class Noted - Resolved POA    Lung cancer [facility]) ICD-10-CM: C34.90  ICD-9-CM: 162.9  1/15/2020 - Present Unknown              PLAN:  · Dm - monitor bg and cover with insulin, home lantus of 10 units already started  · Will titrate ssi  · Dm mgt also consulted  · Switch to dm cardiac diet  · Re-check his K  · Other mgt per the primary   · Further w/up and mgt based on his clinical course   · Will follow    Signed:  Enoch Fenton MD

## 2020-02-27 NOTE — CONSULTS
LEAPFROG PROTOCOL NOTE    Vilma Dunaway  2/27/2020    The patient is currently in the critical care setting managed by Dr. Jorge A Bills . The patient's chart is reviewed and the patient is discussed with the staff. Patient is currently hemodynamically stable. Patient has no needs identified for Intensivist management in the critical care setting at this time. Please notify us if can be of assistance. No charge billed to the patient. Thank you.     Sachin Cox NP

## 2020-02-27 NOTE — PROGRESS NOTES
peggy URBINA paged \"patient insulin orders to start tomorrow for breakfast. blood sugar is 319. would you like for us to cover dinner dosage tonight? if so 11 units? \"    Awaiting response

## 2020-02-27 NOTE — OP NOTES
[facility]  OPERATIVE REPORT    Name:  Mark Pickard  MR#:  [id number]  :  1940  ACCOUNT #:  [de-identified]  DATE OF SERVICE:  2020    PREOPERATIVE DIAGNOSIS:  Left lung cancer. 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 PROCEDURES PERFORMED:  1. Combined left thoracoscopy and thoracotomy approach for extensive lysis of adhesions over 3 hours. 2.  Left upper lobectomy. 3.  Left lower lobe wedge resection. 4.  Chest wall resection. 5.  Mediastinal lymphadenectomy. 6.  Intercostal nerve cryoablation. SURGEON:  Ludwig Guadarrama MD    ASSISTANT:  Yulissa Brunner NP    ANESTHESIA: general    COMPLICATIONS:  none    SPECIMENS REMOVED:  As above    IMPLANTS:  CT x 2    ESTIMATED BLOOD LOSS:  200 mL. INDICATION:  This is a 61-year-old male. He presented with left lung cancer since last year and his surgery was initially scheduled in 2020 which had to be canceled due to an episode of pneumonia and he also developed hypoglycemic episode, which required hospitalization. He does have few surgical risks, however, his cancer was growing on the scan and I felt further waiting could complicate more future outcome and the patient fully understands his risks and benefits and agreed to proceed with surgery. FINDINGS:  1. He has extensive acute-on-chronic pleural inflammation involving left upper lobe which was mostly plastered onto the chest wall. The dissection is extremely difficult, which require removal of part of the chest wall along with the entire lobe. He is also positive for bullous disease, which contributes the adhesions and the chronic inflammation. 2.  His cancer is felt to be within the lung. I do not believe the chronic acute inflammation in the pleural space represents malignant disease. PROCEDURE:  After informed consent obtained, the patient brought into the operating room, left in supine position. General anesthesia was administered. Double-lumen tube placed per Anesthesia. The patient then positioned to the right decubitus position with left side up. His left chest was prepped and draped in routine fashion. I first placed a trocar in the anterior axillary line about the 7th intercostal space and the pleural cavity was entered easily and while the lower lobe is mostly free, the upper lobe is densely stuck onto the chest wall. I placed a second trocar in the mid axillary line about eighth intercostal space. I started lysis of adhesions and soon I realized the upper lobe was completely stuck into the chest wall. Purely laparoscopy considered to be extremely difficult, so I decided to switch this case to thoracotomy. Posterior lateral thoracotomy was made and dissection carried through the platysma and then the serratus anterior was retracted and isolated posteriorly. The fifth rib was divided and then the intercostal space was entered in the fourth intercostal space. Adhesions were realized to be really high involving mostly the upper lobe and even thoracotomy incisions are not adequate to able to expose the upper lobe. So, instead actually we used a combined thoracoscope and then the thoracotomy incision to do the lysis of adhesions and for a large portion of the upper lobe we had to dissect into the chest wall. The chest wall was later removed together with the upper lobe. This was a very difficult and tedious process and it took almost 3 hours. We finally were able to mobilize the entire upper lobe and then able to proceed with surgery. As noted, he did have a wide localization prior to surgeries and so after upper lobe was mobilized we were able to feel the primary cancer. It seems well within the lung parenchyma. However left upper lobe inflamed and I felt it is not in good shape, especially there is  additional bullous disease and I felt upper lobectomy is indicated.     I first mobilized the lower lobe. The inferior pulmonary ligament was mobilized and I were able to dissect #8 and #9 lymph nodes. These were removed separately and then dissection carried into the hilum. As noted, there was quite a bit of chronic inflammation around the hilum and I were able to isolate both inferior pulmonary vein and superior pulmonary vein. The superior pulmonary vein was isolated circumferentially and then divided with vascular stapler. Then, I was able to follow the pulmonary arteries and the pulmonary artery was stuck into the lung and the dissection was very difficult. I were able to make a window along the pulmonary artery into the fissure and I tried to isolate the fissure. However, this is extremely adhesed and dissection was impossible. Instead, to dissect the fissure, I used the stapler and then I wedged out a part of the lower lobe and then we were able to get over dissection right down the pulmonary arteries. Again, the pulmonary artery dissection was difficult due to the inflammation and with tremendous effort I was able to isolate to a major branch to the upper lobe and each was individually divided with Endo-SERA stapler with vascular load. Further dissection revealed the lingular branch to the upper lobe. This was also divided with Endo SERA with vascular stapler. Then, lastly the bronchus to the upper lobe was isolated. The bronchus is very hard, fibrotic and this was again clamped with Endo-SERA stapler. At this time we used a black load and then the lower lobe was inflated, which was inflated nicely and then the upper lobe bronchus was divided. The specimen was then able to be removed from the surgical field and we did obtain additional lymph nodes in #7, perihilar region and AP window location. Each sample was sent separately. Then, the surgical field inspected and copious irrigation was used. Return fluid was clear.   No evidence of bleeding of air leak and we did perform intercostal nerve cryoablation for postop pain control and then Progel was used to separate along the cutting surface of the lung parenchyma to minimize postop air leak and two chest tubes, both [address] were placed, one to the apex and one along the diaphragm. Then, the incision was reapproximated with #2-0 Vicryl. The fascia was closed with 0 Vicryl on posterior anterior fascia in running fashion. Skin closed with staples. The patient tolerated the procedure well, transferred to recovery room in stable condition. All the instrument count and lap count were correct. Estimated blood loss about 200 mL. As noted, Shan Espinal is the first assistant in this case and this is an extremely challenging case which required tremendous exposures. Jojo also helped to fire stapler and did significant amount of dissections. It is impossible to perform this case without her assistance.           Bernadette Navas MD      [id number]  D:  02/26/2020 18:23  T:  02/27/2020 2:19  JOB #:  [id number]

## 2020-02-27 NOTE — CONSULTS
PM&R Rehab Consult    Subjective:     Date of Consultation:  February 27, 2020    Referring Physician: Dr Yoan Keen  Asked to review for rehab appropriateness    Active Problems:    Lung cancer [facility]) (1/15/2020)    POD #1 for scheduled VATS for left upper lobe mass; adenocarcinoma with lymph node involvement. He is a 71 yo with a PMH of COPD, DM2 poorly controlled with hyperglycemia, depression, stg 3 CKD and CAD. He was recently hospitalized last month due to pneumonia. He was found minimally responsive at home with hypoglycemia. Not the first time he has passed out from this. Of note patient with recent admission 12/31 for hemoptysis and treated for pneumonia. Raheem Quevedo completed 4-day course of IV antibiotics and was discharged. Raheem Quevedo was seen 1/15 for surgical evaluation for lung adenocarcinoma, and surgery was postponed secondary to pneumonia. On this past admission he was found to be bradycardic, hypoglycemia and hypothermic. Patient was continued on broad-spectrum antibiotics with vancomycin and Zosyn, vancomycin was later on discontinued. Patient started developing dry cough, he was given Chloraseptic spray and nystatin swish and swallow. Patient said he did not really help his dry cough. He was then started on low-dose prednisone. For his hypoglycemia he was initially started on D5 and later on had to put on D10 and once his Blood sugars improved that D10 IV fluids were discontinued. He was gradually started back on Lantus sliding scale insulin and insulin with meals. During this last admission, late January, son had concerns that pt could no longer take care of himself. Per case mgt notes 1/25 \" According to the dtr, the pt can not move in with she or her brother and they can not move in with him. Pt has other family support in the area, I.e. brother and sister and they do check on him. The pt/family has not explored [facility] as an option. Dtr expressed an interest in the pt applying for Medicaid and [facility] services. JEN provided them with a medicaid application for community waiver services. JEN also provided them with the book \"All About Seniors\" which has information about hiring non-medical home care and other community resources. JEN sent order to resume [facility] services to [facility] [place] at [place] and added [facility] social work services to the order. SW remains available to assist as needed. \"    Per review of therapy notes today; pt very impaired physically, functionally and with some cognitive concerns as well.    Past Medical History:   Diagnosis Date    CAD (coronary artery disease) 2019    non obstructive     Chronic kidney disease     Stage 3    COPD     inhalers    Depression     managed with medications    Diabetes ([facility]     takes insulin, A1c on 19 was 10.3; unsure of avg blood sugar    HTN (hypertension) 2015    managed wt medications    Malignant neoplasm of upper lobe of left lung ([facility] 2019    Other ill-defined conditions(799.89)     cholesterol    Sepsis ([facility] 2019      Family History   Problem Relation Age of Onset    Heart Disease Mother     Heart Disease Father     Diabetes Sister       Social History     Tobacco Use    Smoking status: Former Smoker     Packs/day: 1.00     Years: 40.00     Pack years: 40.00     Types: Cigarettes     Last attempt to quit: 2019     Years since quittin.1    Smokeless tobacco: Never Used   Substance Use Topics    Alcohol use: No   Home Environment: Private residence  # Steps to Enter: 2  Rails to Enter: No  One/Two Story Residence: One story  Living Alone: Yes  Patient Expects to be Discharged to[de-identified] Private residence  Current DME Used/Available at Home: [employer], Glucometer, Girtha Dulce, quad, Walker, rolling(used QC)  Tub or Shower Type: Tub/Shower combination(has walk in as well, sits on edge of tub for washing off)  Prior Level of Function/Work/Activity:  Pt lives alone in 1 level home with 2-3 steps at entrance, no HRs, has walk in and T/S combo but prefers to sit on the edge of the tub for washing up. Was driving up to 3 months ago. Uses a QC out in the community and sometimes at home. Checked his own BSs and gave himself shots but admits he has passed out a few times. Admits to 2-3 falls in last 6 months. Older sister Rod Vasquez lives just up the road and reports pt has been needing more and more help with house management and self care the past 3 months. Has children but they live out of town. Pt admits he has not been managing well. Bills are auto drafted. Still goes to [employer] weekly. Dominant Side:         RIGHT  Past Surgical History:   Procedure Laterality Date    ABDOMEN SURGERY PROC UNLISTED  5/2015    explor lap    HX COLONOSCOPY      HX ORTHOPAEDIC      bilateral shoulder repairs, both knees repaired-no hardware    HX OTHER SURGICAL  12/2019    lung biopsy      Prior to Admission medications    Medication Sig Start Date End Date Taking? Authorizing Provider   metoprolol tartrate (LOPRESSOR) 50 mg tablet Take  by mouth two (2) times a day. Yes Provider, Historical   buPROPion XL (WELLBUTRIN XL) 150 mg tablet Take 150 mg by mouth every morning. Yes Provider, Historical   insulin aspart U-100 (NOVOLOG FLEXPEN U-100 INSULIN) 100 unit/mL (3 mL) inpn With meals tid. For Blood Sugar (mg/dL) of:     Less than 150 =   0 units           150 -199 =   2 units  200 -249 =   5 units  250 -299 =   8 units  300 -349 =   10 units  350 and above = 12 units  Patient taking differently: With meals tid. For Blood Sugar (mg/dL) of:     1-2 units 130-150  3-4 units 151-249  5-6 units 250-and over   1/30/20  Yes Juanita Bowers MD   insulin glargine (LANTUS SOLOSTAR U-100 INSULIN) 100 unit/mL (3 mL) inpn 10 Units by SubCUTAneous route nightly. Yes Provider, Historical   ferrous sulfate 325 mg (65 mg iron) tablet Take 1 Tab by mouth two (2) times daily (with meals). Patient taking differently: Take 325 mg by mouth Daily (before breakfast).  1/4/20  Yes Mable Puente MD   LIPITOR 10 mg Tab Take 10 mg by mouth nightly. Yes Provider, Historical   lisinopril (PRINIVIL, ZESTRIL) 5 mg tablet take 5 mg by mouth daily. Yes Provider, Historical   ATIVAN 1 mg Tab Take 1 mg by mouth two (2) times a day. Yes Provider, Historical   albuterol (PROVENTIL VENTOLIN) 2.5 mg /3 mL (0.083 %) nebu 2.5 mg by Nebulization route every four to six (4-6) hours as needed for Other (For SOB or wheezing ). Provider, Historical   albuterol-ipratropium (DUO-NEB) 2.5 mg-0.5 mg/3 ml nebu 3 mL by Nebulization route three (3) times daily. 20   Mable Puente MD   fluticasone propion-salmeterol (ADVAIR/WIXELA) 250-50 mcg/dose diskus inhaler Take 1 Puff by inhalation two (2) times a day. Indications: Bronchospasm Prevention with COPD 20   Mable Puente MD   tiotropium [facility]) 18 mcg inhalation capsule Take 1 Cap by inhalation daily. 20   Mable Puente MD     No Known Allergies         Objective:     Vitals:  Blood pressure 137/63, pulse 74, temperature 98.1 °F (36.7 °C), resp. rate 24, height 6' 1\" (1.854 m), weight 157 lb 10.1 oz (71.5 kg), SpO2 99 %. Temp (24hrs), Av.9 °F (36.6 °C), Min:97.6 °F (36.4 °C), Max:98.3 °F (36.8 °C)      Intake and Output:   1901 -  0700  In: 2870.8 [P.O.:600;  I.V.:2270.8]  Out: 4030 [Urine:1600]      Labs/Studies:  Recent Results (from the past 72 hour(s))   CBC WITH AUTOMATED DIFF    Collection Time: 20 12:17 PM   Result Value Ref Range    WBC 5.1 4.3 - 11.1 K/uL    RBC 4.16 (L) 4.23 - 5.6 M/uL    HGB 11.8 (L) 13.6 - 17.2 g/dL    HCT 34.2 (L) 41.1 - 50.3 %    MCV 82.2 79.6 - 97.8 FL    MCH 28.4 26.1 - 32.9 PG    MCHC 34.5 31.4 - 35.0 g/dL    RDW 14.4 11.9 - 14.6 %    PLATELET 228 620 - 095 K/uL    MPV 9.2 (L) 9.4 - 12.3 FL    ABSOLUTE NRBC 0.00 0.0 - 0.2 K/uL    DF AUTOMATED      NEUTROPHILS 36 (L) 43 - 78 %    LYMPHOCYTES 56 (H) 13 - 44 %    MONOCYTES 7 4.0 - 12.0 %    EOSINOPHILS 1 0.5 - 7.8 %    BASOPHILS 0 0.0 - 2.0 %    IMMATURE GRANULOCYTES 0 0.0 - 5.0 %    ABS. NEUTROPHILS 1.8 1.7 - 8.2 K/UL    ABS. LYMPHOCYTES 2.9 0.5 - 4.6 K/UL    ABS. MONOCYTES 0.3 0.1 - 1.3 K/UL    ABS. EOSINOPHILS 0.1 0.0 - 0.8 K/UL    ABS. BASOPHILS 0.0 0.0 - 0.2 K/UL    ABS. IMM. GRANS. 0.0 0.0 - 0.5 K/UL   METABOLIC PANEL, COMPREHENSIVE    Collection Time: 02/25/20 12:17 PM   Result Value Ref Range    Sodium 135 (L) 136 - 145 mmol/L    Potassium 4.7 3.5 - 5.1 mmol/L    Chloride 101 98 - 107 mmol/L    CO2 32 21 - 32 mmol/L    Anion gap 2 (L) 7 - 16 mmol/L    Glucose 288 (H) 65 - 100 mg/dL    BUN 21 8 - 23 MG/DL    Creatinine 1.65 (H) 0.8 - 1.5 MG/DL    GFR est AA 52 (L) >60 ml/min/1.73m2    GFR est non-AA 43 (L) >60 ml/min/1.73m2    Calcium 9.0 8.3 - 10.4 MG/DL    Bilirubin, total 0.3 0.2 - 1.1 MG/DL    ALT (SGPT) 63 12 - 65 U/L    AST (SGOT) 36 15 - 37 U/L    Alk.  phosphatase 241 (H) 50 - 136 U/L    Protein, total 7.1 6.3 - 8.2 g/dL    Albumin 3.3 3.2 - 4.6 g/dL    Globulin 3.8 (H) 2.3 - 3.5 g/dL    A-G Ratio 0.9 (L) 1.2 - 3.5     URINALYSIS W/ RFLX MICROSCOPIC    Collection Time: 02/25/20 12:17 PM   Result Value Ref Range    Color YELLOW      Appearance CLEAR      Specific gravity 1.005 1.001 - 1.023      pH (UA) 6.0 5.0 - 9.0      Protein TRACE (A) NEG mg/dL    Glucose 500 (A) NEG mg/dL    Ketone NEGATIVE  NEG mg/dL    Bilirubin NEGATIVE  NEG      Blood NEGATIVE  NEG      Urobilinogen 0.2 0.2 - 1.0 EU/dL    Nitrites NEGATIVE  NEG      Leukocyte Esterase NEGATIVE  NEG      WBC 0-3 0 /hpf    RBC 0-3 0 /hpf    Epithelial cells 0-3 0 /hpf    Bacteria 0 0 /hpf    Other observations RESULTS VERIFIED MANUALLY     PROTHROMBIN TIME + INR    Collection Time: 02/25/20 12:17 PM   Result Value Ref Range    Prothrombin time 13.1 12.0 - 14.7 sec    INR 1.0     GLUCOSE, POC    Collection Time: 02/25/20 12:24 PM   Result Value Ref Range    Glucose (POC) 295 (H) 65 - 100 mg/dL   EKG, 12 LEAD, INITIAL    Collection Time: 02/26/20  8:01 AM Result Value Ref Range    Ventricular Rate 59 BPM    Atrial Rate 59 BPM    P-R Interval 170 ms    QRS Duration 112 ms    Q-T Interval 418 ms    QTC Calculation (Bezet) 413 ms    Calculated P Axis 73 degrees    Calculated R Axis -38 degrees    Calculated T Axis -46 degrees    Diagnosis       Sinus bradycardia  Left axis deviation  T wave abnormality, consider inferior ischemia  Abnormal ECG  No previous ECGs available  Confirmed by Venice Hutchison MD (), BAUDILIO MCGRAW ([id number]) on 2/26/2020 9:21:20 AM     GLUCOSE, POC    Collection Time: 02/26/20  8:48 AM   Result Value Ref Range    Glucose (POC) 279 (H) 65 - 100 mg/dL   TYPE & SCREEN    Collection Time: 02/26/20  8:50 AM   Result Value Ref Range    Crossmatch Expiration 02/29/2020     ABO/Rh(D) B POSITIVE     Antibody screen NEG    GLUCOSE, POC    Collection Time: 02/26/20  9:41 AM   Result Value Ref Range    Glucose (POC) 233 (H) 65 - 100 mg/dL   GLUCOSE, POC    Collection Time: 02/26/20 10:00 AM   Result Value Ref Range    Glucose (POC) 221 (H) 65 - 100 mg/dL   GLUCOSE, POC    Collection Time: 02/26/20 10:27 AM   Result Value Ref Range    Glucose (POC) 176 (H) 65 - 100 mg/dL   GLUCOSE, POC    Collection Time: 02/26/20 11:22 AM   Result Value Ref Range    Glucose (POC) 58 (L) 65 - 100 mg/dL   GLUCOSE, POC    Collection Time: 02/26/20 11:49 AM   Result Value Ref Range    Glucose (POC) 115 (H) 65 - 100 mg/dL   GLUCOSE, POC    Collection Time: 02/26/20 12:15 PM   Result Value Ref Range    Glucose (POC) 57 (L) 65 - 100 mg/dL   GLUCOSE, POC    Collection Time: 02/26/20 12:42 PM   Result Value Ref Range    Glucose (POC) 123 (H) 65 - 100 mg/dL   GLUCOSE, POC    Collection Time: 02/26/20  1:31 PM   Result Value Ref Range    Glucose (POC) 104 (H) 65 - 100 mg/dL   GLUCOSE, POC    Collection Time: 02/26/20  2:30 PM   Result Value Ref Range    Glucose (POC) 77 65 - 100 mg/dL   GLUCOSE, POC    Collection Time: 02/26/20  2:58 PM   Result Value Ref Range    Glucose (POC) 135 (H) 65 - 100 mg/dL   GLUCOSE, POC    Collection Time: 02/26/20  3:59 PM   Result Value Ref Range    Glucose (POC) 123 (H) 65 - 100 mg/dL   GLUCOSE, POC    Collection Time: 02/26/20  4:58 PM   Result Value Ref Range    Glucose (POC) 144 (H) 65 - 100 mg/dL   MAGNESIUM    Collection Time: 02/26/20  6:35 PM   Result Value Ref Range    Magnesium 1.5 (L) 1.8 - 2.4 mg/dL   POC CG8I    Collection Time: 02/26/20  6:44 PM   Result Value Ref Range    Device: NASAL CANNULA      pH (POC) 7.340 (L) 7.35 - 7.45      pCO2 (POC) 50.9 (H) 35 - 45 MMHG    pO2 (POC) 133 (H) 75 - 100 MMHG    HCO3 (POC) 27.5 (H) 22 - 26 MMOL/L    sO2 (POC) 99 (H) 95 - 98 %    Base excess (POC) 1 mmol/L    Allens test (POC) YES      Site DRAWN FROM ARTERIAL LINE      Specimen type (POC) ARTERIAL      Sodium,  136 - 145 MMOL/L    Potassium, POC 3.9 3.5 - 5.1 MMOL/L    Glucose,  (H) 65 - 100 MG/DL    Calcium, ionized (POC) 1.21 1.12 - 1.32 mmol/L    Performed by Jose Alejandro Mtz     Flow rate (POC) 2.000 L/min    Respiratory comment: NurseNotified     COLLECT TIME 6,436     METABOLIC PANEL, BASIC    Collection Time: 02/27/20  3:10 AM   Result Value Ref Range    Sodium 135 (L) 136 - 145 mmol/L    Potassium 5.3 (H) 3.5 - 5.1 mmol/L    Chloride 102 98 - 107 mmol/L    CO2 24 21 - 32 mmol/L    Anion gap 9 7 - 16 mmol/L    Glucose 313 (H) 65 - 100 mg/dL    BUN 18 8 - 23 MG/DL    Creatinine 1.22 0.8 - 1.5 MG/DL    GFR est AA >60 >60 ml/min/1.73m2    GFR est non-AA >60 >60 ml/min/1.73m2    Calcium 8.2 (L) 8.3 - 10.4 MG/DL   CBC WITH AUTOMATED DIFF    Collection Time: 02/27/20  3:10 AM   Result Value Ref Range    WBC 5.8 4.3 - 11.1 K/uL    RBC 4.39 4.23 - 5.6 M/uL    HGB 12.4 (L) 13.6 - 17.2 g/dL    HCT 35.7 (L) 41.1 - 50.3 %    MCV 81.3 79.6 - 97.8 FL    MCH 28.2 26.1 - 32.9 PG    MCHC 34.7 31.4 - 35.0 g/dL    RDW 14.2 11.9 - 14.6 %    PLATELET 957 080 - 093 K/uL    MPV 9.2 (L) 9.4 - 12.3 FL    ABSOLUTE NRBC 0.00 0.0 - 0.2 K/uL    DF AUTOMATED      NEUTROPHILS 66 43 - 78 %    LYMPHOCYTES 26 13 - 44 %    MONOCYTES 5 4.0 - 12.0 %    EOSINOPHILS 2 0.5 - 7.8 %    BASOPHILS 0 0.0 - 2.0 %    IMMATURE GRANULOCYTES 0 0.0 - 5.0 %    ABS. NEUTROPHILS 3.8 1.7 - 8.2 K/UL    ABS. LYMPHOCYTES 1.5 0.5 - 4.6 K/UL    ABS. MONOCYTES 0.3 0.1 - 1.3 K/UL    ABS. EOSINOPHILS 0.1 0.0 - 0.8 K/UL    ABS. BASOPHILS 0.0 0.0 - 0.2 K/UL    ABS. IMM.  GRANS. 0.0 0.0 - 0.5 K/UL   MAGNESIUM    Collection Time: 02/27/20  3:10 AM   Result Value Ref Range    Magnesium 1.9 1.8 - 2.4 mg/dL   GLUCOSE, POC    Collection Time: 02/27/20  5:08 AM   Result Value Ref Range    Glucose (POC) 297 (H) 65 - 100 mg/dL   GLUCOSE, POC    Collection Time: 02/27/20  7:46 AM   Result Value Ref Range    Glucose (POC) 456 (HH) 65 - 100 mg/dL   GLUCOSE, POC    Collection Time: 02/27/20  8:02 AM   Result Value Ref Range    Glucose (POC) 400 (H) 65 - 100 mg/dL   GLUCOSE, POC    Collection Time: 02/27/20 12:08 PM   Result Value Ref Range    Glucose (POC) 408 (H) 65 - 100 mg/dL       Speech Assessment:    Dysphagia Screening  Vocal Quality/Secretions: Normal  History of Dysphagia: No  O2 Saturation: Normal  Alertness: Normal  Pre-Swallow Assessment Score: 0  Purees: No difficulty noted  Water by Cup: No difficulty noted  Water by Straw: No difficulty noted                Ambulation:  Activity and Safety  Activity Level: Bed Rest  Ambulate: No (Comment)  Activity: Chair position in bed  Activity Assistance: Partial (two people)  Weight Bearing Status: WBAT (Weight Bearing as Tolerated)  Mode of Transportation: Stretcher  Repositioned: Head of bed elevated (degrees)  Patient Turned: Turns self  Head of Bed Elevated: Self regulated  Activity Response: Fairly tolerated  Assistive Device: Fall prevention device  Safety Measures: Bed/Chair-Wheels locked, Bed in low position, Emergency bedside equipment, Nurse at bedside, Side rails X2     Impression/Plan:     Active Problems:    [facility] (1/15/2020)    Acute on chronic debility with progressive decline in function and ability to care for self ; living alone and children cannot assist    Recommendations: Continue Acute Rehab Program  Coordination of rehab/medical care  Counseling of PM & R care issues management  Monitoring and management of medical conditions per plan of care/orders   -given chart review and events of past few mos, pt is not able to live independently. He is likely having difficulty administering medications correctly . Has had other hypoglycemic events with syncope per his own admission to such. Given he has recently diagnosed lung cancer, I do not see him getting more capable of living alone. IR will not change the fact that he will require dc to [address] ? Treatment plan. ? Tolerance to such. -strongly recommend Skilled placement.      Reviewed Therapies/Labs/Meds/Records  Colette Sharma MD

## 2020-02-27 NOTE — PROGRESS NOTES
TRANSFER - OUT REPORT:    Verbal report given to becky crowley(name) on [place]  being transferred to [facility](unit) for routine progression of care       Report consisted of patients Situation, Background, Assessment and   Recommendations(SBAR). Information from the following report(s) SBAR, Kardex, ED Summary, [facility] and Recent Results was reviewed with the receiving nurse. Lines:   Peripheral IV 02/26/20 Right Hand (Active)   Site Assessment Clean, dry, & intact 2/27/2020  8:16 AM   Phlebitis Assessment 0 2/27/2020  8:16 AM   Infiltration Assessment 0 2/27/2020  8:16 AM   Dressing Status Clean, dry, & intact 2/27/2020  8:16 AM   Dressing Type Transparent;Tape 2/27/2020  8:16 AM   Hub Color/Line Status Green;Patent; Flushed 2/27/2020  8:16 AM   Alcohol Cap Used No 2/27/2020  3:16 AM       Peripheral IV 02/26/20 Left Arm (Active)   Site Assessment Clean, dry, & intact 2/27/2020  8:16 AM   Phlebitis Assessment 0 2/27/2020  8:16 AM   Infiltration Assessment 0 2/27/2020  8:16 AM   Dressing Status Clean, dry, & intact 2/27/2020  8:16 AM   Dressing Type Transparent;Tape 2/27/2020  8:16 AM   Hub Color/Line Status Green;Patent; Infusing 2/27/2020  8:16 AM   Alcohol Cap Used No 2/27/2020  3:16 AM        Opportunity for questions and clarification was provided.       Patient transported with:   Registered Nurse

## 2020-02-27 NOTE — PROGRESS NOTES
MD aware of bgl 400s. Orders received for home dose of ativan and advance diet to diabetic from liquid.

## 2020-02-27 NOTE — PROGRESS NOTES
No events overnight. Pt oriented x4 and pain well-controlled with PRN Percocet and Dilaudid. No fevers, nausea or vomiting. Tolerating sips of water and ginger ale. UOP adequate, approx. 950 mL overnight. CT x2 intact with no airleak, approx. 600 mL sanguinous total output over 12 hrs. Report given to [facility].

## 2020-02-27 NOTE — PROGRESS NOTES
02/27/20 1321   Dual Skin Pressure Injury Assessment   Dual Skin Pressure Injury Assessment WDL   Second Care Provider (Based on [address] [place] [address]) [id number] [facility]

## 2020-02-27 NOTE — PROGRESS NOTES
END OF SHIFT NOTE:    INTAKE/OUTPUT  02/26 0701 - 02/27 0700  In: 2870.8 [P.O.:600; I.V.:2270.8]  Out: 3372 [Urine:1600]  Voiding: NO  Catheter: NO  Drain:    Bladder scanned: 52 ml           Flatus: Patient does have flatus present. Stool:  0 occurrences. Characteristics:       Emesis: 0 occurrences. Characteristics:        VITAL SIGNS  Patient Vitals for the past 12 hrs:   Temp Pulse Resp BP SpO2   02/27/20 1540 -- -- -- -- 95 %   02/27/20 1525 97.7 °F (36.5 °C) 99 18 120/66 95 %   02/27/20 1200 -- -- -- 137/63 --   02/27/20 1045 -- 74 24 127/58 99 %   02/27/20 1031 -- 72 30 140/64 98 %   02/27/20 1017 -- 75 28 126/80 99 %   02/27/20 0900 -- 77 22 132/64 99 %   02/27/20 0833 -- 72 -- 155/52 --   02/27/20 0824 -- -- -- -- 98 %   02/27/20 0816 98.1 °F (36.7 °C) 77 30 136/63 100 %   02/27/20 0716 -- 71 17 142/64 97 %       Pain Assessment  Pain Intensity 1: 8 (02/27/20 1540)  Pain Location 1: Incisional  Pain Intervention(s) 1: Repositioned, Rest  Patient Stated Pain Goal: 0    Ambulating  Yes    Shift report given to oncoming nurse at the bedside.     Solange Mcallister RN

## 2020-02-27 NOTE — PROGRESS NOTES
Initial visit made to patient and a prayer was provided to the patient and his sister, Connor Wolff.          JOVANNI Fernandez

## 2020-02-28 ENCOUNTER — APPOINTMENT (OUTPATIENT)
Dept: GENERAL RADIOLOGY | Age: 80
DRG: 164 | End: 2020-02-28
Attending: SURGERY
Payer: MEDICARE

## 2020-02-28 LAB
ANION GAP SERPL CALC-SCNC: 11 MMOL/L (ref 7–16)
BASOPHILS # BLD: 0 K/UL (ref 0–0.2)
BASOPHILS NFR BLD: 0 % (ref 0–2)
BUN SERPL-MCNC: 43 MG/DL (ref 8–23)
CALCIUM SERPL-MCNC: 8.1 MG/DL (ref 8.3–10.4)
CHLORIDE SERPL-SCNC: 97 MMOL/L (ref 98–107)
CO2 SERPL-SCNC: 22 MMOL/L (ref 21–32)
CREAT SERPL-MCNC: 1.65 MG/DL (ref 0.8–1.5)
DIFFERENTIAL METHOD BLD: ABNORMAL
EOSINOPHIL # BLD: 0 K/UL (ref 0–0.8)
EOSINOPHIL NFR BLD: 0 % (ref 0.5–7.8)
ERYTHROCYTE [DISTWIDTH] IN BLOOD BY AUTOMATED COUNT: 14 % (ref 11.9–14.6)
GLUCOSE BLD STRIP.AUTO-MCNC: 120 MG/DL (ref 65–100)
GLUCOSE BLD STRIP.AUTO-MCNC: 224 MG/DL (ref 65–100)
GLUCOSE BLD STRIP.AUTO-MCNC: 250 MG/DL (ref 65–100)
GLUCOSE BLD STRIP.AUTO-MCNC: 251 MG/DL (ref 65–100)
GLUCOSE SERPL-MCNC: 246 MG/DL (ref 65–100)
HCT VFR BLD AUTO: 29.7 % (ref 41.1–50.3)
HGB BLD-MCNC: 10.5 G/DL (ref 13.6–17.2)
IMM GRANULOCYTES # BLD AUTO: 0 K/UL (ref 0–0.5)
IMM GRANULOCYTES NFR BLD AUTO: 0 % (ref 0–5)
LYMPHOCYTES # BLD: 2.5 K/UL (ref 0.5–4.6)
LYMPHOCYTES NFR BLD: 37 % (ref 13–44)
MCH RBC QN AUTO: 28.2 PG (ref 26.1–32.9)
MCHC RBC AUTO-ENTMCNC: 35.4 G/DL (ref 31.4–35)
MCV RBC AUTO: 79.6 FL (ref 79.6–97.8)
MM INDURATION POC: 0 MM (ref 0–5)
MONOCYTES # BLD: 0.6 K/UL (ref 0.1–1.3)
MONOCYTES NFR BLD: 8 % (ref 4–12)
NEUTS SEG # BLD: 3.8 K/UL (ref 1.7–8.2)
NEUTS SEG NFR BLD: 54 % (ref 43–78)
NRBC # BLD: 0 K/UL (ref 0–0.2)
PLATELET # BLD AUTO: 169 K/UL (ref 150–450)
PMV BLD AUTO: 9.2 FL (ref 9.4–12.3)
POTASSIUM SERPL-SCNC: 4.3 MMOL/L (ref 3.5–5.1)
PPD POC: NORMAL
RBC # BLD AUTO: 3.73 M/UL (ref 4.23–5.6)
SODIUM SERPL-SCNC: 130 MMOL/L (ref 136–145)
WBC # BLD AUTO: 6.9 K/UL (ref 4.3–11.1)

## 2020-02-28 PROCEDURE — 71045 X-RAY EXAM CHEST 1 VIEW: CPT

## 2020-02-28 PROCEDURE — 36415 COLL VENOUS BLD VENIPUNCTURE: CPT

## 2020-02-28 PROCEDURE — 74011636637 HC RX REV CODE- 636/637: Performed by: INTERNAL MEDICINE

## 2020-02-28 PROCEDURE — 94640 AIRWAY INHALATION TREATMENT: CPT

## 2020-02-28 PROCEDURE — 80048 BASIC METABOLIC PNL TOTAL CA: CPT

## 2020-02-28 PROCEDURE — 3331090002 HH PPS REVENUE DEBIT

## 2020-02-28 PROCEDURE — 65270000029 HC RM PRIVATE

## 2020-02-28 PROCEDURE — 74011000250 HC RX REV CODE- 250: Performed by: SURGERY

## 2020-02-28 PROCEDURE — 94760 N-INVAS EAR/PLS OXIMETRY 1: CPT

## 2020-02-28 PROCEDURE — 74011250637 HC RX REV CODE- 250/637: Performed by: NURSE PRACTITIONER

## 2020-02-28 PROCEDURE — 85025 COMPLETE CBC W/AUTO DIFF WBC: CPT

## 2020-02-28 PROCEDURE — 74011250637 HC RX REV CODE- 250/637: Performed by: SURGERY

## 2020-02-28 PROCEDURE — 3331090001 HH PPS REVENUE CREDIT

## 2020-02-28 PROCEDURE — 74011250636 HC RX REV CODE- 250/636: Performed by: SURGERY

## 2020-02-28 PROCEDURE — 82962 GLUCOSE BLOOD TEST: CPT

## 2020-02-28 RX ORDER — INSULIN LISPRO 100 [IU]/ML
4 INJECTION, SOLUTION INTRAVENOUS; SUBCUTANEOUS
Status: DISCONTINUED | OUTPATIENT
Start: 2020-02-28 | End: 2020-02-29

## 2020-02-28 RX ORDER — INSULIN GLARGINE 100 [IU]/ML
15 INJECTION, SOLUTION SUBCUTANEOUS
Status: DISCONTINUED | OUTPATIENT
Start: 2020-02-28 | End: 2020-02-29

## 2020-02-28 RX ORDER — TAMSULOSIN HYDROCHLORIDE 0.4 MG/1
0.4 CAPSULE ORAL DAILY
Status: DISCONTINUED | OUTPATIENT
Start: 2020-02-28 | End: 2020-03-10 | Stop reason: HOSPADM

## 2020-02-28 RX ADMIN — TAMSULOSIN HYDROCHLORIDE 0.4 MG: 0.4 CAPSULE ORAL at 13:47

## 2020-02-28 RX ADMIN — BUPROPION HYDROCHLORIDE 150 MG: 150 TABLET, EXTENDED RELEASE ORAL at 08:39

## 2020-02-28 RX ADMIN — METOPROLOL TARTRATE 50 MG: 50 TABLET, FILM COATED ORAL at 22:22

## 2020-02-28 RX ADMIN — METOPROLOL TARTRATE 50 MG: 50 TABLET, FILM COATED ORAL at 08:39

## 2020-02-28 RX ADMIN — ALBUTEROL SULFATE 2.5 MG: 2.5 SOLUTION RESPIRATORY (INHALATION) at 19:49

## 2020-02-28 RX ADMIN — INSULIN LISPRO 4 UNITS: 100 INJECTION, SOLUTION INTRAVENOUS; SUBCUTANEOUS at 11:51

## 2020-02-28 RX ADMIN — ENOXAPARIN SODIUM 40 MG: 40 INJECTION SUBCUTANEOUS at 22:20

## 2020-02-28 RX ADMIN — TIOTROPIUM BROMIDE INHALATION SPRAY 2 PUFF: 3.12 SPRAY, METERED RESPIRATORY (INHALATION) at 07:35

## 2020-02-28 RX ADMIN — BUDESONIDE AND FORMOTEROL FUMARATE DIHYDRATE 2 PUFF: 160; 4.5 AEROSOL RESPIRATORY (INHALATION) at 07:35

## 2020-02-28 RX ADMIN — Medication 10 ML: at 06:11

## 2020-02-28 RX ADMIN — Medication 10 ML: at 14:00

## 2020-02-28 RX ADMIN — INSULIN LISPRO 6 UNITS: 100 INJECTION, SOLUTION INTRAVENOUS; SUBCUTANEOUS at 22:22

## 2020-02-28 RX ADMIN — INSULIN LISPRO 8 UNITS: 100 INJECTION, SOLUTION INTRAVENOUS; SUBCUTANEOUS at 11:50

## 2020-02-28 RX ADMIN — ATORVASTATIN CALCIUM 10 MG: 10 TABLET, FILM COATED ORAL at 22:20

## 2020-02-28 RX ADMIN — Medication 10 ML: at 22:23

## 2020-02-28 RX ADMIN — OXYCODONE HYDROCHLORIDE AND ACETAMINOPHEN 1 TABLET: 7.5; 325 TABLET ORAL at 00:10

## 2020-02-28 RX ADMIN — LORAZEPAM 1 MG: 1 TABLET ORAL at 08:38

## 2020-02-28 RX ADMIN — INSULIN LISPRO 8 UNITS: 100 INJECTION, SOLUTION INTRAVENOUS; SUBCUTANEOUS at 08:42

## 2020-02-28 RX ADMIN — BUDESONIDE AND FORMOTEROL FUMARATE DIHYDRATE 2 PUFF: 160; 4.5 AEROSOL RESPIRATORY (INHALATION) at 19:49

## 2020-02-28 RX ADMIN — LISINOPRIL 5 MG: 5 TABLET ORAL at 08:38

## 2020-02-28 RX ADMIN — SENNOSIDES AND DOCUSATE SODIUM 2 TABLET: 8.6; 5 TABLET ORAL at 08:38

## 2020-02-28 RX ADMIN — GABAPENTIN 300 MG: 300 CAPSULE ORAL at 08:38

## 2020-02-28 RX ADMIN — INSULIN LISPRO 4 UNITS: 100 INJECTION, SOLUTION INTRAVENOUS; SUBCUTANEOUS at 18:20

## 2020-02-28 RX ADMIN — OXYCODONE HYDROCHLORIDE AND ACETAMINOPHEN 1 TABLET: 7.5; 325 TABLET ORAL at 22:49

## 2020-02-28 RX ADMIN — LORAZEPAM 1 MG: 1 TABLET ORAL at 18:20

## 2020-02-28 RX ADMIN — PANTOPRAZOLE SODIUM 40 MG: 40 TABLET, DELAYED RELEASE ORAL at 06:11

## 2020-02-28 RX ADMIN — GABAPENTIN 300 MG: 300 CAPSULE ORAL at 22:21

## 2020-02-28 RX ADMIN — GABAPENTIN 300 MG: 300 CAPSULE ORAL at 18:19

## 2020-02-28 RX ADMIN — BUPROPION HYDROCHLORIDE 150 MG: 150 TABLET, EXTENDED RELEASE ORAL at 22:20

## 2020-02-28 RX ADMIN — SENNOSIDES AND DOCUSATE SODIUM 2 TABLET: 8.6; 5 TABLET ORAL at 18:20

## 2020-02-28 RX ADMIN — ALBUTEROL SULFATE 2.5 MG: 2.5 SOLUTION RESPIRATORY (INHALATION) at 07:35

## 2020-02-28 RX ADMIN — OXYCODONE HYDROCHLORIDE AND ACETAMINOPHEN 1 TABLET: 7.5; 325 TABLET ORAL at 08:39

## 2020-02-28 RX ADMIN — HYDROMORPHONE HYDROCHLORIDE 1 MG: 1 INJECTION, SOLUTION INTRAMUSCULAR; INTRAVENOUS; SUBCUTANEOUS at 04:23

## 2020-02-28 RX ADMIN — HYDROMORPHONE HYDROCHLORIDE 1 MG: 1 INJECTION, SOLUTION INTRAMUSCULAR; INTRAVENOUS; SUBCUTANEOUS at 11:52

## 2020-02-28 RX ADMIN — Medication 10 ML: at 06:12

## 2020-02-28 RX ADMIN — INSULIN GLARGINE 15 UNITS: 100 INJECTION, SOLUTION SUBCUTANEOUS at 22:21

## 2020-02-28 NOTE — PROGRESS NOTES
Hospitalist progress note     Admit Date:  2020  7:11 AM   Name:  Cherrie See   Age:  70 y.o.  :  1940   MRN:  [id number]   PCP:  Angie Morales MD  Treatment Team: Attending Provider: Ania Arnold MD; Utilization Review: Esequiel Marie RN; Consulting Provider: Calli Gordon, Isabell Peace MD; Consulting Provider: Chio Ward MD; Student Nurse: Triny Elizondo Primary Nurse: Nita Arce RN; Care Manager: Sin Villeda RN    CC: Diabetes MGT  HPI:   79yr s/p vats for lung cancer for noted to have elevated bg post op. Patient states he has bg in 300's to 400's at home. Last a1c was > 10. 3. today it is 9.1. He feels that he is getting too many sweets on his tray and this also driving up his bg. 2020 -patient's blood sugars into the 200s;  10 systems reviewed and negative except as noted in HPI.   Past Medical History:   Diagnosis Date    CAD (coronary artery disease) 2019    non obstructive     Chronic kidney disease     Stage 3    COPD     inhalers    Depression     managed with medications    Diabetes ([address]     takes insulin, A1c on 19 was 10.3; unsure of avg blood sugar    HTN (hypertension) 2015    managed wtih medications    Malignant neoplasm of upper lobe of left lung ([address] 2019    Other ill-defined conditions(799.89)     cholesterol    Sepsis ([address] 2019      Past Surgical History:   Procedure Laterality Date    ABDOMEN SURGERY PROC UNLISTED  2015    explor lap    HX COLONOSCOPY      HX ORTHOPAEDIC      bilateral shoulder repairs, both knees repaired-no hardware    HX OTHER SURGICAL  2019    lung biopsy      Current Facility-Administered Medications   Medication Dose Route Frequency    insulin glargine (LANTUS) injection 15 Units  15 Units SubCUTAneous QHS    insulin lispro (HUMALOG) injection 4 Units  4 Units SubCUTAneous TIDAC    tamsulosin (FLOMAX) capsule 0.4 mg  0.4 mg Oral DAILY  LORazepam (ATIVAN) tablet 1 mg  1 mg Oral BID    tuberculin injection 5 Units  5 Units IntraDERMal ONCE    insulin lispro (HUMALOG) injection   SubCUTAneous AC&HS    sodium chloride (NS) flush 5-40 mL  5-40 mL IntraVENous Q8H    sodium chloride (NS) flush 5-40 mL  5-40 mL IntraVENous PRN    dextrose (D50W) injection syrg 25 g  25 g IntraVENous PRN    albuterol (PROVENTIL VENTOLIN) nebulizer solution 2.5 mg  2.5 mg Nebulization QID RT    buPROPion SR (WELLBUTRIN SR) tablet 150 mg  150 mg Oral BID    budesonide-formoteroL (SYMBICORT) 160-4.5 mcg/actuation HFA inhaler 2 Puff  2 Puff Inhalation BID    atorvastatin (LIPITOR) tablet 10 mg  10 mg Oral QHS    lisinopril (PRINIVIL, ZESTRIL) tablet 5 mg  5 mg Oral DAILY    metoprolol tartrate (LOPRESSOR) tablet 50 mg  50 mg Oral BID    sodium chloride (NS) flush 5-40 mL  5-40 mL IntraVENous Q8H    sodium chloride (NS) flush 5-40 mL  5-40 mL IntraVENous PRN    HYDROmorphone (PF) (DILAUDID) injection 1 mg  1 mg IntraVENous Q4H PRN    oxyCODONE-acetaminophen (PERCOCET 7.5) 7.5-325 mg per tablet 1 Tab  1 Tab Oral Q4H PRN    promethazine (PHENERGAN) with saline injection 12.5 mg  12.5 mg IntraVENous Q6H PRN    senna-docusate (PERICOLACE) 8.6-50 mg per tablet 2 Tab  2 Tab Oral BID    enoxaparin (LOVENOX) injection 40 mg  40 mg SubCUTAneous Q24H    gabapentin (NEURONTIN) capsule 300 mg  300 mg Oral TID    pantoprazole (PROTONIX) tablet 40 mg  40 mg Oral ACB    hydrALAZINE (APRESOLINE) 20 mg/mL injection 20 mg  20 mg IntraVENous Q6H PRN    tiotropium bromide (SPIRIVA RESPIMAT) 2.5 mcg /actuation  2 Puff Inhalation DAILY    NUTRITIONAL SUPPORT ELECTROLYTE PRN ORDERS   Does Not Apply PRN     No Known Allergies   Social History     Tobacco Use    Smoking status: Former Smoker     Packs/day: 1.00     Years: 40.00     Pack years: 40.00     Types: Cigarettes     Last attempt to quit: 2019     Years since quittin.1    Smokeless tobacco: Never Used Substance Use Topics    Alcohol use: No      Family History   Problem Relation Age of Onset    Heart Disease Mother     Heart Disease Father     Diabetes Sister       Immunization History   Administered Date(s) Administered    Influenza Vaccine 09/27/2019    TB Skin Test (PPD) Intradermal 01/24/2020, 02/27/2020       Objective:     Patient Vitals for the past 24 hrs:   Temp Pulse Resp BP SpO2   02/28/20 1130 98.9 °F (37.2 °C) 97 18 124/55 94 %   02/28/20 0736 -- -- -- -- 93 %   02/28/20 0726 98.4 °F (36.9 °C) 100 18 124/56 96 %   02/28/20 0414 97.9 °F (36.6 °C) (!) 106 20 126/62 96 %   02/27/20 2336 97.8 °F (36.6 °C) 93 20 101/49 94 %   02/27/20 2246 -- 90 -- 119/49 --   02/27/20 2032 -- -- -- -- 96 %   02/27/20 1928 97.9 °F (36.6 °C) 95 20 113/59 94 %   02/27/20 1540 -- -- -- -- 95 %   02/27/20 1525 97.7 °F (36.5 °C) 99 18 120/66 95 %     Oxygen Therapy  O2 Sat (%): 94 %(Room air) (02/28/20 1130)  Pulse via Oximetry: 103 beats per minute (02/28/20 0736)  O2 Device: Room air (02/28/20 0736)  O2 Flow Rate (L/min): 2 l/min (02/27/20 0316)  ETCO2 (mmHg): 45 mmHg (02/26/20 1140)    Intake/Output Summary (Last 24 hours) at 2/28/2020 1417  Last data filed at 2/28/2020 1340  Gross per 24 hour   Intake 800 ml   Output 1380 ml   Net -580 ml       Physical Exam:  General:    Well nourished. Alert. Eyes:   Normal sclera. Extraocular movements intact. ENT:  Normocephalic, atraumatic. Moist mucous membranes  CV:   RRR. No murmur, rub, or gallop. Lungs:  CTAB. No wheezing, rhonchi, or rales. Abdomen: Soft, nontender, nondistended. Bowel sounds normal.   Extremities: Warm and dry. No cyanosis or edema. Neurologic: CN II-XII grossly intact. Sensation intact. Skin:     No rashes or jaundice. Psych:  Normal mood and affect. I reviewed the labs, imaging, EKGs, telemetry, and other studies done this admission.   Data Review:   Recent Results (from the past 24 hour(s))   GLUCOSE, POC    Collection Time: 02/27/20  4:59 PM   Result Value Ref Range    Glucose (POC) 319 (H) 65 - 100 mg/dL   POTASSIUM    Collection Time: 02/27/20  5:55 PM   Result Value Ref Range    Potassium 5.2 (H) 3.5 - 5.1 mmol/L   GLUCOSE, POC    Collection Time: 02/27/20  9:04 PM   Result Value Ref Range    Glucose (POC) 239 (H) 65 - 780 mg/dL   METABOLIC PANEL, BASIC    Collection Time: 02/28/20  5:55 AM   Result Value Ref Range    Sodium 130 (L) 136 - 145 mmol/L    Potassium 4.3 3.5 - 5.1 mmol/L    Chloride 97 (L) 98 - 107 mmol/L    CO2 22 21 - 32 mmol/L    Anion gap 11 7 - 16 mmol/L    Glucose 246 (H) 65 - 100 mg/dL    BUN 43 (H) 8 - 23 MG/DL    Creatinine 1.65 (H) 0.8 - 1.5 MG/DL    GFR est AA 52 (L) >60 ml/min/1.73m2    GFR est non-AA 43 (L) >60 ml/min/1.73m2    Calcium 8.1 (L) 8.3 - 10.4 MG/DL   CBC WITH AUTOMATED DIFF    Collection Time: 02/28/20  5:55 AM   Result Value Ref Range    WBC 6.9 4.3 - 11.1 K/uL    RBC 3.73 (L) 4.23 - 5.6 M/uL    HGB 10.5 (L) 13.6 - 17.2 g/dL    HCT 29.7 (L) 41.1 - 50.3 %    MCV 79.6 79.6 - 97.8 FL    MCH 28.2 26.1 - 32.9 PG    MCHC 35.4 (H) 31.4 - 35.0 g/dL    RDW 14.0 11.9 - 14.6 %    PLATELET 908 123 - 903 K/uL    MPV 9.2 (L) 9.4 - 12.3 FL    ABSOLUTE NRBC 0.00 0.0 - 0.2 K/uL    DF AUTOMATED      NEUTROPHILS 54 43 - 78 %    LYMPHOCYTES 37 13 - 44 %    MONOCYTES 8 4.0 - 12.0 %    EOSINOPHILS 0 (L) 0.5 - 7.8 %    BASOPHILS 0 0.0 - 2.0 %    IMMATURE GRANULOCYTES 0 0.0 - 5.0 %    ABS. NEUTROPHILS 3.8 1.7 - 8.2 K/UL    ABS. LYMPHOCYTES 2.5 0.5 - 4.6 K/UL    ABS. MONOCYTES 0.6 0.1 - 1.3 K/UL    ABS. EOSINOPHILS 0.0 0.0 - 0.8 K/UL    ABS. BASOPHILS 0.0 0.0 - 0.2 K/UL    ABS. IMM.  GRANS. 0.0 0.0 - 0.5 K/UL   GLUCOSE, POC    Collection Time: 02/28/20  7:36 AM   Result Value Ref Range    Glucose (POC) 251 (H) 65 - 100 mg/dL   GLUCOSE, POC    Collection Time: 02/28/20 11:25 AM   Result Value Ref Range    Glucose (POC) 250 (H) 65 - 100 mg/dL       All Micro Results     None          Other Studies:  Xr Chest Sngl V    Result Date: 2/26/2020  CHEST X-RAY, one view. HISTORY:  Postoperative evaluation after left thoracotomy. TECHNIQUE:  AP upright portable view COMPARISON: 21 February 2020. FINDINGS:   *The lungs: Density left lung apex. . There are 2 left-sided chest tubes, one apex and one at the base. No pneumothorax. *The heart size: is normal. *The costophrenic angles: are sharp. *The pulmonary vasculature: is unremarkable. *Included portion of the upper abdomen: is unremarkable. *Bones: No gross bony lesions. *Other: None. IMPRESSION:  Postop changes. No pneumothorax. Two left-sided chest tubes. Ct Guidance Stereo Loc    Result Date: 2/26/2020  Title: CT guided lung nodule needle localization with Kopans wire and methylene blue administration. Indication: 22-year-old male with history of left upper lobe suspicious pulmonary nodule. Previously had his procedure canceled due to acute pneumonia. Now presents following treatment for wire localization prior to going to the OR for resection. Consent: Informed written and oral consent was obtained from the patient after explanation of benefits and risks (including, but not limited to: Infection, Hemorrhage, pneumothorax). The patient's questions were answered to their satisfaction. The patient stated understanding and requested that we proceed. Technique: All CT scans at this facility are performed using dose reduction/dose modulation techniques, as appropriate the performed exam, including the following:  Automated Exposure Control; Adjustment of the mA and/or kV according to patient size (this includes techniques or standardized protocols for targeted exams where dose is matched to indication/reason for exam); and Use of Iterative Reconstruction Technique. Procedure:  Sterile barrier technique (including:  cap, mask, sterile gloves, sterile sheet, hand hygiene, and  cutaneous antisepsis) was used.   With the patient supine a preliminary CT scan through the chest was performed with radio-opaque markers on the skin. Following routine prep and drape of the left upper chest, a local field block with lidocaine was achieved. Using intermittent CT technique, a 20-gauge needle was advanced into the left upper lobe pulmonary nodule. Approximately 1 mL of methylene blue was administered. A Kopans wire was then delivered and the needle removed. A bandage was applied in order to secure the wire. A post-procedure CT scan was obtained. Complications: None. Radiation Exposure Indices: Total Exam DLP = 233 mGy-cm Contrast: None. Medications:  Under physician supervision, 35 minutes of moderate intravenous conscious sedation was performed by administering Versed, Fentanyl intravenously. Continuous pulse oximetry, heart rate, and blood pressure monitoring was performed with an independent, trained observer present. Findings: Left upper lobe pulmonary nodule measuring up to 2.7 cm. Postprocedure CT reveals a Kopan's wire within the left upper lobe pulmonary nodule with the distal aspect near the deep margin. Methylene blue appears to sustain adjacent to the nodule. No pneumothorax. Impression: Successful CT-guided left upper lobe pulmonary nodule wire localization with methylene blue injection.       Assessment and Plan:     Hospital Problems as of 2/28/2020 Date Reviewed: 2/21/2020          Codes Class Noted - Resolved POA    Lung cancer [facility]) ICD-10-CM: C34.90  ICD-9-CM: 162.9  1/15/2020 - Present Unknown              PLAN:  · Dm - monitor bg and cover with insulin, will increase Lantus to 15 units nightly; will add pre-meal insulin 4 units  · Will titrate ssi  · Dm mgt also consulted  · Switch to dm cardiac diet  · Other mgt per the primary   · Further w/up and mgt based on his clinical course   · Will follow    Signed:  Cheikh Ruvalcaba MD

## 2020-02-28 NOTE — PROGRESS NOTES
END OF SHIFT NOTE:    INTAKE/OUTPUT  02/27 0701 - 02/28 0700  In: -   Out: 3085 [Urine:1325]  Voiding: YES via ch  Catheter: YES  Drain:    Chest tube A; 0 output for the shift  Chest tube P: 30ml output for the shift. (with moderate drainage at the site)         Flatus: Patient does not have flatus present. Stool:  0 occurrences. Characteristics:       Emesis: 0 occurrences. Characteristics:        VITAL SIGNS  Patient Vitals for the past 12 hrs:   Temp Pulse Resp BP SpO2   02/28/20 0414 97.9 °F (36.6 °C) (!) 106 20 126/62 96 %   02/27/20 2336 97.8 °F (36.6 °C) 93 20 101/49 94 %   02/27/20 2246 -- 90 -- 119/49 --   02/27/20 2032 -- -- -- -- 96 %   02/27/20 1928 97.9 °F (36.6 °C) 95 20 113/59 94 %       Pain Assessment  Pain Intensity 1: 8 (02/28/20 0423)  Pain Location 1: Incisional, Flank, Back  Pain Intervention(s) 1: Medication (see MAR)  Patient Stated Pain Goal: 0    Ambulating  No    Shift report given to oncoming nurse at the bedside.     Matty Rodrigez, RN

## 2020-02-28 NOTE — DIABETES MGMT
Patient admitted with lung cancer s/p surgery. Admitting blood glucose 279. Blood glucose ranged 239-456 yesterday with patient receiving Lantus 10 units and Humalog 42 units. Blood glucose this morning was 251. Provider updated this AM via [employer] pt would likely benefit from an increase in basal insulin as fasting blood glucose is not at goal. Creatinine 1.65. GFR 52. Pt in bed, visitor at bedside. Pt last seen by diabetes management team 1/29. HbA1c 9.1 (eAG 214). Previously had discussed with pt that he would likely benefit from CGM therapy and a referral to endocrinology as typically pt blood glucose levels are labile. Which was again reviewed with patient. Per previous conversations. Pt was diagnosed with diabetes \"over 40 years ago. \" Pt states at this time he has been checking his blood glucose \"in the morning and with dinner. \" Pt has trouble remembering his insulin doses stating \"I think the Lantus is 10 units and Humalog is 20 units maybe. \" Pt uses insulin pens. Educated patient regarding current basal/bolus regimen of Lantus 15 units nightly and Humalog 4 units with meals and SSI including type of insulin, timing with meals, onset, duration of effect, and peak of insulin dose. Educated patient on the differences between prandial insulin and sliding scale insulin. Pt states he ate 75% of his meal. Reviewed effects of stress, pain, steroid therapy, and infection on glycemic control. Pt verbalized understanding. At this time pt drowsy falling asleep will continue to follow along.

## 2020-02-28 NOTE — PROGRESS NOTES
2/28/2020    PLAN:  Diet- Diabetic  DVT Prophylactics- SCD/Lovenox  Pain control- Dilaudid/percocet  SUP prophylaxis- Protonix  Encourage C/DB/IS  Chest tubes to water seal  Will remove anterior chest tube today  Add Flomax  Will CHEYENNE ch tomorrow  Adjust insulin  Consult PT/OT  Consult Social service for discharge planning- lives alone  PPD ordered  Transfered to floor 2/27/20  Follow CXR      ASSESSMENT:  Admit Date: 2/26/2020   1 Day Post-Op  Procedure(s):  LEFT VATS WITH LEFT UPPER LOBECTOMY/ , MEDIASTINAL LYMPHADENECTOMY/CYRO      SUBJECTIVE: 02/28/2020-  No complaints. Pain controlled. On room air 100%. Chest tubes without airleak noted. Pulling 1200 on IS. Tolerating diet. AF, NAD. OBJECTIVE:  Patient Vitals for the past 24 hrs:   Temp Pulse Resp BP SpO2   02/28/20 0736 -- -- -- -- 93 %   02/28/20 0726 98.4 °F (36.9 °C) 100 18 124/56 96 %   02/28/20 0414 97.9 °F (36.6 °C) (!) 106 20 126/62 96 %   02/27/20 2336 97.8 °F (36.6 °C) 93 20 101/49 94 %   02/27/20 2246 -- 90 -- 119/49 --   02/27/20 2032 -- -- -- -- 96 %   02/27/20 1928 97.9 °F (36.6 °C) 95 20 113/59 94 %   02/27/20 1540 -- -- -- -- 95 %   02/27/20 1525 97.7 °F (36.5 °C) 99 18 120/66 95 %   02/27/20 1200 -- -- -- 137/63 --   02/27/20 1045 -- 74 24 127/58 99 %   02/27/20 1031 -- 72 30 140/64 98 %   02/27/20 1017 -- 75 28 126/80 99 %         Date 02/27/20 0700 - 02/28/20 0659 02/28/20 0700 - 02/29/20 0659   Shift 0042-1296 0837-9269 24 Hour Total 2285-1567 3413-4240 24 Hour Total   INTAKE   Shift Total(mL/kg)         OUTPUT   Urine(mL/kg/hr) 625(0.7) 700(0.8) 1325(0.8)        Urine Output (mL) ([REMOVED] Urinary Catheter 02/26/20 2- way) 625  625        Urine Output (mL) (Urinary Catheter 02/27/20 Ch)  700 700      Chest Tube 306 30 336        Output (ml) (Chest Tube #1 02/26/20 Straight; Anterior; Left) 126 0 126        Output (ml) (Chest Tube #2 02/26/20 Angled; Posterior; Left) 180 30 210      Shift Total(mL/kg) 931(13) 730(10.2) 9872(37.1)      NET -258 -623 -7960      Weight (kg) 71.5 71.5 71.5 71.5 71.5 71.5            General:          No acute distress    Lungs:             Even and unlabored, chest tubes without airleak noted  Heart:              RRR  Abdomen:        Soft, Non distended, appropriately tender. Extremities:     No cyanosis, clubbing or edema  Neurologic:      No focal deficits           Labs:    Recent Labs     02/28/20  0555  02/25/20  1217   WBC 6.9   < > 5.1   HGB 10.5*   < > 11.8*      < > 196   *   < > 135*   K 4.3   < > 4.7   CL 97*   < > 101   CO2 22   < > 32   BUN 43*   < > 21   CREA 1.65*   < > 1.65*   *   < > 288*   PTP  --   --  13.1   INR  --   --  1.0   TBILI  --   --  0.3   SGOT  --   --  36   ALT  --   --  63   AP  --   --  241*    < > = values in this interval not displayed.          [facility], NP

## 2020-02-28 NOTE — PROGRESS NOTES
Anterior chest tube removed at the end of inspiration. Dressing applied. Pt tolerated procedure well.  CXR ordered for joradn.    Alda Davies NP

## 2020-02-29 ENCOUNTER — APPOINTMENT (OUTPATIENT)
Dept: GENERAL RADIOLOGY | Age: 80
DRG: 164 | End: 2020-02-29
Attending: SURGERY
Payer: MEDICARE

## 2020-02-29 LAB
ANION GAP SERPL CALC-SCNC: 6 MMOL/L (ref 7–16)
ARTERIAL PATENCY WRIST A: YES
BASE EXCESS BLD CALC-SCNC: 0 MMOL/L
BASOPHILS # BLD: 0 K/UL (ref 0–0.2)
BASOPHILS NFR BLD: 0 % (ref 0–2)
BDY SITE: ABNORMAL
BUN SERPL-MCNC: 43 MG/DL (ref 8–23)
CA-I BLD-MCNC: 1.15 MMOL/L (ref 1.12–1.32)
CALCIUM SERPL-MCNC: 7.9 MG/DL (ref 8.3–10.4)
CHLORIDE SERPL-SCNC: 97 MMOL/L (ref 98–107)
CO2 SERPL-SCNC: 27 MMOL/L (ref 21–32)
COLLECT TIME,HTIME: 1330
CREAT SERPL-MCNC: 1.44 MG/DL (ref 0.8–1.5)
DIFFERENTIAL METHOD BLD: ABNORMAL
EOSINOPHIL # BLD: 0.1 K/UL (ref 0–0.8)
EOSINOPHIL NFR BLD: 1 % (ref 0.5–7.8)
ERYTHROCYTE [DISTWIDTH] IN BLOOD BY AUTOMATED COUNT: 13.9 % (ref 11.9–14.6)
GAS FLOW.O2 O2 DELIVERY SYS: ABNORMAL L/MIN
GLUCOSE BLD STRIP.AUTO-MCNC: 130 MG/DL (ref 65–100)
GLUCOSE BLD STRIP.AUTO-MCNC: 182 MG/DL (ref 65–100)
GLUCOSE BLD STRIP.AUTO-MCNC: 196 MG/DL (ref 65–100)
GLUCOSE BLD STRIP.AUTO-MCNC: 222 MG/DL (ref 65–100)
GLUCOSE BLD STRIP.AUTO-MCNC: 257 MG/DL (ref 65–100)
GLUCOSE BLD STRIP.AUTO-MCNC: 309 MG/DL (ref 65–100)
GLUCOSE SERPL-MCNC: 153 MG/DL (ref 65–100)
HCO3 BLD-SCNC: 24.2 MMOL/L (ref 22–26)
HCT VFR BLD AUTO: 26 % (ref 41.1–50.3)
HGB BLD-MCNC: 9 G/DL (ref 13.6–17.2)
IMM GRANULOCYTES # BLD AUTO: 0 K/UL (ref 0–0.5)
IMM GRANULOCYTES NFR BLD AUTO: 0 % (ref 0–5)
LYMPHOCYTES # BLD: 2.6 K/UL (ref 0.5–4.6)
LYMPHOCYTES NFR BLD: 39 % (ref 13–44)
MCH RBC QN AUTO: 28 PG (ref 26.1–32.9)
MCHC RBC AUTO-ENTMCNC: 34.6 G/DL (ref 31.4–35)
MCV RBC AUTO: 81 FL (ref 79.6–97.8)
MM INDURATION POC: 0 MM (ref 0–5)
MONOCYTES # BLD: 0.7 K/UL (ref 0.1–1.3)
MONOCYTES NFR BLD: 10 % (ref 4–12)
NEUTS SEG # BLD: 3.3 K/UL (ref 1.7–8.2)
NEUTS SEG NFR BLD: 50 % (ref 43–78)
NRBC # BLD: 0 K/UL (ref 0–0.2)
PCO2 BLD: 34.9 MMHG (ref 35–45)
PH BLD: 7.45 [PH] (ref 7.35–7.45)
PLATELET # BLD AUTO: 152 K/UL (ref 150–450)
PMV BLD AUTO: 9.4 FL (ref 9.4–12.3)
PO2 BLD: 65 MMHG (ref 75–100)
POTASSIUM BLD-SCNC: 4.1 MMOL/L (ref 3.5–5.1)
POTASSIUM SERPL-SCNC: 4.2 MMOL/L (ref 3.5–5.1)
PPD POC: NORMAL
RBC # BLD AUTO: 3.21 M/UL (ref 4.23–5.6)
SAO2 % BLD: 94 % (ref 95–98)
SERVICE CMNT-IMP: ABNORMAL
SODIUM BLD-SCNC: 127 MMOL/L (ref 136–145)
SODIUM SERPL-SCNC: 130 MMOL/L (ref 136–145)
SPECIMEN TYPE: ABNORMAL
WBC # BLD AUTO: 6.6 K/UL (ref 4.3–11.1)

## 2020-02-29 PROCEDURE — 51798 US URINE CAPACITY MEASURE: CPT

## 2020-02-29 PROCEDURE — 94760 N-INVAS EAR/PLS OXIMETRY 1: CPT

## 2020-02-29 PROCEDURE — 3331090001 HH PPS REVENUE CREDIT

## 2020-02-29 PROCEDURE — 36600 WITHDRAWAL OF ARTERIAL BLOOD: CPT

## 2020-02-29 PROCEDURE — 36415 COLL VENOUS BLD VENIPUNCTURE: CPT

## 2020-02-29 PROCEDURE — 74011636637 HC RX REV CODE- 636/637: Performed by: INTERNAL MEDICINE

## 2020-02-29 PROCEDURE — 74011000250 HC RX REV CODE- 250: Performed by: SURGERY

## 2020-02-29 PROCEDURE — 80048 BASIC METABOLIC PNL TOTAL CA: CPT

## 2020-02-29 PROCEDURE — 82947 ASSAY GLUCOSE BLOOD QUANT: CPT

## 2020-02-29 PROCEDURE — 74011250636 HC RX REV CODE- 250/636: Performed by: HOSPITALIST

## 2020-02-29 PROCEDURE — 65270000029 HC RM PRIVATE

## 2020-02-29 PROCEDURE — 74011250637 HC RX REV CODE- 250/637: Performed by: NURSE PRACTITIONER

## 2020-02-29 PROCEDURE — 74011250636 HC RX REV CODE- 250/636: Performed by: SURGERY

## 2020-02-29 PROCEDURE — 94640 AIRWAY INHALATION TREATMENT: CPT

## 2020-02-29 PROCEDURE — 82803 BLOOD GASES ANY COMBINATION: CPT

## 2020-02-29 PROCEDURE — 82962 GLUCOSE BLOOD TEST: CPT

## 2020-02-29 PROCEDURE — 3331090002 HH PPS REVENUE DEBIT

## 2020-02-29 PROCEDURE — 85025 COMPLETE CBC W/AUTO DIFF WBC: CPT

## 2020-02-29 PROCEDURE — 77030040830 HC CATH URETH FOL MDII -A

## 2020-02-29 PROCEDURE — 71045 X-RAY EXAM CHEST 1 VIEW: CPT

## 2020-02-29 PROCEDURE — 74011250636 HC RX REV CODE- 250/636

## 2020-02-29 PROCEDURE — 74011636637 HC RX REV CODE- 636/637: Performed by: HOSPITALIST

## 2020-02-29 PROCEDURE — 74011250637 HC RX REV CODE- 250/637: Performed by: SURGERY

## 2020-02-29 RX ORDER — INSULIN LISPRO 100 [IU]/ML
6 INJECTION, SOLUTION INTRAVENOUS; SUBCUTANEOUS
Status: DISCONTINUED | OUTPATIENT
Start: 2020-02-29 | End: 2020-03-03

## 2020-02-29 RX ORDER — HYDROMORPHONE HYDROCHLORIDE 1 MG/ML
0.2 INJECTION, SOLUTION INTRAMUSCULAR; INTRAVENOUS; SUBCUTANEOUS
Status: DISCONTINUED | OUTPATIENT
Start: 2020-02-29 | End: 2020-03-01

## 2020-02-29 RX ORDER — NALOXONE HYDROCHLORIDE 0.4 MG/ML
INJECTION, SOLUTION INTRAMUSCULAR; INTRAVENOUS; SUBCUTANEOUS
Status: COMPLETED
Start: 2020-02-29 | End: 2020-02-29

## 2020-02-29 RX ORDER — NALOXONE HYDROCHLORIDE 1 MG/ML
0.4 INJECTION INTRAMUSCULAR; INTRAVENOUS; SUBCUTANEOUS ONCE
Status: DISPENSED | OUTPATIENT
Start: 2020-02-29 | End: 2020-03-01

## 2020-02-29 RX ORDER — NALOXONE HYDROCHLORIDE 0.4 MG/ML
INJECTION, SOLUTION INTRAMUSCULAR; INTRAVENOUS; SUBCUTANEOUS
Status: DISCONTINUED
Start: 2020-02-29 | End: 2020-02-29 | Stop reason: WASHOUT

## 2020-02-29 RX ORDER — INSULIN GLARGINE 100 [IU]/ML
17 INJECTION, SOLUTION SUBCUTANEOUS
Status: DISCONTINUED | OUTPATIENT
Start: 2020-02-29 | End: 2020-03-02

## 2020-02-29 RX ORDER — OXYCODONE AND ACETAMINOPHEN 5; 325 MG/1; MG/1
1 TABLET ORAL
Status: DISCONTINUED | OUTPATIENT
Start: 2020-02-29 | End: 2020-03-01

## 2020-02-29 RX ADMIN — Medication 10 ML: at 22:40

## 2020-02-29 RX ADMIN — METOPROLOL TARTRATE 50 MG: 50 TABLET, FILM COATED ORAL at 09:20

## 2020-02-29 RX ADMIN — METOPROLOL TARTRATE 50 MG: 50 TABLET, FILM COATED ORAL at 22:40

## 2020-02-29 RX ADMIN — Medication 10 ML: at 22:41

## 2020-02-29 RX ADMIN — INSULIN GLARGINE 17 UNITS: 100 INJECTION, SOLUTION SUBCUTANEOUS at 22:38

## 2020-02-29 RX ADMIN — Medication 10 ML: at 13:43

## 2020-02-29 RX ADMIN — INSULIN LISPRO 3 UNITS: 100 INJECTION, SOLUTION INTRAVENOUS; SUBCUTANEOUS at 09:18

## 2020-02-29 RX ADMIN — INSULIN LISPRO 6 UNITS: 100 INJECTION, SOLUTION INTRAVENOUS; SUBCUTANEOUS at 12:10

## 2020-02-29 RX ADMIN — SODIUM CHLORIDE 500 ML: 900 INJECTION, SOLUTION INTRAVENOUS at 13:41

## 2020-02-29 RX ADMIN — Medication 10 ML: at 06:18

## 2020-02-29 RX ADMIN — ALBUTEROL SULFATE 2.5 MG: 2.5 SOLUTION RESPIRATORY (INHALATION) at 19:59

## 2020-02-29 RX ADMIN — Medication 10 ML: at 06:19

## 2020-02-29 RX ADMIN — INSULIN LISPRO 11 UNITS: 100 INJECTION, SOLUTION INTRAVENOUS; SUBCUTANEOUS at 22:39

## 2020-02-29 RX ADMIN — TIOTROPIUM BROMIDE INHALATION SPRAY 2 PUFF: 3.12 SPRAY, METERED RESPIRATORY (INHALATION) at 08:56

## 2020-02-29 RX ADMIN — INSULIN LISPRO 8 UNITS: 100 INJECTION, SOLUTION INTRAVENOUS; SUBCUTANEOUS at 12:10

## 2020-02-29 RX ADMIN — PANTOPRAZOLE SODIUM 40 MG: 40 TABLET, DELAYED RELEASE ORAL at 06:35

## 2020-02-29 RX ADMIN — SENNOSIDES AND DOCUSATE SODIUM 2 TABLET: 8.6; 5 TABLET ORAL at 09:20

## 2020-02-29 RX ADMIN — INSULIN LISPRO 6 UNITS: 100 INJECTION, SOLUTION INTRAVENOUS; SUBCUTANEOUS at 09:18

## 2020-02-29 RX ADMIN — OXYCODONE HYDROCHLORIDE AND ACETAMINOPHEN 1 TABLET: 7.5; 325 TABLET ORAL at 12:07

## 2020-02-29 RX ADMIN — HYDROMORPHONE HYDROCHLORIDE 1 MG: 1 INJECTION, SOLUTION INTRAMUSCULAR; INTRAVENOUS; SUBCUTANEOUS at 06:31

## 2020-02-29 RX ADMIN — ATORVASTATIN CALCIUM 10 MG: 10 TABLET, FILM COATED ORAL at 22:40

## 2020-02-29 RX ADMIN — ENOXAPARIN SODIUM 40 MG: 40 INJECTION SUBCUTANEOUS at 22:39

## 2020-02-29 RX ADMIN — ALBUTEROL SULFATE 2.5 MG: 2.5 SOLUTION RESPIRATORY (INHALATION) at 15:32

## 2020-02-29 RX ADMIN — GABAPENTIN 300 MG: 300 CAPSULE ORAL at 09:21

## 2020-02-29 RX ADMIN — LORAZEPAM 1 MG: 1 TABLET ORAL at 09:20

## 2020-02-29 RX ADMIN — ALBUTEROL SULFATE 2.5 MG: 2.5 SOLUTION RESPIRATORY (INHALATION) at 07:35

## 2020-02-29 RX ADMIN — INSULIN LISPRO 6 UNITS: 100 INJECTION, SOLUTION INTRAVENOUS; SUBCUTANEOUS at 17:02

## 2020-02-29 RX ADMIN — ALBUTEROL SULFATE 2.5 MG: 2.5 SOLUTION RESPIRATORY (INHALATION) at 12:53

## 2020-02-29 RX ADMIN — BUPROPION HYDROCHLORIDE 150 MG: 150 TABLET, EXTENDED RELEASE ORAL at 09:20

## 2020-02-29 RX ADMIN — NALOXONE HYDROCHLORIDE: 0.4 INJECTION, SOLUTION INTRAMUSCULAR; INTRAVENOUS; SUBCUTANEOUS at 13:30

## 2020-02-29 RX ADMIN — GABAPENTIN 300 MG: 300 CAPSULE ORAL at 22:40

## 2020-02-29 RX ADMIN — SENNOSIDES AND DOCUSATE SODIUM 2 TABLET: 8.6; 5 TABLET ORAL at 17:02

## 2020-02-29 RX ADMIN — LISINOPRIL 5 MG: 5 TABLET ORAL at 09:21

## 2020-02-29 RX ADMIN — BUDESONIDE AND FORMOTEROL FUMARATE DIHYDRATE 2 PUFF: 160; 4.5 AEROSOL RESPIRATORY (INHALATION) at 20:00

## 2020-02-29 RX ADMIN — BUDESONIDE AND FORMOTEROL FUMARATE DIHYDRATE 2 PUFF: 160; 4.5 AEROSOL RESPIRATORY (INHALATION) at 07:35

## 2020-02-29 RX ADMIN — TAMSULOSIN HYDROCHLORIDE 0.4 MG: 0.4 CAPSULE ORAL at 09:19

## 2020-02-29 RX ADMIN — BUPROPION HYDROCHLORIDE 150 MG: 150 TABLET, EXTENDED RELEASE ORAL at 22:40

## 2020-02-29 NOTE — PROGRESS NOTES
Spoke with Karrie Cobian via phone re: patient unable to void after ch removed at 1000. Bladder scan=>504 ml. Verbal order received to place indwelling ch cath.

## 2020-02-29 NOTE — PROGRESS NOTES
END OF SHIFT NOTE:    INTAKE/OUTPUT  02/27 0701 - 02/28 0700  In: -   Out: 4399 [Urine:1325]  Voiding: NO  Catheter: YES  Drain:              Flatus: Patient does not have flatus present. Stool:  0 occurrences. Characteristics:      Emesis: 0 occurrences. Characteristics:        VITAL SIGNS  Patient Vitals for the past 12 hrs:   Temp Pulse Resp BP SpO2   02/28/20 1449 97.9 °F (36.6 °C) (!) 103 19 101/61 95 %   02/28/20 1130 98.9 °F (37.2 °C) 97 18 124/55 94 %       Pain Assessment  Pain Intensity 1: 0 (02/28/20 1500)  Pain Location 1: Incisional, Flank, Back  Pain Intervention(s) 1: Repositioned, Family Support  Patient Stated Pain Goal: 0    Ambulating  Yes to chair with assistance    Shift report given to oncoming nurse at the bedside.     Theodora King RN

## 2020-02-29 NOTE — PROGRESS NOTES
Problem: Falls - Risk of  Goal: *Absence of Falls  Description  Document Nicole Latin Fall Risk and appropriate interventions in the flowsheet. Outcome: Progressing Towards Goal  Note: Fall Risk Interventions:  Mobility Interventions: Patient to call before getting OOB, PT Consult for mobility concerns, OT consult for ADLs    Mentation Interventions: Adequate sleep, hydration, pain control, Increase mobility, More frequent rounding    Medication Interventions: Assess postural VS orthostatic hypotension, Patient to call before getting OOB, Teach patient to arise slowly    Elimination Interventions: Call light in reach, Urinal in reach, Toileting schedule/hourly rounds    History of Falls Interventions: Door open when patient unattended         Problem: Patient Education: Go to Patient Education Activity  Goal: Patient/Family Education  Outcome: Progressing Towards Goal     Problem: Pressure Injury - Risk of  Goal: *Prevention of pressure injury  Description  Document Garfield Scale and appropriate interventions in the flowsheet.   Outcome: Progressing Towards Goal  Note: Pressure Injury Interventions:  Sensory Interventions: Assess changes in LOC, Check visual cues for pain, Minimize linen layers         Activity Interventions: Increase time out of bed, PT/OT evaluation    Mobility Interventions: HOB 30 degrees or less, Pressure redistribution bed/mattress (bed type)    Nutrition Interventions: Document food/fluid/supplement intake, Offer support with meals,snacks and hydration    Friction and Shear Interventions: HOB 30 degrees or less, Apply protective barrier, creams and emollients                Problem: Patient Education: Go to Patient Education Activity  Goal: Patient/Family Education  Outcome: Progressing Towards Goal     Problem: Patient Education: Go to Patient Education Activity  Goal: Patient/Family Education  Outcome: Progressing Towards Goal     Problem: Patient Education: Go to Patient Education Activity  Goal: Patient/Family Education  Outcome: Progressing Towards Goal     Problem: Diabetes Self-Management  Goal: *Disease process and treatment process  Description  Define diabetes and identify own type of diabetes; list 3 options for treating diabetes. Outcome: Progressing Towards Goal  Goal: *Incorporating nutritional management into lifestyle  Description  Describe effect of type, amount and timing of food on blood glucose; list 3 methods for planning meals. Outcome: Progressing Towards Goal  Goal: *Incorporating physical activity into lifestyle  Description  State effect of exercise on blood glucose levels. Outcome: Progressing Towards Goal  Goal: *Developing strategies to promote health/change behavior  Description  Define the ABC's of diabetes; identify appropriate screenings, schedule and personal plan for screenings. Outcome: Progressing Towards Goal  Goal: *Using medications safely  Description  State effect of diabetes medications on diabetes; name diabetes medication taking, action and side effects. Outcome: Progressing Towards Goal  Goal: *Monitoring blood glucose, interpreting and using results  Description  Identify recommended blood glucose targets  and personal targets. Outcome: Progressing Towards Goal  Goal: *Prevention, detection, treatment of acute complications  Description  List symptoms of hyper- and hypoglycemia; describe how to treat low blood sugar and actions for lowering  high blood glucose level. Outcome: Progressing Towards Goal  Goal: *Prevention, detection and treatment of chronic complications  Description  Define the natural course of diabetes and describe the relationship of blood glucose levels to long term complications of diabetes.   Outcome: Progressing Towards Goal  Goal: *Developing strategies to address psychosocial issues  Description  Describe feelings about living with diabetes; identify support needed and support network  Outcome: Progressing Towards Goal  Goal: *Insulin pump training  Outcome: Progressing Towards Goal  Goal: *Sick day guidelines  Outcome: Progressing Towards Goal     Problem: Patient Education: Go to Patient Education Activity  Goal: Patient/Family Education  Outcome: Progressing Towards Goal

## 2020-02-29 NOTE — PROGRESS NOTES
[facility] CRITICAL CARE OUTREACH NURSE PROGRESS REPORT      SUBJECTIVE: Called to assess patient secondary to transfer from ICU/ Rapid Response. MEWS Score: 1 (02/29/20 0727)  Vitals:    02/29/20 0923 02/29/20 1228 02/29/20 1256 02/29/20 1331   BP: 104/53 170/49  164/47   Pulse: 79 82  80   Resp:  18     Temp:  97.7 °F (36.5 °C)     SpO2:  98% 98% 96%   Weight:       Height:              LAB DATA:    Recent Labs     02/29/20  0453 02/28/20  0555 02/27/20  1755 02/27/20  0310  02/26/20  1835   * 130*  --  135*  --   --    K 4.2 4.3 5.2* 5.3*   < >  --    CL 97* 97*  --  102  --   --    CO2 27 22  --  24  --   --    AGAP 6* 11  --  9  --   --    * 246*  --  313*  --   --    BUN 43* 43*  --  18  --   --    CREA 1.44 1.65*  --  1.22  --   --    GFRAA >60 52*  --  >60  --   --    GFRNA 51* 43*  --  >60  --   --    CA 7.9* 8.1*  --  8.2*  --   --    MG  --   --   --  1.9  --  1.5*    < > = values in this interval not displayed. Recent Labs     02/29/20 0453 02/28/20 0555 02/27/20  0310   WBC 6.6 6.9 5.8   HGB 9.0* 10.5* 12.4*   HCT 26.0* 29.7* 35.7*    169 208          OBJECTIVE: On arrival to room, I found patient to be minimally responsive with family and staff at bedside. Pain Assessment  Pain Intensity 1: 5 (02/29/20 1207)  Pain Location 1: Chest  Pain Intervention(s) 1: Medication (see MAR)  Patient Stated Pain Goal: 0          ASSESSMENT:  Arrived to round on patient and RN at bedside calling RR for hypotension and patient very lethargic, opens eyes to sternal rub. Patient recently received pain medication around 12 pm. Rapid response was called; BP upon arrival 76/38 on recheck 89/44 on opposite arm. O2 sat 95% on room air. . Dr. Tanesha Toure and Dr. Nawaf Valdez at bedside. Orders to give 500cc bolus NS and narcan. Patient more responsive after narcan and now communicating. ABG also ordered; pH 7.45, CO2 34.9, O2 65.  BP on recheck 164/47, HR 81, O2 sat 96% on room air. Per Dr. Elina Ball, he has adjusted pain medication and reduced the dosage. PLAN:  Will continue to follow patient due to recent rapid response.

## 2020-02-29 NOTE — PROGRESS NOTES
Date of Outreach Update:  Leeann Altman was seen and assessed. Previous Outreach assessment has been reviewed. There have been no significant clinical changes since the completion of the last dated Outreach assessment. Patient seen after recent rapid response. He is alert with family at bedside. O2 sat 96% on room air, HR 88. Patient is not in any visible distress. He denies any pain. Chest tube in place with no visible air leak. Will continue to follow up per outreach protocol.     Signed By:   Zully Collins    February 29, 2020 4:53 PM

## 2020-02-29 NOTE — PROGRESS NOTES
Critical Care Outreach Nurse Progress Report:    Subjective: In to assess pt secondary to transfer from unit. MEWS Score: 2 (02/28/20 1449)    Vitals:    02/28/20 1130 02/28/20 1449 02/28/20 1949 02/28/20 2021   BP: 124/55 101/61  104/65   Pulse: 97 (!) 103  78   Resp: 18 19  18   Temp: 98.9 °F (37.2 °C) 97.9 °F (36.6 °C)  97.3 °F (36.3 °C)   SpO2: 94% 95% 94% 95%   Weight:       Height:            LAB DATA:    Recent Labs     02/28/20  0555 02/27/20  1755 02/27/20  0310 02/26/20  1835   *  --  135*  --    K 4.3 5.2* 5.3*  --    CL 97*  --  102  --    CO2 22  --  24  --    AGAP 11  --  9  --    *  --  313*  --    BUN 43*  --  18  --    CREA 1.65*  --  1.22  --    GFRAA 52*  --  >60  --    GFRNA 43*  --  >60  --    CA 8.1*  --  8.2*  --    MG  --   --  1.9 1.5*        Recent Labs     02/28/20  0555 02/27/20 0310   WBC 6.9 5.8   HGB 10.5* 12.4*   HCT 29.7* 35.7*    208        Assessment: Patient sitting up in chair, nurse and CNA assisted patient back to bed. Chest tube to gravity, denies any SOB or chest pain. O2 sat 96% on RA. VSS. He appears slightly drowsy, reports he is tired from sitting up in recliner. Family at bedside discussing care with primary RN. Plan: Continue with outreach protocol.

## 2020-02-29 NOTE — PROGRESS NOTES
END OF SHIFT NOTE:    INTAKE/OUTPUT  02/28 0701 - 02/29 0700  In: 1040 [P.O.:1040]  Out: 7876 [Urine:1250]  Voiding: YES  Catheter: YES  Drain:              Flatus: Patient does have flatus present. Stool:  0 occurrences. Characteristics:  Stool Assessment  Stool Appearance: Formed, Soft    Emesis: 0 occurrences. Characteristics:        VITAL SIGNS  Patient Vitals for the past 12 hrs:   Temp Pulse Resp BP SpO2   02/29/20 0355 98.4 °F (36.9 °C) 89 18 120/55 93 %   02/29/20 0043 -- 92 -- 104/56 --   02/28/20 2350 98.3 °F (36.8 °C) 95 19 (!) 84/51 94 %   02/28/20 2021 97.3 °F (36.3 °C) 78 18 104/65 95 %   02/28/20 1949 -- -- -- -- 94 %       Pain Assessment  Pain Intensity 1: 8 (02/29/20 0631)  Pain Location 1: Chest  Pain Intervention(s) 1: Medication (see MAR)  Patient Stated Pain Goal: 0    Ambulating  Yes    Shift report given to oncoming nurse at the bedside.     Lisandra Michel RN

## 2020-02-29 NOTE — PROGRESS NOTES
Chart reviewed. Blood sugars suboptimally controlled. Increased lantus from 15 units to 17 units, humalog pre meals 4 units to 6 units.

## 2020-02-29 NOTE — PROGRESS NOTES
[facility] CRITICAL CARE OUTREACH NURSE PROGRESS REPORT      SUBJECTIVE: Called to assess patient secondary to transfer from ICU. MEWS Score: 1 (02/29/20 0727)  Vitals:    02/29/20 5289 02/29/20 0355 02/29/20 0727 02/29/20 0738   BP: 104/56 120/55 129/63    Pulse: 92 89 90    Resp:  18 18    Temp:  98.4 °F (36.9 °C) 98.2 °F (36.8 °C)    SpO2:  93% 92% 92%   Weight:       Height:              LAB DATA:    Recent Labs     02/29/20 0453 02/28/20  0555 02/27/20  1755 02/27/20  0310 02/26/20  1835   * 130*  --  135*  --   --    K 4.2 4.3 5.2* 5.3*   < >  --    CL 97* 97*  --  102  --   --    CO2 27 22  --  24  --   --    AGAP 6* 11  --  9  --   --    * 246*  --  313*  --   --    BUN 43* 43*  --  18  --   --    CREA 1.44 1.65*  --  1.22  --   --    GFRAA >60 52*  --  >60  --   --    GFRNA 51* 43*  --  >60  --   --    CA 7.9* 8.1*  --  8.2*  --   --    MG  --   --   --  1.9  --  1.5*    < > = values in this interval not displayed. Recent Labs     02/29/20 0453 02/28/20 0555 02/27/20 0310   WBC 6.6 6.9 5.8   HGB 9.0* 10.5* 12.4*   HCT 26.0* 29.7* 35.7*    169 208          OBJECTIVE: On arrival to room, I found patient to be resting in bed, watching TV. Pain Assessment  Pain Intensity 1: 8 (02/29/20 0631)  Pain Location 1: Chest  Pain Intervention(s) 1: Medication (see MAR)  Patient Stated Pain Goal: 0         ASSESSMENT:  Patient awakens to voice, denies any current pain. Breath unlabored, chest tube in place with no air leak. O2 sat 92% on room air, HR 94. No acute distress. Chart and labs reviewed. PLAN:  Will continue to follow per outreach protocol.

## 2020-02-29 NOTE — PROGRESS NOTES
2/29/2020    PLAN:  Diet- Diabetic  DVT Prophylactics- SCD/Lovenox  Pain control- Dilaudid/percocet  SUP prophylaxis- Protonix  Encourage C/DB/IS  Anterior chest tube removed 2/28/20  Posterior Chest tube to water seal  Added Flomax 2/28/30  DC ch   Adjust insulin  Consult PT/OT  Consult Social service for discharge planning- lives alone. Likely to rehab next week  PPD ordered  Transfered to floor 2/27/20  Will clamp remaining chest tube at midnight   Repeat CXR in am       ASSESSMENT:  Admit Date: 2/26/2020   3 Day Post-Op  Procedure(s):  LEFT VATS WITH LEFT UPPER LOBECTOMY/ , MEDIASTINAL LYMPHADENECTOMY/CYRO      SUBJECTIVE: 02/28/2020-  No complaints. Pain controlled. On room air. Posterior Chest tube to water seal. CXR this am showing New left apical pneumothorax status post removal of anterior chest tube. Tolerating diet. AF, NAD. OBJECTIVE:  Patient Vitals for the past 24 hrs:   Temp Pulse Resp BP SpO2   02/29/20 1256 -- -- -- -- 98 %   02/29/20 1228 97.7 °F (36.5 °C) 82 18 170/49 98 %   02/29/20 0923 -- 79 -- 104/53 --   02/29/20 0738 -- -- -- -- 92 %   02/29/20 0727 98.2 °F (36.8 °C) 90 18 129/63 92 %   02/29/20 0355 98.4 °F (36.9 °C) 89 18 120/55 93 %   02/29/20 0043 -- 92 -- 104/56 --   02/28/20 2350 98.3 °F (36.8 °C) 95 19 (!) 84/51 94 %   02/28/20 2021 97.3 °F (36.3 °C) 78 18 104/65 95 %   02/28/20 1949 -- -- -- -- 94 %   02/28/20 1449 97.9 °F (36.6 °C) (!) 103 19 101/61 95 %         Date 02/28/20 0700 - 02/29/20 0659 02/29/20 0700 - 03/01/20 0659   Shift 6465-2533 7118-4063 24 Hour Total 6782-2506 5647-9177 24 Hour Total   INTAKE   P.O. 1040  1040        P. O. 1040  1040      Shift Total(mL/kg) 1040(14.5)  1040(14.5)      OUTPUT   Urine(mL/kg/hr) 600(0.7) 650(0.8) 1250(0.7) 675  675     Urine Output (mL) ([REMOVED] Urinary Catheter 02/26/20 2- way) 350  350        Urine Output (mL) ([REMOVED] Urinary Catheter 02/27/20 Ch) 250 650 900 675  675   Chest Tube 67 49 116 34  34     Output (ml) (Chest Tube #2 02/26/20 Angled; Posterior; Left) 67 49 116 34  34   Shift Total(mL/kg) 667(9.3) 699(9.8) 7122(93.6) 709(9.9)  709(9.9)    -699 -326 -709  -709   Weight (kg) 71.5 71.5 71.5 71.5 71.5 71.5            General:          No acute distress    Lungs:             Even and unlabored, chest tube without airleak noted  Heart:              RRR  Abdomen:        Soft, Non distended, appropriately tender.   Extremities:     No cyanosis, clubbing or edema  Neurologic:      No focal deficits           Labs:    Recent Labs     02/29/20  0453   WBC 6.6   HGB 9.0*      *   K 4.2   CL 97*   CO2 27   BUN 43*   CREA 1.44   *         Stephanie Wheeler NP

## 2020-02-29 NOTE — PROGRESS NOTES
Date of Outreach Update:  Shannon Mtz was seen and assessed. MEWS Score: 2 (02/28/20 2350)  Vitals:    02/28/20 1949 02/28/20 2021 02/28/20 2350 02/29/20 0043   BP:  104/65 (!) 84/51 104/56   Pulse:  78 95 92   Resp:  18 19    Temp:  97.3 °F (36.3 °C) 98.3 °F (36.8 °C)    SpO2: 94% 95% 94%    Weight:       Height:            Previous Outreach assessment has been reviewed. There have been no significant clinical changes since the completion of the last dated Outreach assessment. Will continue to follow up per outreach protocol.     Signed By:   Kaleb Brunson RN    February 29, 2020 2:32 AM \

## 2020-02-29 NOTE — PROGRESS NOTES
Called to the room to assist patient back to bed from recliner. Family at side. Patient lethargic. BP=76/38, 02= 97% RA, IIFG=994. Rapid Response called. Code team arrived. Dr. Cris Ahumada, RN House Supervisor, RT, ICU nurse, and Davon. Order received for 500 cc NS bolus, and ABG.    (1324) 500 NS bolus infusing. (1330) Order received for Narcan 0.4 mg IVP X1. Administered per Rubin Garcia RN, RT performed ABG per order    [phone]) patient eyes open and answering questions.

## 2020-03-01 ENCOUNTER — APPOINTMENT (OUTPATIENT)
Dept: GENERAL RADIOLOGY | Age: 80
DRG: 164 | End: 2020-03-01
Attending: SURGERY
Payer: MEDICARE

## 2020-03-01 LAB
ANION GAP SERPL CALC-SCNC: 6 MMOL/L (ref 7–16)
BASOPHILS # BLD: 0 K/UL (ref 0–0.2)
BASOPHILS NFR BLD: 0 % (ref 0–2)
BUN SERPL-MCNC: 31 MG/DL (ref 8–23)
CALCIUM SERPL-MCNC: 8 MG/DL (ref 8.3–10.4)
CHLORIDE SERPL-SCNC: 100 MMOL/L (ref 98–107)
CO2 SERPL-SCNC: 27 MMOL/L (ref 21–32)
CREAT SERPL-MCNC: 1.39 MG/DL (ref 0.8–1.5)
DIFFERENTIAL METHOD BLD: ABNORMAL
EOSINOPHIL # BLD: 0.1 K/UL (ref 0–0.8)
EOSINOPHIL NFR BLD: 1 % (ref 0.5–7.8)
ERYTHROCYTE [DISTWIDTH] IN BLOOD BY AUTOMATED COUNT: 13.9 % (ref 11.9–14.6)
GLUCOSE BLD STRIP.AUTO-MCNC: 156 MG/DL (ref 65–100)
GLUCOSE BLD STRIP.AUTO-MCNC: 256 MG/DL (ref 65–100)
GLUCOSE BLD STRIP.AUTO-MCNC: 330 MG/DL (ref 65–100)
GLUCOSE BLD STRIP.AUTO-MCNC: 85 MG/DL (ref 65–100)
GLUCOSE SERPL-MCNC: 224 MG/DL (ref 65–100)
HCT VFR BLD AUTO: 26.5 % (ref 41.1–50.3)
HGB BLD-MCNC: 9.3 G/DL (ref 13.6–17.2)
IMM GRANULOCYTES # BLD AUTO: 0.1 K/UL (ref 0–0.5)
IMM GRANULOCYTES NFR BLD AUTO: 1 % (ref 0–5)
LYMPHOCYTES # BLD: 1.6 K/UL (ref 0.5–4.6)
LYMPHOCYTES NFR BLD: 21 % (ref 13–44)
MCH RBC QN AUTO: 28.4 PG (ref 26.1–32.9)
MCHC RBC AUTO-ENTMCNC: 35.1 G/DL (ref 31.4–35)
MCV RBC AUTO: 80.8 FL (ref 79.6–97.8)
MM INDURATION POC: 0 MM (ref 0–5)
MONOCYTES # BLD: 0.8 K/UL (ref 0.1–1.3)
MONOCYTES NFR BLD: 10 % (ref 4–12)
NEUTS SEG # BLD: 5.1 K/UL (ref 1.7–8.2)
NEUTS SEG NFR BLD: 67 % (ref 43–78)
NRBC # BLD: 0 K/UL (ref 0–0.2)
PLATELET # BLD AUTO: 174 K/UL (ref 150–450)
PMV BLD AUTO: 9.5 FL (ref 9.4–12.3)
POTASSIUM SERPL-SCNC: 4.4 MMOL/L (ref 3.5–5.1)
PPD POC: NORMAL
RBC # BLD AUTO: 3.28 M/UL (ref 4.23–5.6)
SODIUM SERPL-SCNC: 133 MMOL/L (ref 136–145)
WBC # BLD AUTO: 7.7 K/UL (ref 4.3–11.1)

## 2020-03-01 PROCEDURE — 36415 COLL VENOUS BLD VENIPUNCTURE: CPT

## 2020-03-01 PROCEDURE — 74011250637 HC RX REV CODE- 250/637: Performed by: NURSE PRACTITIONER

## 2020-03-01 PROCEDURE — 77010033678 HC OXYGEN DAILY

## 2020-03-01 PROCEDURE — 74011636637 HC RX REV CODE- 636/637: Performed by: INTERNAL MEDICINE

## 2020-03-01 PROCEDURE — 94760 N-INVAS EAR/PLS OXIMETRY 1: CPT

## 2020-03-01 PROCEDURE — 3331090002 HH PPS REVENUE DEBIT

## 2020-03-01 PROCEDURE — 74011636637 HC RX REV CODE- 636/637: Performed by: HOSPITALIST

## 2020-03-01 PROCEDURE — 85025 COMPLETE CBC W/AUTO DIFF WBC: CPT

## 2020-03-01 PROCEDURE — 71045 X-RAY EXAM CHEST 1 VIEW: CPT

## 2020-03-01 PROCEDURE — 74011250637 HC RX REV CODE- 250/637: Performed by: SURGERY

## 2020-03-01 PROCEDURE — 74011250637 HC RX REV CODE- 250/637: Performed by: HOSPITALIST

## 2020-03-01 PROCEDURE — 65270000029 HC RM PRIVATE

## 2020-03-01 PROCEDURE — 3331090001 HH PPS REVENUE CREDIT

## 2020-03-01 PROCEDURE — 74011250636 HC RX REV CODE- 250/636: Performed by: SURGERY

## 2020-03-01 PROCEDURE — 80048 BASIC METABOLIC PNL TOTAL CA: CPT

## 2020-03-01 PROCEDURE — 94640 AIRWAY INHALATION TREATMENT: CPT

## 2020-03-01 PROCEDURE — 82962 GLUCOSE BLOOD TEST: CPT

## 2020-03-01 PROCEDURE — 74011000250 HC RX REV CODE- 250: Performed by: SURGERY

## 2020-03-01 RX ORDER — LORAZEPAM 1 MG/1
1 TABLET ORAL
Status: DISCONTINUED | OUTPATIENT
Start: 2020-03-01 | End: 2020-03-10 | Stop reason: HOSPADM

## 2020-03-01 RX ORDER — GABAPENTIN 100 MG/1
200 CAPSULE ORAL 3 TIMES DAILY
Status: DISCONTINUED | OUTPATIENT
Start: 2020-03-01 | End: 2020-03-10 | Stop reason: HOSPADM

## 2020-03-01 RX ORDER — ACETAMINOPHEN 500 MG
1000 TABLET ORAL
Status: DISCONTINUED | OUTPATIENT
Start: 2020-03-01 | End: 2020-03-10 | Stop reason: HOSPADM

## 2020-03-01 RX ORDER — TRAMADOL HYDROCHLORIDE 50 MG/1
50 TABLET ORAL
Status: DISCONTINUED | OUTPATIENT
Start: 2020-03-01 | End: 2020-03-10 | Stop reason: HOSPADM

## 2020-03-01 RX ADMIN — LORAZEPAM 1 MG: 1 TABLET ORAL at 17:31

## 2020-03-01 RX ADMIN — INSULIN LISPRO 6 UNITS: 100 INJECTION, SOLUTION INTRAVENOUS; SUBCUTANEOUS at 09:08

## 2020-03-01 RX ADMIN — ENOXAPARIN SODIUM 40 MG: 40 INJECTION SUBCUTANEOUS at 20:43

## 2020-03-01 RX ADMIN — Medication 10 ML: at 06:13

## 2020-03-01 RX ADMIN — Medication 10 ML: at 06:12

## 2020-03-01 RX ADMIN — Medication 10 ML: at 20:48

## 2020-03-01 RX ADMIN — INSULIN LISPRO 11 UNITS: 100 INJECTION, SOLUTION INTRAVENOUS; SUBCUTANEOUS at 20:45

## 2020-03-01 RX ADMIN — LORAZEPAM 1 MG: 1 TABLET ORAL at 09:15

## 2020-03-01 RX ADMIN — INSULIN LISPRO 3 UNITS: 100 INJECTION, SOLUTION INTRAVENOUS; SUBCUTANEOUS at 12:13

## 2020-03-01 RX ADMIN — BUPROPION HYDROCHLORIDE 150 MG: 150 TABLET, EXTENDED RELEASE ORAL at 20:43

## 2020-03-01 RX ADMIN — Medication 10 ML: at 13:50

## 2020-03-01 RX ADMIN — SENNOSIDES AND DOCUSATE SODIUM 2 TABLET: 8.6; 5 TABLET ORAL at 17:31

## 2020-03-01 RX ADMIN — BUDESONIDE AND FORMOTEROL FUMARATE DIHYDRATE 2 PUFF: 160; 4.5 AEROSOL RESPIRATORY (INHALATION) at 19:50

## 2020-03-01 RX ADMIN — TRAMADOL HYDROCHLORIDE 50 MG: 50 TABLET, FILM COATED ORAL at 14:08

## 2020-03-01 RX ADMIN — INSULIN LISPRO 6 UNITS: 100 INJECTION, SOLUTION INTRAVENOUS; SUBCUTANEOUS at 12:13

## 2020-03-01 RX ADMIN — INSULIN GLARGINE 17 UNITS: 100 INJECTION, SOLUTION SUBCUTANEOUS at 20:45

## 2020-03-01 RX ADMIN — ALBUTEROL SULFATE 2.5 MG: 2.5 SOLUTION RESPIRATORY (INHALATION) at 15:50

## 2020-03-01 RX ADMIN — BUPROPION HYDROCHLORIDE 150 MG: 150 TABLET, EXTENDED RELEASE ORAL at 09:15

## 2020-03-01 RX ADMIN — BUDESONIDE AND FORMOTEROL FUMARATE DIHYDRATE 2 PUFF: 160; 4.5 AEROSOL RESPIRATORY (INHALATION) at 07:22

## 2020-03-01 RX ADMIN — GABAPENTIN 200 MG: 100 CAPSULE ORAL at 22:04

## 2020-03-01 RX ADMIN — Medication 10 ML: at 20:47

## 2020-03-01 RX ADMIN — METOPROLOL TARTRATE 50 MG: 50 TABLET, FILM COATED ORAL at 09:15

## 2020-03-01 RX ADMIN — ALBUTEROL SULFATE 2.5 MG: 2.5 SOLUTION RESPIRATORY (INHALATION) at 11:25

## 2020-03-01 RX ADMIN — INSULIN LISPRO 8 UNITS: 100 INJECTION, SOLUTION INTRAVENOUS; SUBCUTANEOUS at 09:09

## 2020-03-01 RX ADMIN — SENNOSIDES AND DOCUSATE SODIUM 2 TABLET: 8.6; 5 TABLET ORAL at 09:13

## 2020-03-01 RX ADMIN — ATORVASTATIN CALCIUM 10 MG: 10 TABLET, FILM COATED ORAL at 20:42

## 2020-03-01 RX ADMIN — GABAPENTIN 300 MG: 300 CAPSULE ORAL at 17:31

## 2020-03-01 RX ADMIN — PANTOPRAZOLE SODIUM 40 MG: 40 TABLET, DELAYED RELEASE ORAL at 06:13

## 2020-03-01 RX ADMIN — TAMSULOSIN HYDROCHLORIDE 0.4 MG: 0.4 CAPSULE ORAL at 09:14

## 2020-03-01 RX ADMIN — ALBUTEROL SULFATE 2.5 MG: 2.5 SOLUTION RESPIRATORY (INHALATION) at 19:50

## 2020-03-01 RX ADMIN — OXYCODONE HYDROCHLORIDE AND ACETAMINOPHEN 1 TABLET: 5; 325 TABLET ORAL at 03:43

## 2020-03-01 RX ADMIN — ALBUTEROL SULFATE 2.5 MG: 2.5 SOLUTION RESPIRATORY (INHALATION) at 07:22

## 2020-03-01 RX ADMIN — TIOTROPIUM BROMIDE INHALATION SPRAY 2 PUFF: 3.12 SPRAY, METERED RESPIRATORY (INHALATION) at 07:22

## 2020-03-01 NOTE — PROGRESS NOTES
END OF SHIFT NOTE:    INTAKE/OUTPUT  02/28 0701 - 02/29 0700  In: 1040 [P.O.:1040]  Out: 8632 [Urine:1250]  Voiding: NO  Catheter: YES  Drain:              Flatus: Patient does have flatus present. Stool:  0 occurrences. Characteristics:      Emesis: 0 occurrences. Characteristics:        VITAL SIGNS  Patient Vitals for the past 12 hrs:   Temp Pulse Resp BP SpO2   02/29/20 1905 -- 88 -- 113/54 --   02/29/20 1631 97.7 °F (36.5 °C) 88 18 137/75 95 %   02/29/20 1557 -- -- -- 113/60 --   02/29/20 1533 -- -- -- -- 97 %   02/29/20 1502 -- -- -- 90/55 --   02/29/20 1415 -- 84 -- 107/64 97 %   02/29/20 1358 -- 84 -- 97/50 97 %   02/29/20 1344 -- 83 -- 91/55 100 %   02/29/20 1331 -- 80 -- 164/47 96 %   02/29/20 1325 -- -- -- (!) 89/44 --   02/29/20 1320 -- -- -- (!) 76/38 --   02/29/20 1256 -- -- -- -- 98 %   02/29/20 1228 97.7 °F (36.5 °C) 82 18 170/49 98 %   02/29/20 0923 -- 79 -- 104/53 --   02/29/20 0738 -- -- -- -- 92 %   02/29/20 0727 98.2 °F (36.8 °C) 90 18 129/63 92 %       Pain Assessment  Pain Intensity 1: 3 (02/29/20 1252)  Pain Location 1: Chest  Pain Intervention(s) 1: Medication (see MAR)  Patient Stated Pain Goal: 0    Ambulating  Yes with assistance    Shift report given to oncoming nurse at the bedside.     Mundo Sun RN

## 2020-03-01 NOTE — PROGRESS NOTES
END OF SHIFT NOTE:    INTAKE/OUTPUT  02/29 0701 - 03/01 0700  In: 18 [P.O.:360; I.V.:500]  Out: 6004 [Urine:3450]  Voiding: YES  Catheter: YES  Drain:              Flatus: Patient does have flatus present. Stool:  1 occurrences. Characteristics:  Stool Assessment  Stool Appearance: Formed, Soft    Emesis: 0 occurrences. Characteristics:        VITAL SIGNS  Patient Vitals for the past 12 hrs:   Temp Pulse Resp BP SpO2   03/01/20 0322 98.2 °F (36.8 °C) (!) 111 19 114/52 100 %   03/01/20 0012 97.9 °F (36.6 °C) 98 18 127/50 99 %   02/29/20 2013 -- -- -- -- 96 %   02/29/20 2000 -- -- -- -- (!) 89 %   02/29/20 1955 98.4 °F (36.9 °C) 90 18 137/77 96 %       Pain Assessment  Pain Intensity 1: 7 (03/01/20 0343)  Pain Location 1: Chest  Pain Intervention(s) 1: Medication (see MAR)  Patient Stated Pain Goal: 0    Ambulating  Yes    Shift report given to oncoming nurse at the bedside.     Lisandra Michel RN

## 2020-03-01 NOTE — PROGRESS NOTES
Pt resting in bed. Inserted indwelling ch cath using sterile technique per orders. Pt tolerated well. Draining straw yellow urine. Will continue to monitor.

## 2020-03-01 NOTE — PROGRESS NOTES
[facility] CRITICAL CARE OUTREACH NURSE PROGRESS REPORT      SUBJECTIVE: Called to assess patient secondary to recent rapid response. MEWS Score: 1 (03/01/20 1110)  Vitals:    03/01/20 0727 03/01/20 0913 03/01/20 1110 03/01/20 1126   BP:  111/51 102/57    Pulse:  94 83    Resp:   18    Temp:   97.5 °F (36.4 °C)    SpO2: 95%  100% 98%   Weight:       Height:          LAB DATA:    Recent Labs     03/01/20  0524 02/29/20  0453 02/28/20  0555   * 130* 130*   K 4.4 4.2 4.3    97* 97*   CO2 27 27 22   AGAP 6* 6* 11   * 153* 246*   BUN 31* 43* 43*   CREA 1.39 1.44 1.65*   GFRAA >60 >60 52*   GFRNA 53* 51* 43*   CA 8.0* 7.9* 8.1*        Recent Labs     03/01/20  0524 02/29/20  0453 02/28/20  0555   WBC 7.7 6.6 6.9   HGB 9.3* 9.0* 10.5*   HCT 26.5* 26.0* 29.7*    152 169          OBJECTIVE: On arrival to room, I found patient to be sitting up in chair with family at bedside. Pain Assessment  Pain Intensity 1: 7 (03/01/20 0343)  Pain Location 1: Chest  Pain Intervention(s) 1: Medication (see MAR)  Patient Stated Pain Goal: 0        ASSESSMENT:  Patient alert, no visible signs of distress. Breathing unlabored, O2 sat 100%, currently receiving breathing treatment. HR 77. Patient denies any current pain. Chart and labs reviewed. PLAN:  Will continue to follow per outreach protocol.

## 2020-03-01 NOTE — PROGRESS NOTES
Pt sitting in chair eating lunch with sister at bedside. Chest tube clamped per order. Will monitor for s/sx of SOB.

## 2020-03-01 NOTE — PROGRESS NOTES
Critical Care Outreach Nurse Progress Report:    Subjective: In to assess pt secondary to transfer from ICU. MEWS Score: 1 (02/29/20 1955)    Vitals:    02/29/20 1905 02/29/20 1955 02/29/20 2000 02/29/20 2013   BP: 113/54 137/77     Pulse: 88 90     Resp:  18     Temp:  98.4 °F (36.9 °C)     SpO2:  96% (!) 89% 96%   Weight:       Height:            LAB DATA:    Recent Labs     02/29/20 0453 02/28/20 0555 02/27/20 1755 02/27/20 0310   * 130*  --  135*   K 4.2 4.3 5.2* 5.3*   CL 97* 97*  --  102   CO2 27 22  --  24   AGAP 6* 11  --  9   * 246*  --  313*   BUN 43* 43*  --  18   CREA 1.44 1.65*  --  1.22   GFRAA >60 52*  --  >60   GFRNA 51* 43*  --  >60   CA 7.9* 8.1*  --  8.2*   MG  --   --   --  1.9        Recent Labs     02/29/20 0453 02/28/20 0555 02/27/20 0310   WBC 6.6 6.9 5.8   HGB 9.0* 10.5* 12.4*   HCT 26.0* 29.7* 35.7*    169 208        Objective:     Pain Intensity 1: 3 (02/29/20 1920)  Pain Location 1: Chest  Pain Intervention(s) 1: Family Support, Emotional support  Patient Stated Pain Goal: 0    Assessment:  Patient resting in bed eating a sandwich, family at bedside. Currently confused. HR 90, O2 95% on room air. Chest tube noted with no visible air leak. No complaints or distess noted. Plan: Will continue to follow.

## 2020-03-01 NOTE — PROGRESS NOTES
Reviewed notes for spiritual concerns prior to visit  Visit with patient to build rapport with .   Calm  Encouraged with presence and words of hope  prayer      Ro Andino,  Staff   C: [phone]  /  [email or web address]

## 2020-03-01 NOTE — PROGRESS NOTES
Date of Outreach Update:  Jacquelyn Mendoza was seen and assessed. Previous Outreach assessment has been reviewed. There have been no significant clinical changes since the completion of the last dated Outreach assessment. Patient alert, sitting up in bed. Breathing unlabored, O2 sat 96% on room air. HR 96. Patient states he needs to use bathroom, and his current pain is 7/10. RN notified. Will continue to follow up per outreach protocol.     Signed By:   Rina Nageotte    March 1, 2020 6:00 PM

## 2020-03-01 NOTE — PROGRESS NOTES
3/1/2020    PLAN:  Diet- Diabetic  DVT Prophylactics- SCD/Lovenox  Pain control- Dilaudid/percocet  SUP prophylaxis- Protonix  Encourage C/DB/IS  Anterior chest tube removed 2/28/20  Posterior Chest tube to water seal and clamped 3/1/20  Added Flomax 2/28/30  DC ch and replaced due to urinary retention 2/29/20  Adjust insulin  Consult PT/OT  Consult Social service for discharge planning- lives alone. Likely to rehab next week  PPD ordered  Transfered to floor 2/27/20  Repeat CXR in am       ASSESSMENT:  Admit Date: 2/26/2020   4 Day Post-Op  Procedure(s):  LEFT VATS WITH LEFT UPPER LOBECTOMY/ , MEDIASTINAL LYMPHADENECTOMY/CYRO      SUBJECTIVE: 03/01/2020-  Pt awake in bed. Rapid response called yesterday due to lethargy which improved with Narcan. Remaining chest tube to water seal - clamped today. CXR this am showing stable apical pneumothorax. Ch replaced again yesterday due to urinary retention. Tolerating Diabetic diet. +flatus/+BM. AF, NAD.      OBJECTIVE:  Patient Vitals for the past 24 hrs:   Temp Pulse Resp BP SpO2   03/01/20 1551 -- -- -- -- 98 %   03/01/20 1530 97.6 °F (36.4 °C) 80 20 117/44 97 %   03/01/20 1407 -- 84 -- 139/58 --   03/01/20 1354 -- -- -- -- 98 %   03/01/20 1126 -- -- -- -- 98 %   03/01/20 1110 97.5 °F (36.4 °C) 83 18 102/57 100 %   03/01/20 0913 -- 94 -- 111/51 --   03/01/20 0727 -- -- -- -- 95 %   03/01/20 0712 97.6 °F (36.4 °C) 80 18 124/46 97 %   03/01/20 0322 98.2 °F (36.8 °C) (!) 111 19 114/52 100 %   03/01/20 0012 97.9 °F (36.6 °C) 98 18 127/50 99 %   02/29/20 2013 -- -- -- -- 96 %   02/29/20 2000 -- -- -- -- (!) 89 %   02/29/20 1955 98.4 °F (36.9 °C) 90 18 137/77 96 %   02/29/20 1905 -- 88 -- 113/54 --   02/29/20 1631 97.7 °F (36.5 °C) 88 18 137/75 95 %         Date 02/29/20 0700 - 03/01/20 0659 03/01/20 0700 - 03/02/20 0659   Shift 5382-4371 1275-5237 24 Hour Total 8111-5510 7329-9643 24 Hour Total   INTAKE   P.O. 360 360 480  480     P. O.  360 360 480  480   I. V.(mL/kg/hr) 500(0.6)  500(0.3)        I.V. 500  500      Shift Total(mL/kg) 500(7) 360(5) 860(12) 480(6.7)  480(6.7)   OUTPUT   Urine(mL/kg/hr) 1150(1.3) 2300(2.7) 3450(2) 2200  2200     Urine Output (mL) (Urinary Catheter 02/29/20 Patel; 2- way) 475 2300 2775 2200  2200     Urine Output (mL) ([REMOVED] Urinary Catheter 02/27/20 [place]) 675  675      Stool           Stool Occurrence(s)    1 x  1 x   Chest Tube 34 140 174 0  0     Output (ml) (Chest Tube #2 02/26/20 Angled; Posterior; Left) 34 140 174 0  0   Shift Total(mL/kg) 1184(16.6) 1575(56.0) 8977(38.5) 2200(30.8)  2200(30.8)   NET -684 -3462 -7348 -1724  -1724   Weight (kg) 71.5 71.5 71.5 71.5 71.5 71.5            General:          No acute distress    Lungs:             Even and unlabored, chest tube without airleak noted  Heart:              RRR  Abdomen:        Soft, Non distended, appropriately tender.   Extremities:     No cyanosis, clubbing or edema  Neurologic:      No focal deficits           Labs:    Recent Labs     03/01/20  0524   WBC 7.7   HGB 9.3*      *   K 4.4      CO2 27   BUN 31*   CREA 1.39   *         Blase Mitchell, NP

## 2020-03-01 NOTE — PROGRESS NOTES
Patient has increased confusion tonight. Stating he is going home and trying to get out of bed. Placing phone calls to his home and he lives alone. Daughter is staying with patient. Placed bed alarm on patient. Patient requested a meal at this time and was eating a turkey sandwich.

## 2020-03-01 NOTE — PROGRESS NOTES
Date of Outreach Update:  Lois Lopez was seen and assessed. Previous Outreach assessment has been reviewed. There have been no significant clinical changes since the completion of the last dated Outreach assessment. Will continue to follow up per outreach protocol.     Signed By:   Shazia Norton RN    March 1, 2020 6:14 AM

## 2020-03-01 NOTE — PROGRESS NOTES
Patient ambulated with assistance to bathroom. Had large formed, soft bowel movement. Changed out dressing due to breakthrough drainage.     Patient requested pain medication and Percocet given. Bed alarm on patient and daughter in the room.

## 2020-03-02 ENCOUNTER — APPOINTMENT (OUTPATIENT)
Dept: GENERAL RADIOLOGY | Age: 80
DRG: 164 | End: 2020-03-02
Attending: SURGERY
Payer: MEDICARE

## 2020-03-02 ENCOUNTER — APPOINTMENT (OUTPATIENT)
Dept: GENERAL RADIOLOGY | Age: 80
DRG: 164 | End: 2020-03-02
Attending: NURSE PRACTITIONER
Payer: MEDICARE

## 2020-03-02 LAB
ANION GAP SERPL CALC-SCNC: 5 MMOL/L (ref 7–16)
BASOPHILS # BLD: 0 K/UL (ref 0–0.2)
BASOPHILS NFR BLD: 0 % (ref 0–2)
BUN SERPL-MCNC: 20 MG/DL (ref 8–23)
CALCIUM SERPL-MCNC: 8.4 MG/DL (ref 8.3–10.4)
CHLORIDE SERPL-SCNC: 101 MMOL/L (ref 98–107)
CO2 SERPL-SCNC: 28 MMOL/L (ref 21–32)
CREAT SERPL-MCNC: 1.28 MG/DL (ref 0.8–1.5)
DIFFERENTIAL METHOD BLD: ABNORMAL
EOSINOPHIL # BLD: 0.2 K/UL (ref 0–0.8)
EOSINOPHIL NFR BLD: 3 % (ref 0.5–7.8)
ERYTHROCYTE [DISTWIDTH] IN BLOOD BY AUTOMATED COUNT: 13.9 % (ref 11.9–14.6)
GLUCOSE BLD STRIP.AUTO-MCNC: 222 MG/DL (ref 65–100)
GLUCOSE BLD STRIP.AUTO-MCNC: 223 MG/DL (ref 65–100)
GLUCOSE BLD STRIP.AUTO-MCNC: 248 MG/DL (ref 65–100)
GLUCOSE BLD STRIP.AUTO-MCNC: 309 MG/DL (ref 65–100)
GLUCOSE SERPL-MCNC: 269 MG/DL (ref 65–100)
HCT VFR BLD AUTO: 25.9 % (ref 41.1–50.3)
HGB BLD-MCNC: 9.1 G/DL (ref 13.6–17.2)
IMM GRANULOCYTES # BLD AUTO: 0 K/UL (ref 0–0.5)
IMM GRANULOCYTES NFR BLD AUTO: 1 % (ref 0–5)
LYMPHOCYTES # BLD: 2.1 K/UL (ref 0.5–4.6)
LYMPHOCYTES NFR BLD: 30 % (ref 13–44)
MCH RBC QN AUTO: 28.7 PG (ref 26.1–32.9)
MCHC RBC AUTO-ENTMCNC: 35.1 G/DL (ref 31.4–35)
MCV RBC AUTO: 81.7 FL (ref 79.6–97.8)
MONOCYTES # BLD: 0.7 K/UL (ref 0.1–1.3)
MONOCYTES NFR BLD: 10 % (ref 4–12)
NEUTS SEG # BLD: 3.9 K/UL (ref 1.7–8.2)
NEUTS SEG NFR BLD: 57 % (ref 43–78)
NRBC # BLD: 0 K/UL (ref 0–0.2)
PLATELET # BLD AUTO: 211 K/UL (ref 150–450)
PMV BLD AUTO: 9.7 FL (ref 9.4–12.3)
POTASSIUM SERPL-SCNC: 4.3 MMOL/L (ref 3.5–5.1)
RBC # BLD AUTO: 3.17 M/UL (ref 4.23–5.6)
SODIUM SERPL-SCNC: 134 MMOL/L (ref 136–145)
WBC # BLD AUTO: 6.8 K/UL (ref 4.3–11.1)

## 2020-03-02 PROCEDURE — 74011000250 HC RX REV CODE- 250: Performed by: SURGERY

## 2020-03-02 PROCEDURE — 74011636637 HC RX REV CODE- 636/637: Performed by: HOSPITALIST

## 2020-03-02 PROCEDURE — 65270000029 HC RM PRIVATE

## 2020-03-02 PROCEDURE — 74011636637 HC RX REV CODE- 636/637: Performed by: NURSE PRACTITIONER

## 2020-03-02 PROCEDURE — 82962 GLUCOSE BLOOD TEST: CPT

## 2020-03-02 PROCEDURE — 36415 COLL VENOUS BLD VENIPUNCTURE: CPT

## 2020-03-02 PROCEDURE — 80048 BASIC METABOLIC PNL TOTAL CA: CPT

## 2020-03-02 PROCEDURE — 3331090001 HH PPS REVENUE CREDIT

## 2020-03-02 PROCEDURE — 85025 COMPLETE CBC W/AUTO DIFF WBC: CPT

## 2020-03-02 PROCEDURE — 94640 AIRWAY INHALATION TREATMENT: CPT

## 2020-03-02 PROCEDURE — 74011250637 HC RX REV CODE- 250/637: Performed by: SURGERY

## 2020-03-02 PROCEDURE — 74011250637 HC RX REV CODE- 250/637: Performed by: NURSE PRACTITIONER

## 2020-03-02 PROCEDURE — 3331090002 HH PPS REVENUE DEBIT

## 2020-03-02 PROCEDURE — 71045 X-RAY EXAM CHEST 1 VIEW: CPT

## 2020-03-02 PROCEDURE — 94760 N-INVAS EAR/PLS OXIMETRY 1: CPT

## 2020-03-02 PROCEDURE — 97530 THERAPEUTIC ACTIVITIES: CPT

## 2020-03-02 PROCEDURE — 74011250636 HC RX REV CODE- 250/636: Performed by: SURGERY

## 2020-03-02 RX ORDER — INSULIN GLARGINE 100 [IU]/ML
19 INJECTION, SOLUTION SUBCUTANEOUS
Status: DISCONTINUED | OUTPATIENT
Start: 2020-03-02 | End: 2020-03-03

## 2020-03-02 RX ORDER — INSULIN LISPRO 100 [IU]/ML
INJECTION, SOLUTION INTRAVENOUS; SUBCUTANEOUS
Status: DISCONTINUED | OUTPATIENT
Start: 2020-03-02 | End: 2020-03-09

## 2020-03-02 RX ADMIN — TIOTROPIUM BROMIDE INHALATION SPRAY 2 PUFF: 3.12 SPRAY, METERED RESPIRATORY (INHALATION) at 07:17

## 2020-03-02 RX ADMIN — ALBUTEROL SULFATE 2.5 MG: 2.5 SOLUTION RESPIRATORY (INHALATION) at 07:16

## 2020-03-02 RX ADMIN — BUDESONIDE AND FORMOTEROL FUMARATE DIHYDRATE 2 PUFF: 160; 4.5 AEROSOL RESPIRATORY (INHALATION) at 07:17

## 2020-03-02 RX ADMIN — BUPROPION HYDROCHLORIDE 150 MG: 150 TABLET, EXTENDED RELEASE ORAL at 21:31

## 2020-03-02 RX ADMIN — ENOXAPARIN SODIUM 40 MG: 40 INJECTION SUBCUTANEOUS at 19:51

## 2020-03-02 RX ADMIN — Medication 10 ML: at 14:35

## 2020-03-02 RX ADMIN — PANTOPRAZOLE SODIUM 40 MG: 40 TABLET, DELAYED RELEASE ORAL at 05:42

## 2020-03-02 RX ADMIN — ALBUTEROL SULFATE 2.5 MG: 2.5 SOLUTION RESPIRATORY (INHALATION) at 20:26

## 2020-03-02 RX ADMIN — ATORVASTATIN CALCIUM 10 MG: 10 TABLET, FILM COATED ORAL at 21:31

## 2020-03-02 RX ADMIN — GABAPENTIN 200 MG: 100 CAPSULE ORAL at 21:30

## 2020-03-02 RX ADMIN — GABAPENTIN 200 MG: 100 CAPSULE ORAL at 08:38

## 2020-03-02 RX ADMIN — Medication 10 ML: at 22:00

## 2020-03-02 RX ADMIN — BUDESONIDE AND FORMOTEROL FUMARATE DIHYDRATE 2 PUFF: 160; 4.5 AEROSOL RESPIRATORY (INHALATION) at 20:26

## 2020-03-02 RX ADMIN — INSULIN LISPRO 4 UNITS: 100 INJECTION, SOLUTION INTRAVENOUS; SUBCUTANEOUS at 12:06

## 2020-03-02 RX ADMIN — INSULIN LISPRO 4 UNITS: 100 INJECTION, SOLUTION INTRAVENOUS; SUBCUTANEOUS at 17:04

## 2020-03-02 RX ADMIN — INSULIN LISPRO 8 UNITS: 100 INJECTION, SOLUTION INTRAVENOUS; SUBCUTANEOUS at 08:42

## 2020-03-02 RX ADMIN — Medication 10 ML: at 05:17

## 2020-03-02 RX ADMIN — SENNOSIDES AND DOCUSATE SODIUM 2 TABLET: 8.6; 5 TABLET ORAL at 08:39

## 2020-03-02 RX ADMIN — ALBUTEROL SULFATE 2.5 MG: 2.5 SOLUTION RESPIRATORY (INHALATION) at 11:04

## 2020-03-02 RX ADMIN — TAMSULOSIN HYDROCHLORIDE 0.4 MG: 0.4 CAPSULE ORAL at 08:39

## 2020-03-02 RX ADMIN — ALBUTEROL SULFATE 2.5 MG: 2.5 SOLUTION RESPIRATORY (INHALATION) at 15:12

## 2020-03-02 RX ADMIN — INSULIN GLARGINE 19 UNITS: 100 INJECTION, SOLUTION SUBCUTANEOUS at 21:33

## 2020-03-02 RX ADMIN — INSULIN LISPRO 6 UNITS: 100 INJECTION, SOLUTION INTRAVENOUS; SUBCUTANEOUS at 12:08

## 2020-03-02 RX ADMIN — INSULIN LISPRO 6 UNITS: 100 INJECTION, SOLUTION INTRAVENOUS; SUBCUTANEOUS at 17:05

## 2020-03-02 RX ADMIN — GABAPENTIN 200 MG: 100 CAPSULE ORAL at 16:01

## 2020-03-02 RX ADMIN — METOPROLOL TARTRATE 50 MG: 50 TABLET, FILM COATED ORAL at 08:39

## 2020-03-02 RX ADMIN — BUPROPION HYDROCHLORIDE 150 MG: 150 TABLET, EXTENDED RELEASE ORAL at 08:38

## 2020-03-02 RX ADMIN — SENNOSIDES AND DOCUSATE SODIUM 2 TABLET: 8.6; 5 TABLET ORAL at 17:04

## 2020-03-02 RX ADMIN — INSULIN LISPRO 4 UNITS: 100 INJECTION, SOLUTION INTRAVENOUS; SUBCUTANEOUS at 21:34

## 2020-03-02 RX ADMIN — METOPROLOL TARTRATE 50 MG: 50 TABLET, FILM COATED ORAL at 20:23

## 2020-03-02 RX ADMIN — LISINOPRIL 5 MG: 5 TABLET ORAL at 09:00

## 2020-03-02 RX ADMIN — INSULIN LISPRO 6 UNITS: 100 INJECTION, SOLUTION INTRAVENOUS; SUBCUTANEOUS at 08:40

## 2020-03-02 NOTE — PROGRESS NOTES
Dispo update:  Spoke to Mr. June Lentz in room 209 again about discharge planning. The 9th floor IRU said \"no\", so will need STR at a SNF. Provided him the standard list for his review.

## 2020-03-02 NOTE — DIABETES MGMT
Patient admitted with lung cancer s/p surgery. Blood glucose ranged  yesterday with patient receiving Lantus 17 units and Humalog 34 units. Blood glucose this morning was 269. Reviewed pt current regimen: Lantus 17 units nightly, Humalog 6 units with melas, and Humalog SSI very insulin resistant scale. Spoke with provider new orders received to increase basal insulin to Lantus 19 units nightly as fasting blood glucose is not at goal. New orders also received to change SSI to normal sensitivity as pt is already on prandial insulin. Update: Pt in bed, eating breakfast. Pt states his appetite is improving stating \"for a few days after surgery I didn't eat much. \" Educated patient regarding current basal/bolus regimen of Lantus 19 units nightly and Humalog 6 units with meals and SSI including type of insulin, timing with meals, onset, duration of effect, and peak of insulin dose. Educated patient on the differences between prandial insulin and sliding scale insulin. Instructed patient to always seek guidance from their primary care provider about adjusting their insulin doses and not to adjust them on their own as this could negatively impact their glycemic control or result in hypoglycemia. Instructed pt to let RN know if he is going to skip a meal. Reviewed pt glycemic control with RN. Discussed the benefits of CGM therapy and the role of an endocrinologist. Patient verbalizes understanding and voices no further questions regarding diabetes management at this time.

## 2020-03-02 NOTE — PROGRESS NOTES
END OF SHIFT NOTE:    INTAKE/OUTPUT  02/29 0701 - 03/01 0700  In: 18 [P.O.:360; I.V.:500]  Out: 3336 [Urine:3450]  Voiding: YES  Catheter: YES  Drain:              Flatus: Patient does have flatus present. Stool:  2 occurrences. Characteristics:  Stool Assessment  Stool Color: Brown  Stool Appearance: Soft  Stool Amount: Small  Stool Source/Status: Rectum    Emesis: 0 occurrences. Characteristics:        VITAL SIGNS  Patient Vitals for the past 12 hrs:   Temp Pulse Resp BP SpO2   03/01/20 1822 97.9 °F (36.6 °C) 87 17 114/53 97 %   03/01/20 1551 -- -- -- -- 98 %   03/01/20 1530 97.6 °F (36.4 °C) 80 20 117/44 97 %   03/01/20 1407 -- 84 -- 139/58 --   03/01/20 1354 -- -- -- -- 98 %   03/01/20 1126 -- -- -- -- 98 %   03/01/20 1110 97.5 °F (36.4 °C) 83 18 102/57 100 %   03/01/20 0913 -- 94 -- 111/51 --   03/01/20 0727 -- -- -- -- 95 %       Pain Assessment  Pain Intensity 1: 4 (03/01/20 1457)  Pain Location 1: Hip  Pain Intervention(s) 1: Medication (see MAR)  Patient Stated Pain Goal: 0    Ambulating  Yes, in room    Shift report given to oncoming nurse at the bedside.     Kimmy Schaefer, RN

## 2020-03-02 NOTE — PROGRESS NOTES
Problem: Falls - Risk of  Goal: *Absence of Falls  Description  Document Nicole Latin Fall Risk and appropriate interventions in the flowsheet. Outcome: Progressing Towards Goal  Note: Fall Risk Interventions:  Mobility Interventions: Patient to call before getting OOB, Bed/chair exit alarm    Mentation Interventions: Adequate sleep, hydration, pain control, Reorient patient, More frequent rounding    Medication Interventions: Assess postural VS orthostatic hypotension, Patient to call before getting OOB, Teach patient to arise slowly    Elimination Interventions: Bed/chair exit alarm, Call light in reach, Toileting schedule/hourly rounds    History of Falls Interventions: Door open when patient unattended         Problem: Patient Education: Go to Patient Education Activity  Goal: Patient/Family Education  Outcome: Progressing Towards Goal     Problem: Pressure Injury - Risk of  Goal: *Prevention of pressure injury  Description  Document Garfield Scale and appropriate interventions in the flowsheet. Outcome: Progressing Towards Goal  Note: Pressure Injury Interventions:  Sensory Interventions: Check visual cues for pain, Maintain/enhance activity level    Moisture Interventions: Maintain skin hydration (lotion/cream)    Activity Interventions: Increase time out of bed, Pressure redistribution bed/mattress(bed type)    Mobility Interventions: Pressure redistribution bed/mattress (bed type), PT/OT evaluation    Nutrition Interventions: Document food/fluid/supplement intake, Offer support with meals,snacks and hydration    Friction and Shear Interventions: HOB 30 degrees or less, Apply protective barrier, creams and emollients                Problem: Diabetes Self-Management  Goal: *Disease process and treatment process  Description  Define diabetes and identify own type of diabetes; list 3 options for treating diabetes.   Outcome: Progressing Towards Goal  Goal: *Incorporating nutritional management into lifestyle  Description  Describe effect of type, amount and timing of food on blood glucose; list 3 methods for planning meals. Outcome: Progressing Towards Goal  Goal: *Incorporating physical activity into lifestyle  Description  State effect of exercise on blood glucose levels. Outcome: Progressing Towards Goal  Goal: *Developing strategies to promote health/change behavior  Description  Define the ABC's of diabetes; identify appropriate screenings, schedule and personal plan for screenings. Outcome: Progressing Towards Goal  Goal: *Using medications safely  Description  State effect of diabetes medications on diabetes; name diabetes medication taking, action and side effects. Outcome: Progressing Towards Goal  Goal: *Monitoring blood glucose, interpreting and using results  Description  Identify recommended blood glucose targets  and personal targets. Outcome: Progressing Towards Goal  Goal: *Prevention, detection, treatment of acute complications  Description  List symptoms of hyper- and hypoglycemia; describe how to treat low blood sugar and actions for lowering  high blood glucose level. Outcome: Progressing Towards Goal  Goal: *Prevention, detection and treatment of chronic complications  Description  Define the natural course of diabetes and describe the relationship of blood glucose levels to long term complications of diabetes.   Outcome: Progressing Towards Goal  Goal: *Developing strategies to address psychosocial issues  Description  Describe feelings about living with diabetes; identify support needed and support network  Outcome: Progressing Towards Goal  Goal: *Insulin pump training  Outcome: Progressing Towards Goal  Goal: *Sick day guidelines  Outcome: Progressing Towards Goal  Goal: *Patient Specific Goal (EDIT GOAL, INSERT TEXT)  Outcome: Progressing Towards Goal

## 2020-03-02 NOTE — PROGRESS NOTES
END OF SHIFT NOTE:    INTAKE/OUTPUT  03/01 0701 - 03/02 0700  In: 0 [P.O.:880]  Out: 4450 [Urine:4450]  Voiding: YES  Catheter: YES  Drain:              Flatus: Patient does have flatus present. Stool:  1 occurrences. Characteristics:  Stool Assessment  Stool Color: Brown  Stool Appearance: Soft, Formed  Stool Amount: Small  Stool Source/Status: Rectum    Emesis: 0 occurrences. Characteristics:        VITAL SIGNS  Patient Vitals for the past 12 hrs:   Temp Pulse Resp BP SpO2   03/02/20 0718 -- -- -- -- 97 %   03/02/20 0435 97.4 °F (36.3 °C) 95 18 146/67 94 %       Pain Assessment  Pain Intensity 1: 4 (03/01/20 1457)  Pain Location 1: Hip  Pain Intervention(s) 1: Medication (see MAR)  Patient Stated Pain Goal: 0    Ambulating  Yes    Shift report given to oncoming nurse at the bedside.     Zaida Good RN

## 2020-03-02 NOTE — PROGRESS NOTES
Date of Outreach Update:  Bakari Cabrales was seen and assessed. Pt sleeping. No distress noted. Previous Outreach assessment has been reviewed. There have been no significant clinical changes since the completion of the last dated Outreach assessment. Will continue to follow up per outreach protocol.     Signed By:   Jv Leslie    March 2, 2020 4:13 AM

## 2020-03-02 NOTE — PROGRESS NOTES
END OF SHIFT NOTE:    INTAKE/OUTPUT  03/01 0701 - 03/02 0700  In: 0 [P.O.:880]  Out: 4450 [Urine:4450]  Voiding: YES  Catheter: NO  Drain:              Flatus: Patient does have flatus present. Stool:  1 occurrences. Characteristics:  Stool Assessment  Stool Color: Brown  Stool Appearance: Formed  Stool Amount: Medium  Stool Source/Status: Rectum    Emesis: 0 occurrences. Characteristics:        VITAL SIGNS  Patient Vitals for the past 12 hrs:   Temp Pulse Resp BP SpO2   03/02/20 1601 98.2 °F (36.8 °C) 80 19 124/48 98 %   03/02/20 1512 -- -- -- -- 97 %   03/02/20 1157 98.2 °F (36.8 °C) 79 18 131/55 97 %   03/02/20 1104 -- -- -- -- 97 %   03/02/20 0718 -- -- -- -- 97 %   03/02/20 0709 97.9 °F (36.6 °C) 84 18 147/66 98 %       Pain Assessment  Pain Intensity 1: 4 (03/02/20 1420)  Pain Location 1: Flank  Pain Intervention(s) 1: Refused, Rest  Patient Stated Pain Goal: 0    Ambulating  Yes    Shift report given to oncoming nurse at the bedside.     Eri Magallanes

## 2020-03-02 NOTE — PROGRESS NOTES
[facility] CRITICAL CARE OUTREACH NURSE PROGRESS REPORT      SUBJECTIVE: Called to assess patient secondary to outreach protocol. MEWS Score: 1 (03/01/20 1530)  Vitals:    03/01/20 1530 03/01/20 1551 03/01/20 1822 03/01/20 1950   BP: 117/44  114/53    Pulse: 80  87    Resp: 20  17    Temp: 97.6 °F (36.4 °C)  97.9 °F (36.6 °C)    SpO2: 97% 98% 97% 94%   Weight:       Height:            LAB DATA:    Recent Labs     03/01/20  0524 02/29/20  0453 02/28/20  0555   * 130* 130*   K 4.4 4.2 4.3    97* 97*   CO2 27 27 22   AGAP 6* 6* 11   * 153* 246*   BUN 31* 43* 43*   CREA 1.39 1.44 1.65*   GFRAA >60 >60 52*   GFRNA 53* 51* 43*   CA 8.0* 7.9* 8.1*        Recent Labs     03/01/20  0524 02/29/20  0453 02/28/20  0555   WBC 7.7 6.6 6.9   HGB 9.3* 9.0* 10.5*   HCT 26.5* 26.0* 29.7*    152 169          OBJECTIVE: On arrival to room, I found patient to be resting in bed watching TV. Pain Assessment  Pain Intensity 1: 4 (03/01/20 1457)  Pain Location 1: Hip  Pain Intervention(s) 1: Medication (see MAR)  Patient Stated Pain Goal: 0                                 ASSESSMENT:  Pt is oriented x1. VSS. Chest tube is draining sanguinoserous output. No airleak noted. Per primary RN pt had increase confusion towards the evening. PLAN:  Will continue to monitor.

## 2020-03-02 NOTE — PROGRESS NOTES
3/2/2020    PLAN:  Diet- Diabetic  DVT Prophylactics- SCD/Lovenox  Pain control- Tylenol/ ultram  SUP prophylaxis- Protonix  Encourage C/DB/IS  Anterior chest tube removed 2/28/20  Posterior Chest tube removed 3/2/20  Added Flomax 2/28/30  ch replaced due to urinary retention 2/29/20  Adjust insulin- appreciate help from diabetic educator  PT/OT  Social service for discharge planning- lives alone. Ready for discharge [place]  PPD ordered  Transfered to floor 2/27/20  Repeat CXR in 4 hours after chest tube removal and again in AM      ASSESSMENT:  Admit Date: 2/26/2020   5 Day Post-Op  Procedure(s):  LEFT VATS WITH LEFT UPPER LOBECTOMY/ , MEDIASTINAL LYMPHADENECTOMY/CYRO      SUBJECTIVE:  02/27/2020-  No complaints. Pain controlled. On room air 100%. Chest tubes without airleak noted. Tolerating diet. Will adjust insulin dose. Transfer to floor and chest tubes to suction.     02/28/2020-  No complaints. Pain controlled. On room air 100%. Chest tubes without airleak noted. Pulling 1200 on IS. Tolerating diet  02/29/2020-  No complaints. Pain controlled. On room air. Posterior Chest tube to water seal. CXR this am showing New left apical pneumothorax status post removal of anterior chest tube. Tolerating diet. AF, NAD.   03/01/2020-  Pt awake in bed. Rapid response called yesterday due to lethargy which improved with Narcan. Remaining chest tube to water seal - clamped today. CXR this am showing stable apical pneumothorax. Ch replaced again yesterday due to urinary retention. Tolerating Diabetic diet. +flatus/+BM. AF, NAD.   03/02/2020 Patient in bed without complaints of pain to right hip/buttock area. Area assessed. Tender to palpation, no skin redness or breakdown noted. Chest tube clamped. No SOB noted. Unclamped no air leak noted. Will DC remaining chest tube and get follow up xray. Social work for discharge planning.  Glucose still uncontrolled with adjust lantus. Patel remains for 1 week related to  attempts of removal with urinary retention  Posterior chest tube removed at end inspiration. Dressing applied. Patient tolerated well. CXR ordered for 4 hours      OBJECTIVE:  Patient Vitals for the past 24 hrs:   Temp Pulse Resp BP SpO2   03/02/20 0718 -- -- -- -- 97 %   03/02/20 0709 97.9 °F (36.6 °C) 84 18 147/66 98 %   03/02/20 0435 97.4 °F (36.3 °C) 95 18 146/67 94 %   03/01/20 1950 -- -- -- -- 94 %   03/01/20 1822 97.9 °F (36.6 °C) 87 17 114/53 97 %   03/01/20 1551 -- -- -- -- 98 %   03/01/20 1530 97.6 °F (36.4 °C) 80 20 117/44 97 %   03/01/20 1407 -- 84 -- 139/58 --   03/01/20 1354 -- -- -- -- 98 %   03/01/20 1126 -- -- -- -- 98 %   03/01/20 1110 97.5 °F (36.4 °C) 83 18 102/57 100 %   03/01/20 0913 -- 94 -- 111/51 --         Date 03/01/20 0700 - 03/02/20 0659 03/02/20 0700 - 03/03/20 0659   Shift 1123-7676 9468-3382 24 Hour Total 2026-2833 7275-1063 24 Hour Total   INTAKE   P.O. 640 240 880        P. O. 640 240 880      Shift Total(mL/kg) 640(9) 240(3.4) 880(12.3)      OUTPUT   Urine(mL/kg/hr) 2950(3.4) 1500(1.7) 4450(2.6)        Urine Voided 200  200        Urine Output (mL) (Urinary Catheter 02/29/20 Patel; 2- way) 2750 1500 4250      Stool           Stool Occurrence(s) 2 x 2 x 4 x      Chest Tube 0 0 0        Output (ml) (Chest Tube #2 02/26/20 Angled; Posterior; Left) 0 0 0      Shift Total(mL/kg) 8788(32.1) 1500(21) 0047(26.3)      NET -2158 -5126 -8263      Weight (kg) 71.5 71.5 71.5 71.5 71.5 71.5            General:          No acute distress    Lungs:             Even and unlabored, chest tube without airleak noted  Heart:              RRR  Abdomen:        Soft, Non distended  Extremities:     No cyanosis, clubbing or edema  Neurologic:      No focal deficits           Labs:    Recent Labs     03/02/20  0448   WBC 6.8   HGB 9.1*      *   K 4.3      CO2 28   BUN 20   CREA 1.28   *         Crystal Army Age, NP

## 2020-03-02 NOTE — PROGRESS NOTES
Dispo update:  Spoke to Mr. Jaquelin Patel and his older sister Ms. Bryan Claudio (her home phone is [phone]; cell [phone]) about which SNFs to send referrals. From list provided, first choice is [facility], second is [facility], and third is [facility]. Sent referrals to first two, as patient and sister only want [facility] if the first two are not an option. After have a rehab bed, will need to start [employer] pre-cert. Case Management to continue to follow.

## 2020-03-02 NOTE — PROGRESS NOTES
Reviewed notes for spiritual concerns prior to visit  Visit with patient to build rapport with .   Calm  Encouraged with presence and words of hope    Patient is in good spirits  Demonstrates his [place] k[place] and trust in [address] [facility] well  Demonstrates confidence in his care team      Jin Stovall,  Staff   C: [phone]  /  [email or web address]

## 2020-03-02 NOTE — PROGRESS NOTES
Problem: Mobility Impaired (Adult and Pediatric)  Goal: *Acute Goals and Plan of Care (Insert Text)  Description  LTG:  (1.)Mr. Salena Banda will move from supine to sit and sit to supine , scoot up and down and roll side to side in flat bed without siderails with  INDEPENDENT within 7 day(s). (2.)Mr. Salena Banda will perform all functional transfers with  MODIFIED INDEPENDENCE using the least restrictive/no device within 7 day(s). (3.)Mr. Salena Banda will ambulate with  MODIFIED INDEPENDENCE for 250+ feet with normal vital sign response with the least restrictive/no device within 7 day(s). (4.)Mr. Salena Banda will ambulate up/down 2 steps with bilateral  railing with  STAND BY ASSIST with no device within 7 day(s). Outcome: Progressing Towards Goal     PHYSICAL THERAPY: Daily Note 3/3/2020  INPATIENT: PT Visit Days : 2  Payor: Tiera Brandt / Plan: [address] [place] [address] HMO / Product Type: [employer] /       NAME/AGE/GENDER: Martha Al is a 70 y.o. male   PRIMARY DIAGNOSIS: Lung mass [R91.8]  Lung cancer ([facility] [address] [C34.90]  Lung cancer ([facility] [address] [C34.90] <principal problem not specified> <principal problem not specified>  Procedure(s) (LRB):  LEFT VATS WITH LEFT UPPER LOBECTOMY/ , MEDIASTINAL LYMPHADENECTOMY/CYRO (Left)  6 Days Post-Op  ICD-10: Treatment Diagnosis:    · Other abnormalities of gait and mobility (R26.89)  · History of falling (Z91.81)   Precaution/Allergies:  Patient has no known allergies. ASSESSMENT:     Mr. Salena Banda presents supine in bed. Patient lives alone and ambulated independently but with quad cane in community. 3/2 Today patient sitting up in recliner. He seems somewhat confused thinking that his ch needs to be unhooked for PT. He stood with CGA and ambulated 80' x3 with RW and CGA. Performed ex's in sit. Progressing well with transfers and ambulation. Will work on weaning off RW soon.    Mr. Salena Banda is functioning  below baseline and is therefore appropriate for skilled PT to maximize rehab potential.       This section established at most recent assessment   PROBLEM LIST (Impairments causing functional limitations):  1. Decreased Transfer Abilities  2. Decreased Ambulation Ability/Technique  3. Decreased Balance  4. Increased Pain  5. Decreased Activity Tolerance  6. Decreased Knowledge of Precautions  7. Decreased [place] with Home Exercise Program   INTERVENTIONS PLANNED: (Benefits and precautions of physical therapy have been discussed with the patient.)  1. Balance Exercise  2. Bed Mobility  3. Gait Training  4. Therapeutic Activites  5. Therapeutic Exercise/Strengthening  6. Transfer Training  7. education      TREATMENT PLAN: Frequency/Duration: 3 times a week for duration of hospital stay  Rehabilitation Potential For Stated Goals: Excellent     REHAB RECOMMENDATIONS (at time of discharge pending progress):    Placement: It is my opinion, based on this patient's performance to date, that Mr. Muriel Stout may benefit from intensive therapy at a [facility] after discharge due to the functional deficits listed above that are likely to improve with skilled rehabilitation and concerns that he/she may be unsafe to be unsupervised at home due to history of falls. Equipment:    None at this time              HISTORY:   History of Present Injury/Illness (Reason for Referral):  Per MD note, \"The patient is a 70 y.o. male with hx of COPD who was found to have a suspicious EVA pulmonary nodule. Previously presented for needle localization but was diagnosed with pneumonia and required re-scheduling. Reports improved cough since prior visit. Denies fever. \"  Past Medical History/Comorbidities:   Mr. Muriel Stout  has a past medical history of CAD (coronary artery disease) (08/2019), Chronic kidney disease, COPD, Depression, Diabetes ([address] HTN (hypertension) (4/29/2015), Malignant neoplasm of upper lobe of left lung ([address] (12/12/2019), Other ill-defined conditions(799.89), and Sepsis ([facility] [address] (12/2019). Mr. Salena Banda  has a past surgical history that includes hx orthopaedic; pr abdomen surgery proc unlisted (5/2015); hx other surgical (12/2019); and hx colonoscopy. Social History/Living Environment:   Home Environment: Private residence  # Steps to Enter: 2  Rails to Enter: No  One/Two Story Residence: One story  Living Alone: Yes  Support Systems: Child(shayy)  Patient Expects to be Discharged to[de-identified] Private residence  Current DME Used/Available at Home: Cane, quad, Glucometer, Jung Pear, Walker, rolling  Tub or Shower Type: Tub/Shower combination(has walk in as well, sits on edge of tub for washing off)  Prior Level of Function/Work/Activity:  Pt lives alone in 1 level home with 2-3 steps at entrance, no HRs, has walk in and T/S combo but prefers to sit on the edge of the tub for washing up. Was driving up to 3 months ago. Uses a QC out in the community and sometimes at home. Checked his own BSs and gave himself shots but admits he has passed out a few times. Admits to 2-3 falls in last 6 months. Older sister Kerri Garg lives just up the road and reports pt has been needing more and more help with house management and self care the past 3 months. Has children but they live out of town. Pt admits he has not been managing well. Bills are auto drafted. Still goes to [employer] weekly. Number of Personal Factors/Comorbidities that affect the Plan of Care: 1-2: MODERATE COMPLEXITY   EXAMINATION:   Most Recent Physical Functioning:   Gross Assessment:  AROM: Generally decreased, functional  Strength: Generally decreased, functional               Posture:  Posture (WDL): Exceptions to WDL  Posture Assessment: Forward head, Rounded shoulders  Balance:    Bed Mobility:     Wheelchair Mobility:     Transfers: CGA     Gait: 85'x3 RW CGA            Body Structures Involved:  1. Lungs  2. Muscles Body Functions Affected:  1. Sensory/Pain  2. Respiratory  3.  Movement Related  4. Skin Related Activities and Participation Affected:  1. Mobility  2. Self Care  3. Domestic Life  4. Community, Social and [place]   Number of elements that affect the Plan of Care: 4+: HIGH COMPLEXITY   CLINICAL PRESENTATION:   Presentation: Evolving clinical presentation with changing clinical characteristics: MODERATE COMPLEXITY   CLINICAL DECISION MAKIN [facility] Mobility Inpatient Short Form  How much difficulty does the patient currently have. .. Unable A Lot A Little None   1. Turning over in bed (including adjusting bedclothes, sheets and blankets)? [] 1   [] 2   [x] 3   [] 4   2. Sitting down on and standing up from a chair with arms ( e.g., wheelchair, bedside commode, etc.)   [] 1   [] 2   [x] 3   [] 4   3. Moving from lying on back to sitting on the side of the bed? [] 1   [x] 2   [] 3   [] 4   How much help from another person does the patient currently need. .. Total A Lot A Little None   4. Moving to and from a bed to a chair (including a wheelchair)? [] 1   [] 2   [x] 3   [] 4   5. Need to walk in hospital room? [] 1   [] 2   [x] 3   [] 4   6. Climbing 3-5 steps with a railing? [] 1   [] 2   [x] 3   [] 4   © , Trustees of [address] [place] [address] [address] [address], under license to [employer]. All rights reserved      Score:  Initial: 17 Most Recent: X (Date: -- )    Interpretation of Tool:  Represents activities that are increasingly more difficult (i.e. Bed mobility, Transfers, Gait). Medical Necessity:     · Patient is expected to demonstrate progress in   · strength, balance, and functional technique  ·  to   · increase independence with   and improve safety during all functional mobility   · . Reason for Services/Other Comments:  · Patient continues to require skilled intervention due to   · patient unable to attend/participate in therapy as expected  · .    Use of outcome tool(s) and clinical judgement create a POC that gives a: Clear prediction of patient's progress: LOW COMPLEXITY            TREATMENT:   (In addition to Assessment/Re-Assessment sessions the following treatments were rendered)   Pre-treatment Symptoms/Complaints:  None. Pain: Initial:   Pain Intensity 1: 4  Pain Location 1: Incisional  Pain Orientation 1: Left  Post Session:  4     Therapeutic Activity: (    25 minutes): Therapeutic activities including Chair transfers, Ambulation on level ground and LE exerscises to improve mobility, strength and balance. Required moderate cueing   to promote correct execution. DATE: 2/27/20 3/2/20      Ambulation        Hip Flexion  x20 AB      Long Arc Quads X20 AB x20 AB      Hip ab/ad        Heel Raises X20 AB x10 AB      Toe Raises X20 AB x10 AB      Sit to stand x2'                        Key:  A=active, AA=active assisted, P=passive, B=bilaterally, R=right, L=left   DF=dorsiflexion, PF=plantarflexion      Braces/Orthotics/Lines/Etc:   · O2 Device: Room air  Treatment/Session Assessment:    · Response to Treatment:  pleasant and cooperative. · Interdisciplinary Collaboration:   o Physical Therapist  o Registered Nurse  o   · After treatment position/precautions:   o Up in chair  o Bed/Chair-wheels locked  o Call light within reach  o RN notified   · Compliance with Program/Exercises: Compliant all of the time, Will assess as treatment progresses  · Recommendations/Intent for next treatment session: \"Next visit will focus on advancements to more challenging activities and reduction in assistance provided\".   Total Treatment Duration:  PT Patient Time In/Time Out  Time In: 1420  Time Out: [facility], PT, DPT

## 2020-03-03 ENCOUNTER — APPOINTMENT (OUTPATIENT)
Dept: GENERAL RADIOLOGY | Age: 80
DRG: 164 | End: 2020-03-03
Attending: SURGERY
Payer: MEDICARE

## 2020-03-03 ENCOUNTER — APPOINTMENT (OUTPATIENT)
Dept: GENERAL RADIOLOGY | Age: 80
DRG: 164 | End: 2020-03-03
Attending: NURSE PRACTITIONER
Payer: MEDICARE

## 2020-03-03 LAB
ANION GAP SERPL CALC-SCNC: 6 MMOL/L (ref 7–16)
BASOPHILS # BLD: 0 K/UL (ref 0–0.2)
BASOPHILS NFR BLD: 1 % (ref 0–2)
BUN SERPL-MCNC: 15 MG/DL (ref 8–23)
CALCIUM SERPL-MCNC: 8.4 MG/DL (ref 8.3–10.4)
CHLORIDE SERPL-SCNC: 100 MMOL/L (ref 98–107)
CO2 SERPL-SCNC: 28 MMOL/L (ref 21–32)
CREAT SERPL-MCNC: 1.04 MG/DL (ref 0.8–1.5)
DIFFERENTIAL METHOD BLD: ABNORMAL
EOSINOPHIL # BLD: 0.2 K/UL (ref 0–0.8)
EOSINOPHIL NFR BLD: 4 % (ref 0.5–7.8)
ERYTHROCYTE [DISTWIDTH] IN BLOOD BY AUTOMATED COUNT: 14 % (ref 11.9–14.6)
GLUCOSE BLD STRIP.AUTO-MCNC: 161 MG/DL (ref 65–100)
GLUCOSE BLD STRIP.AUTO-MCNC: 174 MG/DL (ref 65–100)
GLUCOSE BLD STRIP.AUTO-MCNC: 178 MG/DL (ref 65–100)
GLUCOSE BLD STRIP.AUTO-MCNC: 363 MG/DL (ref 65–100)
GLUCOSE BLD STRIP.AUTO-MCNC: 94 MG/DL (ref 65–100)
GLUCOSE SERPL-MCNC: 263 MG/DL (ref 65–100)
HCT VFR BLD AUTO: 26.7 % (ref 41.1–50.3)
HGB BLD-MCNC: 9.1 G/DL (ref 13.6–17.2)
IMM GRANULOCYTES # BLD AUTO: 0.1 K/UL (ref 0–0.5)
IMM GRANULOCYTES NFR BLD AUTO: 1 % (ref 0–5)
LYMPHOCYTES # BLD: 2.1 K/UL (ref 0.5–4.6)
LYMPHOCYTES NFR BLD: 33 % (ref 13–44)
MCH RBC QN AUTO: 28.3 PG (ref 26.1–32.9)
MCHC RBC AUTO-ENTMCNC: 34.1 G/DL (ref 31.4–35)
MCV RBC AUTO: 83.2 FL (ref 79.6–97.8)
MONOCYTES # BLD: 0.7 K/UL (ref 0.1–1.3)
MONOCYTES NFR BLD: 11 % (ref 4–12)
NEUTS SEG # BLD: 3.2 K/UL (ref 1.7–8.2)
NEUTS SEG NFR BLD: 51 % (ref 43–78)
NRBC # BLD: 0 K/UL (ref 0–0.2)
PLATELET # BLD AUTO: 213 K/UL (ref 150–450)
PMV BLD AUTO: 9.3 FL (ref 9.4–12.3)
POTASSIUM SERPL-SCNC: 4.6 MMOL/L (ref 3.5–5.1)
RBC # BLD AUTO: 3.21 M/UL (ref 4.23–5.6)
SODIUM SERPL-SCNC: 134 MMOL/L (ref 136–145)
WBC # BLD AUTO: 6.3 K/UL (ref 4.3–11.1)

## 2020-03-03 PROCEDURE — 65270000029 HC RM PRIVATE

## 2020-03-03 PROCEDURE — 97535 SELF CARE MNGMENT TRAINING: CPT

## 2020-03-03 PROCEDURE — 3331090002 HH PPS REVENUE DEBIT

## 2020-03-03 PROCEDURE — 74011000250 HC RX REV CODE- 250: Performed by: SURGERY

## 2020-03-03 PROCEDURE — 74011250637 HC RX REV CODE- 250/637: Performed by: SURGERY

## 2020-03-03 PROCEDURE — 73502 X-RAY EXAM HIP UNI 2-3 VIEWS: CPT

## 2020-03-03 PROCEDURE — 94640 AIRWAY INHALATION TREATMENT: CPT

## 2020-03-03 PROCEDURE — 71045 X-RAY EXAM CHEST 1 VIEW: CPT

## 2020-03-03 PROCEDURE — 3331090001 HH PPS REVENUE CREDIT

## 2020-03-03 PROCEDURE — 94760 N-INVAS EAR/PLS OXIMETRY 1: CPT

## 2020-03-03 PROCEDURE — 74011250637 HC RX REV CODE- 250/637: Performed by: NURSE PRACTITIONER

## 2020-03-03 PROCEDURE — 74011636637 HC RX REV CODE- 636/637: Performed by: NURSE PRACTITIONER

## 2020-03-03 PROCEDURE — 97110 THERAPEUTIC EXERCISES: CPT

## 2020-03-03 PROCEDURE — 85025 COMPLETE CBC W/AUTO DIFF WBC: CPT

## 2020-03-03 PROCEDURE — 74011000250 HC RX REV CODE- 250: Performed by: INTERNAL MEDICINE

## 2020-03-03 PROCEDURE — 74011250636 HC RX REV CODE- 250/636: Performed by: SURGERY

## 2020-03-03 PROCEDURE — 82962 GLUCOSE BLOOD TEST: CPT

## 2020-03-03 PROCEDURE — 36415 COLL VENOUS BLD VENIPUNCTURE: CPT

## 2020-03-03 PROCEDURE — 80048 BASIC METABOLIC PNL TOTAL CA: CPT

## 2020-03-03 RX ORDER — INSULIN LISPRO 100 [IU]/ML
10 INJECTION, SOLUTION INTRAVENOUS; SUBCUTANEOUS
Status: DISCONTINUED | OUTPATIENT
Start: 2020-03-03 | End: 2020-03-06

## 2020-03-03 RX ORDER — INSULIN GLARGINE 100 [IU]/ML
24 INJECTION, SOLUTION SUBCUTANEOUS
Status: DISCONTINUED | OUTPATIENT
Start: 2020-03-03 | End: 2020-03-04

## 2020-03-03 RX ORDER — ALBUTEROL SULFATE 0.83 MG/ML
2.5 SOLUTION RESPIRATORY (INHALATION)
Status: DISCONTINUED | OUTPATIENT
Start: 2020-03-03 | End: 2020-03-10

## 2020-03-03 RX ADMIN — TIOTROPIUM BROMIDE INHALATION SPRAY 2 PUFF: 3.12 SPRAY, METERED RESPIRATORY (INHALATION) at 07:08

## 2020-03-03 RX ADMIN — INSULIN LISPRO 2 UNITS: 100 INJECTION, SOLUTION INTRAVENOUS; SUBCUTANEOUS at 12:22

## 2020-03-03 RX ADMIN — SENNOSIDES AND DOCUSATE SODIUM 2 TABLET: 8.6; 5 TABLET ORAL at 08:32

## 2020-03-03 RX ADMIN — BUPROPION HYDROCHLORIDE 150 MG: 150 TABLET, EXTENDED RELEASE ORAL at 22:12

## 2020-03-03 RX ADMIN — METOPROLOL TARTRATE 50 MG: 50 TABLET, FILM COATED ORAL at 22:12

## 2020-03-03 RX ADMIN — TAMSULOSIN HYDROCHLORIDE 0.4 MG: 0.4 CAPSULE ORAL at 08:32

## 2020-03-03 RX ADMIN — BUDESONIDE AND FORMOTEROL FUMARATE DIHYDRATE 2 PUFF: 160; 4.5 AEROSOL RESPIRATORY (INHALATION) at 20:55

## 2020-03-03 RX ADMIN — Medication 10 ML: at 22:27

## 2020-03-03 RX ADMIN — INSULIN GLARGINE 24 UNITS: 100 INJECTION, SOLUTION SUBCUTANEOUS at 22:26

## 2020-03-03 RX ADMIN — LISINOPRIL 5 MG: 5 TABLET ORAL at 08:31

## 2020-03-03 RX ADMIN — INSULIN LISPRO 10 UNITS: 100 INJECTION, SOLUTION INTRAVENOUS; SUBCUTANEOUS at 17:01

## 2020-03-03 RX ADMIN — ATORVASTATIN CALCIUM 10 MG: 10 TABLET, FILM COATED ORAL at 22:12

## 2020-03-03 RX ADMIN — METOPROLOL TARTRATE 50 MG: 50 TABLET, FILM COATED ORAL at 08:32

## 2020-03-03 RX ADMIN — PANTOPRAZOLE SODIUM 40 MG: 40 TABLET, DELAYED RELEASE ORAL at 05:03

## 2020-03-03 RX ADMIN — Medication 10 ML: at 14:10

## 2020-03-03 RX ADMIN — INSULIN LISPRO 10 UNITS: 100 INJECTION, SOLUTION INTRAVENOUS; SUBCUTANEOUS at 08:36

## 2020-03-03 RX ADMIN — INSULIN LISPRO 2 UNITS: 100 INJECTION, SOLUTION INTRAVENOUS; SUBCUTANEOUS at 17:00

## 2020-03-03 RX ADMIN — GABAPENTIN 200 MG: 100 CAPSULE ORAL at 22:12

## 2020-03-03 RX ADMIN — ALBUTEROL SULFATE 2.5 MG: 2.5 SOLUTION RESPIRATORY (INHALATION) at 11:06

## 2020-03-03 RX ADMIN — GABAPENTIN 200 MG: 100 CAPSULE ORAL at 08:31

## 2020-03-03 RX ADMIN — BUDESONIDE AND FORMOTEROL FUMARATE DIHYDRATE 2 PUFF: 160; 4.5 AEROSOL RESPIRATORY (INHALATION) at 07:09

## 2020-03-03 RX ADMIN — INSULIN LISPRO 10 UNITS: 100 INJECTION, SOLUTION INTRAVENOUS; SUBCUTANEOUS at 08:39

## 2020-03-03 RX ADMIN — BUPROPION HYDROCHLORIDE 150 MG: 150 TABLET, EXTENDED RELEASE ORAL at 08:31

## 2020-03-03 RX ADMIN — ALBUTEROL SULFATE 2.5 MG: 2.5 SOLUTION RESPIRATORY (INHALATION) at 20:55

## 2020-03-03 RX ADMIN — ENOXAPARIN SODIUM 40 MG: 40 INJECTION SUBCUTANEOUS at 22:12

## 2020-03-03 RX ADMIN — INSULIN LISPRO 10 UNITS: 100 INJECTION, SOLUTION INTRAVENOUS; SUBCUTANEOUS at 12:23

## 2020-03-03 RX ADMIN — GABAPENTIN 200 MG: 100 CAPSULE ORAL at 17:00

## 2020-03-03 RX ADMIN — ALBUTEROL SULFATE 2.5 MG: 2.5 SOLUTION RESPIRATORY (INHALATION) at 07:08

## 2020-03-03 NOTE — PROGRESS NOTES
Dispo update:  Accepted rehab bed offer at [facility] for STR (their first choice), and will start [facility] pre-cert.

## 2020-03-03 NOTE — PROGRESS NOTES
3/3/2020    PLAN:  Diet- Diabetic  DVT Prophylactics- SCD/Lovenox  Pain control- Tylenol/ ultram  SUP prophylaxis- Protonix  Encourage C/DB/IS  Anterior chest tube removed 2/28/20  Posterior Chest tube removed 3/2/20  Added Flomax 2/28/30  ch replaced due to urinary retention 2/29/20  Adjust insulin- appreciate help from diabetic educator  PT/OT  Social service for discharge planning- referrals sent   PPD ordered  Hip xray this AM       ASSESSMENT:  Admit Date: 2/26/2020   6 Day Post-Op  Procedure(s):  LEFT VATS WITH LEFT UPPER LOBECTOMY/ , MEDIASTINAL LYMPHADENECTOMY/CYRO       DIAGNOSIS   A: \"# 9 LYMPH NODE X3\":   TWO FRAGMENTS OF ANTHRACOTIC LYMPH NODE NEGATIVE FOR METASTATIC TUMOR   B: \"# 8 LYMPH NODE X2\":   THREE FRAGMENTS OF ANTHRACOTIC LYMPH NODE NEGATIVE FOR METASTATIC TUMOR   C: \"AP WINDOW LYMPH NODE\":   ONE FRAGMENT OF LYMPH NODE NEGATIVE FOR METASTATIC TUMOR   D: \"# 7 LYMPH NODE X3\":   FOUR FRAGMENTS OF ANTHRACOTIC LYMPH NODE NEGATIVE FOR METASTATIC TUMOR   E: \"PERIHILAR\":   ONE FRAGMENT OF ANTHRACOTIC LYMPH NODE NEGATIVE FOR METASTATIC TUMOR   F: \"LEFT UPPER LOBECTOMY\":   ADENOCARCINOMA, ENTERIC TYPE (SEE COMMENT), MEASURING APPROXIMATELY 7 X 6.5 X 4 CM. MACROMETASTATIC CARCINOMA TO ONE OF SIX PERIBRONCHIAL LYMPH NODES WITH FOCAL EXTRACAPSULAR EXTENSION. BRONCHIAL AND VASCULAR MARGINS OF RESECTION ARE NEGATIVE FOR MALIGNANT TUMOR. DEFINITE LYMPHOVASCULAR INVASION IS NOT IDENTIFIED. PLEURAL SURFACE IS NEGATIVE FOR MALIGNANT TUMOR. Comment   The tumor       SUBJECTIVE:  02/27/2020-  No complaints. Pain controlled. On room air 100%. Chest tubes without airleak noted. Tolerating diet. Will adjust insulin dose. Transfer to floor and chest tubes to suction.     02/28/2020-  No complaints. Pain controlled. On room air 100%. Chest tubes without airleak noted. Pulling 1200 on IS. Tolerating diet  02/29/2020-  No complaints. Pain controlled.  On room air. Posterior Chest tube to water seal. CXR this am showing New left apical pneumothorax status post removal of anterior chest tube. Tolerating diet. AF, NAD.   03/01/2020-  Pt awake in bed. Rapid response called yesterday due to lethargy which improved with Narcan. Remaining chest tube to water seal - clamped today. CXR this am showing stable apical pneumothorax. Patel replaced again yesterday due to urinary retention. Tolerating Diabetic diet. +flatus/+BM. AF, NAD.   03/02/2020 Patient in bed without complaints of pain to right hip/buttock area. Area assessed. Tender to palpation, no skin redness or breakdown noted. Chest tube clamped. No SOB noted. Unclamped no air leak noted. Will DC remaining chest tube and get follow up xray. Social work for discharge planning. Glucose still uncontrolled with adjust lantus. Patel remains for 1 week related to  attempts of removal with urinary retention  Posterior chest tube removed at end inspiration. Dressing applied. Patient tolerated well. CXR ordered for 4 hours  03/03/2020 Patient away at Xray this AM- hip xray negative. Returned this evening and Patient in restroom. Did not see patient- nurse states no issues. - chest xray reviewed. Awaiting snf placement. OBJECTIVE:  Patient Vitals for the past 24 hrs:   Temp Pulse Resp BP SpO2   03/03/20 0710 97.9 °F (36.6 °C) 71 18 143/56 97 %   03/03/20 0709 -- -- -- -- 97 %   03/03/20 0407 98.2 °F (36.8 °C) 72 18 120/49 96 %   03/02/20 2321 98.3 °F (36.8 °C) 87 18 164/55 96 %   03/02/20 2026 -- -- -- -- 98 %   03/02/20 1956 97.2 °F (36.2 °C) 85 18 150/61 98 %   03/02/20 1601 98.2 °F (36.8 °C) 80 19 124/48 98 %   03/02/20 1512 -- -- -- -- 97 %   03/02/20 1157 98.2 °F (36.8 °C) 79 18 131/55 97 %   03/02/20 1104 -- -- -- -- 97 %         Date 03/02/20 0700 - 03/03/20 0659 03/03/20 0700 - 03/04/20 0659   Shift 0752-3081 0130-6442 24 Hour Total 3017-8999 4712-3084 24 Hour Total   INTAKE   P.O. 360  360        P. O. 360  360 Shift Total(mL/kg) 360(5)  360(5)      OUTPUT   Urine(mL/kg/hr) 1500(1.7) 1450(1.7) 2950(1.7) 375  375     Urine Output (mL) (Urinary Catheter 02/29/20 Patel; 2- way) 1500 1450 2950 375  375   Stool           Stool Occurrence(s) 1 x 1 x 2 x      Shift Total(mL/kg) 1500(21) 1450(20.3) 2950(41.3) 375(5.2)  375(5.2)   NET -1140 -1450 -2590 -375  -375   Weight (kg) 71.5 71.5 71.5 71.5 71.5 71.5                Labs:    Recent Labs     03/03/20  0448   WBC 6.3   HGB 9.1*      *   K 4.6      CO2 28   BUN 15   CREA 1.04   *         Crystal Sera Fortune, NP

## 2020-03-03 NOTE — PROGRESS NOTES
Problem: Self Care Deficits Care Plan (Adult)  Goal: *Acute Goals and Plan of Care (Insert Text)  Description  1. Patient will complete upper body bathing and dressing with setup and adaptive equipment as needed. 2. Patient will complete lower body bathing and dressing with min A, additional time and adaptive equipment as needed. 3. Patient will complete toileting with mod I.   4. Patient will tolerate 25 minutes of OT treatment with 3 rest breaks to increase activity tolerance for ADLs. -GOAL MET 3/3/2020 upgrade to 40 minutes. 5. Patient will complete functional transfers with CGA and adaptive equipment as needed. -GOAL MET 3/3/2020 upgrade to supervision  6. Patient will complete grooming tasks in standing at sink level after setup. Timeframe: 7 visits      Outcome: Progressing Towards Goal       OCCUPATIONAL THERAPY: Daily Note   3/3/2020  INPATIENT: OT Visit Days: 2  Payor: Elza Seymour / Plan: [address] [place] [address] HMO / Product Type: [employer] Medicare /      NAME/AGE/GENDER: Maria Victoria Caldwell is a 70 y.o. male   PRIMARY DIAGNOSIS:  Lung mass [R91.8]  Lung cancer ([facility] [address] [C34.90]  Lung cancer ([facility] [address] [C34.90] <principal problem not specified> <principal problem not specified>  Procedure(s) (LRB):  LEFT VATS WITH LEFT UPPER LOBECTOMY/ , MEDIASTINAL LYMPHADENECTOMY/CYRO (Left)  6 Days Post-Op  ICD-10: Treatment Diagnosis:    · Generalized Muscle Weakness (M62.81)  · Other lack of cordination (R27.8)  · Difficulty in walking, Not elsewhere classified (R26.2)  · Repeated Falls (R29.6)  · History of falling (Z91.81)   Precautions/Allergies:    Falls, Patient has no known allergies. ASSESSMENT:     Mr. Sharda Sepulveda is a 69 yo L dominant AAM admitted with above and underwent surgery. Pt lives alone in 1 level home with 2-3 steps at entrance, no HRs, has walk in and T/S combo but prefers to sit on the edge of the tub for washing up. Was driving up to 3 months ago. Admits to 2-3 falls in last 6 months. Older sister Juliane Casiano lives just up the road and reports pt has been needing more and more help with house management and self care the past 3 months. Has children but they live out of town. 3/3/2020 reviewed arm stretches in chair to reduce pain and increase mobility due to posterior thoracic incisions. Up with walker to bathroom for standing grooming at sink. Patient progressing well, plan is STR. Continue OT POC    This section established at most recent assessment   PROBLEM LIST (Impairments causing functional limitations):  1. Decreased Strength  2. Decreased ADL/Functional Activities  3. Decreased Transfer Abilities  4. Decreased Ambulation Ability/Technique  5. Decreased Balance  6. Increased Pain  7. Decreased Activity Tolerance  8. Decreased Pacing Skills  9. Decreased Work Simplification/Energy Conservation Techniques  10. Increased Fatigue  11. Decreased Flexibility/Joint Mobility  12. Edema/Girth  13. Decreased Skin Integrity/Hygeine  14. Decreased Cognition   INTERVENTIONS PLANNED: (Benefits and precautions of occupational therapy have been discussed with the patient.)  1. Activities of daily living training  2. Adaptive equipment training  3. Balance training  4. Clothing management  5. Community reintergration  6. Therapeutic activity  7. Therapeutic exercise     TREATMENT PLAN: Frequency/Duration: Follow patient 3x per week to address above goals. Rehabilitation Potential For Stated Goals: Good     REHAB RECOMMENDATIONS (at time of discharge pending progress):    Placement: It is my opinion, based on this patient's performance to date, that Mr. Negrita Montoya may benefit from intensive therapy at a [address] [place] [address] after discharge due to the functional deficits listed above that are likely to improve with skilled rehabilitation and concerns that he/she may be unsafe to be unsupervised at home due to living alone and not managing well.   Equipment:   [address] chair or 3 in 1 BSC , should use RW OCCUPATIONAL PROFILE AND HISTORY:   History of Present Injury/Illness (Reason for Referral): SWATI Stovall is a 70 y.o. male who is back to discuss his surgery. His surgery was canceled last month due to active pneumonia. He was also admitted recently due to hypoglycemic and syncope episode. He is still weak and he lives alone. Today, he has no respiratory symptoms, he feels well overall. On 12/10/2019, he was referred by pulmonary for evaluation of a lung mass involving the Left upper lobe. The tumor was identified incidentally by routine chest xray and then proceeded to CT scan. The pathology showed a adenocarcinoma and lymph node 10L suspicious for malignancy. Currently, he complains of cough with clear secreations. He denies chest pain, shortness of breath and hemoptysis. He  does have a history of smoking and quit 1 month ago. He does not have a history of drinking. Denies any chest pain     He does not have a family history of cancer. Medical history - DM, Htn, COPD . He has had previous chest surgery- collapsed left lung with chest tubes. Other surgeries include exploratory laparotomy for SBO. does take blood thinner.- Plavix, but unsure why he takes this, denies any stent or a-fib, denies history of CVA, states has been taking for a long time. Past Medical History/Comorbidities:   Mr. Deborah Gutiérrez  has a past medical history of CAD (coronary artery disease) (08/2019), Chronic kidney disease, COPD, Depression, Diabetes ([address] HTN (hypertension) (4/29/2015), Malignant neoplasm of upper lobe of left lung ([address] (12/12/2019), Other ill-defined conditions(799.89), and Sepsis ([address] (12/2019). Mr. Deborah Gutiérrez  has a past surgical history that includes hx orthopaedic; pr abdomen surgery proc unlisted (5/2015); hx other surgical (12/2019); and hx colonoscopy.   Social History/Living Environment:   Home Environment: Private residence  # Steps to Enter: 2  Rails to Enter: No  One/Two Story Residence: One story  Living Alone: Yes  Support Systems: Child(shayy)  Patient Expects to be Discharged to[de-identified] Private residence  Current DME Used/Available at Home: Cane, quad, Glucometer, [place], Walker, rolling  Tub or Shower Type: Tub/Shower combination(has walk in as well, sits on edge of tub for washing off)  Prior Level of Function/Work/Activity:  Pt lives alone in 1 level home with 2-3 steps at entrance, no HRs, has walk in and T/S combo but prefers to sit on the edge of the tub for washing up. Was driving up to 3 months ago. Uses a QC out in the community and sometimes at home. Checked his own BSs and gave himself shots but admits he has passed out a few times. Admits to 2-3 falls in last 6 months. Older sister Ira Kruse lives just up the road and reports pt has been needing more and more help with house management and self care the past 3 months. Has children but they live out of town. Pt admits he has not been managing well. Bills are auto drafted. Still goes to [employer] weekly. Dominant Side:         RIGHT   Number of Personal Factors/Comorbidities that affect the Plan of Care: Extensive review of physical, cognitive, and psychosocial performance (3+):  HIGH COMPLEXITY   ASSESSMENT OF OCCUPATIONAL PERFORMANCE[de-identified]   Activities of Daily Living:   Basic ADLs (From Assessment) Complex ADLs (From Assessment)   Feeding: Setup  Oral Facial Hygiene/Grooming: Minimum assistance  Bathing: Moderate assistance  Upper Body Dressing: Moderate assistance  Lower Body Dressing: Total assistance  Toileting: Total assistance, Adaptive equipment(ch in place) Instrumental ADL  Meal Preparation: Total assistance  Homemaking:  Total assistance  Medication Management: Setup  Financial Management: Stand-by assistance   Grooming/Bathing/Dressing Activities of Daily Living                       Functional Transfers  Bathroom Mobility: Minimum assistance  Toilet Transfer : Minimum assistance     Bed/Mat Mobility  Sit to Stand: Contact guard assistance  Stand to Sit: Contact guard assistance  Bed to Chair: Minimum assistance;Contact guard assistance     Most Recent Physical Functioning:   Gross Assessment:                  Posture:  Posture (WDL): Exceptions to WDL  Posture Assessment: Forward head, Rounded shoulders  Balance:    Bed Mobility:     Wheelchair Mobility:     Transfers:  Sit to Stand: Contact guard assistance  Stand to Sit: Contact guard assistance  Bed to Chair: Minimum assistance;Contact guard assistance            Patient Vitals for the past 6 hrs:   BP SpO2 Pulse   20 1105 131/65 91 % (!) 127   20 1106 -- 98 % --       Mental Status  Neurologic State: Alert  Orientation Level: Appropriate for age  Cognition: Follows commands  Perception: Appears intact  Perseveration: No perseveration noted  Safety/Judgement: Awareness of environment, Fall prevention                          Physical Skills Involved:  1. Range of Motion  2. Balance  3. Strength  4. Activity Tolerance  5. Sensation  6. Fine Motor Control  7. Pain (acute)  8. Edema  9. Skin Integrity Cognitive Skills Affected (resulting in the inability to perform in a timely and safe manner):  1. Sustained Attention  2. Divided Attention Psychosocial Skills Affected:  1. Habits/Routines  2. Environmental Adaptation  3. Social Interaction  4. Self-Awareness   Number of elements that affect the Plan of Care: 5+:  HIGH COMPLEXITY   CLINICAL DECISION MAKIN [facility] [address] [address] AM-PAC 6 Clicks   Daily Activity Inpatient Short Form  How much help from another person does the patient currently need. .. Total A Lot A Little None   1. Putting on and taking off regular lower body clothing? [] 1   [x] 2   [] 3   [] 4   2. Bathing (including washing, rinsing, drying)? [] 1   [x] 2   [] 3   [] 4   3. Toileting, which includes using toilet, bedpan or urinal?   [] 1   [x] 2   [] 3   [] 4   4. Putting on and taking off regular upper body clothing?    [] 1   [x] 2   [] 3   [] 4   5. Taking care of personal grooming such as brushing teeth? [] 1   [] 2   [x] 3   [] 4   6. Eating meals? [] 1   [] 2   [x] 3   [] 4   © 2007, Trustees of [address] [place] [address] [address] [address], under license to [employer]. All rights reserved      Score:  Initial: 14, completed 2/27/2020 Most Recent: X (Date: -- )    Interpretation of Tool:  Represents activities that are increasingly more difficult (i.e. Bed mobility, Transfers, Gait). Medical Necessity:     · Patient demonstrates   · good  ·  rehab potential due to higher previous functional level. Reason for Services/Other Comments:  · Patient continues to require skilled intervention due to   · S/p above and decreased ADLs and mobiltiy   · . Use of outcome tool(s) and clinical judgement create a POC that gives a: MODERATE COMPLEXITY         TREATMENT:   (In addition to Assessment/Re-Assessment sessions the following treatments were rendered)     Pre-treatment Symptoms/Complaints:  \"I don't think I have been doing so well. \"  Pain: Initial:   Pain Intensity 1: 3  Pain Location 1: Flank  Pain Orientation 1: Right, Left  Post Session:  \"about the same\" RN aware     Self Care: (10 mins): Procedure(s) (per grid) utilized to improve and/or restore self-care/home management as related to dressing, toileting and grooming. Required moderate verbal and tactile cueing to facilitate activities of daily living skills and compensatory activities. Therapeutic Exercise: ( 16):  Exercises per grid below to improve mobility, strength and coordination. Required minimal verbal, manual and tactile cues to promote proper body alignment, promote proper body posture and promote proper body mechanics. Progressed range and complexity of movement as indicated.    Date:  3/3/2020 Date:   Date:     Activity/Exercise Parameters Parameters Parameters   Shoulder flexion 2 sets of 10     Shoulder abduction 2 sets of 10      Shoulder extension 2 sets of 10 Braces/Orthotics/Lines/Etc:   · drain 1 tube  · O2 Device: Room air  Treatment/Session Assessment:    · Response to Treatment:  got light headed and zoned out when standing, BPs were WNLs  · Interdisciplinary Collaboration:   o Occupational Therapist  o Registered Nurse  o   · After treatment position/precautions:   o Supine in bed  o Bed/Chair-wheels locked  o Bed in low position  o Call light within reach  o RN notified  o Family at bedside  o Nurse at bedside  o SW at bedside   · Compliance with Program/Exercises: Compliant most of the time, Will assess as treatment progresses. · Recommendations/Intent for next treatment session: \"Next visit will focus on advancements to more challenging activities and reduction in assistance provided\".   Total Treatment Duration:  OT Patient Time In/Time Out  Time In: 1320  Time Out: Evelia 60, OT  Mian Vanegas MS, OTR/L

## 2020-03-03 NOTE — DIABETES MGMT
Patient admitted with lung cancer, s/p surgery. Blood glucose ranged 222-309 yesterday with patient receiving Lantus 19 units and Humalog 38 units. Blood glucose this morning was 363. Reviewed pt current regimen: Lantus 19 units nightly, Humalog 6 units with meals, and Humalog SSI. Spoke with provider discussed patient glycemic control. New orders received to increase basal insulin to Lantus 24 units nightly as fasting blood glucose is not at goal. New orders also received to increase prandial insulin to Humalog 10 units with meals to help improve glycemic control during the day related to caloric intake.

## 2020-03-03 NOTE — PROGRESS NOTES
END OF SHIFT NOTE:    INTAKE/OUTPUT  03/02 0701 - 03/03 0700  In: 360 [P.O.:360]  Out: 2950 [Urine:2950]  Voiding: NO  Catheter: YES  Drain:              Flatus: Patient does have flatus present. Stool:  3 occurrences. Characteristics:  Stool Assessment  Stool Color: Brown  Stool Appearance: Loose  Stool Amount: Large  Stool Source/Status: Rectum    Emesis: 0 occurrences. Characteristics:        VITAL SIGNS  Patient Vitals for the past 12 hrs:   Temp Pulse Resp BP SpO2   03/03/20 1528 97.5 °F (36.4 °C) 86 18 109/49 98 %   03/03/20 1106 -- -- -- -- 98 %   03/03/20 1105 97.7 °F (36.5 °C) (!) 127 20 131/65 91 %   03/03/20 0710 97.9 °F (36.6 °C) 71 18 143/56 97 %   03/03/20 0709 -- -- -- -- 97 %       Pain Assessment  Pain Intensity 1: 0 (03/03/20 1355)  Pain Location 1: Flank  Pain Intervention(s) 1: Refused, Rest  Patient Stated Pain Goal: 0    Ambulating  Yes    Shift report given to oncoming nurse at the bedside.     Rizwan Culver

## 2020-03-03 NOTE — PROGRESS NOTES
Respiratory Care Services Policy Number: -[id number]    Title: Aerosolized Medication Protocol    Effective Date: 10/1998    Revised Date: 06/13, 03/16, 11/17, 07/19     Reviewed Date: 05/14/ 03/15 , 06/17, 5/18   I. Policy: The Aerosolized Medication Protocol shall by implemented by Respiratory Care Practitioners (RCP) for patients with orders to receive aerosol therapy with medication. II. Purpose: To open and maintain obstructed airways, the RCP, will utilize the following   protocol to select the indicated aerosolized medication(s) and determine the most effective method of delivery to the patient. III. Patient Type: All patients who are determined to meet aerosolized medication criteria as          outlined in this protocol. IV. Responsibility: Director, [address], registered Respiratory Care Practitioners (RCP's) with documented competency in the performance of respiratory therapeutic techniques. V. Equipment needed:  A. Stethoscope  B. Pulse oximeter  C. AeroEclipse nebulizer  D. Dry Powder Inhaler (DPI)     VI. Protocol:   A. The following conditions are accepted indications for aerosolized medication therapy. 1. Bronchospasm/wheezing  2. Impaired mucociliary clearance  3. Tracheobronchial mucosal congestion/and laryngeal stridor  4. Diseases which commonly require aerosolized medication therapy include, but are not limited to:  a. Asthma/reactive airway disease  b. Bronchitis/emphysema (COPD)  c. Cystic fibrosis  d. Severe laryngitis/tracheitis  e. Bronchiectasis  f. Smoke inhalation or chemical trauma to the lung or upper airway  g. Physical trauma to the upper airway  h. Laryngotracheobronchitis  i. Bronchiolitis  j. Non-specific wheezing              B. Indications for bronchodilator medications will include:  a. Bronchospasm/ wheezing  b. Asthma/reactive airway disease  c. Chronic obstructive pulmonary disease  d.  Obstructive defect on pulmonary function testing  C. Administration of medications  1. If a bronchodilator or any other type of respiratory medication is needed, a physician order must be indicated in the medication section in the patient's EMR. 2. When the physician specifies the medication and dosage at the time of request, the ordered medication will be used as part of the care plan. D. The following guidelines will be utilized in the evaluation and selection of the appropriate delivery device for indicated medication(s):  1. Unassisted aerosol (UA) is the preferred method of aerosol delivery and indicated if  a. Ventilation is inadequate  b. Patient demonstrates wheezing   c. Routine treatments shall be given via the AeroEclipse nebulizer. d. The Aerogen nebulizer shall be used in the following circumstances:  i. ER patients and they will continue with this nebulizer if admitted to 8th floor or ICU.  ii. Patients in ICU   iii. Patients on 8th floor with severe wheezing (at the RCP's discretion)  2. Dry PowderInhaler (DPI)   a. Patient should be alert/cooperative  b. Able to perform 3 second breath hold. c. Patient has used DPI therapy previously, either at home or in the hospital.  d. Note: The only approved inhaler on formulary is Spiriva. VII. Guidelines:   Monitor patient's vital signs and evaluate patient's clinical status. The need to change medication and/or modality may be indicated by:  1. A pulse greater than 120 bpm, or if a pulse increase of 20 bpm occurs with bronchodilator medications. 2. Significant worsening of dyspnea or wheezing occurring during or within 30 minutes of discontinuing therapy. 3. Worsening of patient's sensorium (e.g. patient becomes confused or obtunded, and unable to follow directions). 4. Worsening of patient's chest x-ray. 5. Change in sputum (e.g. increased pulmonary infiltrate, which might indicate need for volume expansion therapy).   6. Patient has difficulty coughing up secretions, which might indicate need for acetylcysteine and/or bronchial hygiene therapy. 7. Call physician immediately if dyspnea worsens and is not responsive to modifications allowed by protocol. VIII. Clinical Responsibility:  1. The therapy assessment guidelines will be used to evaluate all patients receiving aerosolized medications with the exception of critical care areas. 1. RCP's will perform changes in therapy per protocol. 2. It will be the responsibility of RCP to provide instruction regarding respiratory medications, possible side effects, aerosol therapy and proper DPI technique, as well as, spacer usage to patients ordered DPI therapy. 3. Current therapy that is part of a patient's home regimen will not be discontinued. a. Provide spacer and educate patient on proper inhaler technique if needed. IX. Documentation  A. Document assessment findings in the respiratory assessment section of the patient's EMR. B. Document changes in therapy per protocol in the respiratory orders section and in the care plan section of the patient's EMR. C. Document patient education in the patient education section of the patient's EMR. X. Outcome Criteria:  A. Relief of wheezes and obstruction  B. Improved cough and sputum color and consistency  C. Improved chest x-ray  D. Improved arterial oxygen tension and or SaO2  E. Improved Peak Flow on asthmatic patients        XI. Related Protocols:  A. Respiratory Patient Care Protocols  B. Bronchial Hygiene Therapy  C. Oxygen Protocol    Reference:  L - Respiratory Care Department Policy, Procedure and Protocol Guideline Manual, 1995, TIKA Vasquez. L -  Therapist Driven Respiratory Care Protocols - A Practitioner's Guide for Criteria-Based Respiratory Care by Jenna Christine M.D., and TIKA Lomas, AKASH. L - The rationale for therapist-driven protocols: an update. Respiratory Care 1998; [id number]. N -[facility] Clinical Practice Guidelines.

## 2020-03-03 NOTE — PROGRESS NOTES
.  END OF SHIFT NOTE:    INTAKE/OUTPUT  03/02 0701 - 03/03 0700  In: 360 [P.O.:360]  Out: 2950 [Urine:2950]  Voiding: YES  Catheter: YES  Drain:              Flatus: Patient does have flatus present. Stool:  1 occurrences. Characteristics:  Stool Assessment  Stool Color: Brown  Stool Appearance: Loose  Stool Amount: Large  Stool Source/Status: Rectum    Emesis: 0 occurrences. Characteristics:        VITAL SIGNS  Patient Vitals for the past 12 hrs:   Temp Pulse Resp BP SpO2   03/03/20 0407 98.2 °F (36.8 °C) 72 18 120/49 96 %   03/02/20 2321 98.3 °F (36.8 °C) 87 18 164/55 96 %   03/02/20 2026 -- -- -- -- 98 %   03/02/20 1956 97.2 °F (36.2 °C) 85 18 150/61 98 %       Pain Assessment  Pain Intensity 1: 0 (03/03/20 0141)  Pain Location 1: Flank  Pain Intervention(s) 1: Refused, Rest  Patient Stated Pain Goal: 0    Ambulating  Yes    Shift report given to oncoming nurse at the bedside.     Meli Hurtado RN

## 2020-03-04 ENCOUNTER — APPOINTMENT (OUTPATIENT)
Dept: GENERAL RADIOLOGY | Age: 80
DRG: 164 | End: 2020-03-04
Attending: NURSE PRACTITIONER
Payer: MEDICARE

## 2020-03-04 PROBLEM — E11.9 TYPE 2 DIABETES MELLITUS, WITH LONG-TERM CURRENT USE OF INSULIN (HCC): Chronic | Status: ACTIVE | Noted: 2020-01-26

## 2020-03-04 PROBLEM — Z79.4 TYPE 2 DIABETES MELLITUS, WITH LONG-TERM CURRENT USE OF INSULIN (HCC): Chronic | Status: ACTIVE | Noted: 2020-01-26

## 2020-03-04 LAB
ANION GAP SERPL CALC-SCNC: 7 MMOL/L (ref 7–16)
BASOPHILS # BLD: 0 K/UL (ref 0–0.2)
BASOPHILS NFR BLD: 1 % (ref 0–2)
BUN SERPL-MCNC: 16 MG/DL (ref 8–23)
CALCIUM SERPL-MCNC: 8.2 MG/DL (ref 8.3–10.4)
CHLORIDE SERPL-SCNC: 100 MMOL/L (ref 98–107)
CO2 SERPL-SCNC: 26 MMOL/L (ref 21–32)
CREAT SERPL-MCNC: 1.1 MG/DL (ref 0.8–1.5)
DIFFERENTIAL METHOD BLD: ABNORMAL
EOSINOPHIL # BLD: 0.2 K/UL (ref 0–0.8)
EOSINOPHIL NFR BLD: 4 % (ref 0.5–7.8)
ERYTHROCYTE [DISTWIDTH] IN BLOOD BY AUTOMATED COUNT: 14.2 % (ref 11.9–14.6)
GLUCOSE BLD STRIP.AUTO-MCNC: 158 MG/DL (ref 65–100)
GLUCOSE BLD STRIP.AUTO-MCNC: 247 MG/DL (ref 65–100)
GLUCOSE BLD STRIP.AUTO-MCNC: 274 MG/DL (ref 65–100)
GLUCOSE BLD STRIP.AUTO-MCNC: 275 MG/DL (ref 65–100)
GLUCOSE BLD STRIP.AUTO-MCNC: 99 MG/DL (ref 65–100)
GLUCOSE SERPL-MCNC: 229 MG/DL (ref 65–100)
HCT VFR BLD AUTO: 27.1 % (ref 41.1–50.3)
HGB BLD-MCNC: 9.2 G/DL (ref 13.6–17.2)
IMM GRANULOCYTES # BLD AUTO: 0.1 K/UL (ref 0–0.5)
IMM GRANULOCYTES NFR BLD AUTO: 1 % (ref 0–5)
LYMPHOCYTES # BLD: 2.2 K/UL (ref 0.5–4.6)
LYMPHOCYTES NFR BLD: 36 % (ref 13–44)
MCH RBC QN AUTO: 28.1 PG (ref 26.1–32.9)
MCHC RBC AUTO-ENTMCNC: 33.9 G/DL (ref 31.4–35)
MCV RBC AUTO: 82.9 FL (ref 79.6–97.8)
MONOCYTES # BLD: 0.7 K/UL (ref 0.1–1.3)
MONOCYTES NFR BLD: 11 % (ref 4–12)
NEUTS SEG # BLD: 3 K/UL (ref 1.7–8.2)
NEUTS SEG NFR BLD: 48 % (ref 43–78)
NRBC # BLD: 0 K/UL (ref 0–0.2)
PLATELET # BLD AUTO: 289 K/UL (ref 150–450)
PMV BLD AUTO: 9.2 FL (ref 9.4–12.3)
POTASSIUM SERPL-SCNC: 4.7 MMOL/L (ref 3.5–5.1)
RBC # BLD AUTO: 3.27 M/UL (ref 4.23–5.6)
SODIUM SERPL-SCNC: 133 MMOL/L (ref 136–145)
WBC # BLD AUTO: 6.3 K/UL (ref 4.3–11.1)

## 2020-03-04 PROCEDURE — 82962 GLUCOSE BLOOD TEST: CPT

## 2020-03-04 PROCEDURE — 74011636637 HC RX REV CODE- 636/637: Performed by: INTERNAL MEDICINE

## 2020-03-04 PROCEDURE — 3331090001 HH PPS REVENUE CREDIT

## 2020-03-04 PROCEDURE — 36415 COLL VENOUS BLD VENIPUNCTURE: CPT

## 2020-03-04 PROCEDURE — 71045 X-RAY EXAM CHEST 1 VIEW: CPT

## 2020-03-04 PROCEDURE — 85025 COMPLETE CBC W/AUTO DIFF WBC: CPT

## 2020-03-04 PROCEDURE — 94760 N-INVAS EAR/PLS OXIMETRY 1: CPT

## 2020-03-04 PROCEDURE — 80048 BASIC METABOLIC PNL TOTAL CA: CPT

## 2020-03-04 PROCEDURE — 65270000029 HC RM PRIVATE

## 2020-03-04 PROCEDURE — 74011250637 HC RX REV CODE- 250/637: Performed by: SURGERY

## 2020-03-04 PROCEDURE — 94640 AIRWAY INHALATION TREATMENT: CPT

## 2020-03-04 PROCEDURE — 74011250637 HC RX REV CODE- 250/637: Performed by: NURSE PRACTITIONER

## 2020-03-04 PROCEDURE — 74011000250 HC RX REV CODE- 250: Performed by: INTERNAL MEDICINE

## 2020-03-04 PROCEDURE — 3331090002 HH PPS REVENUE DEBIT

## 2020-03-04 PROCEDURE — 97530 THERAPEUTIC ACTIVITIES: CPT

## 2020-03-04 PROCEDURE — 74011250636 HC RX REV CODE- 250/636: Performed by: SURGERY

## 2020-03-04 PROCEDURE — 74011636637 HC RX REV CODE- 636/637: Performed by: NURSE PRACTITIONER

## 2020-03-04 RX ORDER — INSULIN GLARGINE 100 [IU]/ML
27 INJECTION, SOLUTION SUBCUTANEOUS
Status: DISCONTINUED | OUTPATIENT
Start: 2020-03-04 | End: 2020-03-05

## 2020-03-04 RX ORDER — INSULIN GLARGINE 100 [IU]/ML
30 INJECTION, SOLUTION SUBCUTANEOUS
Status: DISCONTINUED | OUTPATIENT
Start: 2020-03-04 | End: 2020-03-04

## 2020-03-04 RX ADMIN — LISINOPRIL 5 MG: 5 TABLET ORAL at 09:00

## 2020-03-04 RX ADMIN — Medication 10 ML: at 22:00

## 2020-03-04 RX ADMIN — ENOXAPARIN SODIUM 40 MG: 40 INJECTION SUBCUTANEOUS at 21:33

## 2020-03-04 RX ADMIN — Medication 10 ML: at 05:32

## 2020-03-04 RX ADMIN — BUDESONIDE AND FORMOTEROL FUMARATE DIHYDRATE 2 PUFF: 160; 4.5 AEROSOL RESPIRATORY (INHALATION) at 19:22

## 2020-03-04 RX ADMIN — ALBUTEROL SULFATE 2.5 MG: 2.5 SOLUTION RESPIRATORY (INHALATION) at 07:48

## 2020-03-04 RX ADMIN — GABAPENTIN 200 MG: 100 CAPSULE ORAL at 17:29

## 2020-03-04 RX ADMIN — TAMSULOSIN HYDROCHLORIDE 0.4 MG: 0.4 CAPSULE ORAL at 08:04

## 2020-03-04 RX ADMIN — METOPROLOL TARTRATE 50 MG: 50 TABLET, FILM COATED ORAL at 21:38

## 2020-03-04 RX ADMIN — PANTOPRAZOLE SODIUM 40 MG: 40 TABLET, DELAYED RELEASE ORAL at 05:32

## 2020-03-04 RX ADMIN — SENNOSIDES AND DOCUSATE SODIUM 2 TABLET: 8.6; 5 TABLET ORAL at 17:28

## 2020-03-04 RX ADMIN — SENNOSIDES AND DOCUSATE SODIUM 2 TABLET: 8.6; 5 TABLET ORAL at 08:04

## 2020-03-04 RX ADMIN — GABAPENTIN 200 MG: 100 CAPSULE ORAL at 21:38

## 2020-03-04 RX ADMIN — BUDESONIDE AND FORMOTEROL FUMARATE DIHYDRATE 2 PUFF: 160; 4.5 AEROSOL RESPIRATORY (INHALATION) at 07:48

## 2020-03-04 RX ADMIN — BUPROPION HYDROCHLORIDE 150 MG: 150 TABLET, EXTENDED RELEASE ORAL at 08:04

## 2020-03-04 RX ADMIN — INSULIN LISPRO 10 UNITS: 100 INJECTION, SOLUTION INTRAVENOUS; SUBCUTANEOUS at 11:39

## 2020-03-04 RX ADMIN — INSULIN LISPRO 10 UNITS: 100 INJECTION, SOLUTION INTRAVENOUS; SUBCUTANEOUS at 08:07

## 2020-03-04 RX ADMIN — INSULIN LISPRO 6 UNITS: 100 INJECTION, SOLUTION INTRAVENOUS; SUBCUTANEOUS at 22:56

## 2020-03-04 RX ADMIN — TRAMADOL HYDROCHLORIDE 50 MG: 50 TABLET, FILM COATED ORAL at 09:29

## 2020-03-04 RX ADMIN — INSULIN GLARGINE 27 UNITS: 100 INJECTION, SOLUTION SUBCUTANEOUS at 22:55

## 2020-03-04 RX ADMIN — METOPROLOL TARTRATE 50 MG: 50 TABLET, FILM COATED ORAL at 08:04

## 2020-03-04 RX ADMIN — BUPROPION HYDROCHLORIDE 150 MG: 150 TABLET, EXTENDED RELEASE ORAL at 21:38

## 2020-03-04 RX ADMIN — INSULIN LISPRO 10 UNITS: 100 INJECTION, SOLUTION INTRAVENOUS; SUBCUTANEOUS at 17:28

## 2020-03-04 RX ADMIN — ATORVASTATIN CALCIUM 10 MG: 10 TABLET, FILM COATED ORAL at 21:38

## 2020-03-04 RX ADMIN — TIOTROPIUM BROMIDE INHALATION SPRAY 2 PUFF: 3.12 SPRAY, METERED RESPIRATORY (INHALATION) at 08:30

## 2020-03-04 RX ADMIN — INSULIN LISPRO 4 UNITS: 100 INJECTION, SOLUTION INTRAVENOUS; SUBCUTANEOUS at 08:07

## 2020-03-04 RX ADMIN — Medication 10 ML: at 13:56

## 2020-03-04 RX ADMIN — GABAPENTIN 200 MG: 100 CAPSULE ORAL at 08:03

## 2020-03-04 RX ADMIN — INSULIN LISPRO 2 UNITS: 100 INJECTION, SOLUTION INTRAVENOUS; SUBCUTANEOUS at 11:38

## 2020-03-04 RX ADMIN — ALBUTEROL SULFATE 2.5 MG: 2.5 SOLUTION RESPIRATORY (INHALATION) at 13:29

## 2020-03-04 NOTE — PROGRESS NOTES
END OF SHIFT NOTE:    INTAKE/OUTPUT  03/03 0701 - 03/04 0700  In: 1380 [P.O.:580]  Out: 2000 [Urine:2000]  Voiding: NO has not voided since ch d/c'd   Catheter: NO-ch d/c'd at 1400  Drain:              Flatus: Patient does have flatus present. Stool:  0 occurrences. Characteristics:    Emesis: 0 occurrences. Characteristics:        VITAL SIGNS  Patient Vitals for the past 12 hrs:   Temp Pulse Resp BP SpO2   03/04/20 1536 97.6 °F (36.4 °C) 71 18 116/59 97 %   03/04/20 1330 -- -- -- -- 90 %   03/04/20 1113 97.4 °F (36.3 °C) 71 18 123/64 100 %   03/04/20 0752 -- -- -- -- 95 %   03/04/20 0704 98.7 °F (37.1 °C) 69 18 129/61 96 %       Pain Assessment  Pain Intensity 1: 0 (03/04/20 1120)  Pain Location 1: Flank  Pain Intervention(s) 1: Refused, Rest  Patient Stated Pain Goal: 0    Ambulating  Yes    Shift report given to oncoming nurse at the bedside.     Christa Magaña RN

## 2020-03-04 NOTE — DIABETES MGMT
Patient admitted with lung cancer s/p surgery. Blood glucose ranged  yesterday with patient receiving Lantus 24 units and Humalog 44 units. Blood glucose this morning was 229. Reviewed these numbers and their significance with patient. Patient states he is hungry this morning and awaiting breakfast. Reviewed the importance of letting RN know if patient has decreased appetite or is going to skip a meal to avoid over treatment resulting in hypoglycemia. Patient verbalized understanding. Educated patient regarding current basal/bolus regimen of Lantus 24 units nightly and Humalog 10 units with meals and normal SSI including type of insulin, timing with meals, onset, duration of effect, and peak of insulin dose. Reviewed the role of an endocrinologist. Also discussed the benefits of CGM therapy. Patient verbalizes understanding and voices no further questions regarding diabetes management at this time. Per chart review plan is for patient is awaiting pre-cert for STR. Spoke with provider, discussed patient glycemic control. Patient would likely benefit from an increase in basal insulin to Lantus 27 units as fasting blood glucose is not at goal. Patient would also likely benefit from a change in sliding scale sensitivity to insulin sensitive to reduce risk of hypoglycemia.  New orders received to consult hospitalist.

## 2020-03-04 NOTE — PROGRESS NOTES
3/4/2020    PLAN:  Diet- Diabetic  DVT Prophylactics- SCD/Lovenox  Pain control- Tylenol/ ultram  SUP prophylaxis- Protonix  Encourage C/DB/IS  Anterior chest tube removed 2/28/20  Posterior Chest tube removed 3/2/20  Added Flomax 2/28/30  ch replaced due to urinary retention 2/29/20  Adjust insulin- appreciate help from diabetic educator  PT/OT  Social service for discharge planning- awaiting insurance approval         ASSESSMENT:  Admit Date: 2/26/2020   7 Day Post-Op  Procedure(s):  LEFT VATS WITH LEFT UPPER LOBECTOMY/ , MEDIASTINAL LYMPHADENECTOMY/CYRO       DIAGNOSIS   A: \"# 9 LYMPH NODE X3\":   TWO FRAGMENTS OF ANTHRACOTIC LYMPH NODE NEGATIVE FOR METASTATIC TUMOR   B: \"# 8 LYMPH NODE X2\":   THREE FRAGMENTS OF ANTHRACOTIC LYMPH NODE NEGATIVE FOR METASTATIC TUMOR   C: \"AP WINDOW LYMPH NODE\":   ONE FRAGMENT OF LYMPH NODE NEGATIVE FOR METASTATIC TUMOR   D: \"# 7 LYMPH NODE X3\":   FOUR FRAGMENTS OF ANTHRACOTIC LYMPH NODE NEGATIVE FOR METASTATIC TUMOR   E: \"PERIHILAR\":   ONE FRAGMENT OF ANTHRACOTIC LYMPH NODE NEGATIVE FOR METASTATIC TUMOR   F: \"LEFT UPPER LOBECTOMY\":   ADENOCARCINOMA, ENTERIC TYPE (SEE COMMENT), MEASURING APPROXIMATELY 7 X 6.5 X 4 CM. MACROMETASTATIC CARCINOMA TO ONE OF SIX PERIBRONCHIAL LYMPH NODES WITH FOCAL EXTRACAPSULAR EXTENSION. BRONCHIAL AND VASCULAR MARGINS OF RESECTION ARE NEGATIVE FOR MALIGNANT TUMOR. DEFINITE LYMPHOVASCULAR INVASION IS NOT IDENTIFIED. PLEURAL SURFACE IS NEGATIVE FOR MALIGNANT TUMOR. Comment   The tumor       SUBJECTIVE:  02/27/2020-  No complaints. Pain controlled. On room air 100%. Chest tubes without airleak noted. Tolerating diet. Will adjust insulin dose. Transfer to floor and chest tubes to suction.     02/28/2020-  No complaints. Pain controlled. On room air 100%. Chest tubes without airleak noted. Pulling 1200 on IS. Tolerating diet  02/29/2020-  No complaints. Pain controlled. On room air.  Posterior Chest tube to water seal. CXR this am showing New left apical pneumothorax status post removal of anterior chest tube. Tolerating diet. AF, NAD.   03/01/2020-  Pt awake in bed. Rapid response called yesterday due to lethargy which improved with Narcan. Remaining chest tube to water seal - clamped today. CXR this am showing stable apical pneumothorax. Patel replaced again yesterday due to urinary retention. Tolerating Diabetic diet. +flatus/+BM. AF, NAD.   03/02/2020 Patient in bed without complaints of pain to right hip/buttock area. Area assessed. Tender to palpation, no skin redness or breakdown noted. Chest tube clamped. No SOB noted. Unclamped no air leak noted. Will DC remaining chest tube and get follow up xray. Social work for discharge planning. Glucose still uncontrolled with adjust lantus. Patel remains for 1 week related to  attempts of removal with urinary retention  Posterior chest tube removed at end inspiration. Dressing applied. Patient tolerated well. CXR ordered for 4 hours  03/03/2020 Patient away at Xray this AM- hip xray negative. Returned this evening and Patient in restroom. Did not see patient- nurse states no issues. - chest xray reviewed. Awaiting snf placement. 03/04/2020 Right hip less painful today. No complaints. Xray reviewed.  Awaiting insurance approval.    OBJECTIVE:  Patient Vitals for the past 24 hrs:   Temp Pulse Resp BP SpO2   03/04/20 1113 97.4 °F (36.3 °C) 71 18 123/64 100 %   03/04/20 0752 -- -- -- -- 95 %   03/04/20 0704 98.7 °F (37.1 °C) 69 18 129/61 96 %   03/04/20 0251 98.2 °F (36.8 °C) 73 18 148/57 95 %   03/03/20 2341 98 °F (36.7 °C) 81 18 127/60 96 %   03/03/20 2108 97.5 °F (36.4 °C) 81 18 135/58 100 %   03/03/20 2055 -- -- -- -- 94 %   03/03/20 1528 97.5 °F (36.4 °C) 86 18 109/49 98 %         Date 03/03/20 0700 - 03/04/20 0659 03/04/20 0700 - 03/05/20 0659   Shift 2206-3023 8860-7478 24 Hour Total 1505-9703 8531-0848 24 Hour Total   INTAKE   P.O. 580  401 P.O. 580  580      Other  800 800 425  425     Irrigation Volume Input (mL) (Urinary Catheter 02/29/20 Patel; 2- way)  800 800 425  425   Shift Total(mL/kg) 580(8.1) 800(11.2) 1380(19.3) 425(5.9)  425(5.9)   OUTPUT   Urine(mL/kg/hr) 1431(2.5)  2000(1.2)        Urine Output (mL) (Urinary Catheter 02/29/20 Patel; 2- way) 2000 2000      Stool           Stool Occurrence(s) 3 x  3 x      Shift Total(mL/kg) 8603(73)  3608(04)      NET -1420 800 -620 425  425   Weight (kg) 71.5 71.5 71.5 71.5 71.5 71.5                Labs:    Recent Labs     03/04/20  0625   WBC 6.3   HGB 9.2*      *   K 4.7      CO2 26   BUN 16   CREA 1.10   *         Crystal Ursula Backbone, NP

## 2020-03-04 NOTE — CONSULTS
Hospitalist Note     Admit Date:  2020  7:11 AM   Name:  Yesica Daniels   Age:  70 y.o.  :  1940   MRN:  [id number]   PCP:  Jennifer Feldman MD  Treatment Team: Attending Provider: Ramez Howell MD; Utilization Review: Gadiel De León RN; Consulting Provider: Robin Day, Sol Encarnacion MD; Consulting Provider: Trip Villaseñor MD; Care Manager: Gibson Ponce RN; Physical Therapist: Kimberly Jacobson, PT, DPT; Consulting Provider: Kylie Llanos DO    HPI/Subjective:   Pt is a 71 y/o M with DM, COPD, HTN, lung cancer admitted by surgery for EVA lobectomy, LLL wedge resection, lymphadenectomy done on . hospitalists were consulted for DM management. His BG have been generally uncontrolled most of his stay. His intake has varied and has seen wide swings in BG readings. He reports compliance with insulin at home but says he varies his basal insulin based on qhs BG checks. takes 10u prandial consistently for most part.  he has questionable compliance with diet however. He seems to have some mild cognitive slowing/deficit during my interview. No pain, SOB, fevers. 10 systems reviewed and negative except as noted in HPI.   Past Medical History:   Diagnosis Date    CAD (coronary artery disease) 2019    non obstructive     Chronic kidney disease     Stage 3    COPD     inhalers    Depression     managed with medications    Diabetes ([facility] [address]     takes insulin, A1c on 19 was 10.3; unsure of avg blood sugar    HTN (hypertension) 2015    managed wt medications    Malignant neoplasm of upper lobe of left lung ([address] 2019    Other ill-defined conditions(799.89)     cholesterol    Sepsis ([address] 2019      Past Surgical History:   Procedure Laterality Date    ABDOMEN SURGERY PROC UNLISTED  2015    explor lap    HX COLONOSCOPY      HX ORTHOPAEDIC      bilateral shoulder repairs, both knees repaired-no hardware    HX OTHER SURGICAL  2019    lung biopsy      No Known Allergies   Social History     Tobacco Use    Smoking status: Former Smoker     Packs/day: 1.00     Years: 40.00     Pack years: 40.00     Types: Cigarettes     Last attempt to quit: 2019     Years since quittin.1    Smokeless tobacco: Never Used   Substance Use Topics    Alcohol use: No      Family History   Problem Relation Age of Onset    Heart Disease Mother     Heart Disease Father     Diabetes Sister       Family history reviewed and noncontributory. Immunization History   Administered Date(s) Administered    Influenza Vaccine 2019    TB Skin Test (PPD) Intradermal 2020, 2020     PTA Medications:  Prior to Admission Medications   Prescriptions Last Dose Informant Patient Reported? Taking? ATIVAN 1 mg Tab 2020 at Unknown time Self Yes Yes   Sig: Take 1 mg by mouth two (2) times a day. LIPITOR 10 mg Tab 2020 at Unknown time Self Yes Yes   Sig: Take 10 mg by mouth nightly. albuterol (PROVENTIL VENTOLIN) 2.5 mg /3 mL (0.083 %) nebu 2020  Yes No   Si.5 mg by Nebulization route every four to six (4-6) hours as needed for Other (For SOB or wheezing ). albuterol-ipratropium (DUO-NEB) 2.5 mg-0.5 mg/3 ml nebu 2020  No No   Sig: 3 mL by Nebulization route three (3) times daily. buPROPion XL (WELLBUTRIN XL) 150 mg tablet 2020 at Unknown time  Yes Yes   Sig: Take 150 mg by mouth every morning. ferrous sulfate 325 mg (65 mg iron) tablet 2020 at Unknown time  No Yes   Sig: Take 1 Tab by mouth two (2) times daily (with meals). Patient taking differently: Take 325 mg by mouth Daily (before breakfast). fluticasone propion-salmeterol (ADVAIR/WIXELA) 250-50 mcg/dose diskus inhaler 2020  No No   Sig: Take 1 Puff by inhalation two (2) times a day. Indications: Bronchospasm Prevention with COPD   insulin aspart U-100 (NOVOLOG FLEXPEN U-100 INSULIN) 100 unit/mL (3 mL) inpn 2020 at Unknown time  No Yes   Sig: With meals tid.     For Blood Sugar (mg/dL) of:     Less than 150 =   0 units           150 -199 =   2 units  200 -249 =   5 units  250 -299 =   8 units  300 -349 =   10 units  350 and above = 12 units   Patient taking differently: With meals tid. For Blood Sugar (mg/dL) of:     1-2 units 130-150  3-4 units 151-249  5-6 units 250-and over     insulin glargine (LANTUS SOLOSTAR U-100 INSULIN) 100 unit/mL (3 mL) inpn 2/25/2020 at Unknown time  Yes Yes   Sig: 10 Units by SubCUTAneous route nightly. lisinopril (PRINIVIL, ZESTRIL) 5 mg tablet 2/25/2020 at Unknown time Self Yes Yes   Sig: take 5 mg by mouth daily. metoprolol tartrate (LOPRESSOR) 50 mg tablet 2/25/2020 at 1700  Yes Yes   Sig: Take  by mouth two (2) times a day. tiotropium (SPIRIVA) 18 mcg inhalation capsule 2/24/2020  No No   Sig: Take 1 Cap by inhalation daily. Facility-Administered Medications: None       Objective:     Patient Vitals for the past 24 hrs:   Temp Pulse Resp BP SpO2   03/04/20 1330 -- -- -- -- 90 %   03/04/20 1113 97.4 °F (36.3 °C) 71 18 123/64 100 %   03/04/20 0752 -- -- -- -- 95 %   03/04/20 0704 98.7 °F (37.1 °C) 69 18 129/61 96 %   03/04/20 0251 98.2 °F (36.8 °C) 73 18 148/57 95 %   03/03/20 2341 98 °F (36.7 °C) 81 18 127/60 96 %   03/03/20 2108 97.5 °F (36.4 °C) 81 18 135/58 100 %   03/03/20 2055 -- -- -- -- 94 %   03/03/20 1528 97.5 °F (36.4 °C) 86 18 109/49 98 %     Oxygen Therapy  O2 Sat (%): 90 % (03/04/20 1330)  Pulse via Oximetry: 75 beats per minute (03/04/20 1330)  O2 Device: Room air (03/04/20 1330)  O2 Flow Rate (L/min): 0 l/min (03/02/20 1512)  FIO2 (%): 21 % (03/02/20 0718)  ETCO2 (mmHg): 45 mmHg (02/26/20 1140)      Intake/Output Summary (Last 24 hours) at 3/4/2020 1441  Last data filed at 3/4/2020 1345  Gross per 24 hour   Intake 1485 ml   Output 1475 ml   Net 10 ml       *Note that automatically entered I/Os may not be accurate; dependent on patient compliance with collection and accurate  by assistants.     Physical Exam:  General:    Well nourished. Alert. Eyes:   Normal sclerae. Extraocular movements intact. ENT:  Normocephalic, atraumatic. Moist mucous membranes  CV:   RRR. No murmur, rub, or gallop. Lungs:  Diminished L upper field  Abdomen: Soft, nontender, nondistended. Bowel sounds normal.   Extremities: Warm and dry. No cyanosis or edema. Neurologic: CN II-XII grossly intact. Sensation intact. Skin:     No rashes or jaundice. Psych:  Normal mood and affect. I reviewed the labs, imaging, EKGs, telemetry, and other studies done this admission. Data Review:   Recent Results (from the past 24 hour(s))   GLUCOSE, POC    Collection Time: 03/03/20  4:01 PM   Result Value Ref Range    Glucose (POC) 174 (H) 65 - 100 mg/dL   GLUCOSE, POC    Collection Time: 03/03/20  9:38 PM   Result Value Ref Range    Glucose (POC) 94 65 - 100 mg/dL   GLUCOSE, POC    Collection Time: 03/03/20 10:24 PM   Result Value Ref Range    Glucose (POC) 178 (H) 65 - 100 mg/dL   GLUCOSE, POC    Collection Time: 03/04/20  2:57 AM   Result Value Ref Range    Glucose (POC) 274 (H) 65 - 100 mg/dL   CBC WITH AUTOMATED DIFF    Collection Time: 03/04/20  6:25 AM   Result Value Ref Range    WBC 6.3 4.3 - 11.1 K/uL    RBC 3.27 (L) 4.23 - 5.6 M/uL    HGB 9.2 (L) 13.6 - 17.2 g/dL    HCT 27.1 (L) 41.1 - 50.3 %    MCV 82.9 79.6 - 97.8 FL    MCH 28.1 26.1 - 32.9 PG    MCHC 33.9 31.4 - 35.0 g/dL    RDW 14.2 11.9 - 14.6 %    PLATELET 946 580 - 586 K/uL    MPV 9.2 (L) 9.4 - 12.3 FL    ABSOLUTE NRBC 0.00 0.0 - 0.2 K/uL    DF AUTOMATED      NEUTROPHILS 48 43 - 78 %    LYMPHOCYTES 36 13 - 44 %    MONOCYTES 11 4.0 - 12.0 %    EOSINOPHILS 4 0.5 - 7.8 %    BASOPHILS 1 0.0 - 2.0 %    IMMATURE GRANULOCYTES 1 0.0 - 5.0 %    ABS. NEUTROPHILS 3.0 1.7 - 8.2 K/UL    ABS. LYMPHOCYTES 2.2 0.5 - 4.6 K/UL    ABS. MONOCYTES 0.7 0.1 - 1.3 K/UL    ABS. EOSINOPHILS 0.2 0.0 - 0.8 K/UL    ABS. BASOPHILS 0.0 0.0 - 0.2 K/UL    ABS. IMM.  GRANS. 0.1 0.0 - 0.5 K/UL   METABOLIC PANEL, BASIC    Collection Time: 03/04/20  6:25 AM   Result Value Ref Range    Sodium 133 (L) 136 - 145 mmol/L    Potassium 4.7 3.5 - 5.1 mmol/L    Chloride 100 98 - 107 mmol/L    CO2 26 21 - 32 mmol/L    Anion gap 7 7 - 16 mmol/L    Glucose 229 (H) 65 - 100 mg/dL    BUN 16 8 - 23 MG/DL    Creatinine 1.10 0.8 - 1.5 MG/DL    GFR est AA >60 >60 ml/min/1.73m2    GFR est non-AA >60 >60 ml/min/1.73m2    Calcium 8.2 (L) 8.3 - 10.4 MG/DL   GLUCOSE, POC    Collection Time: 03/04/20  8:06 AM   Result Value Ref Range    Glucose (POC) 247 (H) 65 - 100 mg/dL   GLUCOSE, POC    Collection Time: 03/04/20 11:35 AM   Result Value Ref Range    Glucose (POC) 158 (H) 65 - 100 mg/dL       All Micro Results     None          Current Facility-Administered Medications   Medication Dose Route Frequency    insulin glargine (LANTUS) injection 27 Units  27 Units SubCUTAneous QHS    insulin lispro (HUMALOG) injection 10 Units  10 Units SubCUTAneous TIDAC    albuterol (PROVENTIL VENTOLIN) nebulizer solution 2.5 mg  2.5 mg Nebulization Q6HWA RT    insulin lispro (HUMALOG) injection   SubCUTAneous AC&HS    traMADol (ULTRAM) tablet 50 mg  50 mg Oral Q6H PRN    LORazepam (ATIVAN) tablet 1 mg  1 mg Oral BID PRN    gabapentin (NEURONTIN) capsule 200 mg  200 mg Oral TID    acetaminophen (TYLENOL) tablet 1,000 mg  1,000 mg Oral Q6H PRN    tamsulosin (FLOMAX) capsule 0.4 mg  0.4 mg Oral DAILY    sodium chloride (NS) flush 5-40 mL  5-40 mL IntraVENous PRN    dextrose (D50W) injection syrg 25 g  25 g IntraVENous PRN    buPROPion SR (WELLBUTRIN SR) tablet 150 mg  150 mg Oral BID    budesonide-formoteroL (SYMBICORT) 160-4.5 mcg/actuation HFA inhaler 2 Puff  2 Puff Inhalation BID    atorvastatin (LIPITOR) tablet 10 mg  10 mg Oral QHS    lisinopril (PRINIVIL, ZESTRIL) tablet 5 mg  5 mg Oral DAILY    metoprolol tartrate (LOPRESSOR) tablet 50 mg  50 mg Oral BID    sodium chloride (NS) flush 5-40 mL  5-40 mL IntraVENous Q8H    promethazine (PHENERGAN) with saline injection 12.5 mg  12.5 mg IntraVENous Q6H PRN    senna-docusate (PERICOLACE) 8.6-50 mg per tablet 2 Tab  2 Tab Oral BID    enoxaparin (LOVENOX) injection 40 mg  40 mg SubCUTAneous Q24H    pantoprazole (PROTONIX) tablet 40 mg  40 mg Oral ACB    hydrALAZINE (APRESOLINE) 20 mg/mL injection 20 mg  20 mg IntraVENous Q6H PRN    tiotropium bromide (SPIRIVA RESPIMAT) 2.5 mcg /actuation  2 Puff Inhalation DAILY    NUTRITIONAL SUPPORT ELECTROLYTE PRN ORDERS   Does Not Apply PRN       Other Studies:  Xr Chest Sngl V    Result Date: 2/26/2020  CHEST X-RAY, one view. HISTORY:  Postoperative evaluation after left thoracotomy. TECHNIQUE:  AP upright portable view COMPARISON: 21 February 2020. FINDINGS:   *The lungs: Density left lung apex. . There are 2 left-sided chest tubes, one apex and one at the base. No pneumothorax. *The heart size: is normal. *The costophrenic angles: are sharp. *The pulmonary vasculature: is unremarkable. *Included portion of the upper abdomen: is unremarkable. *Bones: No gross bony lesions. *Other: None. IMPRESSION:  Postop changes. No pneumothorax. Two left-sided chest tubes. Ct Guidance Stereo Loc    Result Date: 2/26/2020  Title: CT guided lung nodule needle localization with Kopans wire and methylene blue administration. Indication: 28-year-old male with history of left upper lobe suspicious pulmonary nodule. Previously had his procedure canceled due to acute pneumonia. Now presents following treatment for wire localization prior to going to the OR for resection. Consent: Informed written and oral consent was obtained from the patient after explanation of benefits and risks (including, but not limited to: Infection, Hemorrhage, pneumothorax). The patient's questions were answered to their satisfaction. The patient stated understanding and requested that we proceed. Technique:   All CT scans at this facility are performed using dose reduction/dose modulation techniques, as appropriate the performed exam, including the following:  Automated Exposure Control; Adjustment of the mA and/or kV according to patient size (this includes techniques or standardized protocols for targeted exams where dose is matched to indication/reason for exam); and Use of Iterative Reconstruction Technique. Procedure:  Sterile barrier technique (including:  cap, mask, sterile gloves, sterile sheet, hand hygiene, and  cutaneous antisepsis) was used. With the patient supine a preliminary CT scan through the chest was performed with radio-opaque markers on the skin. Following routine prep and drape of the left upper chest, a local field block with lidocaine was achieved. Using intermittent CT technique, a 20-gauge needle was advanced into the left upper lobe pulmonary nodule. Approximately 1 mL of methylene blue was administered. A Kopans wire was then delivered and the needle removed. A bandage was applied in order to secure the wire. A post-procedure CT scan was obtained. Complications: None. Radiation Exposure Indices: Total Exam DLP = 233 mGy-cm Contrast: None. Medications:  Under physician supervision, 35 minutes of moderate intravenous conscious sedation was performed by administering Versed, Fentanyl intravenously. Continuous pulse oximetry, heart rate, and blood pressure monitoring was performed with an independent, trained observer present. Findings: Left upper lobe pulmonary nodule measuring up to 2.7 cm. Postprocedure CT reveals a Kopan's wire within the left upper lobe pulmonary nodule with the distal aspect near the deep margin. Methylene blue appears to sustain adjacent to the nodule. No pneumothorax. Impression: Successful CT-guided left upper lobe pulmonary nodule wire localization with methylene blue injection.       Assessment and Plan:     Hospital Problems as of 3/4/2020 Date Reviewed: 2/21/2020          Codes Class Noted - Resolved POA    [facility] ICD-10-CM: C34.90  ICD-9-CM: 162.9  1/15/2020 - Present Unknown              Plan:  · Suspect poor compliance/understanding. He may have some early/mild dementia.   DM educator following  · Increase lantus to 27u  · Cont prandial 10u  · ISS  · Possibly to SNF rehab tomorrow per notes; can resume home regimen for now; needs outpatient follow up for outpatient control    Signed:  Louis Duncan MD

## 2020-03-04 NOTE — MED STUDENT NOTES
Medical Student History & Physical     Admit Date:  2020  7:11 AM   Name:  Wilber Gonzalez   Age:  70 y.o.  :  1940   MRN:  [id number]   PCP:  Skinny Vital MD  Treatment Team: Attending Provider: Jayjay Tripp MD; Utilization Review: Kevin Husain RN; Consulting Provider: Piyush Mccall MD; Consulting Provider: Nhung Kramer MD; Care Manager: Sydney Chandra RN; Consulting Provider: Mathew Yanez DO    HPI/Subjective:     70year old AA male presents with uncontrolled diabetes mellitus spanning duration of current hospital stay (admitted 20). Diabetes management consult was placed today following several days of abnormal glucose readings. Patients blood glucose readings ranged from 94mg/dL to 363mg/dL. The patient was recently hospitalized for hypoglycemia (2020) with a blood glucose of 40mg/dL. Following this hospitalization, his glipizide was discontinued. He currently takes lantus (basal bolus) and humalog with meals. His current hospitalization is post-surgical in nature - a diagnosis of adenocarcinoma of left upper lung lobe prompted a left upper lobectomy and mediastinal lymphadenectomy. The adenocarcinoma was accidentally discovered on 12/10/19 during a routine CXR which was followed up with a CT. Patient has a 38 year history of Type II Diabetes  Per the patient, his home glucose readings have varied significantly. He admits his diet is variable and eats 4 to 6 times a day. He is a former smoker (1 pack per day for 40 years). Patient lives alone however his [de-identified]year old sister lives nearby as does his son. Patients sister is at bedside. She states the patient is in need of more assistance at home and should consider placement in a facility of some sort.     Patient endorses weight loss  Patient denies tingling in fingers/hands, loss of appetite, foot ulcers    Past Medical History:   Diagnosis Date    CAD (coronary artery disease) 2019    non obstructive     Chronic kidney disease     Stage 3    COPD     inhalers    Depression     managed with medications    Diabetes ([facility]     takes insulin, A1c on 19 was 10.3; unsure of avg blood sugar    HTN (hypertension) 2015    managed wt medications    Malignant neoplasm of upper lobe of left lung ([facility] 2019    Other ill-defined conditions(799.89)     cholesterol    [facility] 2019      Past Surgical History:   Procedure Laterality Date    ABDOMEN SURGERY PROC UNLISTED  2015    explor lap    HX COLONOSCOPY      HX ORTHOPAEDIC      bilateral shoulder repairs, both knees repaired-no hardware    HX OTHER SURGICAL  2019    lung biopsy      No Known Allergies   Social History     Tobacco Use    Smoking status: Former Smoker     Packs/day: 1.00     Years: 40.00     Pack years: 40.00     Types: Cigarettes     Last attempt to quit: 2019     Years since quittin.1    Smokeless tobacco: Never Used   Substance Use Topics    Alcohol use: No      Family History   Problem Relation Age of Onset    Heart Disease Mother     Heart Disease Father     Diabetes Sister       Immunization History   Administered Date(s) Administered    Influenza Vaccine 2019    TB Skin Test (PPD) Intradermal 2020, 2020     PTA Medications:  Prior to Admission Medications   Prescriptions Last Dose Informant Patient Reported? Taking? ATIVAN 1 mg Tab 2020 at Unknown time Self Yes Yes   Sig: Take 1 mg by mouth two (2) times a day. LIPITOR 10 mg Tab 2020 at Unknown time Self Yes Yes   Sig: Take 10 mg by mouth nightly. albuterol (PROVENTIL VENTOLIN) 2.5 mg /3 mL (0.083 %) nebu 2020  Yes No   Si.5 mg by Nebulization route every four to six (4-6) hours as needed for Other (For SOB or wheezing ). albuterol-ipratropium (DUO-NEB) 2.5 mg-0.5 mg/3 ml nebu 2020  No No   Sig: 3 mL by Nebulization route three (3) times daily.    buPROPion XL (WELLBUTRIN XL) 150 mg tablet 2/25/2020 at Unknown time  Yes Yes   Sig: Take 150 mg by mouth every morning. ferrous sulfate 325 mg (65 mg iron) tablet 2/25/2020 at Unknown time  No Yes   Sig: Take 1 Tab by mouth two (2) times daily (with meals). Patient taking differently: Take 325 mg by mouth Daily (before breakfast). fluticasone propion-salmeterol (ADVAIR/WIXELA) 250-50 mcg/dose diskus inhaler 2/24/2020  No No   Sig: Take 1 Puff by inhalation two (2) times a day. Indications: Bronchospasm Prevention with COPD   insulin aspart U-100 (NOVOLOG FLEXPEN U-100 INSULIN) 100 unit/mL (3 mL) inpn 2/25/2020 at Unknown time  No Yes   Sig: With meals tid. For Blood Sugar (mg/dL) of:     Less than 150 =   0 units           150 -199 =   2 units  200 -249 =   5 units  250 -299 =   8 units  300 -349 =   10 units  350 and above = 12 units   Patient taking differently: With meals tid. For Blood Sugar (mg/dL) of:     1-2 units 130-150  3-4 units 151-249  5-6 units 250-and over     insulin glargine (LANTUS SOLOSTAR U-100 INSULIN) 100 unit/mL (3 mL) inpn 2/25/2020 at Unknown time  Yes Yes   Sig: 10 Units by SubCUTAneous route nightly. lisinopril (PRINIVIL, ZESTRIL) 5 mg tablet 2/25/2020 at Unknown time Self Yes Yes   Sig: take 5 mg by mouth daily. metoprolol tartrate (LOPRESSOR) 50 mg tablet 2/25/2020 at 1700  Yes Yes   Sig: Take  by mouth two (2) times a day. tiotropium (SPIRIVA) 18 mcg inhalation capsule 2/24/2020  No No   Sig: Take 1 Cap by inhalation daily.       Facility-Administered Medications: None       Objective:     Patient Vitals for the past 24 hrs:   Temp Pulse Resp BP SpO2   03/04/20 1536 97.6 °F (36.4 °C) 71 18 116/59 97 %   03/04/20 1330 -- -- -- -- 90 %   03/04/20 1113 97.4 °F (36.3 °C) 71 18 123/64 100 %   03/04/20 0752 -- -- -- -- 95 %   03/04/20 0704 98.7 °F (37.1 °C) 69 18 129/61 96 %   03/04/20 0251 98.2 °F (36.8 °C) 73 18 148/57 95 %   03/03/20 2341 98 °F (36.7 °C) 81 18 127/60 96 %   03/03/20 2108 97.5 °F (36.4 °C) 81 18 135/58 100 %   03/03/20 2055 -- -- -- -- 94 %     Oxygen Therapy  O2 Sat (%): 97 % (03/04/20 1536)  Pulse via Oximetry: 75 beats per minute (03/04/20 1330)  O2 Device: Room air (03/04/20 1330)  O2 Flow Rate (L/min): 0 l/min (03/02/20 1512)  FIO2 (%): 21 % (03/02/20 0718)  ETCO2 (mmHg): 45 mmHg (02/26/20 1140)    Estimated body mass index is 20.8 kg/m² as calculated from the following:    Height as of this encounter: 6' 1\" (1.854 m). Weight as of this encounter: 71.5 kg (157 lb 10.1 oz). Intake/Output Summary (Last 24 hours) at 3/4/2020 1603  Last data filed at 3/4/2020 1345  Gross per 24 hour   Intake 1485 ml   Output 750 ml   Net 735 ml       *Note that automatically entered I/Os may not be accurate; dependent on patient compliance with collection and accurate  by assistants. Physical Exam:  General:    Well nourished. Lower extremity muscle wasting. Alert but mental acuity decreased    Eyes:   Normal sclerae. Extraocular movements intact. CV:   RRR. No m/r/g. Lungs:  CTAB. No wheezing, rhonchi, or rales. Difficulty with deep inspiration   Extremities: Warm and dry. No cyanosis or edema. Skin:     No rashes or jaundice. Normal coloration  Psych:  Normal mood and affect.     Data Review:   Recent Results (from the past 24 hour(s))   GLUCOSE, POC    Collection Time: 03/03/20  9:38 PM   Result Value Ref Range    Glucose (POC) 94 65 - 100 mg/dL   GLUCOSE, POC    Collection Time: 03/03/20 10:24 PM   Result Value Ref Range    Glucose (POC) 178 (H) 65 - 100 mg/dL   GLUCOSE, POC    Collection Time: 03/04/20  2:57 AM   Result Value Ref Range    Glucose (POC) 274 (H) 65 - 100 mg/dL   CBC WITH AUTOMATED DIFF    Collection Time: 03/04/20  6:25 AM   Result Value Ref Range    WBC 6.3 4.3 - 11.1 K/uL    RBC 3.27 (L) 4.23 - 5.6 M/uL    HGB 9.2 (L) 13.6 - 17.2 g/dL    HCT 27.1 (L) 41.1 - 50.3 %    MCV 82.9 79.6 - 97.8 FL    MCH 28.1 26.1 - 32.9 PG    MCHC 33.9 31.4 - 35.0 g/dL    RDW 14.2 11.9 - 14.6 %    PLATELET 324 353 - 402 K/uL    MPV 9.2 (L) 9.4 - 12.3 FL    ABSOLUTE NRBC 0.00 0.0 - 0.2 K/uL    DF AUTOMATED      NEUTROPHILS 48 43 - 78 %    LYMPHOCYTES 36 13 - 44 %    MONOCYTES 11 4.0 - 12.0 %    EOSINOPHILS 4 0.5 - 7.8 %    BASOPHILS 1 0.0 - 2.0 %    IMMATURE GRANULOCYTES 1 0.0 - 5.0 %    ABS. NEUTROPHILS 3.0 1.7 - 8.2 K/UL    ABS. LYMPHOCYTES 2.2 0.5 - 4.6 K/UL    ABS. MONOCYTES 0.7 0.1 - 1.3 K/UL    ABS. EOSINOPHILS 0.2 0.0 - 0.8 K/UL    ABS. BASOPHILS 0.0 0.0 - 0.2 K/UL    ABS. IMM.  GRANS. 0.1 0.0 - 0.5 K/UL   METABOLIC PANEL, BASIC    Collection Time: 03/04/20  6:25 AM   Result Value Ref Range    Sodium 133 (L) 136 - 145 mmol/L    Potassium 4.7 3.5 - 5.1 mmol/L    Chloride 100 98 - 107 mmol/L    CO2 26 21 - 32 mmol/L    Anion gap 7 7 - 16 mmol/L    Glucose 229 (H) 65 - 100 mg/dL    BUN 16 8 - 23 MG/DL    Creatinine 1.10 0.8 - 1.5 MG/DL    GFR est AA >60 >60 ml/min/1.73m2    GFR est non-AA >60 >60 ml/min/1.73m2    Calcium 8.2 (L) 8.3 - 10.4 MG/DL   GLUCOSE, POC    Collection Time: 03/04/20  8:06 AM   Result Value Ref Range    Glucose (POC) 247 (H) 65 - 100 mg/dL   GLUCOSE, POC    Collection Time: 03/04/20 11:35 AM   Result Value Ref Range    Glucose (POC) 158 (H) 65 - 100 mg/dL       All Micro Results     None          Current Facility-Administered Medications   Medication Dose Route Frequency    insulin glargine (LANTUS) injection 27 Units  27 Units SubCUTAneous QHS    insulin lispro (HUMALOG) injection 10 Units  10 Units SubCUTAneous TIDAC    albuterol (PROVENTIL VENTOLIN) nebulizer solution 2.5 mg  2.5 mg Nebulization Q6HWA RT    insulin lispro (HUMALOG) injection   SubCUTAneous AC&HS    traMADol (ULTRAM) tablet 50 mg  50 mg Oral Q6H PRN    LORazepam (ATIVAN) tablet 1 mg  1 mg Oral BID PRN    gabapentin (NEURONTIN) capsule 200 mg  200 mg Oral TID    acetaminophen (TYLENOL) tablet 1,000 mg  1,000 mg Oral Q6H PRN    tamsulosin (FLOMAX) capsule 0.4 mg  0.4 mg Oral DAILY    sodium chloride (NS) flush 5-40 mL  5-40 mL IntraVENous PRN    dextrose (D50W) injection syrg 25 g  25 g IntraVENous PRN    buPROPion SR (WELLBUTRIN SR) tablet 150 mg  150 mg Oral BID    budesonide-formoteroL (SYMBICORT) 160-4.5 mcg/actuation HFA inhaler 2 Puff  2 Puff Inhalation BID    atorvastatin (LIPITOR) tablet 10 mg  10 mg Oral QHS    lisinopril (PRINIVIL, ZESTRIL) tablet 5 mg  5 mg Oral DAILY    metoprolol tartrate (LOPRESSOR) tablet 50 mg  50 mg Oral BID    sodium chloride (NS) flush 5-40 mL  5-40 mL IntraVENous Q8H    promethazine (PHENERGAN) with saline injection 12.5 mg  12.5 mg IntraVENous Q6H PRN    senna-docusate (PERICOLACE) 8.6-50 mg per tablet 2 Tab  2 Tab Oral BID    enoxaparin (LOVENOX) injection 40 mg  40 mg SubCUTAneous Q24H    pantoprazole (PROTONIX) tablet 40 mg  40 mg Oral ACB    hydrALAZINE (APRESOLINE) 20 mg/mL injection 20 mg  20 mg IntraVENous Q6H PRN    tiotropium bromide (SPIRIVA RESPIMAT) 2.5 mcg /actuation  2 Puff Inhalation DAILY    NUTRITIONAL SUPPORT ELECTROLYTE PRN ORDERS   Does Not Apply PRN       Other Studies:  No results found for this visit on 02/26/20. Xr Chest Sngl V    Result Date: 3/4/2020  EXAM: TEMPORARY INDICATION: Pneumothorax COMPARISON: 3/3/2020 FINDINGS: A portable AP radiograph of the chest was obtained at 0518 hours. The patient is on a cardiac monitor. The lungs are clear. The cardiac and mediastinal contours and pulmonary vascularity are normal.  Status post left thoracotomy. IMPRESSION: No acute cardiopulmonary disease.         Assessment and Plan:     Hospital Problems as of 3/4/2020 Date Reviewed: 2/21/2020          Codes Class Noted - Resolved POA    Type 2 diabetes mellitus, with long-term current use of insulin (HCC) (Chronic) ICD-10-CM: E11.9, Z79.4  ICD-9-CM: 250.00, V58.67  1/26/2020 - Present Yes        * (Principal) [facility] ICD-10-CM: C34.90  ICD-9-CM: 162.9  1/15/2020 - Present Yes        HTN (hypertension) (Chronic) ICD-10-CM: I10  ICD-9-CM: 401.9  4/29/2015 - Present Yes              Plan:  Uncontrolled DM2  -Patient educated on diet  -Patient diabetes regimen changed - basal Lantus increased from 24 to 27 units  -If sugars remain uncontrolled once discharged, consider switching to [place] due to lower risk for hypoglycemia    Signed:  Nick Lewis  *ATTENTION:  This note has been created by a medical student for educational purposes only. Please do not refer to the content of this note for clinical decision-making, billing, or other purposes. Please see attending physicians note to obtain clinical information on this patient. *

## 2020-03-04 NOTE — PROGRESS NOTES
Problem: Mobility Impaired (Adult and Pediatric)  Goal: *Acute Goals and Plan of Care (Insert Text)  Description  LTG:  (1.)Mr. Edgar Zamora will move from supine to sit and sit to supine , scoot up and down and roll side to side in flat bed without siderails with  INDEPENDENT within 7 day(s). (2.)Mr. Edgar Zamora will perform all functional transfers with  MODIFIED INDEPENDENCE using the least restrictive/no device within 7 day(s). (3.)Mr. Edgar Zamora will ambulate with  MODIFIED INDEPENDENCE for 250+ feet with normal vital sign response with the least restrictive/no device within 7 day(s). (4.)Mr. Edgar Zamora will ambulate up/down 2 steps with bilateral  railing with  STAND BY ASSIST with no device within 7 day(s). Outcome: Progressing Towards Goal     PHYSICAL THERAPY: Daily Note 3/4/2020  INPATIENT: PT Visit Days : 3  Payor: Kayden Neal / Plan: [address] [place] [address] HMO / Product Type: "[employer] Care Medicare /       NAME/AGE/GENDER: Cindy Gayle is a 70 y.o. male   PRIMARY DIAGNOSIS: Lung mass [R91.8]  Lung cancer ([facility] [C34.90]  [facility] [C34.90] <principal problem not specified> <principal problem not specified>  Procedure(s) (LRB):  LEFT VATS WITH LEFT UPPER LOBECTOMY/ , MEDIASTINAL LYMPHADENECTOMY/CYRO (Left)  7 Days Post-Op  ICD-10: Treatment Diagnosis:    · Other abnormalities of gait and mobility (R26.89)  · History of falling (Z91.81)   Precaution/Allergies:  Patient has no known allergies. ASSESSMENT:     Mr. Edgar Zamora presents supine in bed. Patient lives alone and ambulated independently but with quad cane in community. 3/4 Today patient sitting up in recliner with sister at bedside. He stood with CGA and ambulated 90' with min HHA, slow shuffling steps. Worked on standing balance activities in hallway. Performed ex's in sit and stand. Progressing well with transfers and ambulation.   Will continue work on weaning off RW.   Mr. Edgar Zamora is functioning  below baseline and is therefore appropriate for skilled PT to maximize rehab potential.       This section established at most recent assessment   PROBLEM LIST (Impairments causing functional limitations):  1. Decreased Transfer Abilities  2. Decreased Ambulation Ability/Technique  3. Decreased Balance  4. Increased Pain  5. Decreased Activity Tolerance  6. Decreased Knowledge of Precautions  7. Decreased [place] with Home Exercise Program   INTERVENTIONS PLANNED: (Benefits and precautions of physical therapy have been discussed with the patient.)  1. Balance Exercise  2. Bed Mobility  3. Gait Training  4. Therapeutic Activites  5. Therapeutic Exercise/Strengthening  6. Transfer Training  7. education      TREATMENT PLAN: Frequency/Duration: 3 times a week for duration of hospital stay  Rehabilitation Potential For Stated Goals: Excellent     REHAB RECOMMENDATIONS (at time of discharge pending progress):    Placement: It is my opinion, based on this patient's performance to date, that Mr. Sharda Sepulveda may benefit from intensive therapy at a [address] [place] [address] after discharge due to the functional deficits listed above that are likely to improve with skilled rehabilitation and concerns that he/she may be unsafe to be unsupervised at home due to history of falls. Equipment:    None at this time              HISTORY:   History of Present Injury/Illness (Reason for Referral):  Per MD note, \"The patient is a 70 y.o. male with hx of COPD who was found to have a suspicious EVA pulmonary nodule. Previously presented for needle localization but was diagnosed with pneumonia and required re-scheduling. Reports improved cough since prior visit. Denies fever. \"  Past Medical History/Comorbidities:   Mr. Sharda Sepulveda  has a past medical history of CAD (coronary artery disease) (08/2019), Chronic kidney disease, COPD, Depression, Diabetes ([address] HTN (hypertension) (4/29/2015), Malignant neoplasm of upper lobe of left lung ([address] (12/12/2019), Other ill-defined conditions(799.89), and Sepsis ([facility] (12/2019). Mr. Jaquelin Patel  has a past surgical history that includes hx orthopaedic; pr abdomen surgery proc unlisted (5/2015); hx other surgical (12/2019); and hx colonoscopy. Social History/Living Environment:   Home Environment: Private residence  # Steps to Enter: 2  Rails to Enter: No  One/Two Story Residence: One story  Living Alone: Yes  Support Systems: Child(shayy)  Patient Expects to be Discharged to[de-identified] Private residence  Current DME Used/Available at Home: Cane, quad, Glucometer, [place], Walker, rolling  Tub or Shower Type: Tub/Shower combination(has walk in as well, sits on edge of tub for washing off)  Prior Level of Function/Work/Activity:  Pt lives alone in 1 level home with 2-3 steps at entrance, no HRs, has walk in and T/S combo but prefers to sit on the edge of the tub for washing up. Was driving up to 3 months ago. Uses a QC out in the community and sometimes at home. Checked his own BSs and gave himself shots but admits he has passed out a few times. Admits to 2-3 falls in last 6 months. Older sister Niko Childers lives just up the road and reports pt has been needing more and more help with house management and self care the past 3 months. Has children but they live out of town. Pt admits he has not been managing well. Bills are auto drafted. Still goes to [employer] weekly. Number of Personal Factors/Comorbidities that affect the Plan of Care: 1-2: MODERATE COMPLEXITY   EXAMINATION:   Most Recent Physical Functioning:   Gross Assessment:  AROM: Generally decreased, functional  Strength: Generally decreased, functional               Posture:  Posture (WDL): Exceptions to WDL  Posture Assessment:  Forward head, Rounded shoulders  Balance:  Sitting: Intact  Standing: Impaired  Standing - Static: Fair  Standing - Dynamic : Fair Bed Mobility:     Wheelchair Mobility:     Transfers: CGA  Sit to Stand: Contact guard assistance  Stand to Sit: Contact guard assistance  Gait: 85'x3 RW CGA     Speed/Kate: Slow;Shuffled  Step Length: Right shortened;Left shortened  Gait Abnormalities: Altered arm swing;Shuffling gait  Distance (ft): 90 Feet (ft)  Assistive Device: Gait belt(hand hold assist)  Ambulation - Level of Assistance: Minimal assistance  Interventions: Verbal cues; Visual/Demos; Safety awareness training      Body Structures Involved:  1. Lungs  2. Muscles Body Functions Affected:  1. Sensory/Pain  2. Respiratory  3. Movement Related  4. Skin Related Activities and Participation Affected:  1. Mobility  2. Self Care  3. Domestic Life  4. Community, Social and [place]   Number of elements that affect the Plan of Care: 4+: HIGH COMPLEXITY   CLINICAL PRESENTATION:   Presentation: Evolving clinical presentation with changing clinical characteristics: MODERATE COMPLEXITY   CLINICAL DECISION MAKIN [facility] Short [facility]  How much difficulty does the patient currently have. .. Unable A Lot A Little None   1. Turning over in bed (including adjusting bedclothes, sheets and blankets)? [] 1   [] 2   [x] 3   [] 4   2. Sitting down on and standing up from a chair with arms ( e.g., wheelchair, bedside commode, etc.)   [] 1   [] 2   [x] 3   [] 4   3. Moving from lying on back to sitting on the side of the bed? [] 1   [x] 2   [] 3   [] 4   How much help from another person does the patient currently need. .. Total A Lot A Little None   4. Moving to and from a bed to a chair (including a wheelchair)? [] 1   [] 2   [x] 3   [] 4   5. Need to walk in hospital room? [] 1   [] 2   [x] 3   [] 4   6. Climbing 3-5 steps with a railing? [] 1   [] 2   [x] 3   [] 4   © , Trustees of [address] [place] [address] [address] [address], under license to [employer].  All rights reserved      Score:  Initial: 17 Most Recent: X (Date: -- )    Interpretation of Tool:  Represents activities that are increasingly more difficult (i.e. Bed mobility, Transfers, Gait). Medical Necessity:     · Patient is expected to demonstrate progress in   · strength, balance, and functional technique  ·  to   · increase independence with   and improve safety during all functional mobility   · . Reason for Services/Other Comments:  · Patient continues to require skilled intervention due to   · patient unable to attend/participate in therapy as expected  · . Use of outcome tool(s) and clinical judgement create a POC that gives a: Clear prediction of patient's progress: LOW COMPLEXITY            TREATMENT:   (In addition to Assessment/Re-Assessment sessions the following treatments were rendered)   Pre-treatment Symptoms/Complaints:  None. Pain: Initial:   Pain Intensity 1: 4  Pain Location 1: Incisional  Pain Orientation 1: Left  Post Session:  4     Therapeutic Activity: (    25 minutes): Therapeutic activities including Chair transfers, standing balance activites, Ambulation on level ground and LE exerscises to improve mobility, strength and balance. Required moderate cueing Verbal cues; Visual/Demos; Safety awareness training to promote correct execution. DATE: 2/27/20 3/2/20 3/4/20     Ambulation        Hip Flexion  x20 AB x20 AB     Long Arc Quads X20 AB x20 AB      Hip ab/ad        Heel Raises X20 AB x10 AB      Toe Raises X20 AB x10 AB      Sit to stand x2'       Standing heel raises   x20 AB     retrogait   x50' min HHA     sidestepping   x25' [place] min HHA              Key:  A=active, AA=active assisted, P=passive, B=bilaterally, R=right, L=left   DF=dorsiflexion, PF=plantarflexion      Braces/Orthotics/Lines/Etc:   · O2 Device: Room air  Treatment/Session Assessment:    · Response to Treatment:  pleasant and cooperative.   · Interdisciplinary Collaboration:   o Physical Therapist  o Registered Nurse  o   · After treatment position/precautions:   o Up in chair  o Bed/Chair-wheels locked  o Call light within reach  o RN notified  o Family at bedside   · Compliance with Program/Exercises: Compliant all of the time, Will assess as treatment progresses  · Recommendations/Intent for next treatment session: \"Next visit will focus on advancements to more challenging activities and reduction in assistance provided\".   Total Treatment Duration:  PT Patient Time In/Time Out  Time In: 1020  Time Out: 629 Dieudonne Sullivan PT, DPT

## 2020-03-04 NOTE — PROGRESS NOTES
Dispo update:  Pre-cert for transfer to [facility] for STR is still pending (started pre-cert yesterday afternoon).

## 2020-03-05 LAB
GLUCOSE BLD STRIP.AUTO-MCNC: 150 MG/DL (ref 65–100)
GLUCOSE BLD STRIP.AUTO-MCNC: 154 MG/DL (ref 65–100)
GLUCOSE BLD STRIP.AUTO-MCNC: 59 MG/DL (ref 65–100)
GLUCOSE BLD STRIP.AUTO-MCNC: 87 MG/DL (ref 65–100)
GLUCOSE BLD STRIP.AUTO-MCNC: 99 MG/DL (ref 65–100)

## 2020-03-05 PROCEDURE — 74011636637 HC RX REV CODE- 636/637: Performed by: INTERNAL MEDICINE

## 2020-03-05 PROCEDURE — 74011000250 HC RX REV CODE- 250: Performed by: INTERNAL MEDICINE

## 2020-03-05 PROCEDURE — 74011250636 HC RX REV CODE- 250/636: Performed by: SURGERY

## 2020-03-05 PROCEDURE — 65270000029 HC RM PRIVATE

## 2020-03-05 PROCEDURE — 94640 AIRWAY INHALATION TREATMENT: CPT

## 2020-03-05 PROCEDURE — 74011636637 HC RX REV CODE- 636/637: Performed by: NURSE PRACTITIONER

## 2020-03-05 PROCEDURE — 94760 N-INVAS EAR/PLS OXIMETRY 1: CPT

## 2020-03-05 PROCEDURE — 74011250637 HC RX REV CODE- 250/637: Performed by: SURGERY

## 2020-03-05 PROCEDURE — 77030038269 HC DRN EXT URIN PURWCK BARD -A

## 2020-03-05 PROCEDURE — 82962 GLUCOSE BLOOD TEST: CPT

## 2020-03-05 PROCEDURE — 74011250637 HC RX REV CODE- 250/637: Performed by: NURSE PRACTITIONER

## 2020-03-05 RX ORDER — INSULIN GLARGINE 100 [IU]/ML
24 INJECTION, SOLUTION SUBCUTANEOUS
Status: DISCONTINUED | OUTPATIENT
Start: 2020-03-05 | End: 2020-03-05

## 2020-03-05 RX ORDER — INSULIN GLARGINE 100 [IU]/ML
25 INJECTION, SOLUTION SUBCUTANEOUS
Status: DISCONTINUED | OUTPATIENT
Start: 2020-03-05 | End: 2020-03-06

## 2020-03-05 RX ADMIN — GABAPENTIN 200 MG: 100 CAPSULE ORAL at 17:08

## 2020-03-05 RX ADMIN — INSULIN LISPRO 10 UNITS: 100 INJECTION, SOLUTION INTRAVENOUS; SUBCUTANEOUS at 12:29

## 2020-03-05 RX ADMIN — SENNOSIDES AND DOCUSATE SODIUM 2 TABLET: 8.6; 5 TABLET ORAL at 17:08

## 2020-03-05 RX ADMIN — TAMSULOSIN HYDROCHLORIDE 0.4 MG: 0.4 CAPSULE ORAL at 08:05

## 2020-03-05 RX ADMIN — Medication 10 ML: at 22:02

## 2020-03-05 RX ADMIN — TRAMADOL HYDROCHLORIDE 50 MG: 50 TABLET, FILM COATED ORAL at 09:04

## 2020-03-05 RX ADMIN — INSULIN LISPRO 10 UNITS: 100 INJECTION, SOLUTION INTRAVENOUS; SUBCUTANEOUS at 17:08

## 2020-03-05 RX ADMIN — ALBUTEROL SULFATE 2.5 MG: 2.5 SOLUTION RESPIRATORY (INHALATION) at 14:49

## 2020-03-05 RX ADMIN — BUPROPION HYDROCHLORIDE 150 MG: 150 TABLET, EXTENDED RELEASE ORAL at 21:54

## 2020-03-05 RX ADMIN — TIOTROPIUM BROMIDE INHALATION SPRAY 2 PUFF: 3.12 SPRAY, METERED RESPIRATORY (INHALATION) at 07:33

## 2020-03-05 RX ADMIN — METOPROLOL TARTRATE 50 MG: 50 TABLET, FILM COATED ORAL at 08:05

## 2020-03-05 RX ADMIN — GABAPENTIN 200 MG: 100 CAPSULE ORAL at 21:54

## 2020-03-05 RX ADMIN — BUPROPION HYDROCHLORIDE 150 MG: 150 TABLET, EXTENDED RELEASE ORAL at 08:05

## 2020-03-05 RX ADMIN — LISINOPRIL 5 MG: 5 TABLET ORAL at 08:05

## 2020-03-05 RX ADMIN — ATORVASTATIN CALCIUM 10 MG: 10 TABLET, FILM COATED ORAL at 21:54

## 2020-03-05 RX ADMIN — ENOXAPARIN SODIUM 40 MG: 40 INJECTION SUBCUTANEOUS at 21:58

## 2020-03-05 RX ADMIN — BUDESONIDE AND FORMOTEROL FUMARATE DIHYDRATE 2 PUFF: 160; 4.5 AEROSOL RESPIRATORY (INHALATION) at 07:32

## 2020-03-05 RX ADMIN — BUDESONIDE AND FORMOTEROL FUMARATE DIHYDRATE 2 PUFF: 160; 4.5 AEROSOL RESPIRATORY (INHALATION) at 19:57

## 2020-03-05 RX ADMIN — Medication 10 ML: at 14:28

## 2020-03-05 RX ADMIN — INSULIN LISPRO 10 UNITS: 100 INJECTION, SOLUTION INTRAVENOUS; SUBCUTANEOUS at 08:06

## 2020-03-05 RX ADMIN — INSULIN LISPRO 2 UNITS: 100 INJECTION, SOLUTION INTRAVENOUS; SUBCUTANEOUS at 12:29

## 2020-03-05 RX ADMIN — METOPROLOL TARTRATE 50 MG: 50 TABLET, FILM COATED ORAL at 21:54

## 2020-03-05 RX ADMIN — Medication 10 ML: at 06:09

## 2020-03-05 RX ADMIN — ALBUTEROL SULFATE 2.5 MG: 2.5 SOLUTION RESPIRATORY (INHALATION) at 07:28

## 2020-03-05 RX ADMIN — SENNOSIDES AND DOCUSATE SODIUM 2 TABLET: 8.6; 5 TABLET ORAL at 08:05

## 2020-03-05 RX ADMIN — INSULIN GLARGINE 25 UNITS: 100 INJECTION, SOLUTION SUBCUTANEOUS at 22:00

## 2020-03-05 RX ADMIN — GABAPENTIN 200 MG: 100 CAPSULE ORAL at 08:05

## 2020-03-05 RX ADMIN — PANTOPRAZOLE SODIUM 40 MG: 40 TABLET, DELAYED RELEASE ORAL at 06:09

## 2020-03-05 NOTE — PROGRESS NOTES
END OF SHIFT NOTE:    INTAKE/OUTPUT  03/04 0701 - 03/05 0700  In: 425   Out: 4617 [Urine:1675]  Voiding: YES  Catheter: NO  Drain:              Flatus: Patient does have flatus present. Stool:  0 occurrences. Characteristics:    Emesis: 0 occurrences. Characteristics:        VITAL SIGNS  Patient Vitals for the past 12 hrs:   Temp Pulse Resp BP SpO2   03/05/20 1553 97.9 °F (36.6 °C) 70 18 116/54 98 %   03/05/20 1451 -- -- -- -- 92 %   03/05/20 1107 98.4 °F (36.9 °C) 67 18 132/61 100 %   03/05/20 0733 -- -- -- -- 99 %   03/05/20 0704 97.6 °F (36.4 °C) 67 18 132/58 99 %       Pain Assessment  Pain Intensity 1: 4 (03/05/20 1005)  Pain Location 1: Flank  Pain Intervention(s) 1: Medication (see MAR)  Patient Stated Pain Goal: 0    Ambulating  Yes    Shift report given to oncoming nurse at the bedside.     Nawaf Jesus, RN

## 2020-03-05 NOTE — PROGRESS NOTES
END OF SHIFT NOTE:    INTAKE/OUTPUT  03/04 0701 - 03/05 0700  In: 425   Out: 7177 [Urine:1675]  Voiding: YES  Catheter: NO  Drain:              Flatus: Patient does have flatus present. Stool:  1occurrences. Characteristics:  Stool Assessment  Stool Color: Brown  Stool Appearance: Loose  Stool Amount: Large  Stool Source/Status: Rectum    Emesis: 0 occurrences. Characteristics:        VITAL SIGNS  Patient Vitals for the past 12 hrs:   Temp Pulse Resp BP SpO2   03/05/20 0345 98 °F (36.7 °C) 61 18 134/50 99 %   03/04/20 2235 97.3 °F (36.3 °C) 76 18 127/72 99 %   03/04/20 2137 -- 72 -- 131/61 --   03/04/20 1930 97.5 °F (36.4 °C) 90 18 118/49 99 %   03/04/20 1926 -- -- -- -- 97 %       Pain Assessment  Pain Intensity 1: 7 (03/04/20 2002)  Pain Location 1: Flank  Pain Intervention(s) 1: Rest, Refused  Patient Stated Pain Goal: 0    Ambulating  Yes    Shift report given to oncoming nurse at the bedside.     Vandana Wyatt RN

## 2020-03-05 NOTE — DIABETES MGMT
Patient admitted with lung cancer s/p surgery. Blood glucose ranged  yesterday with patient receiving Lantus 27 units and Humalog 42 units. Blood glucose this morning was 59. Recheck FSBS 87. Reviewed these numbers and their significance with patient. Reviewed hypoglycemia signs, symptoms, and treatment with patient. Patients states \"I had orange juice and peanut butter w/ crackers. \" Patient states he ate 100% of breakfast this AM. Pt given educational handout regarding CGM therapy. Again discussed the benefits of CGM therapy and encouraged patient to discuss this with PCP. Patient states \"I think I asked him about that once, but they said it wasn't accurate. \" Discussed the difference between glucometer and CGM and the importance of proper calibration. Also reviewed the role of an endocrinologist. Patient states he previously stayed with his daughter-in-law for a week, but states he was managing his own insulin. Educated patient regarding current basal/bolus regimen of Lantus 25 units nightly and Humalog 10 units with meals and SSI including type of insulin, timing with meals, onset, duration of effect, and peak of insulin dose. Educated patient on the differences between prandial insulin and sliding scale insulin. Instructed patient to always seek guidance from their primary care provider about adjusting their insulin doses and not to adjust them on their own as this could negatively impact their glycemic control or result in hypoglycemia. Patient verbalizes understanding. Per chart review plan is for patient to discharge to [facility]. Patient is sensitive to insulin and may benefit from Humalog insulin sensitive sliding scale to help reduce risk of hypoglycemia. Will continue to follow along.

## 2020-03-05 NOTE — PROGRESS NOTES
Dispo update:  Spoke to Mr. Raymond Weems and his older sister Ms. Antonella Killian (her home phone is [phone]; cell [phone]) about [facility] has denied our request for STR at [facility]. Offered her a copy of the denial letter, with the number that patient/family can call and make an appeal, and explained that [facility] [place] not let our physician talk to their physician, as they say it is past the [facility] deadline. She will talk to her two sons, and to Mr. Raymond Weems and re-convene tomorrow to discuss discharge planning. Chary, however, did say that Mr. Raymond Weems is appropriate for home health.

## 2020-03-05 NOTE — PROGRESS NOTES
Hospitalist Note     Admit Date:  2020  7:11 AM   Name:  Verónica Schaefer   Age:  70 y.o.  :  1940   MRN:  [id number]   PCP:  Rosa Maria Fuentes MD  Treatment Team: Attending Provider: Maria Del Carmen Arnold MD; Utilization Review: Cindy Abad RN; Consulting Provider: Krysten Artis, Em Marie MD; Consulting Provider: Ap Samaniego MD; Care Manager: Nadia Peterson RN; Consulting Provider: Renetta Magana DO; Staff Nurse: Mauricio Sams RN; Physical Therapist: Viky Barcenas, PT, DPT    HPI/Subjective:   Pt is a 71 y/o M with DM, COPD, HTN, lung cancer admitted by surgery for EVA lobectomy, LLL wedge resection, lymphadenectomy done on . hospitalists were consulted for DM management. His BG have been generally uncontrolled most of his stay. His intake has varied and has seen wide swings in BG readings. He reports compliance with insulin at home but says he varies his basal insulin based on qhs BG checks. takes 10u prandial consistently for most part.  he has questionable compliance with diet however. He seems to have some mild cognitive slowing/deficits    3/5  - BG low this morning. Says he ate all of his dinner and breakfast.  No new complaints, feels fatigued and weak, ongoing.   SNF placement pending      Objective:     Patient Vitals for the past 24 hrs:   Temp Pulse Resp BP SpO2   20 0733 -- -- -- -- 99 %   20 0704 97.6 °F (36.4 °C) 67 18 132/58 99 %   20 0345 98 °F (36.7 °C) 61 18 134/50 99 %   20 2235 97.3 °F (36.3 °C) 76 18 127/72 99 %   20 2137 -- 72 -- 131/61 --   20 1930 97.5 °F (36.4 °C) 90 18 118/49 99 %   20 1926 -- -- -- -- 97 %   20 1536 97.6 °F (36.4 °C) 71 18 116/59 97 %   20 1330 -- -- -- -- 90 %   20 1113 97.4 °F (36.3 °C) 71 18 123/64 100 %     Oxygen Therapy  O2 Sat (%): 99 % (20)  Pulse via Oximetry: 71 beats per minute (20)  O2 Device: Room air (20)  O2 Flow Rate (L/min): 0 l/min (03/02/20 1512)  FIO2 (%): 21 % (03/02/20 0718)  ETCO2 (mmHg): 45 mmHg (02/26/20 1140)      Intake/Output Summary (Last 24 hours) at 3/5/2020 0811  Last data filed at 3/5/2020 3189  Gross per 24 hour   Intake --   Output 2150 ml   Net -2150 ml       *Note that automatically entered I/Os may not be accurate; dependent on patient compliance with collection and accurate  by assistants. Physical Exam:  General:    Well nourished. Alert. Eyes:   Normal sclerae. Extraocular movements intact. ENT:  Normocephalic, atraumatic. Moist mucous membranes  CV:   RRR. No murmur, rub, or gallop. Lungs:  Diminished L upper field  Abdomen: Soft, nontender, nondistended. Bowel sounds normal.   Extremities: Warm and dry. No cyanosis or edema. Neurologic: CN II-XII grossly intact. Sensation intact. Skin:     No rashes or jaundice. Psych:  Normal mood and affect. I reviewed the labs, imaging, EKGs, telemetry, and other studies done this admission.   Data Review:   Recent Results (from the past 24 hour(s))   GLUCOSE, POC    Collection Time: 03/04/20 11:35 AM   Result Value Ref Range    Glucose (POC) 158 (H) 65 - 100 mg/dL   GLUCOSE, POC    Collection Time: 03/04/20  3:59 PM   Result Value Ref Range    Glucose (POC) 99 65 - 100 mg/dL   GLUCOSE, POC    Collection Time: 03/04/20 10:40 PM   Result Value Ref Range    Glucose (POC) 275 (H) 65 - 100 mg/dL   GLUCOSE, POC    Collection Time: 03/05/20  7:02 AM   Result Value Ref Range    Glucose (POC) 59 (L) 65 - 100 mg/dL   GLUCOSE, POC    Collection Time: 03/05/20  7:25 AM   Result Value Ref Range    Glucose (POC) 87 65 - 100 mg/dL       All Micro Results     None          Current Facility-Administered Medications   Medication Dose Route Frequency    insulin glargine (LANTUS) injection 25 Units  25 Units SubCUTAneous QHS    insulin lispro (HUMALOG) injection 10 Units  10 Units SubCUTAneous TIDAC    albuterol (PROVENTIL VENTOLIN) nebulizer solution 2.5 mg  2.5 mg Nebulization Q6HWA RT    insulin lispro (HUMALOG) injection   SubCUTAneous AC&HS    traMADol (ULTRAM) tablet 50 mg  50 mg Oral Q6H PRN    LORazepam (ATIVAN) tablet 1 mg  1 mg Oral BID PRN    gabapentin (NEURONTIN) capsule 200 mg  200 mg Oral TID    acetaminophen (TYLENOL) tablet 1,000 mg  1,000 mg Oral Q6H PRN    tamsulosin (FLOMAX) capsule 0.4 mg  0.4 mg Oral DAILY    sodium chloride (NS) flush 5-40 mL  5-40 mL IntraVENous PRN    dextrose (D50W) injection syrg 25 g  25 g IntraVENous PRN    buPROPion SR (WELLBUTRIN SR) tablet 150 mg  150 mg Oral BID    budesonide-formoteroL (SYMBICORT) 160-4.5 mcg/actuation HFA inhaler 2 Puff  2 Puff Inhalation BID    atorvastatin (LIPITOR) tablet 10 mg  10 mg Oral QHS    lisinopril (PRINIVIL, ZESTRIL) tablet 5 mg  5 mg Oral DAILY    metoprolol tartrate (LOPRESSOR) tablet 50 mg  50 mg Oral BID    sodium chloride (NS) flush 5-40 mL  5-40 mL IntraVENous Q8H    promethazine (PHENERGAN) with saline injection 12.5 mg  12.5 mg IntraVENous Q6H PRN    senna-docusate (PERICOLACE) 8.6-50 mg per tablet 2 Tab  2 Tab Oral BID    enoxaparin (LOVENOX) injection 40 mg  40 mg SubCUTAneous Q24H    pantoprazole (PROTONIX) tablet 40 mg  40 mg Oral ACB    hydrALAZINE (APRESOLINE) 20 mg/mL injection 20 mg  20 mg IntraVENous Q6H PRN    tiotropium bromide (SPIRIVA RESPIMAT) 2.5 mcg /actuation  2 Puff Inhalation DAILY    NUTRITIONAL SUPPORT ELECTROLYTE PRN ORDERS   Does Not Apply PRN       Other Studies:  Xr Chest Sngl V    Result Date: 2/26/2020  CHEST X-RAY, one view. HISTORY:  Postoperative evaluation after left thoracotomy. TECHNIQUE:  AP upright portable view COMPARISON: 21 February 2020. FINDINGS:   *The lungs: Density left lung apex. . There are 2 left-sided chest tubes, one apex and one at the base. No pneumothorax. *The heart size: is normal. *The costophrenic angles: are sharp. *The pulmonary vasculature: is unremarkable.  *Included portion of the upper abdomen: is unremarkable. *Bones: No gross bony lesions. *Other: None. IMPRESSION:  Postop changes. No pneumothorax. Two left-sided chest tubes. Ct Guidance Stereo Loc    Result Date: 2/26/2020  Title: CT guided lung nodule needle localization with Kopans wire and methylene blue administration. Indication: 70-year-old male with history of left upper lobe suspicious pulmonary nodule. Previously had his procedure canceled due to acute pneumonia. Now presents following treatment for wire localization prior to going to the OR for resection. Consent: Informed written and oral consent was obtained from the patient after explanation of benefits and risks (including, but not limited to: Infection, Hemorrhage, pneumothorax). The patient's questions were answered to their satisfaction. The patient stated understanding and requested that we proceed. Technique: All CT scans at this facility are performed using dose reduction/dose modulation techniques, as appropriate the performed exam, including the following:  Automated Exposure Control; Adjustment of the mA and/or kV according to patient size (this includes techniques or standardized protocols for targeted exams where dose is matched to indication/reason for exam); and Use of Iterative Reconstruction Technique. Procedure:  Sterile barrier technique (including:  cap, mask, sterile gloves, sterile sheet, hand hygiene, and  cutaneous antisepsis) was used. With the patient supine a preliminary CT scan through the chest was performed with radio-opaque markers on the skin. Following routine prep and drape of the left upper chest, a local field block with lidocaine was achieved. Using intermittent CT technique, a 20-gauge needle was advanced into the left upper lobe pulmonary nodule. Approximately 1 mL of methylene blue was administered. A Kopans wire was then delivered and the needle removed. A bandage was applied in order to secure the wire.  A post-procedure CT scan was obtained. Complications: None. Radiation Exposure Indices: Total Exam DLP = 233 mGy-cm Contrast: None. Medications:  Under physician supervision, 35 minutes of moderate intravenous conscious sedation was performed by administering Versed, Fentanyl intravenously. Continuous pulse oximetry, heart rate, and blood pressure monitoring was performed with an independent, trained observer present. Findings: Left upper lobe pulmonary nodule measuring up to 2.7 cm. Postprocedure CT reveals a Kopan's wire within the left upper lobe pulmonary nodule with the distal aspect near the deep margin. Methylene blue appears to sustain adjacent to the nodule. No pneumothorax. Impression: Successful CT-guided left upper lobe pulmonary nodule wire localization with methylene blue injection. Assessment and Plan:     Hospital Problems as of 3/5/2020 Date Reviewed: 2/21/2020          Codes Class Noted - Resolved POA    Type 2 diabetes mellitus, with long-term current use of insulin (HCC) (Chronic) ICD-10-CM: E11.9, Z79.4  ICD-9-CM: 250.00, V58.67  1/26/2020 - Present Yes        * (Principal) [facility] ICD-10-CM: C34.90  ICD-9-CM: 162.9  1/15/2020 - Present Yes        HTN (hypertension) (Chronic) ICD-10-CM: I10  ICD-9-CM: 401.9  4/29/2015 - Present Yes              Plan:  · Suspect poor compliance/understanding and consistency in dietary intake. He may have some early/mild dementia. DM educator following  · After increasing dominic tus from 24 to 27 last night, he was slightly hypoglycemic this morning. Decrease to 25u.   I suspect his mealtime intake amount varies, otherwise it is hard to explain his pattern of BG measurements  · Cont prandial 10u, but only if eating full meal  · ISS  · Pending SNF placement vs home health    Signed:  Kandace Spears MD

## 2020-03-05 NOTE — PROGRESS NOTES
Pt son expressing concern regarding pts insulin stating \"10 and 12 units is too much\" educated son on meal coverage insulin which is 10 units and sliding scale insulin which is 2 units for pts current blood sugar of 158. Pt son requesting for this writer to contact doctor. This writer contacted Dr. Frank Key via [employer] and stated: \"The patient said he takes 10 at home. You can hold the extra two units\" Son no longer present in room, writer asked about the 10 unit and pt stated \"I used to\" then stated \"can I get some ice\"  This writer attempted to educated pt on meal coverage and sliding scale, pt unreceptive to education stating Shaniqua Grider do you come in here when I'm eating, can I get some ice\"  Again this writer attempting to educated pt on meal time insulin coverage. Ice provided per pt request. 10 units humalog administered per order and sliding scale coverage held per MD order.

## 2020-03-06 LAB
GLUCOSE BLD STRIP.AUTO-MCNC: 150 MG/DL (ref 65–100)
GLUCOSE BLD STRIP.AUTO-MCNC: 165 MG/DL (ref 65–100)
GLUCOSE BLD STRIP.AUTO-MCNC: 199 MG/DL (ref 65–100)
GLUCOSE BLD STRIP.AUTO-MCNC: 250 MG/DL (ref 65–100)
GLUCOSE BLD STRIP.AUTO-MCNC: 426 MG/DL (ref 65–100)
GLUCOSE BLD STRIP.AUTO-MCNC: 48 MG/DL (ref 65–100)

## 2020-03-06 PROCEDURE — 74011250636 HC RX REV CODE- 250/636: Performed by: SURGERY

## 2020-03-06 PROCEDURE — 74011250637 HC RX REV CODE- 250/637: Performed by: SURGERY

## 2020-03-06 PROCEDURE — 74011000250 HC RX REV CODE- 250: Performed by: RADIOLOGY

## 2020-03-06 PROCEDURE — 94640 AIRWAY INHALATION TREATMENT: CPT

## 2020-03-06 PROCEDURE — 74011636637 HC RX REV CODE- 636/637: Performed by: NURSE PRACTITIONER

## 2020-03-06 PROCEDURE — 74011250637 HC RX REV CODE- 250/637: Performed by: NURSE PRACTITIONER

## 2020-03-06 PROCEDURE — 65270000029 HC RM PRIVATE

## 2020-03-06 PROCEDURE — 82962 GLUCOSE BLOOD TEST: CPT

## 2020-03-06 PROCEDURE — 94760 N-INVAS EAR/PLS OXIMETRY 1: CPT

## 2020-03-06 PROCEDURE — 74011000250 HC RX REV CODE- 250: Performed by: INTERNAL MEDICINE

## 2020-03-06 PROCEDURE — 74011636637 HC RX REV CODE- 636/637: Performed by: HOSPITALIST

## 2020-03-06 RX ORDER — INSULIN LISPRO 100 [IU]/ML
7 INJECTION, SOLUTION INTRAVENOUS; SUBCUTANEOUS
Status: DISCONTINUED | OUTPATIENT
Start: 2020-03-07 | End: 2020-03-06

## 2020-03-06 RX ORDER — INSULIN GLARGINE 100 [IU]/ML
20 INJECTION, SOLUTION SUBCUTANEOUS
Status: DISCONTINUED | OUTPATIENT
Start: 2020-03-06 | End: 2020-03-06

## 2020-03-06 RX ORDER — INSULIN LISPRO 100 [IU]/ML
7 INJECTION, SOLUTION INTRAVENOUS; SUBCUTANEOUS
Status: DISCONTINUED | OUTPATIENT
Start: 2020-03-06 | End: 2020-03-07

## 2020-03-06 RX ORDER — INSULIN GLARGINE 100 [IU]/ML
15 INJECTION, SOLUTION SUBCUTANEOUS
Status: DISCONTINUED | OUTPATIENT
Start: 2020-03-06 | End: 2020-03-07

## 2020-03-06 RX ADMIN — ENOXAPARIN SODIUM 40 MG: 40 INJECTION SUBCUTANEOUS at 22:06

## 2020-03-06 RX ADMIN — ALBUTEROL SULFATE 2.5 MG: 2.5 SOLUTION RESPIRATORY (INHALATION) at 19:31

## 2020-03-06 RX ADMIN — GABAPENTIN 200 MG: 100 CAPSULE ORAL at 16:39

## 2020-03-06 RX ADMIN — BUDESONIDE AND FORMOTEROL FUMARATE DIHYDRATE 2 PUFF: 160; 4.5 AEROSOL RESPIRATORY (INHALATION) at 19:31

## 2020-03-06 RX ADMIN — INSULIN LISPRO 2 UNITS: 100 INJECTION, SOLUTION INTRAVENOUS; SUBCUTANEOUS at 17:39

## 2020-03-06 RX ADMIN — GABAPENTIN 200 MG: 100 CAPSULE ORAL at 22:05

## 2020-03-06 RX ADMIN — INSULIN LISPRO 2 UNITS: 100 INJECTION, SOLUTION INTRAVENOUS; SUBCUTANEOUS at 22:02

## 2020-03-06 RX ADMIN — BUPROPION HYDROCHLORIDE 150 MG: 150 TABLET, EXTENDED RELEASE ORAL at 22:05

## 2020-03-06 RX ADMIN — METOPROLOL TARTRATE 50 MG: 50 TABLET, FILM COATED ORAL at 22:05

## 2020-03-06 RX ADMIN — INSULIN LISPRO 10 UNITS: 100 INJECTION, SOLUTION INTRAVENOUS; SUBCUTANEOUS at 12:06

## 2020-03-06 RX ADMIN — TIOTROPIUM BROMIDE INHALATION SPRAY 2 PUFF: 3.12 SPRAY, METERED RESPIRATORY (INHALATION) at 07:34

## 2020-03-06 RX ADMIN — DEXTROSE 50 % IN WATER (D50W) INTRAVENOUS SYRINGE 25 G: at 07:17

## 2020-03-06 RX ADMIN — TAMSULOSIN HYDROCHLORIDE 0.4 MG: 0.4 CAPSULE ORAL at 09:31

## 2020-03-06 RX ADMIN — ATORVASTATIN CALCIUM 10 MG: 10 TABLET, FILM COATED ORAL at 22:05

## 2020-03-06 RX ADMIN — ALBUTEROL SULFATE 2.5 MG: 2.5 SOLUTION RESPIRATORY (INHALATION) at 07:33

## 2020-03-06 RX ADMIN — GABAPENTIN 200 MG: 100 CAPSULE ORAL at 09:30

## 2020-03-06 RX ADMIN — METOPROLOL TARTRATE 50 MG: 50 TABLET, FILM COATED ORAL at 09:31

## 2020-03-06 RX ADMIN — ALBUTEROL SULFATE 2.5 MG: 2.5 SOLUTION RESPIRATORY (INHALATION) at 15:01

## 2020-03-06 RX ADMIN — LISINOPRIL 5 MG: 5 TABLET ORAL at 09:31

## 2020-03-06 RX ADMIN — BUDESONIDE AND FORMOTEROL FUMARATE DIHYDRATE 2 PUFF: 160; 4.5 AEROSOL RESPIRATORY (INHALATION) at 07:35

## 2020-03-06 RX ADMIN — INSULIN LISPRO 7 UNITS: 100 INJECTION, SOLUTION INTRAVENOUS; SUBCUTANEOUS at 17:38

## 2020-03-06 RX ADMIN — BUPROPION HYDROCHLORIDE 150 MG: 150 TABLET, EXTENDED RELEASE ORAL at 09:31

## 2020-03-06 RX ADMIN — Medication 10 ML: at 05:49

## 2020-03-06 RX ADMIN — Medication 10 ML: at 22:06

## 2020-03-06 RX ADMIN — PANTOPRAZOLE SODIUM 40 MG: 40 TABLET, DELAYED RELEASE ORAL at 05:48

## 2020-03-06 RX ADMIN — Medication 10 ML: at 16:40

## 2020-03-06 NOTE — PROGRESS NOTES
Patient again with morning hypoglycemia. Got lantus of 25 units last night. Based on reading, pt is having pre meals hyperglycemia with morning hypoglycemia. Would decrease lantus to 15 units, and keep pre meals at 10 units TID AC. Blood sugars this AM 48, hypoglycemia protocol followed. Nurse advised to hold all forms of insulin as of now until blood glucose is >200. Will monitor and titrate insulin dose accordingly.

## 2020-03-06 NOTE — PROGRESS NOTES
Patient's BS 48. Will hold insulin this morning. Patient is currently drinking glucerna and an amp of D50 has been given at this time. Will recheck BS in 15 minutes.

## 2020-03-06 NOTE — DIABETES MGMT
Patient admitted with lung cancer s/p surgery. Blood glucose ranged  yesterday with patient receiving Lantus 25 units and Humalog 32 units. Blood glucose this morning was 48. Per chart review patient received Glucerna and 25g of D50W. Reviewed these numbers and their significance with patient. Noted patient did not received breakfast prandial insulin. Blood glucose at 1146 was 426mg/dL. Patient received Humalog 20 units. Most recent FSBS 250. Spoke with primary RN historically patient has been sensitive to insulin and blood glucose levels are labile. Would recommend rechecking FSBS in 1-1.5 hours to ensure patient blood glucose is not continuing to drop and provide patient with a snack if needed to reduce patient risk of hypoglycemia. Reviewed hypoglycemia signs, symptoms, and treatment with patient. Educated patient regarding current basal/bolus regimen of Lantus 15 units nightly and Humalog 10 units with meals and SSI including type of insulin, timing with meals, onset, duration of effect, and peak of insulin dose. Educated patient on the differences between prandial insulin and sliding scale insulin. Instructed patient to always seek guidance from their primary care provider about adjusting their insulin doses and not to adjust them on their own as this could negatively impact their glycemic control or result in hypoglycemia. Again reviewed with patient the benefits of CGM therapy and the role of an endocrinologist. Patient verbalizes understanding and voices no further questions regarding diabetes management at this time.

## 2020-03-06 NOTE — PROGRESS NOTES
END OF SHIFT NOTE:    INTAKE/OUTPUT  03/05 0701 - 03/06 0700  In: 240 [P.O.:240]  Out: 775 [Urine:775]  Voiding: YES  Catheter: NO  Drain:              Flatus: Patient does have flatus present. Stool:  0 occurrences. Characteristics:  Stool Assessment  Stool Color: Brown  Stool Appearance: Loose  Stool Amount: Large  Stool Source/Status: Rectum    Emesis: 0 occurrences. Characteristics:        VITAL SIGNS  Patient Vitals for the past 12 hrs:   Temp Pulse Resp BP SpO2   03/06/20 0330 97.8 °F (36.6 °C) 65 19 110/52 97 %   03/05/20 2154 98.4 °F (36.9 °C) 75 20 109/45 98 %   03/05/20 1959 -- -- -- -- 97 %   03/05/20 1925 97.8 °F (36.6 °C) 82 18 103/55 97 %       Pain Assessment  Pain Intensity 1: 4 (03/05/20 1005)  Pain Location 1: Flank  Pain Intervention(s) 1: Medication (see MAR)  Patient Stated Pain Goal: 0    Ambulating  No sat on side of bed this shift. Slept long periods. Shift report given to oncoming nurse at the bedside.     Eva Cardenas, CHRISTIE

## 2020-03-06 NOTE — PROGRESS NOTES
Attempted to treat. Pt working was in bathroom and working with respiratory. Will attempt again next visit.

## 2020-03-06 NOTE — PROGRESS NOTES
3/6/2020    PLAN:  Diet- Diabetic  DVT Prophylactics- SCD/Lovenox  Pain control- Tylenol/ ultram  SUP prophylaxis- Protonix  Encourage C/DB/IS  Anterior chest tube removed 2/28/20  Posterior Chest tube removed 3/2/20  Diabetes managed by hospitalist and diabetic educator  PT/OT  Social service for discharge [facility] denied will try appeal.        ASSESSMENT:  Admit Date: 2/26/2020   9 Day Post-Op  Procedure(s):  LEFT VATS WITH LEFT UPPER LOBECTOMY/ , MEDIASTINAL LYMPHADENECTOMY/CYRO       DIAGNOSIS   A: \"# 9 LYMPH NODE X3\":   TWO FRAGMENTS OF ANTHRACOTIC LYMPH NODE NEGATIVE FOR METASTATIC TUMOR   B: \"# 8 LYMPH NODE X2\":   THREE FRAGMENTS OF ANTHRACOTIC LYMPH NODE NEGATIVE FOR METASTATIC TUMOR   C: \"AP WINDOW LYMPH NODE\":   ONE FRAGMENT OF LYMPH NODE NEGATIVE FOR METASTATIC TUMOR   D: \"# 7 LYMPH NODE X3\":   FOUR FRAGMENTS OF ANTHRACOTIC LYMPH NODE NEGATIVE FOR METASTATIC TUMOR   E: \"PERIHILAR\":   ONE FRAGMENT OF ANTHRACOTIC LYMPH NODE NEGATIVE FOR METASTATIC TUMOR   F: \"LEFT UPPER LOBECTOMY\":   ADENOCARCINOMA, ENTERIC TYPE (SEE COMMENT), MEASURING APPROXIMATELY 7 X 6.5 X 4 CM. MACROMETASTATIC CARCINOMA TO ONE OF SIX PERIBRONCHIAL LYMPH NODES WITH FOCAL EXTRACAPSULAR EXTENSION. BRONCHIAL AND VASCULAR MARGINS OF RESECTION ARE NEGATIVE FOR MALIGNANT TUMOR. DEFINITE LYMPHOVASCULAR INVASION IS NOT IDENTIFIED. PLEURAL SURFACE IS NEGATIVE FOR MALIGNANT TUMOR. Comment   The tumor       SUBJECTIVE:  02/27/2020-  No complaints. Pain controlled. On room air 100%. Chest tubes without airleak noted. Tolerating diet. Will adjust insulin dose. Transfer to floor and chest tubes to suction.     02/28/2020-  No complaints. Pain controlled. On room air 100%. Chest tubes without airleak noted. Pulling 1200 on IS. Tolerating diet  02/29/2020-  No complaints. Pain controlled. On room air.  Posterior Chest tube to water seal. CXR this am showing New left apical pneumothorax status post removal of anterior chest tube. Tolerating diet. AF, NAD.   03/01/2020-  Pt awake in bed. Rapid response called yesterday due to lethargy which improved with Narcan. Remaining chest tube to water seal - clamped today. CXR this am showing stable apical pneumothorax. Patel replaced again yesterday due to urinary retention. Tolerating Diabetic diet. +flatus/+BM. AF, NAD.   03/02/2020 Patient in bed without complaints of pain to right hip/buttock area. Area assessed. Tender to palpation, no skin redness or breakdown noted. Chest tube clamped. No SOB noted. Unclamped no air leak noted. Will DC remaining chest tube and get follow up xray. Social work for discharge planning. Glucose still uncontrolled with adjust lantus. Patel remains for 1 week related to  attempts of removal with urinary retention  Posterior chest tube removed at end inspiration. Dressing applied. Patient tolerated well. CXR ordered for 4 hours  03/03/2020 Patient away at Xray this AM- hip xray negative. Returned this evening and Patient in restroom. Did not see patient- nurse states no issues. - chest xray reviewed. Awaiting snf placement. 03/04/2020 Right hip less painful today. No complaints. Xray reviewed. Awaiting insurance approval.  03/05/2020 Hypoglycemic this AM. Hospitalist and diabetic education managing insulin. [employer] denial for placement. Stating unable to appeal.  Offered to do Peer to Peer and Bekah Benietz is stating \"past deadline\" only patient can appeal.  No sure, cognitively, the patient could manage this. His BS is still not controlled- he recently was in ER with hypoglycemia. Do not feel I can safely discharge this patient to home living alone without a strong social support. Medically from his lung surgery no issues. Patel was removed yesterday and now voiding without difficulty. 03/06/2020 Pt awake in bed. No complaints at this time. Glucose this am 150.  Hospitalist and diabetic education managing insulin. Family planning to appeal [employer] denial for placement. Voiding without problems. Medically stable from his lung surgery. OBJECTIVE:  Patient Vitals for the past 24 hrs:   Temp Pulse Resp BP SpO2   03/06/20 0735 -- -- -- -- 97 %   03/06/20 0710 97.5 °F (36.4 °C) 69 19 125/63 98 %   03/06/20 0330 97.8 °F (36.6 °C) 65 19 110/52 97 %   03/05/20 2154 98.4 °F (36.9 °C) 75 20 109/45 98 %   03/05/20 1959 -- -- -- -- 97 %   03/05/20 1925 97.8 °F (36.6 °C) 82 18 103/55 97 %   03/05/20 1553 97.9 °F (36.6 °C) 70 18 116/54 98 %   03/05/20 1451 -- -- -- -- 92 %   03/05/20 1107 98.4 °F (36.9 °C) 67 18 132/61 100 %         Date 03/05/20 0700 - 03/06/20 0659 03/06/20 0700 - 03/07/20 0659   Shift 2210-6650 5922-7229 24 Hour Total 4852-4460 1109-3110 24 Hour Total   INTAKE   P.O.  240 240 120  120     P. O.  240 240 120  120   Shift Total(mL/kg)  240(3.4) 240(3.4) 120(1.7)  120(1.7)   OUTPUT   Urine(mL/kg/hr) 775(0.9)  775(0.5)        Urine Voided 775  775      Shift Total(mL/kg) 775(10.8)  775(10.8)      NET -775 240 -535 120  120   Weight (kg) 71.5 71.5 71.5 71.5 71.5 71.5                Labs:    Recent Labs     03/04/20  0625   WBC 6.3   HGB 9.2*      *   K 4.7      CO2 26   BUN 16   CREA 1.10   *         Eneida Werner NP

## 2020-03-06 NOTE — PROGRESS NOTES
Patient's , MD notified. Orders received to give a total of 9 units of Humalog at this time. 7 units of Humalog before meals and 2 additional units of Humalog per sliding scale totaling to 9 units of humalog.

## 2020-03-06 NOTE — PROGRESS NOTES
Patient's BS is 426. MD notified, orders received to give a total of 20 units of humalog and recheck BS in 45 minutes.

## 2020-03-06 NOTE — PROGRESS NOTES
Dispo update:  Spoke to Mr. Chauncey Pierce' son, Mr. Liz Farrell (his cell is [phone]) and his older sister Ms. Edmund Brannon (her home phone is [phone]; cell [phone]) about the [facility] denial for STR at a SNF [facility]). Son, Mr. Liz Farrell is going to call [employer] an request a \"Fast Appeal\" by calling [phone]. Per [facility], they are supposed to give a response within 72 hours.   Updated Ms. Felipe Finley NP of [facility].

## 2020-03-07 LAB
GLUCOSE BLD STRIP.AUTO-MCNC: 115 MG/DL (ref 65–100)
GLUCOSE BLD STRIP.AUTO-MCNC: 281 MG/DL (ref 65–100)
GLUCOSE BLD STRIP.AUTO-MCNC: 308 MG/DL (ref 65–100)
GLUCOSE BLD STRIP.AUTO-MCNC: 311 MG/DL (ref 65–100)
GLUCOSE BLD STRIP.AUTO-MCNC: 423 MG/DL (ref 65–100)

## 2020-03-07 PROCEDURE — 74011250636 HC RX REV CODE- 250/636: Performed by: SURGERY

## 2020-03-07 PROCEDURE — 74011250637 HC RX REV CODE- 250/637: Performed by: NURSE PRACTITIONER

## 2020-03-07 PROCEDURE — 74011250637 HC RX REV CODE- 250/637: Performed by: SURGERY

## 2020-03-07 PROCEDURE — 74011000250 HC RX REV CODE- 250: Performed by: INTERNAL MEDICINE

## 2020-03-07 PROCEDURE — 82962 GLUCOSE BLOOD TEST: CPT

## 2020-03-07 PROCEDURE — 74011636637 HC RX REV CODE- 636/637: Performed by: INTERNAL MEDICINE

## 2020-03-07 PROCEDURE — 74011636637 HC RX REV CODE- 636/637: Performed by: NURSE PRACTITIONER

## 2020-03-07 PROCEDURE — 94640 AIRWAY INHALATION TREATMENT: CPT

## 2020-03-07 PROCEDURE — 65270000029 HC RM PRIVATE

## 2020-03-07 PROCEDURE — 94760 N-INVAS EAR/PLS OXIMETRY 1: CPT

## 2020-03-07 RX ORDER — INSULIN GLARGINE 100 [IU]/ML
15 INJECTION, SOLUTION SUBCUTANEOUS DAILY
Status: DISCONTINUED | OUTPATIENT
Start: 2020-03-07 | End: 2020-03-08

## 2020-03-07 RX ORDER — INSULIN LISPRO 100 [IU]/ML
15 INJECTION, SOLUTION INTRAVENOUS; SUBCUTANEOUS ONCE
Status: COMPLETED | OUTPATIENT
Start: 2020-03-07 | End: 2020-03-07

## 2020-03-07 RX ORDER — HYDROMORPHONE HYDROCHLORIDE 1 MG/ML
0.2 INJECTION, SOLUTION INTRAMUSCULAR; INTRAVENOUS; SUBCUTANEOUS
Status: DISCONTINUED | OUTPATIENT
Start: 2020-03-07 | End: 2020-03-10 | Stop reason: HOSPADM

## 2020-03-07 RX ORDER — NALOXONE HYDROCHLORIDE 1 MG/ML
1 INJECTION INTRAMUSCULAR; INTRAVENOUS; SUBCUTANEOUS AS NEEDED
Status: DISCONTINUED | OUTPATIENT
Start: 2020-03-07 | End: 2020-03-10 | Stop reason: HOSPADM

## 2020-03-07 RX ORDER — INSULIN LISPRO 100 [IU]/ML
10 INJECTION, SOLUTION INTRAVENOUS; SUBCUTANEOUS
Status: DISCONTINUED | OUTPATIENT
Start: 2020-03-07 | End: 2020-03-08

## 2020-03-07 RX ADMIN — TAMSULOSIN HYDROCHLORIDE 0.4 MG: 0.4 CAPSULE ORAL at 08:51

## 2020-03-07 RX ADMIN — Medication 10 ML: at 13:39

## 2020-03-07 RX ADMIN — GABAPENTIN 200 MG: 100 CAPSULE ORAL at 17:24

## 2020-03-07 RX ADMIN — BUPROPION HYDROCHLORIDE 150 MG: 150 TABLET, EXTENDED RELEASE ORAL at 08:51

## 2020-03-07 RX ADMIN — INSULIN GLARGINE 15 UNITS: 100 INJECTION, SOLUTION SUBCUTANEOUS at 08:51

## 2020-03-07 RX ADMIN — TIOTROPIUM BROMIDE INHALATION SPRAY 2 PUFF: 3.12 SPRAY, METERED RESPIRATORY (INHALATION) at 07:46

## 2020-03-07 RX ADMIN — TRAMADOL HYDROCHLORIDE 50 MG: 50 TABLET, FILM COATED ORAL at 04:13

## 2020-03-07 RX ADMIN — ALBUTEROL SULFATE 2.5 MG: 2.5 SOLUTION RESPIRATORY (INHALATION) at 07:45

## 2020-03-07 RX ADMIN — Medication 10 ML: at 21:26

## 2020-03-07 RX ADMIN — ATORVASTATIN CALCIUM 10 MG: 10 TABLET, FILM COATED ORAL at 21:17

## 2020-03-07 RX ADMIN — LISINOPRIL 5 MG: 5 TABLET ORAL at 08:51

## 2020-03-07 RX ADMIN — INSULIN LISPRO 8 UNITS: 100 INJECTION, SOLUTION INTRAVENOUS; SUBCUTANEOUS at 11:40

## 2020-03-07 RX ADMIN — METOPROLOL TARTRATE 50 MG: 50 TABLET, FILM COATED ORAL at 21:17

## 2020-03-07 RX ADMIN — INSULIN LISPRO 10 UNITS: 100 INJECTION, SOLUTION INTRAVENOUS; SUBCUTANEOUS at 08:53

## 2020-03-07 RX ADMIN — TRAMADOL HYDROCHLORIDE 50 MG: 50 TABLET, FILM COATED ORAL at 21:17

## 2020-03-07 RX ADMIN — SENNOSIDES AND DOCUSATE SODIUM 2 TABLET: 8.6; 5 TABLET ORAL at 17:24

## 2020-03-07 RX ADMIN — INSULIN LISPRO 10 UNITS: 100 INJECTION, SOLUTION INTRAVENOUS; SUBCUTANEOUS at 11:39

## 2020-03-07 RX ADMIN — INSULIN LISPRO 15 UNITS: 100 INJECTION, SOLUTION INTRAVENOUS; SUBCUTANEOUS at 08:52

## 2020-03-07 RX ADMIN — BUDESONIDE AND FORMOTEROL FUMARATE DIHYDRATE 2 PUFF: 160; 4.5 AEROSOL RESPIRATORY (INHALATION) at 19:32

## 2020-03-07 RX ADMIN — ENOXAPARIN SODIUM 40 MG: 40 INJECTION SUBCUTANEOUS at 21:23

## 2020-03-07 RX ADMIN — GABAPENTIN 200 MG: 100 CAPSULE ORAL at 21:17

## 2020-03-07 RX ADMIN — BUDESONIDE AND FORMOTEROL FUMARATE DIHYDRATE 2 PUFF: 160; 4.5 AEROSOL RESPIRATORY (INHALATION) at 07:45

## 2020-03-07 RX ADMIN — SENNOSIDES AND DOCUSATE SODIUM 2 TABLET: 8.6; 5 TABLET ORAL at 08:51

## 2020-03-07 RX ADMIN — GABAPENTIN 200 MG: 100 CAPSULE ORAL at 08:51

## 2020-03-07 RX ADMIN — INSULIN LISPRO 6 UNITS: 100 INJECTION, SOLUTION INTRAVENOUS; SUBCUTANEOUS at 21:22

## 2020-03-07 RX ADMIN — PANTOPRAZOLE SODIUM 40 MG: 40 TABLET, DELAYED RELEASE ORAL at 04:14

## 2020-03-07 RX ADMIN — BUPROPION HYDROCHLORIDE 150 MG: 150 TABLET, EXTENDED RELEASE ORAL at 21:17

## 2020-03-07 RX ADMIN — METOPROLOL TARTRATE 50 MG: 50 TABLET, FILM COATED ORAL at 08:51

## 2020-03-07 RX ADMIN — ALBUTEROL SULFATE 2.5 MG: 2.5 SOLUTION RESPIRATORY (INHALATION) at 15:08

## 2020-03-07 RX ADMIN — Medication 10 ML: at 04:15

## 2020-03-07 RX ADMIN — ALBUTEROL SULFATE 2.5 MG: 2.5 SOLUTION RESPIRATORY (INHALATION) at 19:32

## 2020-03-07 NOTE — PROGRESS NOTES
END OF SHIFT NOTE:    INTAKE/OUTPUT  03/05 0701 - 03/06 0700  In: 240 [P.O.:240]  Out: 775 [Urine:775]  Voiding: YES  Catheter: NO  Drain:     Flatus: Patient does have flatus present. Stool:  2 occurrences. Characteristics:  Stool Assessment  Stool Color: Brown  Stool Appearance: Loose  Stool Amount: Large  Stool Source/Status: Rectum    Emesis: 0 occurrences. Characteristics:        VITAL SIGNS  Patient Vitals for the past 12 hrs:   Temp Pulse Resp BP SpO2   03/06/20 1549 97.8 °F (36.6 °C) 70 18 120/56 99 %   03/06/20 1501 -- -- -- -- 97 %   03/06/20 1111 97.4 °F (36.3 °C) 74 18 128/68 99 %   03/06/20 0735 -- -- -- -- 97 %       Pain Assessment  Pain Intensity 1: 0 (03/06/20 1641)  Pain Location 1: Flank  Pain Intervention(s) 1: Medication (see MAR)  Patient Stated Pain Goal: 0    Ambulating  Yes. Patient denies having Nausea, vomiting, or pain this shift. Shift report given to oncoming nurse at the bedside.     Corrine Hernandez, RN

## 2020-03-07 NOTE — PROGRESS NOTES
Reviewed notes for spiritual concerns prior to visit  Visit with patient to build rapport with .   Calm  Encouraged with presence and words of hope    He was very open about his illness and recovery  He remains confident he will have a very purposeful       Jw Camacho,  Staff   C: [phone]  /  [email or web address]

## 2020-03-07 NOTE — PROGRESS NOTES
3/7/2020    PLAN:  Diet- Diabetic  DVT Prophylactics- SCD/Lovenox  Pain control- Tylenol/ ultram  SUP prophylaxis- Protonix  Encourage C/DB/IS  Anterior chest tube removed 2/28/20  Posterior Chest tube removed 3/2/20  Diabetes managed by hospitalist and diabetic educator  PT/OT  Social service for discharge IG[employer] Dave 38 denied will try appeal.        ASSESSMENT:  Admit Date: 2/26/2020   10 Day Post-Op  Procedure(s):  LEFT VATS WITH LEFT UPPER LOBECTOMY/ , MEDIASTINAL LYMPHADENECTOMY/CYRO       DIAGNOSIS   A: \"# 9 LYMPH NODE X3\":   TWO FRAGMENTS OF ANTHRACOTIC LYMPH NODE NEGATIVE FOR METASTATIC TUMOR   B: \"# 8 LYMPH NODE X2\":   THREE FRAGMENTS OF ANTHRACOTIC LYMPH NODE NEGATIVE FOR METASTATIC TUMOR   C: \"AP WINDOW LYMPH NODE\":   ONE FRAGMENT OF LYMPH NODE NEGATIVE FOR METASTATIC TUMOR   D: \"# 7 LYMPH NODE X3\":   FOUR FRAGMENTS OF ANTHRACOTIC LYMPH NODE NEGATIVE FOR METASTATIC TUMOR   E: \"PERIHILAR\":   ONE FRAGMENT OF ANTHRACOTIC LYMPH NODE NEGATIVE FOR METASTATIC TUMOR   F: \"LEFT UPPER LOBECTOMY\":   ADENOCARCINOMA, ENTERIC TYPE (SEE COMMENT), MEASURING APPROXIMATELY 7 X 6.5 X 4 CM. MACROMETASTATIC CARCINOMA TO ONE OF SIX PERIBRONCHIAL LYMPH NODES WITH FOCAL EXTRACAPSULAR EXTENSION. BRONCHIAL AND VASCULAR MARGINS OF RESECTION ARE NEGATIVE FOR MALIGNANT TUMOR. DEFINITE LYMPHOVASCULAR INVASION IS NOT IDENTIFIED. PLEURAL SURFACE IS NEGATIVE FOR MALIGNANT TUMOR. Comment   The tumor       SUBJECTIVE:  02/27/2020-  No complaints. Pain controlled. On room air 100%. Chest tubes without airleak noted. Tolerating diet. Will adjust insulin dose. Transfer to floor and chest tubes to suction.     02/28/2020-  No complaints. Pain controlled. On room air 100%. Chest tubes without airleak noted. Pulling 1200 on IS. Tolerating diet  02/29/2020-  No complaints. Pain controlled. On room air.  Posterior Chest tube to water seal. CXR this am showing New left apical pneumothorax status post removal of anterior chest tube. Tolerating diet. AF, NAD.   03/01/2020-  Pt awake in bed. Rapid response called yesterday due to lethargy which improved with Narcan. Remaining chest tube to water seal - clamped today. CXR this am showing stable apical pneumothorax. Patel replaced again yesterday due to urinary retention. Tolerating Diabetic diet. +flatus/+BM. AF, NAD.   03/02/2020 Patient in bed without complaints of pain to right hip/buttock area. Area assessed. Tender to palpation, no skin redness or breakdown noted. Chest tube clamped. No SOB noted. Unclamped no air leak noted. Will DC remaining chest tube and get follow up xray. Social work for discharge planning. Glucose still uncontrolled with adjust lantus. Patel remains for 1 week related to  attempts of removal with urinary retention  Posterior chest tube removed at end inspiration. Dressing applied. Patient tolerated well. CXR ordered for 4 hours  03/03/2020 Patient away at Xray this AM- hip xray negative. Returned this evening and Patient in restroom. Did not see patient- nurse states no issues. - chest xray reviewed. Awaiting snf placement. 03/04/2020 Right hip less painful today. No complaints. Xray reviewed. Awaiting insurance approval.  03/05/2020 Hypoglycemic this AM. Hospitalist and diabetic education managing insulin. [employer] denial for placement. Stating unable to appeal.  Offered to do Peer to Peer and [facility] [facility] is stating \"past deadline\" only patient can appeal.  No sure, cognitively, the patient could manage this. His BS is still not controlled- he recently was in ER with hypoglycemia. Do not feel I can safely discharge this patient to home living alone without a strong social support. Medically from his lung surgery no issues. Patel was removed yesterday and now voiding without difficulty. 03/06/2020 Pt awake in bed. No complaints at this time. Glucose this am 150.  Hospitalist and diabetic education managing insulin. Family planning to appeal [employer] denial for placement. Voiding without problems. Medically stable from his lung surgery. 03/07/2020 Pt awake in bed. No complaints at this time. Glucose level remains unstable. Glucose this am 423 after being hypoglycemic yesterday. Hospitalist and diabetic education managing insulin. Voiding without problems. Medically stable from his lung surgery. OBJECTIVE:  Patient Vitals for the past 24 hrs:   Temp Pulse Resp BP SpO2   03/07/20 0752 97.2 °F (36.2 °C) 69 18 122/54 97 %   03/07/20 0746 -- -- -- -- 94 %   03/07/20 0400 98.2 °F (36.8 °C) 73 18 122/56 97 %   03/06/20 2202 97.6 °F (36.4 °C) 81 18 125/57 96 %   03/06/20 1932 97.4 °F (36.3 °C) 79 16 116/60 96 %   03/06/20 1931 -- -- -- -- 96 %   03/06/20 1549 97.8 °F (36.6 °C) 70 18 120/56 99 %   03/06/20 1501 -- -- -- -- 97 %   03/06/20 1111 97.4 °F (36.3 °C) 74 18 128/68 99 %         Date 03/06/20 0700 - 03/07/20 0659 03/07/20 0700 - 03/08/20 0659   Shift 9123-8078 7698-7110 24 Hour Total 5724-8220 6417-3503 24 Hour Total   INTAKE   P.O. 240  240        P. O. 240  240      Shift Total(mL/kg) 240(3.4)  240(3.4)      OUTPUT   Urine(mL/kg/hr)           Urine Occurrence(s) 3 x 1 x 4 x      Stool           Stool Occurrence(s) 2 x  2 x      Shift Total(mL/kg)           240      Weight (kg) 71.5 71.5 71.5 71.5 71.5 71.5             Ty Russell, MARCOS

## 2020-03-07 NOTE — PROGRESS NOTES
END OF SHIFT NOTE:    INTAKE/OUTPUT  03/06 0701 - 03/07 0700  In: 240 [P.O.:240]  Out: -   Voiding: YES  Catheter: NO  Drain:              Flatus: Patient does have flatus present. Stool:  0 occurrences. Characteristics:  Stool Assessment  Stool Color: Brown  Stool Appearance: (no stool to assess)  Stool Amount: Large  Stool Source/Status: Rectum    Emesis: 0 occurrences. Characteristics:        VITAL SIGNS  Patient Vitals for the past 12 hrs:   Temp Pulse Resp BP SpO2   03/07/20 1624 97.3 °F (36.3 °C) 72 18 110/56 99 %   03/07/20 1508 -- -- -- -- 94 %   03/07/20 1105 98.1 °F (36.7 °C) 73 18 116/58 97 %   03/07/20 0752 97.2 °F (36.2 °C) 69 18 122/54 97 %   03/07/20 0746 -- -- -- -- 94 %       Pain Assessment  Pain Intensity 1: 0 (03/07/20 0755)  Pain Location 1: Chest  Pain Intervention(s) 1: Medication (see MAR)  Patient Stated Pain Goal: 0    Ambulating  Yes    Shift report given to oncoming nurse at the bedside.     Maritza Moe

## 2020-03-07 NOTE — PROGRESS NOTES
Perspiring at this time; SQBS 311. Was given hs snack of turkey sandwich. Had Humalog coverage of 2 units for bs 199 at that time (2200). No c/o's pain. Wants to go home today.

## 2020-03-07 NOTE — PROGRESS NOTES
END OF SHIFT NOTE:    INTAKE/OUTPUT  03/06 0701 - 03/07 0700  In: 240 [P.O.:240]  Out: -   Voiding: YES  Catheter: NO  Drain:              Flatus: Patient does have flatus present. Stool:  0 occurrences. Characteristics:  Stool Assessment  Stool Color: Brown  Stool Appearance: Loose  Stool Amount: Large  Stool Source/Status: Rectum    Emesis: 0 occurrences. Characteristics:        VITAL SIGNS  Patient Vitals for the past 12 hrs:   Temp Pulse Resp BP SpO2   03/07/20 0400 98.2 °F (36.8 °C) 73 18 122/56 97 %   03/06/20 2202 97.6 °F (36.4 °C) 81 18 125/57 96 %   03/06/20 1932 97.4 °F (36.3 °C) 79 16 116/60 96 %   03/06/20 1931 -- -- -- -- 96 %       Pain Assessment  Pain Intensity 1: 6 (03/07/20 0415)  Pain Location 1: Chest  Pain Intervention(s) 1: Medication (see MAR)  Patient Stated Pain Goal: 0    Ambulating  Yes    Shift report given to oncoming nurse at the bedside.     Sunny Pike RN

## 2020-03-07 NOTE — PROGRESS NOTES
Hypoglycemic yesterday morning and Lantus held all day yesterday. Glucoses now in 400s. Will given lispro 15 units now and Lantus 15 units, increase prandial insulin to 10 units and cont SSI. It's going to take a bit of time to get his glucoses leveled out.

## 2020-03-08 LAB
GLUCOSE BLD STRIP.AUTO-MCNC: 168 MG/DL (ref 65–100)
GLUCOSE BLD STRIP.AUTO-MCNC: 231 MG/DL (ref 65–100)
GLUCOSE BLD STRIP.AUTO-MCNC: 257 MG/DL (ref 65–100)
GLUCOSE BLD STRIP.AUTO-MCNC: 292 MG/DL (ref 65–100)
GLUCOSE BLD STRIP.AUTO-MCNC: 352 MG/DL (ref 65–100)

## 2020-03-08 PROCEDURE — 74011250637 HC RX REV CODE- 250/637: Performed by: INTERNAL MEDICINE

## 2020-03-08 PROCEDURE — 74011636637 HC RX REV CODE- 636/637: Performed by: NURSE PRACTITIONER

## 2020-03-08 PROCEDURE — 74011000250 HC RX REV CODE- 250: Performed by: INTERNAL MEDICINE

## 2020-03-08 PROCEDURE — 74011250636 HC RX REV CODE- 250/636: Performed by: SURGERY

## 2020-03-08 PROCEDURE — 74011636637 HC RX REV CODE- 636/637: Performed by: INTERNAL MEDICINE

## 2020-03-08 PROCEDURE — 65270000029 HC RM PRIVATE

## 2020-03-08 PROCEDURE — 97535 SELF CARE MNGMENT TRAINING: CPT

## 2020-03-08 PROCEDURE — 94640 AIRWAY INHALATION TREATMENT: CPT

## 2020-03-08 PROCEDURE — 74011250637 HC RX REV CODE- 250/637: Performed by: SURGERY

## 2020-03-08 PROCEDURE — 74011250637 HC RX REV CODE- 250/637: Performed by: NURSE PRACTITIONER

## 2020-03-08 PROCEDURE — 94760 N-INVAS EAR/PLS OXIMETRY 1: CPT

## 2020-03-08 PROCEDURE — 82962 GLUCOSE BLOOD TEST: CPT

## 2020-03-08 RX ORDER — INSULIN LISPRO 100 [IU]/ML
20 INJECTION, SOLUTION INTRAVENOUS; SUBCUTANEOUS
Status: DISCONTINUED | OUTPATIENT
Start: 2020-03-08 | End: 2020-03-09

## 2020-03-08 RX ORDER — BUDESONIDE AND FORMOTEROL FUMARATE DIHYDRATE 160; 4.5 UG/1; UG/1
2 AEROSOL RESPIRATORY (INHALATION)
Status: DISCONTINUED | OUTPATIENT
Start: 2020-03-08 | End: 2020-03-10 | Stop reason: HOSPADM

## 2020-03-08 RX ORDER — INSULIN LISPRO 100 [IU]/ML
10 INJECTION, SOLUTION INTRAVENOUS; SUBCUTANEOUS
Status: DISCONTINUED | OUTPATIENT
Start: 2020-03-08 | End: 2020-03-09

## 2020-03-08 RX ORDER — INSULIN GLARGINE 100 [IU]/ML
5 INJECTION, SOLUTION SUBCUTANEOUS ONCE
Status: COMPLETED | OUTPATIENT
Start: 2020-03-08 | End: 2020-03-08

## 2020-03-08 RX ORDER — INSULIN GLARGINE 100 [IU]/ML
20 INJECTION, SOLUTION SUBCUTANEOUS DAILY
Status: DISCONTINUED | OUTPATIENT
Start: 2020-03-09 | End: 2020-03-09

## 2020-03-08 RX ADMIN — GABAPENTIN 200 MG: 100 CAPSULE ORAL at 08:45

## 2020-03-08 RX ADMIN — INSULIN LISPRO 10 UNITS: 100 INJECTION, SOLUTION INTRAVENOUS; SUBCUTANEOUS at 08:51

## 2020-03-08 RX ADMIN — METOPROLOL TARTRATE 50 MG: 50 TABLET, FILM COATED ORAL at 21:25

## 2020-03-08 RX ADMIN — BUPROPION HYDROCHLORIDE 150 MG: 150 TABLET, EXTENDED RELEASE ORAL at 08:44

## 2020-03-08 RX ADMIN — METOPROLOL TARTRATE 50 MG: 50 TABLET, FILM COATED ORAL at 08:45

## 2020-03-08 RX ADMIN — ALBUTEROL SULFATE 2.5 MG: 2.5 SOLUTION RESPIRATORY (INHALATION) at 08:09

## 2020-03-08 RX ADMIN — TRAMADOL HYDROCHLORIDE 50 MG: 50 TABLET, FILM COATED ORAL at 21:25

## 2020-03-08 RX ADMIN — ALBUTEROL SULFATE 2.5 MG: 2.5 SOLUTION RESPIRATORY (INHALATION) at 14:35

## 2020-03-08 RX ADMIN — ATORVASTATIN CALCIUM 10 MG: 10 TABLET, FILM COATED ORAL at 21:25

## 2020-03-08 RX ADMIN — TRAMADOL HYDROCHLORIDE 50 MG: 50 TABLET, FILM COATED ORAL at 05:59

## 2020-03-08 RX ADMIN — SENNOSIDES AND DOCUSATE SODIUM 2 TABLET: 8.6; 5 TABLET ORAL at 09:00

## 2020-03-08 RX ADMIN — INSULIN LISPRO 6 UNITS: 100 INJECTION, SOLUTION INTRAVENOUS; SUBCUTANEOUS at 21:30

## 2020-03-08 RX ADMIN — INSULIN GLARGINE 5 UNITS: 100 INJECTION, SOLUTION SUBCUTANEOUS at 09:38

## 2020-03-08 RX ADMIN — Medication 10 ML: at 21:33

## 2020-03-08 RX ADMIN — INSULIN GLARGINE 15 UNITS: 100 INJECTION, SOLUTION SUBCUTANEOUS at 08:42

## 2020-03-08 RX ADMIN — BUPROPION HYDROCHLORIDE 150 MG: 150 TABLET, EXTENDED RELEASE ORAL at 21:25

## 2020-03-08 RX ADMIN — ALBUTEROL SULFATE 2.5 MG: 2.5 SOLUTION RESPIRATORY (INHALATION) at 20:26

## 2020-03-08 RX ADMIN — SENNOSIDES AND DOCUSATE SODIUM 2 TABLET: 8.6; 5 TABLET ORAL at 17:26

## 2020-03-08 RX ADMIN — TIOTROPIUM BROMIDE INHALATION SPRAY 2 PUFF: 3.12 SPRAY, METERED RESPIRATORY (INHALATION) at 08:10

## 2020-03-08 RX ADMIN — PANTOPRAZOLE SODIUM 40 MG: 40 TABLET, DELAYED RELEASE ORAL at 05:59

## 2020-03-08 RX ADMIN — ENOXAPARIN SODIUM 40 MG: 40 INJECTION SUBCUTANEOUS at 21:31

## 2020-03-08 RX ADMIN — INSULIN LISPRO 10 UNITS: 100 INJECTION, SOLUTION INTRAVENOUS; SUBCUTANEOUS at 12:30

## 2020-03-08 RX ADMIN — BUDESONIDE AND FORMOTEROL FUMARATE DIHYDRATE 2 PUFF: 160; 4.5 AEROSOL RESPIRATORY (INHALATION) at 08:10

## 2020-03-08 RX ADMIN — INSULIN LISPRO 10 UNITS: 100 INJECTION, SOLUTION INTRAVENOUS; SUBCUTANEOUS at 08:52

## 2020-03-08 RX ADMIN — GABAPENTIN 200 MG: 100 CAPSULE ORAL at 21:25

## 2020-03-08 RX ADMIN — LISINOPRIL 5 MG: 5 TABLET ORAL at 08:45

## 2020-03-08 RX ADMIN — TAMSULOSIN HYDROCHLORIDE 0.4 MG: 0.4 CAPSULE ORAL at 08:46

## 2020-03-08 RX ADMIN — GABAPENTIN 200 MG: 100 CAPSULE ORAL at 16:16

## 2020-03-08 RX ADMIN — BUDESONIDE AND FORMOTEROL FUMARATE DIHYDRATE 2 PUFF: 160; 4.5 AEROSOL RESPIRATORY (INHALATION) at 20:26

## 2020-03-08 RX ADMIN — Medication 10 ML: at 16:22

## 2020-03-08 RX ADMIN — INSULIN LISPRO 2 UNITS: 100 INJECTION, SOLUTION INTRAVENOUS; SUBCUTANEOUS at 17:26

## 2020-03-08 NOTE — PROGRESS NOTES
AM glucose 352. Increase lantus to 15 units, have to be cautious given previous hypoglycemia and 1700 glucose yesterday of only 115. Increase prandial morning and evening insulin to 20 units, cont lunch prandial at 10 units.

## 2020-03-08 NOTE — PROGRESS NOTES
IV site laeking and discontinued. Politely declines restart tonight. \"I've been stuck too many times. \" Understands we will have to restart IV if his sugar level is too low. Also would llike MDs to speak with him about continuing right hip pain before discharge. Given Tramadol lfor pain. Hospitalists messaged. 9569-Declined IV dilaudid for pain, Will pass onto surgery group that pt would like to speak with them about ongoing hip pain.

## 2020-03-08 NOTE — PROGRESS NOTES
END OF SHIFT NOTE:    INTAKE/OUTPUT  03/07 0701 - 03/08 0700  In: 240 [P.O.:240]  Out: -   Voiding: YES  Catheter: NO  Drain:              Flatus: Patient does have flatus present. Stool:  0 occurrences. Characteristics:  Stool Assessment  Stool Color: Brown  Stool Appearance: (no stool to assess)  Stool Amount: Large  Stool Source/Status: Rectum    Emesis: 0 occurrences. Characteristics:        VITAL SIGNS  Patient Vitals for the past 12 hrs:   Temp Pulse Resp BP SpO2   03/08/20 0318 98.8 °F (37.1 °C) 70 18 130/63 98 %   03/07/20 2302 97.8 °F (36.6 °C) 73 18 131/61 99 %   03/07/20 1932 -- -- -- -- 98 %   03/07/20 1851 98 °F (36.7 °C) 77 18 107/59 99 %       Pain Assessment  Pain Intensity 1: 6 (03/07/20 2127)  Pain Location 1: Hip  Pain Intervention(s) 1: Medication (see MAR)  Patient Stated Pain Goal: 0    Ambulating  Yes    Shift report given to oncoming nurse at the bedside.     Alyssia Grider RN

## 2020-03-08 NOTE — PROGRESS NOTES
3/8/2020    PLAN:  Diet- Diabetic  DVT Prophylactics- SCD/Lovenox  Pain control- Tylenol/ ultram  SUP prophylaxis- Protonix  Encourage C/DB/IS  Anterior chest tube removed 2/28/20  Posterior Chest tube removed 3/2/20  Diabetes managed by hospitalist and diabetic educator  PT/OT  Social service for discharge IG[employer] Dave 38 denied will try appeal.    ASSESSMENT:  Admit Date: 2/26/2020   11 Day Post-Op  Procedure(s):  LEFT VATS WITH LEFT UPPER LOBECTOMY/ , MEDIASTINAL LYMPHADENECTOMY/CYRO       DIAGNOSIS   A: \"# 9 LYMPH NODE X3\":   TWO FRAGMENTS OF ANTHRACOTIC LYMPH NODE NEGATIVE FOR METASTATIC TUMOR   B: \"# 8 LYMPH NODE X2\":   THREE FRAGMENTS OF ANTHRACOTIC LYMPH NODE NEGATIVE FOR METASTATIC TUMOR   C: \"AP WINDOW LYMPH NODE\":   ONE FRAGMENT OF LYMPH NODE NEGATIVE FOR METASTATIC TUMOR   D: \"# 7 LYMPH NODE X3\":   FOUR FRAGMENTS OF ANTHRACOTIC LYMPH NODE NEGATIVE FOR METASTATIC TUMOR   E: \"PERIHILAR\":   ONE FRAGMENT OF ANTHRACOTIC LYMPH NODE NEGATIVE FOR METASTATIC TUMOR   F: \"LEFT UPPER LOBECTOMY\":   ADENOCARCINOMA, ENTERIC TYPE (SEE COMMENT), MEASURING APPROXIMATELY 7 X 6.5 X 4 CM. MACROMETASTATIC CARCINOMA TO ONE OF SIX PERIBRONCHIAL LYMPH NODES WITH FOCAL EXTRACAPSULAR EXTENSION. BRONCHIAL AND VASCULAR MARGINS OF RESECTION ARE NEGATIVE FOR MALIGNANT TUMOR. DEFINITE LYMPHOVASCULAR INVASION IS NOT IDENTIFIED. PLEURAL SURFACE IS NEGATIVE FOR MALIGNANT TUMOR. Comment   The tumor       SUBJECTIVE:  02/27/2020-  No complaints. Pain controlled. On room air 100%. Chest tubes without airleak noted. Tolerating diet. Will adjust insulin dose. Transfer to floor and chest tubes to suction.     02/28/2020-  No complaints. Pain controlled. On room air 100%. Chest tubes without airleak noted. Pulling 1200 on IS. Tolerating diet  02/29/2020-  No complaints. Pain controlled. On room air.  Posterior Chest tube to water seal. CXR this am showing New left apical pneumothorax status post removal of anterior chest tube. Tolerating diet. AF, NAD.   03/01/2020-  Pt awake in bed. Rapid response called yesterday due to lethargy which improved with Narcan. Remaining chest tube to water seal - clamped today. CXR this am showing stable apical pneumothorax. Patel replaced again yesterday due to urinary retention. Tolerating Diabetic diet. +flatus/+BM. AF, NAD.   03/02/2020 Patient in bed without complaints of pain to right hip/buttock area. Area assessed. Tender to palpation, no skin redness or breakdown noted. Chest tube clamped. No SOB noted. Unclamped no air leak noted. Will DC remaining chest tube and get follow up xray. Social work for discharge planning. Glucose still uncontrolled with adjust lantus. Patel remains for 1 week related to  attempts of removal with urinary retention  Posterior chest tube removed at end inspiration. Dressing applied. Patient tolerated well. CXR ordered for 4 hours  03/03/2020 Patient away at Xray this AM- hip xray negative. Returned this evening and Patient in restroom. Did not see patient- nurse states no issues. - chest xray reviewed. Awaiting snf placement. 03/04/2020 Right hip less painful today. No complaints. Xray reviewed. Awaiting insurance approval.  03/05/2020 Hypoglycemic this AM. Hospitalist and diabetic education managing insulin. [employer] denial for placement. Stating unable to appeal.  Offered to do Peer to Peer and [facility] [employer] is stating \"past deadline\" only patient can appeal.  No sure, cognitively, the patient could manage this. His BS is still not controlled- he recently was in ER with hypoglycemia. Do not feel I can safely discharge this patient to home living alone without a strong social support. Medically from his lung surgery no issues. Patel was removed yesterday and now voiding without difficulty. 03/06/2020 Pt awake in bed. No complaints at this time. Glucose this am 150.  Hospitalist and diabetic education managing insulin. Family planning to appeal [employer] denial for placement. Voiding without problems. Medically stable from his lung surgery. 03/07/2020 Pt awake in bed. No complaints at this time. Glucose level remains unstable. Glucose this am 423 after being hypoglycemic yesterday. Hospitalist and diabetic education managing insulin. Voiding without problems. Medically stable from his lung surgery. 03/08/2020 Pt awake in bed. No new issues. Glucose this am 352. Hospitalist and diabetic education managing insulin. Voiding without problems. Medically stable from his lung surgery. OBJECTIVE:  Patient Vitals for the past 24 hrs:   Temp Pulse Resp BP SpO2   03/08/20 0810 -- -- -- -- 96 %   03/08/20 0752 98.1 °F (36.7 °C) 69 18 121/61 97 %   03/08/20 0318 98.8 °F (37.1 °C) 70 18 130/63 98 %   03/07/20 2302 97.8 °F (36.6 °C) 73 18 131/61 99 %   03/07/20 1932 -- -- -- -- 98 %   03/07/20 1851 98 °F (36.7 °C) 77 18 107/59 99 %   03/07/20 1624 97.3 °F (36.3 °C) 72 18 110/56 99 %   03/07/20 1508 -- -- -- -- 94 %         Date 03/07/20 0700 - 03/08/20 0659 03/08/20 0700 - 03/09/20 0659   Shift 5859-1516 8406-0232 24 Hour Total 3278-5477 7152-8354 24 Hour Total   INTAKE   P.O. 240  240        P. O. 240  240      Shift Total(mL/kg) 240(3.4)  240(3.4)      OUTPUT   Urine(mL/kg/hr)           Urine Occurrence(s) 3 x 2 x 5 x      Shift Total(mL/kg)           240      Weight (kg) 71.5 71.5 71.5 71.5 71.5 71.5             Cory Sena NP

## 2020-03-08 NOTE — PROGRESS NOTES
Problem: Self Care Deficits Care Plan (Adult)  Goal: *Acute Goals and Plan of Care (Insert Text)  Description  1. Patient will complete upper body bathing and dressing with setup and adaptive equipment as needed. 2. Patient will complete lower body bathing and dressing with min A, additional time and adaptive equipment as needed. 3. Patient will complete toileting with mod I. Progressing 3/8/2020. At [place] [address]  4. Patient will tolerate 25 minutes of OT treatment with 3 rest breaks to increase activity tolerance for ADLs. -GOAL MET 3/3/2020 upgrade to 40 minutes. 5. Patient will complete functional transfers with CGA and adaptive equipment as needed. -GOAL MET 3/3/2020 upgrade to supervision, progressing 3/8/2020, still CGA  6. Patient will complete grooming tasks in standing at sink level after setup. Progressing 3/8/2020. At [place] [address]  Timeframe: 7 visits      Outcome: Progressing Towards Goal       OCCUPATIONAL THERAPY: Daily Note and PM   3/8/2020  INPATIENT: OT Visit Days: 3  Payor: Kaila Forrester / Plan: [address] [place] [address] HMO / Product Type: Managed Care Medicare /      NAME/AGE/GENDER: Annalisa Harris is a 70 y.o. male   PRIMARY DIAGNOSIS:  Lung mass [R91.8]  Lung cancer ([facility] [address] [C34.90]  Lung cancer ([facility] [address] [C34.90] Lung cancer ([facility] [address] Lung cancer ([facility] [address]  Procedure(s) (LRB):  LEFT VATS WITH LEFT UPPER LOBECTOMY/ , MEDIASTINAL LYMPHADENECTOMY/CYRO (Left)  11 Days Post-Op  ICD-10: Treatment Diagnosis:    · Generalized Muscle Weakness (M62.81)  · Other lack of cordination (R27.8)  · Difficulty in walking, Not elsewhere classified (R26.2)  · Repeated Falls (R29.6)  · History of falling (Z91.81)   Precautions/Allergies:    Falls, Patient has no known allergies. ASSESSMENT:     Mr. Kavon Lucas is a 71 yo L dominant AAM admitted with above and underwent surgery. Pt lives alone in 1 level home with 2-3 steps at entrance, no HRs, has walk in and T/S combo but prefers to sit on the edge of the tub for washing up.  Was driving up to 3 months ago. Admits to 2-3 falls in last 6 months. Older sister Tere Stallworth lives just up the road and reports pt has been needing more and more help with house management and self care the past 3 months. Has children but they live out of town. 3/8/2020 pt agreeable to get up to eat lunch. A & O x4. Pt dressed in street clothes and already had slippers on. Bed mobility at mod I, CGA sit to stand and walked around bed with HHA, refused to use RW this am stating \"I don't want to depend on that. \" HHA to BR, 1 LOB at end of bed, corrected by OT and pt grabbing to end of bed, walked on to BR, pt opening door with CGA, Sat to toilet, removing pants, underwear, voided, then wiping and pulling up clothes, all with CGA for steadying, and then washed hands at sink with steadying CGA. Reports pain still in incisions at side from chest tubes, but getting better, not rated. Walked back into room and sat to recliner with SBA/CGA. Setup tray table over pt and pt able to open all packages and self feed, cut up food and hold drink. OT reviewed safety and need to use RW at this time to prevent falls. Pt reports fall at home on table and R hip hurting with radiating pain down R outer hip to R outer knee, but reported that MD said Xray was negative, \"it's just arthritis, but I don't think arthritis hurts like this. \" OT reviewed arm stretches in chair to reduce pain in side and increase mobility due to posterior thoracic incisions. Patient progressing well, goals #3 and 6, plan is STR. Continue OT POC. This section established at most recent assessment   PROBLEM LIST (Impairments causing functional limitations):  1. Decreased Strength  2. Decreased ADL/Functional Activities  3. Decreased Transfer Abilities  4. Decreased Ambulation Ability/Technique  5. Decreased Balance  6. Increased Pain  7. Decreased Activity Tolerance  8. Decreased Pacing Skills  9.  Decreased Work Simplification/Energy Conservation Techniques  10. Increased Fatigue  11. Decreased Flexibility/Joint Mobility  12. Edema/Girth  13. Decreased Skin Integrity/Hygeine  14. Decreased Cognition   INTERVENTIONS PLANNED: (Benefits and precautions of occupational therapy have been discussed with the patient.)  1. Activities of daily living training  2. Adaptive equipment training  3. Balance training  4. Clothing management  5. Community reintergration  6. Therapeutic activity  7. Therapeutic exercise     TREATMENT PLAN: Frequency/Duration: Follow patient 3x per week to address above goals. Rehabilitation Potential For Stated Goals: Good     REHAB RECOMMENDATIONS (at time of discharge pending progress):    Placement: It is my opinion, based on this patient's performance to date, that Mr. Kavon Lucas may benefit from intensive therapy at a [address] [place] [address] after discharge due to the functional deficits listed above that are likely to improve with skilled rehabilitation and concerns that he/she may be unsafe to be unsupervised at home due to living alone and not managing well. Equipment:   [address] chair or 3 in 1 BSC , should use RW              OCCUPATIONAL PROFILE AND HISTORY:   History of Present Injury/Illness (Reason for Referral): SWATI Gonsalez is a 70 y.o. male who is back to discuss his surgery. His surgery was canceled last month due to active pneumonia. He was also admitted recently due to hypoglycemic and syncope episode. He is still weak and he lives alone. Today, he has no respiratory symptoms, he feels well overall. On 12/10/2019, he was referred by pulmonary for evaluation of a lung mass involving the Left upper lobe. The tumor was identified incidentally by routine chest xray and then proceeded to CT scan. The pathology showed a adenocarcinoma and lymph node 10L suspicious for malignancy. Currently, he complains of cough with clear secreations. He denies chest pain, shortness of breath and hemoptysis.  He  does have a history of smoking and quit 1 month ago. He does not have a history of drinking. Denies any chest pain     He does not have a family history of cancer. Medical history - DM, Htn, COPD . He has had previous chest surgery- collapsed left lung with chest tubes. Other surgeries include exploratory laparotomy for SBO. does take blood thinner.- Plavix, but unsure why he takes this, denies any stent or a-fib, denies history of CVA, states has been taking for a long time. Past Medical History/Comorbidities:   Mr. Ashish Joel  has a past medical history of CAD (coronary artery disease) (08/2019), Chronic kidney disease, COPD, Depression, Diabetes ([facility] [address] HTN (hypertension) (4/29/2015), Malignant neoplasm of upper lobe of left lung ([facility] [address] (12/12/2019), Other ill-defined conditions(799.89), and Sepsis ([facility] [address] (12/2019). Mr. Ashish Joel  has a past surgical history that includes hx orthopaedic; pr abdomen surgery proc unlisted (5/2015); hx other surgical (12/2019); and hx colonoscopy. Social History/Living Environment:   Home Environment: Private residence  # Steps to Enter: 2  Rails to Enter: No  One/Two Story Residence: One story  Living Alone: Yes  Support Systems: Child(shayy)  Patient Expects to be Discharged to[de-identified] Private residence  Current DME Used/Available at Home: Cane, quad, Glucometer, [place], Walker, rolling  Tub or Shower Type: Tub/Shower combination(has walk in as well, sits on edge of tub for washing off)  Prior Level of Function/Work/Activity:  Pt lives alone in 1 level home with 2-3 steps at entrance, no HRs, has walk in and T/S combo but prefers to sit on the edge of the tub for washing up. Was driving up to 3 months ago. Uses a QC out in the community and sometimes at home. Checked his own BSs and gave himself shots but admits he has passed out a few times. Admits to 2-3 falls in last 6 months.   Older sister Salena Brewer lives just up the road and reports pt has been needing more and more help with house management and self care the past 3 months. Has children but they live out of town. Pt admits he has not been managing well. Bills are auto drafted. Still goes to [employer] weekly. Dominant Side:         RIGHT   Number of Personal Factors/Comorbidities that affect the Plan of Care: Extensive review of physical, cognitive, and psychosocial performance (3+):  HIGH COMPLEXITY   ASSESSMENT OF OCCUPATIONAL PERFORMANCE[de-identified]   Activities of Daily Living:   Basic ADLs (From Assessment) Complex ADLs (From Assessment)   Feeding: Setup  Oral Facial Hygiene/Grooming: Minimum assistance  Bathing: Moderate assistance  Upper Body Dressing: Moderate assistance  Lower Body Dressing: Total assistance  Toileting: Total assistance, Adaptive equipment(ch in place) Instrumental ADL  Meal Preparation: Total assistance  Homemaking: Total assistance  Medication Management: Setup  Financial Management: Stand-by assistance   Grooming/Bathing/Dressing Activities of Daily Living   Grooming  Grooming Assistance: Stand-by assistance  Position Performed: Standing(at sink in bathroom)  Washing Hands: Stand-by assistance Cognitive Retraining  Safety/Judgement: Awareness of environment; Fall prevention     Feeding  Feeding Assistance: Independent  Container Management: Independent  Cutting Food: Independent  Utensil Management: Independent  Food to Mouth: Independent  Drink to Mouth: Independent     Toileting  Toileting Assistance: Stand-by assistance;Contact guard assistance  Bladder Hygiene: Supervision;Stand-by assistance  Clothing Management: Contact guard assistance  Cues: Tactile cues provided  Adaptive Equipment: Elevated seat;Grab bars; Other (comment)(HHA, refused RW)     Functional Transfers  Bathroom Mobility: Contact guard assistance  Toilet Transfer : Contact guard assistance  Cues: Tactile cues provided;Verbal cues provided;Visual cues provided  Adaptive Equipment: Grab bars     Bed/Mat Mobility  Rolling: Modified independent  Supine to Sit: Bed Modified; Modified independent  Sit to Supine: (up to recliner after washing hands at sink level)  Sit to Stand: Contact guard assistance  Stand to Sit: Contact guard assistance  Bed to Chair: Contact guard assistance  Scooting: Stand-by assistance     Most Recent Physical Functioning:   Gross Assessment:  AROM: Generally decreased, functional(BUEs are limited at shoulders, rest is WFLs, L UE pain)  PROM: Generally decreased, functional(crepitus in B UEs, especially shoulders)  Strength: Generally decreased, functional(unable to tolerate any pushing at B shoulders)  Coordination: Generally decreased, functional  Tone: Normal  Sensation: Intact               Posture:  Posture (WDL): Exceptions to WDL  Posture Assessment: Forward head, Rounded shoulders  Balance:  Sitting: Intact  Sitting - Static: Prop sitting  Sitting - Dynamic: Prop sitting  Standing: Impaired  Standing - Static: Fair  Standing - Dynamic : Fair(needs to use RW, but doesn't want to) Bed Mobility:  Rolling: Modified independent  Supine to Sit: Bed Modified; Modified independent  Sit to Supine: (up to recliner after washing hands at sink level)  Scooting: Stand-by assistance  Wheelchair Mobility:     Transfers:  Sit to Stand: Contact guard assistance  Stand to Sit: Contact guard assistance  Bed to Chair: Contact guard assistance            Patient Vitals for the past 6 hrs:   BP SpO2 Pulse   03/08/20 0752 121/61 97 % 69   03/08/20 0810 -- 96 % --       Mental Status  Neurologic State: Alert  Orientation Level: Oriented X4  Cognition: Follows commands, Appropriate for age attention/concentration  Perception: Appears intact  Perseveration: No perseveration noted  Safety/Judgement: Awareness of environment, Fall prevention                          Physical Skills Involved:  1. Range of Motion  2. Balance  3. Strength  4. Activity Tolerance  5. Sensation  6. Fine Motor Control  7. Pain (acute)  8. Edema  9.  Skin Integrity Cognitive Skills Affected (resulting in the inability to perform in a timely and safe manner):  1. Sustained Attention  2. Divided Attention Psychosocial Skills Affected:  1. Habits/Routines  2. Environmental Adaptation  3. Social Interaction  4. Self-Awareness   Number of elements that affect the Plan of Care: 5+:  HIGH COMPLEXITY   CLINICAL DECISION MAKIN[address] [place] [address] AM-PAC 6 Clicks   Daily Activity Inpatient Short Form  How much help from another person does the patient currently need. .. Total A Lot A Little None   1. Putting on and taking off regular lower body clothing? [] 1   [x] 2   [] 3   [] 4   2. Bathing (including washing, rinsing, drying)? [] 1   [x] 2   [] 3   [] 4   3. Toileting, which includes using toilet, bedpan or urinal?   [] 1   [x] 2   [] 3   [] 4   4. Putting on and taking off regular upper body clothing? [] 1   [x] 2   [] 3   [] 4   5. Taking care of personal grooming such as brushing teeth? [] 1   [] 2   [x] 3   [] 4   6. Eating meals? [] 1   [] 2   [x] 3   [] 4   © , Trustees of [address] [place] [address], under license to [employer]. All rights reserved      Score:  Initial: 14, completed 2020 Most Recent: X (Date: -- )    Interpretation of Tool:  Represents activities that are increasingly more difficult (i.e. Bed mobility, Transfers, Gait). Medical Necessity:     · Patient demonstrates   · good  ·  rehab potential due to higher previous functional level. Reason for Services/Other Comments:  · Patient continues to require skilled intervention due to   · S/p above and decreased ADLs and mobiltiy   · .    Use of outcome tool(s) and clinical judgement create a POC that gives a: MODERATE COMPLEXITY         TREATMENT:   (In addition to Assessment/Re-Assessment sessions the following treatments were rendered)     Pre-treatment Symptoms/Complaints:  \"my hip is hurting some but they say it's just arthritis\"  Pain: Initial:   Pain Intensity 1: 0  Pain Location 1: Incisional  Pain Orientation 1: Left, Lateral, Upper  Pain Intervention(s) 1: Ambulation/Increased Activity, Distraction, Emotional support, Environmental changes, Family Support, Exercise, Repositioned, Rest  Post Session:  \"about the same\" RN aware and at bedside as OT left room     Self Care: (31 mins): Procedure(s) (per grid) utilized to improve and/or restore self-care/home management as related to dressing, bathing, toileting, grooming and self feeding. Required minimal visual, verbal and tactile cueing to facilitate activities of daily living skills and compensatory activities. Declined bath stating \"I will wash up later when my family can help me. \"  OT briefly reviewed UE stretches for side pain. Date:  3/3/2020 Date:  3/8/2020 Date:     Activity/Exercise Parameters Parameters Parameters   Shoulder flexion 2 sets of 10 5    Shoulder abduction 2 sets of 10  5    Shoulder extension 2 sets of 10 5                                Braces/Orthotics/Lines/Etc:   · O2 Device: Room air  Treatment/Session Assessment:    · Response to Treatment:  1 LOB at end of bed while walking to BR, not wanting to use RW, but would be safer  · Interdisciplinary Collaboration:   o Occupational Therapist  o Registered Nurse  · After treatment position/precautions:   o Up in chair  o Bed/Chair-wheels locked  o Bed in low position  o Call light within reach  o RN notified  o Nurse at bedside   · Compliance with Program/Exercises: Compliant most of the time, Will assess as treatment progresses. · Recommendations/Intent for next treatment session: \"Next visit will focus on advancements to more challenging activities and reduction in assistance provided\".   Total Treatment Duration:  31 mins  OT Patient Time In/Time Out  Time In: 1200  Time Out: 1231     RACHEL Archer, MS, OTR/L

## 2020-03-08 NOTE — PROGRESS NOTES
END OF SHIFT NOTE:    INTAKE/OUTPUT  03/07 0701 - 03/08 0700  In: 240 [P.O.:240]  Out: -   Voiding: YES  Catheter: NO  Drain:              Flatus: Patient does have flatus present. Stool:  1 occurrences. Characteristics:  Stool Assessment  Stool Color: Brown  Stool Appearance: Soft  Stool Amount: Large  Stool Source/Status: Rectum    Emesis: 0 occurrences. Characteristics:        VITAL SIGNS  Patient Vitals for the past 12 hrs:   Temp Pulse Resp BP SpO2   03/08/20 1447 97.2 °F (36.2 °C) (!) 116 18 117/59 96 %   03/08/20 1435 -- -- -- -- 99 %   03/08/20 1028 97.9 °F (36.6 °C) 64 18 119/74 97 %   03/08/20 0810 -- -- -- -- 96 %   03/08/20 0752 98.1 °F (36.7 °C) 69 18 121/61 97 %       Pain Assessment  Pain Intensity 1: 0 (03/08/20 1231)  Pain Location 1: Hip  Pain Intervention(s) 1: Medication (see MAR)  Patient Stated Pain Goal: 0    Ambulating  Yes    Shift report given to oncoming nurse at the bedside.     Jus Anthony

## 2020-03-09 LAB
ALBUMIN SERPL-MCNC: 2.4 G/DL (ref 3.2–4.6)
ALBUMIN/GLOB SERPL: 0.6 {RATIO} (ref 1.2–3.5)
ALP SERPL-CCNC: 279 U/L (ref 50–136)
ALT SERPL-CCNC: 29 U/L (ref 12–65)
ANION GAP SERPL CALC-SCNC: 6 MMOL/L (ref 7–16)
AST SERPL-CCNC: 33 U/L (ref 15–37)
BILIRUB SERPL-MCNC: 0.2 MG/DL (ref 0.2–1.1)
BUN SERPL-MCNC: 18 MG/DL (ref 8–23)
CALCIUM SERPL-MCNC: 8.8 MG/DL (ref 8.3–10.4)
CHLORIDE SERPL-SCNC: 98 MMOL/L (ref 98–107)
CO2 SERPL-SCNC: 29 MMOL/L (ref 21–32)
CREAT SERPL-MCNC: 1.31 MG/DL (ref 0.8–1.5)
ERYTHROCYTE [DISTWIDTH] IN BLOOD BY AUTOMATED COUNT: 14.5 % (ref 11.9–14.6)
GLOBULIN SER CALC-MCNC: 4.3 G/DL (ref 2.3–3.5)
GLUCOSE BLD STRIP.AUTO-MCNC: 139 MG/DL (ref 65–100)
GLUCOSE BLD STRIP.AUTO-MCNC: 208 MG/DL (ref 65–100)
GLUCOSE BLD STRIP.AUTO-MCNC: 234 MG/DL (ref 65–100)
GLUCOSE BLD STRIP.AUTO-MCNC: 310 MG/DL (ref 65–100)
GLUCOSE BLD STRIP.AUTO-MCNC: 341 MG/DL (ref 65–100)
GLUCOSE BLD STRIP.AUTO-MCNC: 384 MG/DL (ref 65–100)
GLUCOSE SERPL-MCNC: 146 MG/DL (ref 65–100)
HCT VFR BLD AUTO: 30.4 % (ref 41.1–50.3)
HGB BLD-MCNC: 10.2 G/DL (ref 13.6–17.2)
MCH RBC QN AUTO: 28.3 PG (ref 26.1–32.9)
MCHC RBC AUTO-ENTMCNC: 33.6 G/DL (ref 31.4–35)
MCV RBC AUTO: 84.2 FL (ref 79.6–97.8)
NRBC # BLD: 0 K/UL (ref 0–0.2)
PLATELET # BLD AUTO: 490 K/UL (ref 150–450)
PMV BLD AUTO: 8.7 FL (ref 9.4–12.3)
POTASSIUM SERPL-SCNC: 4.3 MMOL/L (ref 3.5–5.1)
PROT SERPL-MCNC: 6.7 G/DL (ref 6.3–8.2)
RBC # BLD AUTO: 3.61 M/UL (ref 4.23–5.6)
SODIUM SERPL-SCNC: 133 MMOL/L (ref 136–145)
WBC # BLD AUTO: 7.7 K/UL (ref 4.3–11.1)

## 2020-03-09 PROCEDURE — 85027 COMPLETE CBC AUTOMATED: CPT

## 2020-03-09 PROCEDURE — 74011250637 HC RX REV CODE- 250/637: Performed by: INTERNAL MEDICINE

## 2020-03-09 PROCEDURE — 74011636637 HC RX REV CODE- 636/637: Performed by: INTERNAL MEDICINE

## 2020-03-09 PROCEDURE — 36415 COLL VENOUS BLD VENIPUNCTURE: CPT

## 2020-03-09 PROCEDURE — 97530 THERAPEUTIC ACTIVITIES: CPT

## 2020-03-09 PROCEDURE — 82962 GLUCOSE BLOOD TEST: CPT

## 2020-03-09 PROCEDURE — 94640 AIRWAY INHALATION TREATMENT: CPT

## 2020-03-09 PROCEDURE — 94760 N-INVAS EAR/PLS OXIMETRY 1: CPT

## 2020-03-09 PROCEDURE — 74011250637 HC RX REV CODE- 250/637: Performed by: SURGERY

## 2020-03-09 PROCEDURE — 74011250636 HC RX REV CODE- 250/636: Performed by: SURGERY

## 2020-03-09 PROCEDURE — 74011636637 HC RX REV CODE- 636/637: Performed by: NURSE PRACTITIONER

## 2020-03-09 PROCEDURE — 74011000250 HC RX REV CODE- 250: Performed by: INTERNAL MEDICINE

## 2020-03-09 PROCEDURE — 74011250637 HC RX REV CODE- 250/637: Performed by: NURSE PRACTITIONER

## 2020-03-09 PROCEDURE — 65270000029 HC RM PRIVATE

## 2020-03-09 PROCEDURE — 80053 COMPREHEN METABOLIC PANEL: CPT

## 2020-03-09 PROCEDURE — 74011250636 HC RX REV CODE- 250/636: Performed by: INTERNAL MEDICINE

## 2020-03-09 RX ORDER — INSULIN LISPRO 100 [IU]/ML
0-5 INJECTION, SOLUTION INTRAVENOUS; SUBCUTANEOUS
Status: DISCONTINUED | OUTPATIENT
Start: 2020-03-09 | End: 2020-03-10 | Stop reason: HOSPADM

## 2020-03-09 RX ORDER — INSULIN LISPRO 100 [IU]/ML
10 INJECTION, SOLUTION INTRAVENOUS; SUBCUTANEOUS
Status: DISCONTINUED | OUTPATIENT
Start: 2020-03-09 | End: 2020-03-10 | Stop reason: HOSPADM

## 2020-03-09 RX ORDER — INSULIN GLARGINE 100 [IU]/ML
22 INJECTION, SOLUTION SUBCUTANEOUS DAILY
Status: DISCONTINUED | OUTPATIENT
Start: 2020-03-10 | End: 2020-03-10 | Stop reason: HOSPADM

## 2020-03-09 RX ADMIN — PANTOPRAZOLE SODIUM 40 MG: 40 TABLET, DELAYED RELEASE ORAL at 06:26

## 2020-03-09 RX ADMIN — GABAPENTIN 200 MG: 100 CAPSULE ORAL at 09:03

## 2020-03-09 RX ADMIN — INSULIN LISPRO 10 UNITS: 100 INJECTION, SOLUTION INTRAVENOUS; SUBCUTANEOUS at 12:12

## 2020-03-09 RX ADMIN — BUDESONIDE AND FORMOTEROL FUMARATE DIHYDRATE 2 PUFF: 160; 4.5 AEROSOL RESPIRATORY (INHALATION) at 20:38

## 2020-03-09 RX ADMIN — ENOXAPARIN SODIUM 40 MG: 40 INJECTION SUBCUTANEOUS at 21:32

## 2020-03-09 RX ADMIN — Medication 10 ML: at 14:21

## 2020-03-09 RX ADMIN — TRAMADOL HYDROCHLORIDE 50 MG: 50 TABLET, FILM COATED ORAL at 03:45

## 2020-03-09 RX ADMIN — GABAPENTIN 200 MG: 100 CAPSULE ORAL at 16:55

## 2020-03-09 RX ADMIN — INSULIN LISPRO 4 UNITS: 100 INJECTION, SOLUTION INTRAVENOUS; SUBCUTANEOUS at 12:11

## 2020-03-09 RX ADMIN — METOPROLOL TARTRATE 50 MG: 50 TABLET, FILM COATED ORAL at 09:05

## 2020-03-09 RX ADMIN — Medication 10 ML: at 21:34

## 2020-03-09 RX ADMIN — BUDESONIDE AND FORMOTEROL FUMARATE DIHYDRATE 2 PUFF: 160; 4.5 AEROSOL RESPIRATORY (INHALATION) at 07:21

## 2020-03-09 RX ADMIN — ATORVASTATIN CALCIUM 10 MG: 10 TABLET, FILM COATED ORAL at 21:32

## 2020-03-09 RX ADMIN — GABAPENTIN 200 MG: 100 CAPSULE ORAL at 21:32

## 2020-03-09 RX ADMIN — LISINOPRIL 5 MG: 5 TABLET ORAL at 09:00

## 2020-03-09 RX ADMIN — BUPROPION HYDROCHLORIDE 150 MG: 150 TABLET, EXTENDED RELEASE ORAL at 21:32

## 2020-03-09 RX ADMIN — Medication 10 ML: at 06:29

## 2020-03-09 RX ADMIN — SENNOSIDES AND DOCUSATE SODIUM 2 TABLET: 8.6; 5 TABLET ORAL at 16:56

## 2020-03-09 RX ADMIN — INSULIN GLARGINE 20 UNITS: 100 INJECTION, SOLUTION SUBCUTANEOUS at 09:06

## 2020-03-09 RX ADMIN — ALBUTEROL SULFATE 2.5 MG: 2.5 SOLUTION RESPIRATORY (INHALATION) at 15:08

## 2020-03-09 RX ADMIN — TIOTROPIUM BROMIDE INHALATION SPRAY 2 PUFF: 3.12 SPRAY, METERED RESPIRATORY (INHALATION) at 07:21

## 2020-03-09 RX ADMIN — INSULIN LISPRO 4 UNITS: 100 INJECTION, SOLUTION INTRAVENOUS; SUBCUTANEOUS at 09:05

## 2020-03-09 RX ADMIN — TAMSULOSIN HYDROCHLORIDE 0.4 MG: 0.4 CAPSULE ORAL at 09:05

## 2020-03-09 RX ADMIN — SODIUM CHLORIDE 1000 ML: 900 INJECTION, SOLUTION INTRAVENOUS at 15:46

## 2020-03-09 RX ADMIN — INSULIN LISPRO 4 UNITS: 100 INJECTION, SOLUTION INTRAVENOUS; SUBCUTANEOUS at 21:33

## 2020-03-09 RX ADMIN — SENNOSIDES AND DOCUSATE SODIUM 2 TABLET: 8.6; 5 TABLET ORAL at 09:04

## 2020-03-09 RX ADMIN — ALBUTEROL SULFATE 2.5 MG: 2.5 SOLUTION RESPIRATORY (INHALATION) at 07:19

## 2020-03-09 RX ADMIN — BUPROPION HYDROCHLORIDE 150 MG: 150 TABLET, EXTENDED RELEASE ORAL at 09:03

## 2020-03-09 RX ADMIN — ACETAMINOPHEN 1000 MG: 500 TABLET, FILM COATED ORAL at 09:02

## 2020-03-09 NOTE — DIABETES MGMT
Patient admitted with lung cancer. Blood glucose ranged 168-352 yesterday with patient receiving Lantus 20 units and Humalog 38 units. Blood glucose this morning was 208. Patient refused prandial insulin. Reviewed these numbers and their significance with patient. Educated patient regarding current basal/bolus regimen of Lantus 22 units daily and Humalog 10 units with meals and insulin sensitive sliding scale including type of insulin, timing with meals, onset, duration of effect, and peak of insulin dose. Educated patient on the differences between prandial insulin and sliding scale insulin. Patient states \"at home I take 10 units with meals, but if my sugar is 208 I don't take 10 because that's too much. \" Questioned how much insulin patient would take patient states \"4 or 5 units. \" Educated patient regarding pathophysiology of diabetes. Reviewed with patient that previously when he did not take prandial insulin his blood glucose would elevate. For example patient blood glucose went from 150mg/dL pre-breakfast to 426 pre-lunch when patient did not receive prandial insulin. Patient then states \"it all depends on what I eat. \" Educated patient that though his blood glucose is important in regards to the amount of insulin he is supposed to take, the amount of carbs he is eating also influences his insulin needs. Also again reviewed the role of an endocrinologist with patient. Patient verbalizes understanding but wants to see what his blood glucose is at lunch time after receiving Humalog 4 units and no prandial insulin. Noted patient ate 100% of his breakfast tray. Will continue to follow along.

## 2020-03-09 NOTE — PROGRESS NOTES
Glucoses doing better. Discussed with diabetes educator this AM who knows him from previous admissions. He is very labile and acts more like a type 1 diabetic. Recommend increasing lantus to 22 units, keeping prandial insulin at 10 units with insulin sensitive sliding scale protocol.

## 2020-03-09 NOTE — DIABETES MGMT
This AM blood glucose 208. Most recent FSBS 310. Patient sitting in recliner, has eaten 100% of lunch meal tray. Reviewed pathophysiology of diabetes and the difference between SSI and prandial insulin. Patient did take lunch prandial insulin dose of Humalog 10 units. Educated patient regarding the difference between normal sensitivity SSI and insulin sensitive SSI. Patient verbalizes understanding and voices no further questions regarding diabetes management at this time.

## 2020-03-09 NOTE — PROGRESS NOTES
END OF SHIFT NOTE:    INTAKE/OUTPUT  03/08 0701 - 03/09 0700  In: 120 [P.O.:120]  Out: -   Voiding: YES  Catheter: NO  Drain:              Flatus: Patient does have flatus present. Stool:  0 occurrences. Characteristics:      Emesis: 0 occurrences. Characteristics:        VITAL SIGNS  Patient Vitals for the past 12 hrs:   Temp Pulse Resp BP SpO2   03/09/20 0339 98.1 °F (36.7 °C) 93 20 149/56 98 %   03/08/20 2241 98.2 °F (36.8 °C) 69 16 153/63 98 %   03/08/20 2027 -- -- -- -- 98 %   03/08/20 1914 97.8 °F (36.6 °C) 75 18 130/58 100 %       Pain Assessment  Pain Intensity 1: 7 (03/09/20 0345)  Pain Location 1: Hip  Pain Intervention(s) 1: Medication (see MAR)  Patient Stated Pain Goal: 0    Ambulating  Yes  Has takenTramadol twice this shift for hip and left sided operative site pain with relief. Shift report given to oncoming nurse at the bedside.     Matilde Grimm RN

## 2020-03-09 NOTE — PROGRESS NOTES
Spoke with Dr. Indra Puckett re: patient having orthostatic hypotension with physical therapy. BP dropped to 72/39 after patient stood from chair. After a couple of minutes of patient sitting BP= 111/44. Patient with c/o feeling light headed. MD ordered lab work and held scheduled BP meds. Daisy Norton NP notified as well.

## 2020-03-09 NOTE — PROGRESS NOTES
Notified of orthostatic hypotension with SBP down to 70s earlier. Pt now seated in bedside chair, no complaints. Denies SOB, CP, F/C, dysuria, cough. + good PO intake and UOP per pt. Afebrile. No labs since 3/4. Will hold antihypertensives, check CBC, CMP.

## 2020-03-09 NOTE — PROGRESS NOTES
Problem: Mobility Impaired (Adult and Pediatric)  Goal: *Acute Goals and Plan of Care (Insert Text)  Description  LTG:  (1.)Mr. Jaquelin Patel will move from supine to sit and sit to supine , scoot up and down and roll side to side in flat bed without siderails with  INDEPENDENT within 7 day(s). (2.)Mr. Jaquelin Patel will perform all functional transfers with  MODIFIED INDEPENDENCE using the least restrictive/no device within 7 day(s). (3.)Mr. Jaquelin Patel will ambulate with  MODIFIED INDEPENDENCE for 250+ feet with normal vital sign response with the least restrictive/no device within 7 day(s). (4.)Mr. Jaquelin Patel will ambulate up/down 2 steps with bilateral  railing with  STAND BY ASSIST with no device within 7 day(s). Outcome: Progressing Towards Goal     PHYSICAL THERAPY: Daily Note and PM 3/9/2020  INPATIENT: PT Visit Days : 4  Payor: Luz Maria Rivera / Plan: [address] [place] [address] HMO / Product Type: [employer] Medicare /       NAME/AGE/GENDER: Olimpia Street is a 70 y.o. male   PRIMARY DIAGNOSIS: Lung mass [R91.8]  Lung cancer ([facility] [address] [C34.90]  Lung cancer ([address] [C34.90] Lung cancer ([address] Lung cancer ([facility] [address]  Procedure(s) (LRB):  LEFT VATS WITH LEFT UPPER LOBECTOMY/ , MEDIASTINAL LYMPHADENECTOMY/CYRO (Left)  12 Days Post-Op  ICD-10: Treatment Diagnosis:    · Other abnormalities of gait and mobility (R26.89)  · History of falling (Z91.81)   Precaution/Allergies:  Patient has no known allergies. ASSESSMENT:     Mr. Jaquelin Patel presents supine in bed. Patient lives alone and ambulated independently but with quad cane in community. 3/9- Today patient sitting in chair on arrival, agreeable for ambulation. Upon sit to stand from chair with CGA, pt became dizzy, returned to sitting, /44. Pt states feeling somewhat improved, donned robe and slippers with min A. Pt then performed sit to stand, c/o dizziness again, static standing balance 72/39; returned to sitting, notified RN.  After 1-2 min sitting, pt BP 111/44, O2 95% RA, HR in 80s. Pt limited today due to BP and dizziness, RN following up with MD. PT to cont to follow for acute care needs. This section established at most recent assessment   PROBLEM LIST (Impairments causing functional limitations):  1. Decreased Transfer Abilities  2. Decreased Ambulation Ability/Technique  3. Decreased Balance  4. Increased Pain  5. Decreased Activity Tolerance  6. Decreased Knowledge of Precautions  7. Decreased Tibbie with Home Exercise Program   INTERVENTIONS PLANNED: (Benefits and precautions of physical therapy have been discussed with the patient.)  1. Balance Exercise  2. Bed Mobility  3. Gait Training  4. Therapeutic Activites  5. Therapeutic Exercise/Strengthening  6. Transfer Training  7. education      TREATMENT PLAN: Frequency/Duration: 3 times a week for duration of hospital stay  Rehabilitation Potential For Stated Goals: Excellent     REHAB RECOMMENDATIONS (at time of discharge pending progress):    Placement: It is my opinion, based on this patient's performance to date, that Mr. Susanne Perez may benefit from intensive therapy at a [address] [place] [address] after discharge due to the functional deficits listed above that are likely to improve with skilled rehabilitation and concerns that he/she may be unsafe to be unsupervised at home due to history of falls. Equipment:    None at this time              HISTORY:   History of Present Injury/Illness (Reason for Referral):  Per MD note, \"The patient is a 70 y.o. male with hx of COPD who was found to have a suspicious EVA pulmonary nodule. Previously presented for needle localization but was diagnosed with pneumonia and required re-scheduling. Reports improved cough since prior visit. Denies fever. \"  Past Medical History/Comorbidities:   Mr. Susanne Perez  has a past medical history of CAD (coronary artery disease) (08/2019), Chronic kidney disease, COPD, Depression, Diabetes ([facility] [address] HTN (hypertension) (4/29/2015), Malignant neoplasm of upper lobe of left lung ([facility] [address] (12/12/2019), Other ill-defined conditions(799.89), and Sepsis ([facility] [address] (12/2019). Mr. Tere Kothari  has a past surgical history that includes hx orthopaedic; pr abdomen surgery proc unlisted (5/2015); hx other surgical (12/2019); and hx colonoscopy. Social History/Living Environment:   Home Environment: Private residence  # Steps to Enter: 2  Rails to Enter: No  One/Two Story Residence: One story  Living Alone: Yes  Support Systems: Child(shayy)  Patient Expects to be Discharged to[de-identified] Private residence  Current DME Used/Available at Home: Cane, quad, Glucometer, Britt Lever, Walker, rolling  Tub or Shower Type: Tub/Shower combination(has walk in as well, sits on edge of tub for washing off)  Prior Level of Function/Work/Activity:  Pt lives alone in 1 level home with 2-3 steps at entrance, no HRs, has walk in and T/S combo but prefers to sit on the edge of the tub for washing up. Was driving up to 3 months ago. Uses a QC out in the community and sometimes at home. Checked his own BSs and gave himself shots but admits he has passed out a few times. Admits to 2-3 falls in last 6 months. Older sister Rod Vasquez lives just up the road and reports pt has been needing more and more help with house management and self care the past 3 months. Has children but they live out of town. Pt admits he has not been managing well. Bills are auto drafted. Still goes to [employer] weekly. Number of Personal Factors/Comorbidities that affect the Plan of Care: 1-2: MODERATE COMPLEXITY   EXAMINATION:   Most Recent Physical Functioning:   Gross Assessment:                  Posture:  Posture Assessment:  Forward head, Rounded shoulders  Balance:  Sitting: Intact  Standing: Impaired  Standing - Static: Good;Fair Bed Mobility:  Rolling: (up in chair)  Sit to Supine: (left up in chair)  Wheelchair Mobility:     Transfers: CGA  Sit to Stand: Contact guard assistance  Stand to Sit: Contact guard assistance  Gait: 85'x3 RW CGA            Body Structures Involved:  1. Lungs  2. Muscles Body Functions Affected:  1. Sensory/Pain  2. Respiratory  3. Movement Related  4. Skin Related Activities and Participation Affected:  1. Mobility  2. Self Care  3. Domestic Life  4. Community, Social and [place]   Number of elements that affect the Plan of Care: 4+: HIGH COMPLEXITY   CLINICAL PRESENTATION:   Presentation: Evolving clinical presentation with changing clinical characteristics: MODERATE COMPLEXITY   CLINICAL DECISION MAKIN [facility] Mobility Inpatient Short Form  How much difficulty does the patient currently have. .. Unable A Lot A Little None   1. Turning over in bed (including adjusting bedclothes, sheets and blankets)? [] 1   [] 2   [x] 3   [] 4   2. Sitting down on and standing up from a chair with arms ( e.g., wheelchair, bedside commode, etc.)   [] 1   [] 2   [x] 3   [] 4   3. Moving from lying on back to sitting on the side of the bed? [] 1   [x] 2   [] 3   [] 4   How much help from another person does the patient currently need. .. Total A Lot A Little None   4. Moving to and from a bed to a chair (including a wheelchair)? [] 1   [] 2   [x] 3   [] 4   5. Need to walk in hospital room? [] 1   [] 2   [x] 3   [] 4   6. Climbing 3-5 steps with a railing? [] 1   [] 2   [x] 3   [] 4   © , Trustees of [facility] [employer], under license to [employer]. All rights reserved      Score:  Initial: 17 Most Recent: X (Date: -- )    Interpretation of Tool:  Represents activities that are increasingly more difficult (i.e. Bed mobility, Transfers, Gait). Medical Necessity:     · Patient is expected to demonstrate progress in   · strength, balance, and functional technique  ·  to   · increase independence with   and improve safety during all functional mobility   · .   Reason for Services/Other Comments:  · Patient continues to require skilled intervention due to   · patient unable to attend/participate in therapy as expected  · . Use of outcome tool(s) and clinical judgement create a POC that gives a: Clear prediction of patient's progress: LOW COMPLEXITY            TREATMENT:   (In addition to Assessment/Re-Assessment sessions the following treatments were rendered)   Pre-treatment Symptoms/Complaints:  \"I don't feel right, maybe my sugar is low\"  Pain: Initial:   Pain Intensity 1: 0  Post Session:  0/10 post session in chair     Therapeutic Activity: (    17 minutes): Therapeutic activities including Chair transfers, standing balance activities to improve mobility, strength and balance. Required moderate cueing   to promote correct execution. DATE: 2/27/20 3/2/20 3/4/20     Ambulation        Hip Flexion  x20 AB x20 AB     Long Arc Quads X20 AB x20 AB      Hip ab/ad        Heel Raises X20 AB x10 AB      Toe Raises X20 AB x10 AB      Sit to stand x2'       Standing heel raises   x20 AB     retrogait   x50' min HHA     sidestepping   x25' Rindge min HHA              Key:  A=active, AA=active assisted, P=passive, B=bilaterally, R=right, L=left   DF=dorsiflexion, PF=plantarflexion      Braces/Orthotics/Lines/Etc:   · O2 Device: Room air  Treatment/Session Assessment:    · Response to Treatment:  pleasant and cooperative. · Interdisciplinary Collaboration:   o Physical Therapist  o Registered Nurse  · After treatment position/precautions:   o Up in chair  o Bed/Chair-wheels locked  o Call light within reach  o RN notified   · Compliance with Program/Exercises: Compliant all of the time  · Recommendations/Intent for next treatment session: \"Next visit will focus on advancements to more challenging activities and reduction in assistance provided\".   Total Treatment Duration:  PT Patient Time In/Time Out  Time In: 1320  Time Out: 7800 Sophie Dumont, PT

## 2020-03-09 NOTE — PROGRESS NOTES
3/9/2020    PLAN:  Diet- Diabetic  DVT Prophylactics- SCD/Lovenox  Pain control- Tylenol/ ultram  SUP prophylaxis- Protonix  Encourage C/DB/IS  Anterior chest tube removed 2/28/20  Posterior Chest tube removed 3/2/20  Diabetes managed by hospitalist and diabetic educator  PT/OT  Social service for discharge [employer] [place] denied will try appeal.  Awaiting answer from family appeal for placement. ASSESSMENT:  Admit Date: 2/26/2020   12 Day Post-Op  Procedure(s):  LEFT VATS WITH LEFT UPPER LOBECTOMY/ , MEDIASTINAL LYMPHADENECTOMY/CYRO       DIAGNOSIS   A: \"# 9 LYMPH NODE X3\":   TWO FRAGMENTS OF ANTHRACOTIC LYMPH NODE NEGATIVE FOR METASTATIC TUMOR   B: \"# 8 LYMPH NODE X2\":   THREE FRAGMENTS OF ANTHRACOTIC LYMPH NODE NEGATIVE FOR METASTATIC TUMOR   C: \"AP WINDOW LYMPH NODE\":   ONE FRAGMENT OF LYMPH NODE NEGATIVE FOR METASTATIC TUMOR   D: \"# 7 LYMPH NODE X3\":   FOUR FRAGMENTS OF ANTHRACOTIC LYMPH NODE NEGATIVE FOR METASTATIC TUMOR   E: \"PERIHILAR\":   ONE FRAGMENT OF ANTHRACOTIC LYMPH NODE NEGATIVE FOR METASTATIC TUMOR   F: \"LEFT UPPER LOBECTOMY\":   ADENOCARCINOMA, ENTERIC TYPE (SEE COMMENT), MEASURING APPROXIMATELY 7 X 6.5 X 4 CM. MACROMETASTATIC CARCINOMA TO ONE OF SIX PERIBRONCHIAL LYMPH NODES WITH FOCAL EXTRACAPSULAR EXTENSION. BRONCHIAL AND VASCULAR MARGINS OF RESECTION ARE NEGATIVE FOR MALIGNANT TUMOR. DEFINITE LYMPHOVASCULAR INVASION IS NOT IDENTIFIED. PLEURAL SURFACE IS NEGATIVE FOR MALIGNANT TUMOR. Comment   The tumor       SUBJECTIVE:  02/27/2020-  No complaints. Pain controlled. On room air 100%. Chest tubes without airleak noted. Tolerating diet. Will adjust insulin dose. Transfer to floor and chest tubes to suction.     02/28/2020-  No complaints. Pain controlled. On room air 100%. Chest tubes without airleak noted. Pulling 1200 on IS. Tolerating diet  02/29/2020-  No complaints. Pain controlled. On room air.  Posterior Chest tube to water seal. CXR this am showing New left apical pneumothorax status post removal of anterior chest tube. Tolerating diet. AF, NAD.   03/01/2020-  Pt awake in bed. Rapid response called yesterday due to lethargy which improved with Narcan. Remaining chest tube to water seal - clamped today. CXR this am showing stable apical pneumothorax. Patel replaced again yesterday due to urinary retention. Tolerating Diabetic diet. +flatus/+BM. AF, NAD.   03/02/2020 Patient in bed without complaints of pain to right hip/buttock area. Area assessed. Tender to palpation, no skin redness or breakdown noted. Chest tube clamped. No SOB noted. Unclamped no air leak noted. Will DC remaining chest tube and get follow up xray. Social work for discharge planning. Glucose still uncontrolled with adjust lantus. Patel remains for 1 week related to  attempts of removal with urinary retention  Posterior chest tube removed at end inspiration. Dressing applied. Patient tolerated well. CXR ordered for 4 hours  03/03/2020 Patient away at Xray this AM- hip xray negative. Returned this evening and Patient in restroom. Did not see patient- nurse states no issues. - chest xray reviewed. Awaiting snf placement. 03/04/2020 Right hip less painful today. No complaints. Xray reviewed. Awaiting insurance approval.  03/05/2020 Hypoglycemic this AM. Hospitalist and diabetic education managing insulin. [employer] denial for placement. Stating unable to appeal.  Offered to do Peer to Peer and [facility] [employer] is stating \"past deadline\" only patient can appeal.  No sure, cognitively, the patient could manage this. His BS is still not controlled- he recently was in ER with hypoglycemia. Do not feel I can safely discharge this patient to home living alone without a strong social support. Medically from his lung surgery no issues. Patel was removed yesterday and now voiding without difficulty. 03/06/2020 Pt awake in bed. No complaints at this time. Glucose this am 150. Hospitalist and diabetic education managing insulin. Family planning to appeal [employer] denial for placement. Voiding without problems. Medically stable from his lung surgery. 03/07/2020 Pt awake in bed. No complaints at this time. Glucose level remains unstable. Glucose this am 423 after being hypoglycemic yesterday. Hospitalist and diabetic education managing insulin. Voiding without problems. Medically stable from his lung surgery. 03/08/2020 Pt awake in bed. No new issues. Glucose this am 352. Hospitalist and diabetic education managing insulin. Voiding without problems. Medically stable from his lung surgery. 03/09/2020 No new issues. Hospitalist managing diabetes control. Awaiting appeal process for placement     OBJECTIVE:  Patient Vitals for the past 24 hrs:   Temp Pulse Resp BP SpO2   03/09/20 0721 -- -- -- -- 98 %   03/09/20 0702 98 °F (36.7 °C) 69 20 143/62 99 %   03/09/20 0339 98.1 °F (36.7 °C) 93 20 149/56 98 %   03/08/20 2241 98.2 °F (36.8 °C) 69 16 153/63 98 %   03/08/20 2027 -- -- -- -- 98 %   03/08/20 1914 97.8 °F (36.6 °C) 75 18 130/58 100 %   03/08/20 1447 97.2 °F (36.2 °C) (!) 116 18 117/59 96 %   03/08/20 1435 -- -- -- -- 99 %   03/08/20 1028 97.9 °F (36.6 °C) 64 18 119/74 97 %         Date 03/08/20 0700 - 03/09/20 0659 03/09/20 0700 - 03/10/20 0659   Shift 7901-5976 7459-4120 24 Hour Total 8845-6103 3080-0597 24 Hour Total   INTAKE   P.O.  120 120        P. O.  120 120      Shift Total(mL/kg)  120(1.7) 120(1.7)      OUTPUT   Urine(mL/kg/hr)           Urine Occurrence(s) 1 x 1 x 2 x      Stool           Stool Occurrence(s) 1 x  1 x      Shift Total(mL/kg)         NET  120 120      Weight (kg) 71.5 71.5 71.5 71.5 71.5 71.5             Jojo Wang, NP

## 2020-03-09 NOTE — PROGRESS NOTES
END OF SHIFT NOTE:    INTAKE/OUTPUT  03/08 0701 - 03/09 0700  In: 120 [P.O.:120]  Out: -   Voiding: YES  Catheter: NO  Drain:              Flatus: Patient does have flatus present. Stool:  0 occurrences. Characteristics:      Emesis: 0 occurrences. Characteristics:        VITAL SIGNS  Patient Vitals for the past 12 hrs:   Temp Pulse Resp BP SpO2   03/09/20 1601 98.5 °F (36.9 °C) 62 20 111/64 99 %   03/09/20 1509 -- -- -- -- 99 %   03/09/20 1416 -- 65 -- 99/42 --   03/09/20 1320 -- -- -- 106/44 --   03/09/20 1142 97.5 °F (36.4 °C) 75 20 104/52 99 %   03/09/20 0721 -- -- -- -- 98 %   03/09/20 0702 98 °F (36.7 °C) 69 20 143/62 99 %       Pain Assessment  Pain Intensity 1: 0 (03/09/20 1320)  Pain Location 1: Chest, Hip, Leg  Pain Intervention(s) 1: Medication (see MAR)  Patient Stated Pain Goal: 0    Ambulating  Yes    Shift report given to oncoming nurse at the bedside.     Rocío Guevara RN

## 2020-03-10 ENCOUNTER — HOME CARE VISIT (OUTPATIENT)
Dept: HOME HEALTH SERVICES | Facility: HOME HEALTH | Age: 80
End: 2020-03-10

## 2020-03-10 VITALS
TEMPERATURE: 98 F | HEIGHT: 73 IN | WEIGHT: 157.63 LBS | BODY MASS INDEX: 20.89 KG/M2 | RESPIRATION RATE: 18 BRPM | SYSTOLIC BLOOD PRESSURE: 146 MMHG | OXYGEN SATURATION: 98 % | DIASTOLIC BLOOD PRESSURE: 68 MMHG | HEART RATE: 84 BPM

## 2020-03-10 LAB — GLUCOSE BLD STRIP.AUTO-MCNC: 181 MG/DL (ref 65–100)

## 2020-03-10 PROCEDURE — 74011250637 HC RX REV CODE- 250/637: Performed by: NURSE PRACTITIONER

## 2020-03-10 PROCEDURE — 82962 GLUCOSE BLOOD TEST: CPT

## 2020-03-10 PROCEDURE — 94760 N-INVAS EAR/PLS OXIMETRY 1: CPT

## 2020-03-10 PROCEDURE — 94640 AIRWAY INHALATION TREATMENT: CPT

## 2020-03-10 PROCEDURE — 74011636637 HC RX REV CODE- 636/637: Performed by: INTERNAL MEDICINE

## 2020-03-10 PROCEDURE — 74011000250 HC RX REV CODE- 250: Performed by: INTERNAL MEDICINE

## 2020-03-10 PROCEDURE — 77030008031

## 2020-03-10 PROCEDURE — 74011250637 HC RX REV CODE- 250/637: Performed by: SURGERY

## 2020-03-10 PROCEDURE — 74011250637 HC RX REV CODE- 250/637: Performed by: INTERNAL MEDICINE

## 2020-03-10 RX ORDER — ENOXAPARIN SODIUM 100 MG/ML
40 INJECTION SUBCUTANEOUS EVERY 24 HOURS
Qty: 5.6 ML | Refills: 0 | Status: SHIPPED
Start: 2020-03-10 | End: 2020-03-24

## 2020-03-10 RX ORDER — ALBUTEROL SULFATE 0.83 MG/ML
2.5 SOLUTION RESPIRATORY (INHALATION)
Status: DISCONTINUED | OUTPATIENT
Start: 2020-03-10 | End: 2020-03-10 | Stop reason: HOSPADM

## 2020-03-10 RX ORDER — PANTOPRAZOLE SODIUM 40 MG/1
40 TABLET, DELAYED RELEASE ORAL
Qty: 30 TAB | Refills: 0 | Status: SHIPPED
Start: 2020-03-10 | End: 2020-04-09

## 2020-03-10 RX ORDER — TAMSULOSIN HYDROCHLORIDE 0.4 MG/1
0.4 CAPSULE ORAL DAILY
Qty: 30 CAP | Refills: 0 | Status: SHIPPED
Start: 2020-03-10 | End: 2020-04-09

## 2020-03-10 RX ORDER — TRAMADOL HYDROCHLORIDE 50 MG/1
50 TABLET ORAL
Qty: 20 TAB | Refills: 0 | Status: SHIPPED | OUTPATIENT
Start: 2020-03-10 | End: 2020-03-15

## 2020-03-10 RX ORDER — INSULIN LISPRO 100 [IU]/ML
INJECTION, SOLUTION INTRAVENOUS; SUBCUTANEOUS
Qty: 1 VIAL | Refills: 0 | Status: SHIPPED
Start: 2020-03-10 | End: 2020-08-21 | Stop reason: DRUGHIGH

## 2020-03-10 RX ORDER — LORAZEPAM 1 MG/1
1 TABLET ORAL
Qty: 30 TAB | Refills: 0 | Status: SHIPPED | OUTPATIENT
Start: 2020-03-10 | End: 2020-04-09

## 2020-03-10 RX ADMIN — Medication 10 ML: at 06:00

## 2020-03-10 RX ADMIN — TIOTROPIUM BROMIDE INHALATION SPRAY 2 PUFF: 3.12 SPRAY, METERED RESPIRATORY (INHALATION) at 07:38

## 2020-03-10 RX ADMIN — ALBUTEROL SULFATE 2.5 MG: 2.5 SOLUTION RESPIRATORY (INHALATION) at 07:36

## 2020-03-10 RX ADMIN — PANTOPRAZOLE SODIUM 40 MG: 40 TABLET, DELAYED RELEASE ORAL at 06:27

## 2020-03-10 RX ADMIN — BUDESONIDE AND FORMOTEROL FUMARATE DIHYDRATE 2 PUFF: 160; 4.5 AEROSOL RESPIRATORY (INHALATION) at 07:38

## 2020-03-10 RX ADMIN — GABAPENTIN 200 MG: 100 CAPSULE ORAL at 08:44

## 2020-03-10 RX ADMIN — TAMSULOSIN HYDROCHLORIDE 0.4 MG: 0.4 CAPSULE ORAL at 08:46

## 2020-03-10 RX ADMIN — TRAMADOL HYDROCHLORIDE 50 MG: 50 TABLET, FILM COATED ORAL at 08:01

## 2020-03-10 RX ADMIN — BUPROPION HYDROCHLORIDE 150 MG: 150 TABLET, EXTENDED RELEASE ORAL at 08:43

## 2020-03-10 RX ADMIN — SENNOSIDES AND DOCUSATE SODIUM 2 TABLET: 8.6; 5 TABLET ORAL at 08:43

## 2020-03-10 RX ADMIN — INSULIN GLARGINE 22 UNITS: 100 INJECTION, SOLUTION SUBCUTANEOUS at 08:47

## 2020-03-10 NOTE — DIABETES MGMT
Patient admitted with lung cancer s/p surgery. Blood glucose ranged 146-384 yesterday with patient receiving Lantus 20 units and Humalog 22 units. Blood glucose this morning was 181. Per chart review patient refused prandial insulin at breakfast and supper resulting in hyperglycemia. Patient to discharge to rehab today. Reviewed patient discharge summary patiently ordered Lantus 20 units nightly and Novolog SSI with meals. Updated provider via [employer] that patient has been requiring prandial insulin (Humalog 10 units with meals and insulin sensitive SSI). Provider states he will adjust regimen. Attempted to see patient, patient already discharged. Previous diabetes education completed with patient. Primary RN to fax updated discharge summary to rehab. Noted patient received basal insulin this morning and is set as nightly dosing on discharge paperwork. Spoke with rehab RN updated that patient has already received Lantus this AM and giving another dose tonight would likely result in profound hypoglycemia. Primary RN to clarify with provider at rehab.

## 2020-03-10 NOTE — PROGRESS NOTES
END OF SHIFT NOTE:    INTAKE/OUTPUT  03/09 0701 - 03/10 0700  In: 480 [P.O.:480]  Out: 1950 [Urine:1950]  Voiding: YES  Catheter: NO  Drain:              Flatus: Patient does have flatus present. Stool:  1 occurrences. Characteristics:  Stool Assessment  Stool Color: Brown  Stool Appearance: Soft  Stool Amount: Large  Stool Source/Status: Rectum    Emesis: 0 occurrences. Characteristics:        VITAL SIGNS  Patient Vitals for the past 12 hrs:   Temp Pulse Resp BP SpO2   03/10/20 0302 98 °F (36.7 °C) 73 18 144/63 97 %   03/09/20 2351 98 °F (36.7 °C) 65 18 138/64 98 %   03/09/20 2038 -- -- -- -- 98 %   03/09/20 1951 97.8 °F (36.6 °C) 65 18 126/61 98 %       Pain Assessment  Pain Intensity 1: 5 (03/09/20 2021)  Pain Location 1: Hip  Pain Intervention(s) 1: Refused, Repositioned  Patient Stated Pain Goal: 0    Ambulating  Yes    Shift report given to oncoming nurse at the bedside.     Carlos Ferris RN

## 2020-03-10 NOTE — PROGRESS NOTES
TRANSFER - OUT REPORT:    Verbal report given to Heaven(name) on Mardella See  being transferred to [facility](unit) for routine progression of care       Report consisted of patients Situation, Background, Assessment and   Recommendations(SBAR). Information from the following report(s) SBAR, Kardex, Procedure Summary and Recent Results was reviewed with the receiving nurse. Opportunity for questions and clarification was provided.       Patient transported with:   Tech will be picked up by [facility] ambulance

## 2020-03-10 NOTE — PROGRESS NOTES
Progress Note      Patient: Ale Cornejo               Sex: male          MRN: [id number]           YOB: 1940      Age:  70 y.o. PCP:  Paris Krueger MD  Treatment Team: Attending Provider: Willistine Cheadle, MD; Utilization Review: Linzy Alpers, RN; Consulting Provider: Devin Mahmood MD; Consulting Provider: Jared Landeros MD; Care Manager: Lindsey Irvin RN; Consulting Provider: Stephanie Hodges DO; Staff Nurse: Ray Miller; Physical Therapy Assistant: Racquel Schaffer PTA    Admission HPI:      Subjective:      This morning, discussed with the nurse at the bedside, no acute overnight events, his fingerstick glucose is controlled this morning,       Objective:   Physical Exam:   Visit Vitals  /68   Pulse 84   Temp 98 °F (36.7 °C)   Resp 18   Ht 6' 1\" (1.854 m)   Wt 71.5 kg (157 lb 10.1 oz)   SpO2 98%   BMI 20.80 kg/m²      Temp (24hrs), Av °F (36.7 °C), Min:97.5 °F (36.4 °C), Max:98.5 °F (36.9 °C)    Oxygen Therapy  O2 Sat (%): 98 % (03/10/20 0744)  Pulse via Oximetry: 74 beats per minute (03/10/20 0744)  O2 Device: Room air (03/10/20 0744)  O2 Flow Rate (L/min): 0 l/min (20 1512)  FIO2 (%): 21 % (20 0718)  ETCO2 (mmHg): 45 mmHg (20 1140)    Intake/Output Summary (Last 24 hours) at 3/10/2020 0950  Last data filed at 3/10/2020 0802  Gross per 24 hour   Intake 480 ml   Output 2550 ml   Net -2070 ml          General:- Conscious, No acute distress   HENT- Oral Mucosa is Moist,   Lungs- CTA Bilaterally, No significant wheezing  Heart:- S1 S2 regular  Neurologic: - AOX3, No acute FND,  Psych: - Appropriate mood      LAB  Recent Results (from the past 24 hour(s))   GLUCOSE, POC    Collection Time: 20 11:57 AM   Result Value Ref Range    Glucose (POC) 310 (H) 65 - 100 mg/dL   GLUCOSE, POC    Collection Time: 20  1:33 PM   Result Value Ref Range    Glucose (POC) 234 (H) 65 - 100 mg/dL   CBC W/O DIFF    Collection Time: 03/09/20  3:56 PM   Result Value Ref Range    WBC 7.7 4.3 - 11.1 K/uL    RBC 3.61 (L) 4.23 - 5.6 M/uL    HGB 10.2 (L) 13.6 - 17.2 g/dL    HCT 30.4 (L) 41.1 - 50.3 %    MCV 84.2 79.6 - 97.8 FL    MCH 28.3 26.1 - 32.9 PG    MCHC 33.6 31.4 - 35.0 g/dL    RDW 14.5 11.9 - 14.6 %    PLATELET 111 (H) 212 - 450 K/uL    MPV 8.7 (L) 9.4 - 12.3 FL    ABSOLUTE NRBC 0.00 0.0 - 0.2 K/uL   METABOLIC PANEL, COMPREHENSIVE    Collection Time: 03/09/20  3:56 PM   Result Value Ref Range    Sodium 133 (L) 136 - 145 mmol/L    Potassium 4.3 3.5 - 5.1 mmol/L    Chloride 98 98 - 107 mmol/L    CO2 29 21 - 32 mmol/L    Anion gap 6 (L) 7 - 16 mmol/L    Glucose 146 (H) 65 - 100 mg/dL    BUN 18 8 - 23 MG/DL    Creatinine 1.31 0.8 - 1.5 MG/DL    GFR est AA >60 >60 ml/min/1.73m2    GFR est non-AA 56 (L) >60 ml/min/1.73m2    Calcium 8.8 8.3 - 10.4 MG/DL    Bilirubin, total 0.2 0.2 - 1.1 MG/DL    ALT (SGPT) 29 12 - 65 U/L    AST (SGOT) 33 15 - 37 U/L    Alk. phosphatase 279 (H) 50 - 136 U/L    Protein, total 6.7 6.3 - 8.2 g/dL    Albumin 2.4 (L) 3.2 - 4.6 g/dL    Globulin 4.3 (H) 2.3 - 3.5 g/dL    A-G Ratio 0.6 (L) 1.2 - 3.5     GLUCOSE, POC    Collection Time: 03/09/20  4:17 PM   Result Value Ref Range    Glucose (POC) 139 (H) 65 - 100 mg/dL   GLUCOSE, POC    Collection Time: 03/09/20  7:55 PM   Result Value Ref Range    Glucose (POC) 384 (H) 65 - 100 mg/dL   GLUCOSE, POC    Collection Time: 03/09/20  9:02 PM   Result Value Ref Range    Glucose (POC) 341 (H) 65 - 100 mg/dL   GLUCOSE, POC    Collection Time: 03/10/20  7:21 AM   Result Value Ref Range    Glucose (POC) 181 (H) 65 - 100 mg/dL       No results found. No results found.     All Micro Results     None          Current Medications Reviewed    Current Facility-Administered Medications:     albuterol (PROVENTIL VENTOLIN) nebulizer solution 2.5 mg, 2.5 mg, Nebulization, Q6H PRN, Ciesco, [place],     insulin glargine (LANTUS) injection 22 Units, 22 Units, SubCUTAneous, DAILY, Apple, Cristy Flores MD, 22 Units at 03/10/20 0847    insulin lispro (HUMALOG) injection 10 Units, 10 Units, SubCUTAneous, TID WITH MEALS, Cristy Shen MD, 10 Units at 03/09/20 1212    insulin lispro (HUMALOG) injection 0-5 Units, 0-5 Units, SubCUTAneous, AC&HS, Cristy Shen MD, 4 Units at 03/09/20 2133    budesonide-formoteroL (SYMBICORT) 160-4.5 mcg/actuation HFA inhaler 2 Puff, 2 Puff, Inhalation, BID RT, Ciesco, [place], DO, 2 Puff at 03/10/20 0738    HYDROmorphone (PF) (DILAUDID) injection 0.2 mg, 0.2 mg, IntraVENous, Q6H PRN, Faye Mg MD    naloxone [facility]) injection 1 mg, 1 mg, IntraVENous, PRN, Faye Mg MD    traMADol Danya Carolina) tablet 50 mg, 50 mg, Oral, Q6H PRN, Annette BOCANEGRA NP, 50 mg at 03/10/20 0801    LORazepam (ATIVAN) tablet 1 mg, 1 mg, Oral, BID PRN, Kobi Adorno MD    gabapentin (NEURONTIN) capsule 200 mg, 200 mg, Oral, TID, Kobi Adorno MD, 200 mg at 03/10/20 0844    acetaminophen (TYLENOL) tablet 1,000 mg, 1,000 mg, Oral, Q6H PRN, Kobi Adorno MD, 1,000 mg at 03/09/20 0902    tamsulosin (FLOMAX) capsule 0.4 mg, 0.4 mg, Oral, DAILY, Frommel, Kimberly A, NP, 0.4 mg at 03/10/20 0846    sodium chloride (NS) flush 5-40 mL, 5-40 mL, IntraVENous, PRN, Anisa Damian MD, 10 mL at 03/07/20 0415    dextrose (D50W) injection syrg 25 g, 25 g, IntraVENous, PRN, Tali Bond MD, 25 g at 03/06/20 0717    buPROPion SR [facility] SR) tablet 150 mg, 150 mg, Oral, BID, Kobi Adorno MD, 150 mg at 03/10/20 4104    atorvastatin (LIPITOR) tablet 10 mg, 10 mg, Oral, QHS, Kobi Adorno MD, 10 mg at 03/09/20 2132    [Held by provider] lisinopril (PRINIVIL, ZESTRIL) tablet 5 mg, 5 mg, Oral, DAILY, Kobi Adorno MD, 5 mg at 03/09/20 0900    [Held by provider] metoprolol tartrate (LOPRESSOR) tablet 50 mg, 50 mg, Oral, BID, Kobi Adorno MD, 50 mg at 03/09/20 0905    sodium chloride (NS) flush 5-40 mL, 5-40 mL, IntraVENous, Q8H, Donna Braun MD, 10 mL at 03/10/20 0600    promethazine (PHENERGAN) with saline injection 12.5 mg, 12.5 mg, IntraVENous, Q6H PRN, Edna Roque MD    senna-docusate (PERICOLACE) 8.6-50 mg per tablet 2 Tab, 2 Tab, Oral, BID, Edna Roque MD, 2 Tab at 03/10/20 0843    enoxaparin (LOVENOX) injection 40 mg, 40 mg, SubCUTAneous, Q24H, Edna Roque MD, 40 mg at 03/09/20 2132    pantoprazole (PROTONIX) tablet 40 mg, 40 mg, Oral, ACB, Edna Roque MD, 40 mg at 03/10/20 4338    hydrALAZINE (APRESOLINE) 20 mg/mL injection 20 mg, 20 mg, IntraVENous, Q6H PRN, Edna Roque MD    tiotropium bromide (SPIRIVA RESPIMAT) 2.5 mcg Carlyle Blackwood, 2 [place], Inhalation, DAILY, Edna Roque MD, 2 Puff at 03/10/20 4173    NUTRITIONAL SUPPORT ELECTROLYTE PRN ORDERS, , Does Not Apply, PRN, Edna Roque MD      Assessment/Plan     Principal Problem:    Lung cancer ([facility] (1/15/2020)    Active Problems:    HTN (hypertension) (4/29/2015)      Type 2 diabetes mellitus, with long-term current use of insulin ([facility] (1/26/2020)        Plan: For his diabetes management at home patient reports that he takes Lantus 20 units in the nighttime which he can continue and he monitors his fingerstick glucose before meals and decides how much Humalog insulin he takes, I have asked the patient to monitor his fingerstick glucose closely and continue with that current regimen which he did does at home and he can report to his PCP if his fingerstick glucose running high. Current Discharge Medication List      START taking these medications    Details   enoxaparin (LOVENOX) 40 mg/0.4 mL 0.4 mL by SubCUTAneous route every twenty-four (24) hours for 14 days. Qty: 5.6 mL, Refills: 0      pantoprazole (PROTONIX) 40 mg tablet Take 1 Tab by mouth Daily (before breakfast) for 30 days. Qty: 30 Tab, Refills: 0      tamsulosin (FLOMAX) 0.4 mg capsule Take 1 Cap by mouth daily for 30 days. Qty: 30 Cap, Refills: 0      traMADoL (ULTRAM) 50 mg tablet Take 1 Tab by mouth every six (6) hours as needed for Pain for up to 5 days. Max Daily Amount: 200 mg.   Qty: 20 Tab, Refills: 0    Associated Diagnoses: Malignant neoplasm of upper lobe of left lung ([facility]         CONTINUE these medications which have CHANGED    Details   LORazepam (ATIVAN) 1 mg tablet Take 1 Tab by mouth every eight (8) hours as needed for Anxiety for up to 30 days. Max Daily Amount: 3 mg. Qty: 30 Tab, Refills: 0    Associated Diagnoses: Anxiety disorder, unspecified type         CONTINUE these medications which have NOT CHANGED    Details   metoprolol tartrate (LOPRESSOR) 50 mg tablet Take  by mouth two (2) times a day. buPROPion XL (WELLBUTRIN XL) 150 mg tablet Take 150 mg by mouth every morning. insulin aspart U-100 (NOVOLOG FLEXPEN U-100 INSULIN) 100 unit/mL (3 mL) inpn With meals tid. For Blood Sugar (mg/dL) of:     Less than 150 =   0 units           150 -199 =   2 units  200 -249 =   5 units  250 -299 =   8 units  300 -349 =   10 units  350 and above = 12 units  Qty: 1 Pen, Refills: 0      insulin glargine (LANTUS SOLOSTAR U-100 INSULIN) 100 unit/mL (3 mL) inpn 20 Units by SubCUTAneous route nightly. ferrous sulfate 325 mg (65 mg iron) tablet Take 1 Tab by mouth two (2) times daily (with meals). Qty: 60 Tab, Refills: 0      LIPITOR 10 mg Tab Take 10 mg by mouth nightly. albuterol (PROVENTIL VENTOLIN) 2.5 mg /3 mL (0.083 %) nebu 2.5 mg by Nebulization route every four to six (4-6) hours as needed for Other (For SOB or wheezing ). albuterol-ipratropium (DUO-NEB) 2.5 mg-0.5 mg/3 ml nebu 3 mL by Nebulization route three (3) times daily. Qty: 90 Nebule, Refills: 0      fluticasone propion-salmeterol (ADVAIR/WIXELA) 250-50 mcg/dose diskus inhaler Take 1 Puff by inhalation two (2) times a day. Indications: Bronchospasm Prevention with COPD  Qty: 1 Inhaler, Refills: 0      tiotropium (SPIRIVA) 18 mcg inhalation capsule Take 1 Cap by inhalation daily.   Qty: 30 Cap, Refills: 0         STOP taking these medications       lisinopril (PRINIVIL, ZESTRIL) 5 mg tablet Comments:   Reason for Stopping:                 Branden Talbert MD  March 10, 2020

## 2020-03-10 NOTE — DISCHARGE SUMMARY
Shannen Barkley  MRN: [id number]     : 1940     Age: 70 y.o. Admit date: 2020     Discharge date: 3/10/2020   Attending Physician: Dr. Arin Barragan MD  Primary Discharge Diagnosis:   Principal Problem:    Lung cancer ([facility] (1/15/2020)    Active Problems:    HTN (hypertension) (2015)      Type 2 diabetes mellitus, with long-term current use of insulin ([facility] (2020)      Primary Operations or Procedures Performed :  Procedure(s):  LEFT VATS WITH LEFT UPPER LOBECTOMY/ , MEDIASTINAL LYMPHADENECTOMY/CYRO     Brief History and Reason for Admission: Shannen Barkley was admitted with the following history of present illness. Raphael Kellogg is a 70 y.o. male who is back to discuss his surgery. His surgery was canceled last month due to active pneumonia. He was also admitted recently due to hypoglycemic and syncope episode. He is still weak and he lives alone. Today, he has no respiratory symptoms, he feels well overall.      On 12/10/2019, he was referred by pulmonary for evaluation of a lung mass involving the Left upper lobe.  The tumor was identified incidentally by routine chest xray and then proceeded to CT scan.  The pathology showed a adenocarcinoma and lymph node 10L suspicious for malignancy.  Currently, he complains of cough with clear secreations.  He denies chest pain, shortness of breath and hemoptysis. He  does have a history of smoking and quit 1 month ago.  He does not have a history of drinking. Denies any chest pain     He does not have a family history of cancer.  Medical history - DM, Htn, COPD . He has had previous chest surgery- collapsed left lung with chest tubes. Other surgeries include exploratory laparotomy for SBO.  does take blood thinner.- Plavix, but unsure why he takes this, denies any stent or a-fib, denies history of CVA, states has been taking for a long time.             Hospital Course:  Uneventful for surgery.  Patient diabetes not well controlled. Managed by hospitalist during this stay. Extended stay related to approval of SNF. Patient continues to be weak and still needs teaching with glucose control. Discharged in stable condition on room air. Condition at Discharge: good    Discharge Medications:   Current Discharge Medication List      START taking these medications    Details   enoxaparin (LOVENOX) 40 mg/0.4 mL 0.4 mL by SubCUTAneous route every twenty-four (24) hours for 14 days. Qty: 5.6 mL, Refills: 0      pantoprazole (PROTONIX) 40 mg tablet Take 1 Tab by mouth Daily (before breakfast) for 30 days. Qty: 30 Tab, Refills: 0      tamsulosin (FLOMAX) 0.4 mg capsule Take 1 Cap by mouth daily for 30 days. Qty: 30 Cap, Refills: 0      traMADoL (ULTRAM) 50 mg tablet Take 1 Tab by mouth every six (6) hours as needed for Pain for up to 5 days. Max Daily Amount: 200 mg. Qty: 20 Tab, Refills: 0    Associated Diagnoses: Malignant neoplasm of upper lobe of left lung ([address]         CONTINUE these medications which have CHANGED    Details   LORazepam (ATIVAN) 1 mg tablet Take 1 Tab by mouth every eight (8) hours as needed for Anxiety for up to 30 days. Max Daily Amount: 3 mg. Qty: 30 Tab, Refills: 0    Associated Diagnoses: Anxiety disorder, unspecified type         CONTINUE these medications which have NOT CHANGED    Details   metoprolol tartrate (LOPRESSOR) 50 mg tablet Take  by mouth two (2) times a day. buPROPion XL (WELLBUTRIN XL) 150 mg tablet Take 150 mg by mouth every morning. insulin aspart U-100 (NOVOLOG FLEXPEN U-100 INSULIN) 100 unit/mL (3 mL) inpn With meals tid. For Blood Sugar (mg/dL) of:     Less than 150 =   0 units           150 -199 =   2 units  200 -249 =   5 units  250 -299 =   8 units  300 -349 =   10 units  350 and above = 12 units  Qty: 1 Pen, Refills: 0      insulin glargine (LANTUS SOLOSTAR U-100 INSULIN) 100 unit/mL (3 mL) inpn 20 Units by SubCUTAneous route nightly.       ferrous sulfate 325 mg (65 mg iron) tablet Take 1 Tab by mouth two (2) times daily (with meals). Qty: 60 Tab, Refills: 0      LIPITOR 10 mg Tab Take 10 mg by mouth nightly. albuterol (PROVENTIL VENTOLIN) 2.5 mg /3 mL (0.083 %) nebu 2.5 mg by Nebulization route every four to six (4-6) hours as needed for Other (For SOB or wheezing ). albuterol-ipratropium (DUO-NEB) 2.5 mg-0.5 mg/3 ml nebu 3 mL by Nebulization route three (3) times daily. Qty: 90 Nebule, Refills: 0      fluticasone propion-salmeterol (ADVAIR/WIXELA) 250-50 mcg/dose diskus inhaler Take 1 Puff by inhalation two (2) times a day. Indications: Bronchospasm Prevention with COPD  Qty: 1 Inhaler, Refills: 0      tiotropium (SPIRIVA) 18 mcg inhalation capsule Take 1 Cap by inhalation daily. Qty: 30 Cap, Refills: 0         STOP taking these medications       lisinopril (PRINIVIL, ZESTRIL) 5 mg tablet Comments:   Reason for Stopping:                 Disposition/Discharge Instructions/Follow-up Care: Follow-up with Dr. Delbert Mahajan.  Will need to follow up with diabetes management - may want to go to endocrinologist for management  Keep incisions clean and dry, may remain uncovered. Leave steri strips in place- ok to shower  Do not apply lotions, creams or ointments to incisions.     Diet - as tolerated  Activity - ambulate - as tolerated - Physical therapy. May shower - no tub baths or soaking/submerging.     Resume other home medications.   Tylenol or Ultram for pain  Take Lovenox as ordered- hold Plavix for now  Take Rx as prescribed   Take stool softeners while on narcotics to avoid constipation     If problems or questions arise, please call our office at [phone].     Greater than 30 minutes were spent discharging the patient            Signed:  Zaida Almodovar NP  3/10/2020  10:06 AM

## 2020-03-10 NOTE — DISCHARGE INSTRUCTIONS
Discharge Instructions/Follow-up Plans:   MD Instructions:     Follow-up with Dr. aTcos Pereira.  Will need to follow up with diabetes management - may want to go to endocrinologist for management  Keep incisions clean and dry, may remain uncovered. Leave steri strips in place- ok to shower  Do not apply lotions, creams or ointments to incisions.     Diet - as tolerated  Activity - ambulate - as tolerated - Physical therapy. May shower - no tub baths or soaking/submerging.     Resume other home medications. Tylenol or Ultram for pain  Take Lovenox as ordered- hold Plavix for now  Take Rx as prescribed   Take stool softeners while on narcotics to avoid constipation     If problems or questions arise, please call our office at [phone].     Greater than 30 minutes were spent discharging the patient                  NUTRITION       Continue Oral Nutrition Supplement (ONS) at discharge. Recommend Glucerna or a comparable/similar product Once daily as bedtime snack for 30 days unless otherwise directed by your Primary Care Physician.      Roxan Cogan, RD, LD

## 2020-03-11 ENCOUNTER — PATIENT OUTREACH (OUTPATIENT)
Dept: CASE MANAGEMENT | Age: 80
End: 2020-03-11

## 2020-03-11 NOTE — PROGRESS NOTES
This note will not be viewable in [address] Patient discharged to [facility] on 03/10/2020. Patient discharged to a [facility] facility. Patient will be included in weekly care coordination calls. Information forwarded to Mundo Jean RN, [facility] RN Care Manager.

## 2020-03-23 ENCOUNTER — PATIENT OUTREACH (OUTPATIENT)
Dept: CASE MANAGEMENT | Age: 80
End: 2020-03-23

## 2020-03-23 NOTE — PROGRESS NOTES
Community Care Team documentation for patient in [facility]    The information below provided by:Estrella    PT Update: PT: SBA bed mobility CGA ambuolate 170ft (fair plus Dynamic Stand balance); CGA transfers, 3 steps min A. OT: set-up grooming/hygiene; min A UB dressing and TB bathing; mod A LB dressing; CGA with toileting        Nursing Update:3/16/2020 tramadol and orco ordered prn for pain.       Discharge Date:TBD    Assign to [address] [facility]

## 2020-03-27 ENCOUNTER — HOSPITAL ENCOUNTER (OUTPATIENT)
Dept: LAB | Age: 80
Discharge: HOME | End: 2020-03-27
Payer: MEDICARE

## 2020-03-27 DIAGNOSIS — C34.12 MALIGNANT NEOPLASM OF UPPER LOBE OF LEFT LUNG (HCC): ICD-10-CM

## 2020-03-27 LAB
ALBUMIN SERPL-MCNC: 2.9 G/DL (ref 3.2–4.6)
ALBUMIN/GLOB SERPL: 0.6 {RATIO} (ref 1.2–3.5)
ALP SERPL-CCNC: 327 U/L (ref 50–136)
ALT SERPL-CCNC: 32 U/L (ref 12–65)
ANION GAP SERPL CALC-SCNC: 3 MMOL/L (ref 7–16)
AST SERPL-CCNC: 29 U/L (ref 15–37)
BASOPHILS # BLD: 0 K/UL (ref 0–0.2)
BASOPHILS NFR BLD: 1 % (ref 0–2)
BILIRUB SERPL-MCNC: 0.3 MG/DL (ref 0.2–1.1)
BUN SERPL-MCNC: 8 MG/DL (ref 8–23)
CALCIUM SERPL-MCNC: 9.3 MG/DL (ref 8.3–10.4)
CHLORIDE SERPL-SCNC: 98 MMOL/L (ref 98–107)
CO2 SERPL-SCNC: 31 MMOL/L (ref 21–32)
CREAT SERPL-MCNC: 1.02 MG/DL (ref 0.8–1.5)
DIFFERENTIAL METHOD BLD: ABNORMAL
EOSINOPHIL # BLD: 0.1 K/UL (ref 0–0.8)
EOSINOPHIL NFR BLD: 2 % (ref 0.5–7.8)
ERYTHROCYTE [DISTWIDTH] IN BLOOD BY AUTOMATED COUNT: 13.5 % (ref 11.9–14.6)
GLOBULIN SER CALC-MCNC: 4.7 G/DL (ref 2.3–3.5)
GLUCOSE SERPL-MCNC: 226 MG/DL (ref 65–100)
HCT VFR BLD AUTO: 34.3 % (ref 41.1–50.3)
HGB BLD-MCNC: 11.3 G/DL (ref 13.6–17.2)
IMM GRANULOCYTES # BLD AUTO: 0 K/UL (ref 0–0.5)
IMM GRANULOCYTES NFR BLD AUTO: 0 % (ref 0–5)
LYMPHOCYTES # BLD: 1.8 K/UL (ref 0.5–4.6)
LYMPHOCYTES NFR BLD: 33 % (ref 13–44)
MCH RBC QN AUTO: 27.3 PG (ref 26.1–32.9)
MCHC RBC AUTO-ENTMCNC: 32.9 G/DL (ref 31.4–35)
MCV RBC AUTO: 82.9 FL (ref 79.6–97.8)
MONOCYTES # BLD: 0.5 K/UL (ref 0.1–1.3)
MONOCYTES NFR BLD: 9 % (ref 4–12)
NEUTS SEG # BLD: 3 K/UL (ref 1.7–8.2)
NEUTS SEG NFR BLD: 55 % (ref 43–78)
NRBC # BLD: 0 K/UL (ref 0–0.2)
PLATELET # BLD AUTO: 354 K/UL (ref 150–450)
PMV BLD AUTO: 8.5 FL (ref 9.4–12.3)
POTASSIUM SERPL-SCNC: 4.3 MMOL/L (ref 3.5–5.1)
PROT SERPL-MCNC: 7.6 G/DL (ref 6.3–8.2)
RBC # BLD AUTO: 4.14 M/UL (ref 4.23–5.67)
SODIUM SERPL-SCNC: 132 MMOL/L (ref 136–145)
WBC # BLD AUTO: 5.4 K/UL (ref 4.3–11.1)

## 2020-03-27 PROCEDURE — 36415 COLL VENOUS BLD VENIPUNCTURE: CPT

## 2020-03-27 PROCEDURE — 85025 COMPLETE CBC W/AUTO DIFF WBC: CPT

## 2020-03-27 PROCEDURE — 80053 COMPREHEN METABOLIC PANEL: CPT

## 2020-04-13 NOTE — PROGRESS NOTES
Because of the COVID-19 pandemic, I spoke with Jean Hernandez via the telephone regarding his current [facility] insurance plan, potential oral medication authorizations, enrollment in the [address] [place], [place] [address]able e ([facility]) and the [address] [place] [address] ([address]), and assistance organization resource sheet. At this time, Jean Hernandez would like to speak with his family before making a decision to enroll into the [address] [place], [place] [address]able Ave and the [facility]. I spoke with Jean David regarding the Oncology Care Model Notification Letter. I answered questions regarding the costs associated with Medicare Benefits. I explained to Jean Hernandez the estimated cost of treatment and services for six months under the HD[employer]aging. Jean Hernandez was advised to contact Medicare or their healthcare provider for questions or concerns related to service of care. The Oncology Care Model provides different options of contact for Jean Hernandez regarding concerns and complaints of treatments and services. Next, I spoke with Jean Hernandez regarding his insurance questions. I answered questions about medical treatments and services. Next, I spoke with Jean Hernandez regarding the financial assistance application. At this time, Jean Hernandez would like to speak to his family before applying for the financial assistance application. Next, I spoke with Jean Hernandez regarding his Medicare Part D Prescriptions. Next, I spoke with Jean Hernandez about deductibles, copayments, and out of pocket maximums regarding his current [facility] insurance plan. Next, I spoke with Jean Hernandez regarding the Limited [employer] of [employer] document. After reading the form over the telephone to Jean David, he would like to speak to his family before making a decision to sign the Limited Power of [employer] document.   Next, I spoke with Jean Hernandez regarding potential oral medication authorizations. I told him that if he ever had any problems getting his oral medications filled, to give the dedicated [facility]  a call. Most of the time, it is simply an authorization that needs to be done with the insurance company. Next, I spoke with Wilberto Decker regarding enrolling with [facility]. I went over some of the services that [facility] offers and the enrollment process. Next, I spoke with Wilberto Decker regarding information on flyers about the free Yoga classes for cancer survivors and the Oncology Massage program.  I let him know that he can get a free 30 minute massage. Lastly, I spoke with Wilberto Decker regarding a form with various resource organizations that could assist with specific needs (example:  transportation, lodging, preparing meals, home cleaning)     I am scheduled to follow-up with Wilberto Decker at his next visit. Wilberto Decker expressed understanding of the information above and all questions were answered to his satisfaction.

## 2020-05-16 NOTE — PROGRESS NOTES
TRANSFER - OUT REPORT:    Verbal report given to Jordan Tineo RN on Pushpa  being transferred to Pre-Op for routine post - op       Report consisted of patients Situation, Background, Assessment and   Recommendations(SBAR). Information from the following report(s) SBAR, Kardex, Procedure Summary and MAR was reviewed with the receiving nurse. Lines:   Peripheral IV 02/26/20 Right Hand (Active)   Site Assessment Clean, dry, & intact 2/26/2020  9:22 AM   Phlebitis Assessment 0 2/26/2020  9:22 AM   Infiltration Assessment 0 2/26/2020  9:22 AM   Dressing Status Clean, dry, & intact 2/26/2020  9:22 AM   Dressing Type Transparent;Tape 2/26/2020  9:22 AM   Hub Color/Line Status Green; Infusing 2/26/2020  9:22 AM       Peripheral IV 02/26/20 Left Arm (Active)   Site Assessment Clean, dry, & intact 2/26/2020  9:23 AM   Phlebitis Assessment 0 2/26/2020  9:23 AM   Infiltration Assessment 0 2/26/2020  9:23 AM   Dressing Status Clean, dry, & intact 2/26/2020  9:23 AM   Dressing Type Tape;Transparent 2/26/2020  9:23 AM   Hub Color/Line Status Capped;Flushed 2/26/2020  9:23 AM        Opportunity for questions and clarification was provided. No pertinent family history in first degree relatives

## 2020-08-13 PROBLEM — E11.65 TYPE 2 DIABETES MELLITUS WITH HYPERGLYCEMIA, WITH LONG-TERM CURRENT USE OF INSULIN (HCC): Status: ACTIVE | Noted: 2020-01-26

## 2020-09-23 ENCOUNTER — HOSPITAL ENCOUNTER (OUTPATIENT)
Dept: CT IMAGING | Age: 80
Discharge: HOME | End: 2020-09-23
Attending: SURGERY
Payer: MEDICARE

## 2020-09-23 DIAGNOSIS — C34.12 MALIGNANT NEOPLASM OF UPPER LOBE OF LEFT LUNG (HCC): ICD-10-CM

## 2020-09-23 PROCEDURE — 74011000258 HC RX REV CODE- 258: Performed by: SURGERY

## 2020-09-23 PROCEDURE — 71260 CT THORAX DX C+: CPT

## 2020-09-23 PROCEDURE — 74011000636 HC RX REV CODE- 636: Performed by: SURGERY

## 2020-09-23 RX ORDER — SODIUM CHLORIDE 0.9 % (FLUSH) 0.9 %
10 SYRINGE (ML) INJECTION
Status: COMPLETED | OUTPATIENT
Start: 2020-09-23 | End: 2020-09-23

## 2020-09-23 RX ADMIN — Medication 10 ML: at 14:48

## 2020-09-23 RX ADMIN — IOPAMIDOL 80 ML: 755 INJECTION, SOLUTION INTRAVENOUS at 14:48

## 2020-09-23 RX ADMIN — SODIUM CHLORIDE 100 ML: 900 INJECTION, SOLUTION INTRAVENOUS at 14:48

## 2020-10-05 ENCOUNTER — HOSPITAL ENCOUNTER (OUTPATIENT)
Dept: LAB | Age: 80
Discharge: HOME | End: 2020-10-05
Payer: MEDICARE

## 2020-10-05 DIAGNOSIS — C34.12 MALIGNANT NEOPLASM OF UPPER LOBE OF LEFT LUNG (HCC): ICD-10-CM

## 2020-10-05 LAB
ALBUMIN SERPL-MCNC: 3.6 G/DL (ref 3.2–4.6)
ALBUMIN/GLOB SERPL: 0.9 {RATIO} (ref 1.2–3.5)
ALP SERPL-CCNC: 652 U/L (ref 50–136)
ALT SERPL-CCNC: 109 U/L (ref 12–65)
ANION GAP SERPL CALC-SCNC: 8 MMOL/L (ref 7–16)
AST SERPL-CCNC: 87 U/L (ref 15–37)
BASOPHILS # BLD: 0 K/UL (ref 0–0.2)
BASOPHILS NFR BLD: 1 % (ref 0–2)
BILIRUB SERPL-MCNC: 0.6 MG/DL (ref 0.2–1.1)
BUN SERPL-MCNC: 16 MG/DL (ref 8–23)
CALCIUM SERPL-MCNC: 9 MG/DL (ref 8.3–10.4)
CEA SERPL-MCNC: 10.6 NG/ML (ref 0–3)
CHLORIDE SERPL-SCNC: 102 MMOL/L (ref 98–107)
CO2 SERPL-SCNC: 22 MMOL/L (ref 21–32)
CREAT SERPL-MCNC: 1.2 MG/DL (ref 0.8–1.5)
DIFFERENTIAL METHOD BLD: ABNORMAL
EOSINOPHIL # BLD: 0.1 K/UL (ref 0–0.8)
EOSINOPHIL NFR BLD: 2 % (ref 0.5–7.8)
ERYTHROCYTE [DISTWIDTH] IN BLOOD BY AUTOMATED COUNT: 13.2 % (ref 11.9–14.6)
GLOBULIN SER CALC-MCNC: 3.8 G/DL (ref 2.3–3.5)
GLUCOSE SERPL-MCNC: 156 MG/DL (ref 65–100)
HCT VFR BLD AUTO: 41.8 % (ref 41.1–50.3)
HGB BLD-MCNC: 13.9 G/DL (ref 13.6–17.2)
IMM GRANULOCYTES # BLD AUTO: 0 K/UL (ref 0–0.5)
IMM GRANULOCYTES NFR BLD AUTO: 0 % (ref 0–5)
LYMPHOCYTES # BLD: 2.4 K/UL (ref 0.5–4.6)
LYMPHOCYTES NFR BLD: 56 % (ref 13–44)
MCH RBC QN AUTO: 27.1 PG (ref 26.1–32.9)
MCHC RBC AUTO-ENTMCNC: 33.3 G/DL (ref 31.4–35)
MCV RBC AUTO: 81.6 FL (ref 79.6–97.8)
MONOCYTES # BLD: 0.3 K/UL (ref 0.1–1.3)
MONOCYTES NFR BLD: 7 % (ref 4–12)
NEUTS SEG # BLD: 1.5 K/UL (ref 1.7–8.2)
NEUTS SEG NFR BLD: 34 % (ref 43–78)
NRBC # BLD: 0 K/UL (ref 0–0.2)
PLATELET # BLD AUTO: 221 K/UL (ref 150–450)
PMV BLD AUTO: 9.4 FL (ref 9.4–12.3)
POTASSIUM SERPL-SCNC: 4.7 MMOL/L (ref 3.5–5.1)
PROT SERPL-MCNC: 7.4 G/DL (ref 6.3–8.2)
RBC # BLD AUTO: 5.12 M/UL (ref 4.23–5.67)
SODIUM SERPL-SCNC: 132 MMOL/L (ref 136–145)
WBC # BLD AUTO: 4.3 K/UL (ref 4.3–11.1)

## 2020-10-05 PROCEDURE — 82378 CARCINOEMBRYONIC ANTIGEN: CPT

## 2020-10-05 PROCEDURE — 85025 COMPLETE CBC W/AUTO DIFF WBC: CPT

## 2020-10-05 PROCEDURE — 80053 COMPREHEN METABOLIC PANEL: CPT

## 2020-10-05 PROCEDURE — 36415 COLL VENOUS BLD VENIPUNCTURE: CPT

## 2020-11-17 ENCOUNTER — PATIENT OUTREACH (OUTPATIENT)
Dept: CASE MANAGEMENT | Age: 80
End: 2020-11-17

## 2020-12-16 ENCOUNTER — APPOINTMENT (RX ONLY)
Dept: URBAN - METROPOLITAN AREA CLINIC 23 | Facility: CLINIC | Age: 80
Setting detail: DERMATOLOGY
End: 2020-12-16

## 2020-12-16 DIAGNOSIS — L82.0 INFLAMED SEBORRHEIC KERATOSIS: ICD-10-CM

## 2020-12-16 PROCEDURE — ? COUNSELING

## 2020-12-16 PROCEDURE — 99202 OFFICE O/P NEW SF 15 MIN: CPT

## 2020-12-16 ASSESSMENT — LOCATION DETAILED DESCRIPTION DERM: LOCATION DETAILED: RIGHT PROXIMAL PRETIBIAL REGION

## 2020-12-16 ASSESSMENT — LOCATION ZONE DERM: LOCATION ZONE: LEG

## 2020-12-16 ASSESSMENT — LOCATION SIMPLE DESCRIPTION DERM: LOCATION SIMPLE: RIGHT PRETIBIAL REGION

## 2021-03-06 NOTE — PROGRESS NOTES
VIDEO VISIT PROGRESS NOTE      CHIEF COMPLAINT  Chief Complaint   Patient presents with   • Video Visit   • Nausea   • Gastro-intestinal Problem       SUBJECTIVE  Marcos Maldonado is a 33 year old/male who is requesting a Video Visit (V-Visit) for evaluation of nausea, vomiting and diarrhea.  Patient indicates symptoms began late Friday evening and patient had symptoms until 4 in the morning.  He does feel symptoms are improving as he has not had an emesis for “awhile “.  Is still having diarrhea with the last episode approximately 2 hours prior to visit.  Is able to take in fluids in indicates he is urinating.  Patient notes his son and wife have similar symptoms.  Patient is requesting a work excuse.    MEDICATIONS  No current outpatient medications on file.     No current facility-administered medications for this visit.        HISTORIES  I have personally reviewed and updated the following electronic medical record sections: Current medications, Allergies, Problem list, Past Medical History, Past Surgical History, Social History and Family History    REVIEW OF SYSTEMS  Constitutional:  Denies fever, chills  Gastrointestinal:  As per HPI        PHYSICAL EXAM  Information acquired with patient assistance, demonstration and feedback due to two-way video visit method of visit.  Vital Signs Per Patient: There were no vitals taken for this visit.     Constitutional: well-nourished, well-developed, well-appearing. non-anxious, normal affect  Ears, nose, mouth, throat: normocephalic, atraumatic, external ears normal by inspection  Eyes: no proptosis, extra-ocular eye movement intact, normal sclerae, conjunctivae not injected  Neck: No visible goiter, range of motion of neck appears normal  Respiratory: No increased respiratory effort.  No evidence of using accessory muscles. Able to speak in complete sentences without any evidence of dyspnea.  Skin: appears pink and dry    ASSESSMENT/PLAN  Gastroenteritis, acute    3/5/2020    PLAN:  Diet- Diabetic  DVT Prophylactics- SCD/Lovenox  Pain control- Tylenol/ ultram  SUP prophylaxis- Protonix  Encourage C/DB/IS  Anterior chest tube removed 2/28/20  Posterior Chest tube removed 3/2/20  Diabetes managed by hospitalist and diabetic educator  PT/OT  Social service for discharge [employer] denied will try appeal.        ASSESSMENT:  Admit Date: 2/26/2020   8 Day Post-Op  Procedure(s):  LEFT VATS WITH LEFT UPPER LOBECTOMY/ , MEDIASTINAL LYMPHADENECTOMY/CYRO       DIAGNOSIS   A: \"# 9 LYMPH NODE X3\":   TWO FRAGMENTS OF ANTHRACOTIC LYMPH NODE NEGATIVE FOR METASTATIC TUMOR   B: \"# 8 LYMPH NODE X2\":   THREE FRAGMENTS OF ANTHRACOTIC LYMPH NODE NEGATIVE FOR METASTATIC TUMOR   C: \"AP WINDOW LYMPH NODE\":   ONE FRAGMENT OF LYMPH NODE NEGATIVE FOR METASTATIC TUMOR   D: \"# 7 LYMPH NODE X3\":   FOUR FRAGMENTS OF ANTHRACOTIC LYMPH NODE NEGATIVE FOR METASTATIC TUMOR   E: \"PERIHILAR\":   ONE FRAGMENT OF ANTHRACOTIC LYMPH NODE NEGATIVE FOR METASTATIC TUMOR   F: \"LEFT UPPER LOBECTOMY\":   ADENOCARCINOMA, ENTERIC TYPE (SEE COMMENT), MEASURING APPROXIMATELY 7 X 6.5 X 4 CM. MACROMETASTATIC CARCINOMA TO ONE OF SIX PERIBRONCHIAL LYMPH NODES WITH FOCAL EXTRACAPSULAR EXTENSION. BRONCHIAL AND VASCULAR MARGINS OF RESECTION ARE NEGATIVE FOR MALIGNANT TUMOR. DEFINITE LYMPHOVASCULAR INVASION IS NOT IDENTIFIED. PLEURAL SURFACE IS NEGATIVE FOR MALIGNANT TUMOR. Comment   The tumor       SUBJECTIVE:  02/27/2020-  No complaints. Pain controlled. On room air 100%. Chest tubes without airleak noted. Tolerating diet. Will adjust insulin dose. Transfer to floor and chest tubes to suction.     02/28/2020-  No complaints. Pain controlled. On room air 100%. Chest tubes without airleak noted. Pulling 1200 on IS. Tolerating diet  02/29/2020-  No complaints. Pain controlled. On room air.  Posterior Chest tube to water seal. CXR this am showing New left apical   Supportive measures discussed including indications to seek face-to-face evaluation.  Work excuse provided per patient request    FOLLOW UP  Return if symptoms worsen or fail to improve.    CONSENT   This visit is being performed via video to discuss Video Visit, Nausea, and Gastro-intestinal Problem    Clinician Location: [facility]    Marcos is in [place] and his identity has been established.   He understands that we are limiting office visits due to the coronavirus pandemic and was informed that consent to treat includes permission to submit charges to his insurance. It was also shared that without being seen and evaluated in person, there is a risk that the information and/or assessment may be incomplete or inaccurate.  5-10 minutes were spent in this encounter.         COLE Bass  03/06/21  2:13 PM         pneumothorax status post removal of anterior chest tube. Tolerating diet. AF, NAD.   03/01/2020-  Pt awake in bed. Rapid response called yesterday due to lethargy which improved with Narcan. Remaining chest tube to water seal - clamped today. CXR this am showing stable apical pneumothorax. Patel replaced again yesterday due to urinary retention. Tolerating Diabetic diet. +flatus/+BM. AF, NAD.   03/02/2020 Patient in bed without complaints of pain to right hip/buttock area. Area assessed. Tender to palpation, no skin redness or breakdown noted. Chest tube clamped. No SOB noted. Unclamped no air leak noted. Will DC remaining chest tube and get follow up xray. Social work for discharge planning. Glucose still uncontrolled with adjust lantus. Patel remains for 1 week related to  attempts of removal with urinary retention  Posterior chest tube removed at end inspiration. Dressing applied. Patient tolerated well. CXR ordered for 4 hours  03/03/2020 Patient away at Xray this AM- hip xray negative. Returned this evening and Patient in restroom. Did not see patient- nurse states no issues. - chest xray reviewed. Awaiting snf placement. 03/04/2020 Right hip less painful today. No complaints. Xray reviewed. Awaiting insurance approval.  03/05/2020 Hypoglycemic this AM. Hospitalist and diabetic education managing insulin. [employer] denial for placement. Stating unable to appeal.  Offered to do Peer to Peer and [facility] [employer] is stating \"past deadline\" only patient can appeal.  No sure, cognitively, the patient could manage this. His BS is still not controlled- he recently was in ER with hypoglycemia. Do not feel I can safely discharge this patient to home living alone without a strong social support. Medically from his lung surgery no issues. Patel was removed yesterday and now voiding without difficulty.     OBJECTIVE:  Patient Vitals for the past 24 hrs:   Temp Pulse Resp BP SpO2   03/05/20 0733 -- -- -- -- 99 %   03/05/20 0704 97.6 °F (36.4 °C) 67 18 132/58 99 %   03/05/20 0345 98 °F (36.7 °C) 61 18 134/50 99 %   03/04/20 2235 97.3 °F (36.3 °C) 76 18 127/72 99 %   03/04/20 2137 -- 72 -- 131/61 --   03/04/20 1930 97.5 °F (36.4 °C) 90 18 118/49 99 %   03/04/20 1926 -- -- -- -- 97 %   03/04/20 1536 97.6 °F (36.4 °C) 71 18 116/59 97 %   03/04/20 1330 -- -- -- -- 90 %   03/04/20 1113 97.4 °F (36.3 °C) 71 18 123/64 100 %         Date 03/04/20 0700 - 03/05/20 0659 03/05/20 0700 - 03/06/20 0659   Shift 8452-7420 7774-6916 24 Hour Total 8889-6641 1872-2714 24 Hour Total   INTAKE   Other 425  425        Irrigation Volume Input (mL) ([REMOVED] Urinary Catheter 02/29/20 Patel; 2- way) 425  425      Shift Total(mL/kg) 425(5.9)  425(5.9)      OUTPUT   Urine(mL/kg/hr) 550(0.6) 1125(1.3) 1675(1) 475  475     Urine Voided  1125 1125 475  475     Urine Output (mL) ([REMOVED] Urinary Catheter 02/29/20 Patel; 2- way) 550  550      Stool           Stool Occurrence(s)  1 x 1 x      Shift Total(mL/kg) 550(7.7) 1125(15.7) 1675(23.4) 475(6.6)  475(6.6)   NET -125 -1125 -1250 -475  -475   Weight (kg) 71.5 71.5 71.5 71.5 71.5 71.5                Labs:    Recent Labs     03/04/20  0625   WBC 6.3   HGB 9.2*      *   K 4.7      CO2 26   BUN 16   CREA 1.10   *         Crystal Bonsdta Magdy, NP

## 2021-03-29 ENCOUNTER — HOSPITAL ENCOUNTER (OUTPATIENT)
Dept: CT IMAGING | Age: 81
Discharge: HOME | End: 2021-03-29
Attending: INTERNAL MEDICINE
Payer: MEDICARE

## 2021-03-29 DIAGNOSIS — C34.12 MALIGNANT NEOPLASM OF UPPER LOBE OF LEFT LUNG (HCC): ICD-10-CM

## 2021-03-29 LAB — CREAT BLD-MCNC: 1.06 MG/DL (ref 0.8–1.5)

## 2021-03-29 PROCEDURE — 74011000636 HC RX REV CODE- 636: Performed by: INTERNAL MEDICINE

## 2021-03-29 PROCEDURE — 71260 CT THORAX DX C+: CPT

## 2021-03-29 PROCEDURE — 82565 ASSAY OF CREATININE: CPT

## 2021-03-29 PROCEDURE — 74011000258 HC RX REV CODE- 258: Performed by: INTERNAL MEDICINE

## 2021-03-29 RX ORDER — SODIUM CHLORIDE 0.9 % (FLUSH) 0.9 %
10 SYRINGE (ML) INJECTION
Status: COMPLETED | OUTPATIENT
Start: 2021-03-29 | End: 2021-03-29

## 2021-03-29 RX ADMIN — IOPAMIDOL 100 ML: 755 INJECTION, SOLUTION INTRAVENOUS at 09:49

## 2021-03-29 RX ADMIN — SODIUM CHLORIDE 100 ML: 900 INJECTION, SOLUTION INTRAVENOUS at 09:49

## 2021-03-29 RX ADMIN — Medication 10 ML: at 09:49

## 2021-04-05 ENCOUNTER — PATIENT OUTREACH (OUTPATIENT)
Dept: CASE MANAGEMENT | Age: 81
End: 2021-04-05

## 2021-04-05 ENCOUNTER — HOSPITAL ENCOUNTER (OUTPATIENT)
Dept: LAB | Age: 81
Discharge: HOME | End: 2021-04-05
Payer: MEDICARE

## 2021-04-05 DIAGNOSIS — C34.12 MALIGNANT NEOPLASM OF UPPER LOBE OF LEFT LUNG (HCC): ICD-10-CM

## 2021-04-05 LAB
ALBUMIN SERPL-MCNC: 3.6 G/DL (ref 3.2–4.6)
ALBUMIN/GLOB SERPL: 1 {RATIO} (ref 1.2–3.5)
ALP SERPL-CCNC: 364 U/L (ref 50–136)
ALT SERPL-CCNC: 84 U/L (ref 12–65)
ANION GAP SERPL CALC-SCNC: 4 MMOL/L (ref 7–16)
AST SERPL-CCNC: 54 U/L (ref 15–37)
BASOPHILS # BLD: 0 K/UL (ref 0–0.2)
BASOPHILS NFR BLD: 0 % (ref 0–2)
BILIRUB SERPL-MCNC: 0.4 MG/DL (ref 0.2–1.1)
BUN SERPL-MCNC: 18 MG/DL (ref 8–23)
CALCIUM SERPL-MCNC: 9 MG/DL (ref 8.3–10.4)
CEA SERPL-MCNC: 9 NG/ML (ref 0–3)
CHLORIDE SERPL-SCNC: 98 MMOL/L (ref 98–107)
CO2 SERPL-SCNC: 30 MMOL/L (ref 21–32)
CREAT SERPL-MCNC: 1.4 MG/DL (ref 0.8–1.5)
DIFFERENTIAL METHOD BLD: ABNORMAL
EOSINOPHIL # BLD: 0.1 K/UL (ref 0–0.8)
EOSINOPHIL NFR BLD: 1 % (ref 0.5–7.8)
ERYTHROCYTE [DISTWIDTH] IN BLOOD BY AUTOMATED COUNT: 13.4 % (ref 11.9–14.6)
GLOBULIN SER CALC-MCNC: 3.7 G/DL (ref 2.3–3.5)
GLUCOSE SERPL-MCNC: 335 MG/DL (ref 65–100)
HCT VFR BLD AUTO: 38.8 %
HGB BLD-MCNC: 13 G/DL (ref 13.6–17.2)
IMM GRANULOCYTES # BLD AUTO: 0 K/UL (ref 0–0.5)
IMM GRANULOCYTES NFR BLD AUTO: 0 % (ref 0–5)
LYMPHOCYTES # BLD: 2.8 K/UL (ref 0.5–4.6)
LYMPHOCYTES NFR BLD: 40 % (ref 13–44)
MCH RBC QN AUTO: 27.2 PG (ref 26.1–32.9)
MCHC RBC AUTO-ENTMCNC: 33.5 G/DL (ref 31.4–35)
MCV RBC AUTO: 81.2 FL (ref 79.6–97.8)
MONOCYTES # BLD: 0.4 K/UL (ref 0.1–1.3)
MONOCYTES NFR BLD: 6 % (ref 4–12)
NEUTS SEG # BLD: 3.7 K/UL (ref 1.7–8.2)
NEUTS SEG NFR BLD: 53 % (ref 43–78)
NRBC # BLD: 0 K/UL (ref 0–0.2)
PLATELET # BLD AUTO: 224 K/UL (ref 150–450)
PMV BLD AUTO: 8.7 FL (ref 9.4–12.3)
POTASSIUM SERPL-SCNC: 4.6 MMOL/L (ref 3.5–5.1)
PROT SERPL-MCNC: 7.3 G/DL (ref 6.3–8.2)
RBC # BLD AUTO: 4.78 M/UL (ref 4.23–5.6)
SODIUM SERPL-SCNC: 132 MMOL/L (ref 136–145)
WBC # BLD AUTO: 7.1 K/UL (ref 4.3–11.1)

## 2021-04-05 PROCEDURE — 80053 COMPREHEN METABOLIC PANEL: CPT

## 2021-04-05 PROCEDURE — 85025 COMPLETE CBC W/AUTO DIFF WBC: CPT

## 2021-04-05 PROCEDURE — 36415 COLL VENOUS BLD VENIPUNCTURE: CPT

## 2021-04-05 PROCEDURE — 82378 CARCINOEMBRYONIC ANTIGEN: CPT

## 2021-04-05 NOTE — PROGRESS NOTES
Saw patient with Dr Quang Raya for lung cancer follow up with CT results. CT Chest            shows stable postoperative changes in the left chest, no evidence of tumor recurrence or metastatic disease and stable benign-appearing nodule in the right middle lobe. Advised to follow with PCP regarding your pain as it is no longer cancer related. We will repeat a scan in 6 months to continue to monitor. Navigation will sign off.

## 2022-03-18 PROBLEM — C34.90 LUNG CANCER (HCC): Status: ACTIVE | Noted: 2020-01-15

## 2022-03-18 PROBLEM — R04.2 COUGH WITH HEMOPTYSIS: Status: ACTIVE | Noted: 2020-01-01

## 2022-03-18 PROBLEM — A41.9 SEPSIS (HCC): Status: ACTIVE | Noted: 2020-01-24

## 2022-03-18 PROBLEM — J18.9 PNEUMONIA OF LEFT UPPER LOBE DUE TO INFECTIOUS ORGANISM: Status: ACTIVE | Noted: 2019-12-31

## 2022-03-18 PROBLEM — R91.1 LUNG NODULE: Status: ACTIVE | Noted: 2019-11-12

## 2022-03-18 PROBLEM — C34.12 MALIGNANT NEOPLASM OF UPPER LOBE OF LEFT LUNG (HCC): Status: ACTIVE | Noted: 2019-12-12

## 2022-03-19 PROBLEM — E16.2 HYPOGLYCEMIA: Status: ACTIVE | Noted: 2020-01-24

## 2022-03-20 PROBLEM — Z79.4 TYPE 2 DIABETES MELLITUS WITH HYPERGLYCEMIA, WITH LONG-TERM CURRENT USE OF INSULIN (HCC): Status: ACTIVE | Noted: 2020-01-26

## 2022-03-20 PROBLEM — E11.65 TYPE 2 DIABETES MELLITUS WITH HYPERGLYCEMIA, WITH LONG-TERM CURRENT USE OF INSULIN (HCC): Status: ACTIVE | Noted: 2020-01-26

## 2022-05-02 ENCOUNTER — APPOINTMENT (OUTPATIENT)
Dept: GENERAL RADIOLOGY | Age: 82
End: 2022-05-02
Attending: EMERGENCY MEDICINE
Payer: MEDICARE

## 2022-05-02 ENCOUNTER — HOSPITAL ENCOUNTER (EMERGENCY)
Age: 82
Discharge: HOME | End: 2022-05-02
Attending: EMERGENCY MEDICINE
Payer: MEDICARE

## 2022-05-02 VITALS
BODY MASS INDEX: 18.55 KG/M2 | HEART RATE: 79 BPM | RESPIRATION RATE: 17 BRPM | TEMPERATURE: 97.6 F | DIASTOLIC BLOOD PRESSURE: 67 MMHG | HEIGHT: 73 IN | WEIGHT: 140 LBS | OXYGEN SATURATION: 100 % | SYSTOLIC BLOOD PRESSURE: 135 MMHG

## 2022-05-02 DIAGNOSIS — R00.2 PALPITATIONS: Primary | ICD-10-CM

## 2022-05-02 LAB
ALBUMIN SERPL-MCNC: 3.7 G/DL (ref 3.2–4.6)
ALBUMIN/GLOB SERPL: 0.9 {RATIO} (ref 1.2–3.5)
ALP SERPL-CCNC: 269 U/L (ref 50–136)
ALT SERPL-CCNC: 53 U/L (ref 12–65)
ANION GAP SERPL CALC-SCNC: 9 MMOL/L (ref 7–16)
AST SERPL-CCNC: 36 U/L (ref 15–37)
ATRIAL RATE: 92 BPM
BASOPHILS # BLD: 0 K/UL (ref 0–0.2)
BASOPHILS NFR BLD: 1 % (ref 0–2)
BILIRUB SERPL-MCNC: 0.5 MG/DL (ref 0.2–1.1)
BNP SERPL-MCNC: 59 PG/ML
BUN SERPL-MCNC: 30 MG/DL (ref 8–23)
CALCIUM SERPL-MCNC: 9.6 MG/DL (ref 8.3–10.4)
CALCULATED P AXIS, ECG09: 87 DEGREES
CALCULATED R AXIS, ECG10: -83 DEGREES
CALCULATED T AXIS, ECG11: 80 DEGREES
CHLORIDE SERPL-SCNC: 98 MMOL/L (ref 98–107)
CO2 SERPL-SCNC: 27 MMOL/L (ref 21–32)
CREAT SERPL-MCNC: 1.5 MG/DL (ref 0.8–1.5)
DIAGNOSIS, 93000: NORMAL
DIFFERENTIAL METHOD BLD: ABNORMAL
EOSINOPHIL # BLD: 0 K/UL (ref 0–0.8)
EOSINOPHIL NFR BLD: 0 % (ref 0.5–7.8)
ERYTHROCYTE [DISTWIDTH] IN BLOOD BY AUTOMATED COUNT: 13.2 % (ref 11.9–14.6)
GLOBULIN SER CALC-MCNC: 4 G/DL (ref 2.3–3.5)
GLUCOSE SERPL-MCNC: 127 MG/DL (ref 65–100)
HCT VFR BLD AUTO: 40.1 % (ref 41.1–50.3)
HGB BLD-MCNC: 13.8 G/DL (ref 13.6–17.2)
IMM GRANULOCYTES # BLD AUTO: 0 K/UL (ref 0–0.5)
IMM GRANULOCYTES NFR BLD AUTO: 0 % (ref 0–5)
LYMPHOCYTES # BLD: 2.2 K/UL (ref 0.5–4.6)
LYMPHOCYTES NFR BLD: 40 % (ref 13–44)
MAGNESIUM SERPL-MCNC: 1.8 MG/DL (ref 1.8–2.4)
MCH RBC QN AUTO: 28 PG (ref 26.1–32.9)
MCHC RBC AUTO-ENTMCNC: 34.4 G/DL (ref 31.4–35)
MCV RBC AUTO: 81.3 FL (ref 79.6–97.8)
MONOCYTES # BLD: 0.4 K/UL (ref 0.1–1.3)
MONOCYTES NFR BLD: 8 % (ref 4–12)
NEUTS SEG # BLD: 2.8 K/UL (ref 1.7–8.2)
NEUTS SEG NFR BLD: 52 % (ref 43–78)
NRBC # BLD: 0 K/UL (ref 0–0.2)
P-R INTERVAL, ECG05: 156 MS
PLATELET # BLD AUTO: 240 K/UL (ref 150–450)
PMV BLD AUTO: 8.8 FL (ref 9.4–12.3)
POTASSIUM SERPL-SCNC: 4.2 MMOL/L (ref 3.5–5.1)
PROT SERPL-MCNC: 7.7 G/DL (ref 6.3–8.2)
Q-T INTERVAL, ECG07: 408 MS
QRS DURATION, ECG06: 134 MS
QTC CALCULATION (BEZET), ECG08: 504 MS
RBC # BLD AUTO: 4.93 M/UL (ref 4.23–5.6)
SODIUM SERPL-SCNC: 134 MMOL/L (ref 136–145)
TROPONIN-HIGH SENSITIVITY: 11.4 PG/ML (ref 0–14)
TROPONIN-HIGH SENSITIVITY: 11.7 PG/ML (ref 0–14)
VENTRICULAR RATE, ECG03: 92 BPM
WBC # BLD AUTO: 5.5 K/UL (ref 4.3–11.1)

## 2022-05-02 PROCEDURE — 84484 ASSAY OF TROPONIN QUANT: CPT

## 2022-05-02 PROCEDURE — 83735 ASSAY OF MAGNESIUM: CPT

## 2022-05-02 PROCEDURE — 71045 X-RAY EXAM CHEST 1 VIEW: CPT

## 2022-05-02 PROCEDURE — 80053 COMPREHEN METABOLIC PANEL: CPT

## 2022-05-02 PROCEDURE — 99285 EMERGENCY DEPT VISIT HI MDM: CPT

## 2022-05-02 PROCEDURE — 93005 ELECTROCARDIOGRAM TRACING: CPT | Performed by: EMERGENCY MEDICINE

## 2022-05-02 PROCEDURE — 96361 HYDRATE IV INFUSION ADD-ON: CPT

## 2022-05-02 PROCEDURE — 83880 ASSAY OF NATRIURETIC PEPTIDE: CPT

## 2022-05-02 PROCEDURE — 85025 COMPLETE CBC W/AUTO DIFF WBC: CPT

## 2022-05-02 PROCEDURE — 96360 HYDRATION IV INFUSION INIT: CPT

## 2022-05-02 PROCEDURE — 74011250636 HC RX REV CODE- 250/636: Performed by: EMERGENCY MEDICINE

## 2022-05-02 PROCEDURE — 74011000250 HC RX REV CODE- 250: Performed by: EMERGENCY MEDICINE

## 2022-05-02 RX ORDER — SODIUM CHLORIDE 0.9 % (FLUSH) 0.9 %
5-10 SYRINGE (ML) INJECTION AS NEEDED
Status: DISCONTINUED | OUTPATIENT
Start: 2022-05-02 | End: 2022-05-02 | Stop reason: HOSPADM

## 2022-05-02 RX ORDER — SODIUM CHLORIDE 0.9 % (FLUSH) 0.9 %
5-10 SYRINGE (ML) INJECTION EVERY 8 HOURS
Status: DISCONTINUED | OUTPATIENT
Start: 2022-05-02 | End: 2022-05-02 | Stop reason: HOSPADM

## 2022-05-02 RX ADMIN — SODIUM CHLORIDE, PRESERVATIVE FREE 5 ML: 5 INJECTION INTRAVENOUS at 12:09

## 2022-05-02 RX ADMIN — SODIUM CHLORIDE 1000 ML: 9 INJECTION, SOLUTION INTRAVENOUS at 12:04

## 2022-05-02 NOTE — ED PROVIDER NOTES
74-year-old -American male no known history of cardiac disease does have diabetes and lung cancer status post pneumonectomy. He presents with 2 to 3 days of intermittent palpitations. No aggravating or alleviating factors. He did attend his brother's  4 days ago and following the  several people had some sort of food poisoning. Patient did have some nausea, anorexia and diarrhea for couple days. This has resolved. He is complaining of generalized weakness. No chest pain or shortness of breath. No fever. The history is provided by the patient. Irregular Heart Beat   Associated symptoms include nausea. Pertinent negatives include no fever, no chest pain, no abdominal pain, no vomiting, no headaches, no back pain, no cough and no shortness of breath. Past Medical History:   Diagnosis Date    CAD (coronary artery disease) 2019    non obstructive     Chronic kidney disease     Stage 3    COPD     inhalers    Depression     managed with medications    Diabetes ([address]     takes insulin, A1c on 19 was 10.3; unsure of avg blood sugar    HTN (hypertension) 2015    managed wt medications    Malignant neoplasm of upper lobe of left lung ([address] 2019    Other ill-defined conditions(799.89)     cholesterol    Sepsis ([address] 2019       Past Surgical History:   Procedure Laterality Date    HX COLONOSCOPY      HX LOBECTOMY Left 2020    HX ORTHOPAEDIC      bilateral shoulder repairs, both knees repaired-no hardware    HX OTHER SURGICAL  2019    lung biopsy    MT ABDOMEN SURGERY PROC UNLISTED  2015    explor lap         Family History:   Problem Relation Age of Onset    Heart Disease Mother     Heart Disease Father     Cancer Brother         stomach? ?    Diabetes Daughter        Social History     Socioeconomic History    Marital status: SINGLE     Spouse name: Not on file    Number of children: Not on file    Years of education: Not on file    Highest education level: Not on file   Occupational History    Not on file   Tobacco Use    Smoking status: Former Smoker     Packs/day: 1.00     Years: 40.00     Pack years: 40.00     Types: Cigarettes     Quit date: 2019     Years since quittin.3    Smokeless tobacco: Never Used   Substance and Sexual Activity    Alcohol use: No    Drug use: No    Sexual activity: Not on file   Other Topics Concern    Not on file   Social History Narrative    Not on file     Social Determinants of Health     Financial Resource Strain:     Difficulty of Paying Living Expenses: Not on file   Food Insecurity:     Worried About Running Out of Food in the Last Year: Not on file    Emmanuelle of Food in the Last Year: Not on file   Transportation Needs:     Lack of Transportation (Medical): Not on file    Lack of Transportation (Non-Medical): Not on file   Physical Activity:     Days of Exercise per Week: Not on file    Minutes of Exercise per Session: Not on file   Stress:     Feeling of Stress : Not on file   Social Connections:     Frequency of Communication with Friends and Family: Not on file    Frequency of Social Gatherings with Friends and Family: Not on file    Attends Latter-day Services: Not on file    Active Member of [address] [place] [address] [employer] or Organizations: Not on file    Attends Club or Organization Meetings: Not on file    Marital Status: Not on file   Intimate Partner Violence:     Fear of Current or Ex-Partner: Not on file    Emotionally Abused: Not on file    Physically Abused: Not on file    Sexually Abused: Not on file   Housing Stability:     Unable to Pay for Housing in the Last Year: Not on file    Number of [place] in the Last Year: Not on file    Unstable Housing in the Last Year: Not on file         ALLERGIES: Patient has no known allergies. Review of Systems   Constitutional: Negative for fever. HENT: Negative for congestion.     Respiratory: Negative for cough and shortness of breath. Cardiovascular: Positive for palpitations. Negative for chest pain. Gastrointestinal: Positive for diarrhea and nausea. Negative for abdominal pain and vomiting. Genitourinary: Negative for dysuria. Musculoskeletal: Negative for back pain and neck pain. Skin: Negative for rash. Neurological: Negative for headaches. All other systems reviewed and are negative. Vitals:    05/02/22 1020 05/02/22 1150   BP: (!) 110/48 (!) 117/53   Pulse: 86 74   Resp: 16 18   Temp: 97.6 °F (36.4 °C)    SpO2: 97% 100%   Weight: 63.5 kg (140 lb)    Height: 6' 1\" (1.854 m)             Physical Exam  Vitals and nursing note reviewed. Constitutional:       General: He is not in acute distress. Appearance: Normal appearance. He is not toxic-appearing. HENT:      Head: Normocephalic and atraumatic. Nose: Nose normal.      Mouth/Throat:      Mouth: Mucous membranes are moist.   Eyes:      Conjunctiva/sclera: Conjunctivae normal.      Pupils: Pupils are equal, round, and reactive to light. Cardiovascular:      Rate and Rhythm: Normal rate and regular rhythm. Heart sounds: No murmur heard. Pulmonary:      Effort: Pulmonary effort is normal.      Breath sounds: Normal breath sounds. Abdominal:      Palpations: Abdomen is soft. Tenderness: There is no abdominal tenderness. Musculoskeletal:         General: No swelling. Normal range of motion. Cervical back: Normal range of motion and neck supple. Skin:     General: Skin is warm and dry. Neurological:      Mental Status: He is alert and oriented to person, place, and time. Psychiatric:         Mood and Affect: Mood normal.         Behavior: Behavior normal.          MDM  Number of Diagnoses or Management Options  Diagnosis management comments: EKG shows normal sinus rhythm left atrial enlargement left axis deviation and right bundle branch block. The bundle branch block appears to be new.   Blood work shows mild renal insufficiency creatinine 1.5 BUN 30. Rest of labs unremarkable. Chest x-ray small left pleural effusion versus scarring. This is likely related to pneumonectomy. Patient on the monitor for couple hours and was noted to have occasional PVC. No other concerning dysrhythmia. Repeat troponin still normal.  Patient appears safe for discharge home. Suspect his symptoms are related to PVCs.        Amount and/or Complexity of Data Reviewed  Clinical lab tests: ordered and reviewed  Tests in the radiology section of CPT®: ordered and reviewed    Risk of Complications, Morbidity, and/or Mortality  Presenting problems: moderate  Diagnostic procedures: moderate  Management options: moderate           Procedures

## 2022-05-02 NOTE — ED NOTES
I have reviewed discharge instructions with the patient and caregiver. The patient and caregiver verbalized understanding. Patient left ED via Discharge Method: ambulatory to Home with son. Opportunity for questions and clarification provided. Patient given 0 scripts. To continue your aftercare when you leave the hospital, you may receive an automated call from our care team to check in on how you are doing. This is a free service and part of our promise to provide the best care and service to meet your aftercare needs.  If you have questions, or wish to unsubscribe from this service please call [phone]. Thank you for Choosing our [facility].

## 2022-05-02 NOTE — ED TRIAGE NOTES
Patient presents with complaints of irregular heart beat that he states feels like \"it takes off\" when he feels it getting faster. Patient with shortness of breath. Family member states that they recently attended his brother  in which he thought it may have gotten food poisoning. Patient has been having vomiting for the past couple days.

## 2022-06-22 ENCOUNTER — NURSE TRIAGE (OUTPATIENT)
Dept: OTHER | Facility: CLINIC | Age: 82
End: 2022-06-22

## 2022-06-22 NOTE — TELEPHONE ENCOUNTER
Received call from [address] at [facility] with [employer] Complaint. Subjective: Caller states \"too much medication\"     Current Symptoms: son feels like he is on to many medications; Wants a second opinion on meds;  Denies other symptoms; Occasional dizziness; Onset: a few months ago; unchanged    Associated Symptoms: NA    Pain Severity: 0/10; Temperature: denies     What has been tried: n/a    Recommended disposition: See PCP within 3 Days    Care advice provided, patient verbalizes understanding; denies any other questions or concerns; instructed to call back for any new or worsening symptoms. Patient/Caller agrees with recommended disposition; writer provided warm transfer to [employer] at [facility] for appointment scheduling     Attention Provider: Thank you for allowing me to participate in the care of your patient. The patient was connected to triage in response to information provided to the ECC/PSC. Please do not respond through this encounter as the response is not directed to a shared pool.     Reason for Disposition   Prescription request for new medicine (not a refill)    Protocols used: MEDICATION QUESTION CALL-ADULT-[place]

## 2022-06-30 ENCOUNTER — OFFICE VISIT (OUTPATIENT)
Dept: INTERNAL MEDICINE CLINIC | Facility: CLINIC | Age: 82
End: 2022-06-30
Payer: MEDICARE

## 2022-06-30 VITALS
RESPIRATION RATE: 16 BRPM | SYSTOLIC BLOOD PRESSURE: 117 MMHG | BODY MASS INDEX: 18.92 KG/M2 | WEIGHT: 142.8 LBS | TEMPERATURE: 97.2 F | OXYGEN SATURATION: 98 % | DIASTOLIC BLOOD PRESSURE: 64 MMHG | HEART RATE: 78 BPM | HEIGHT: 73 IN

## 2022-06-30 DIAGNOSIS — Z79.4 TYPE 2 DIABETES MELLITUS WITH HYPERGLYCEMIA, WITH LONG-TERM CURRENT USE OF INSULIN (HCC): Primary | ICD-10-CM

## 2022-06-30 DIAGNOSIS — F17.200 TOBACCO USE DISORDER: ICD-10-CM

## 2022-06-30 DIAGNOSIS — I10 PRIMARY HYPERTENSION: ICD-10-CM

## 2022-06-30 DIAGNOSIS — C34.12 MALIGNANT NEOPLASM OF UPPER LOBE OF LEFT LUNG (HCC): ICD-10-CM

## 2022-06-30 DIAGNOSIS — E11.65 TYPE 2 DIABETES MELLITUS WITH HYPERGLYCEMIA, WITH LONG-TERM CURRENT USE OF INSULIN (HCC): Primary | ICD-10-CM

## 2022-06-30 DIAGNOSIS — E78.2 MIXED HYPERLIPIDEMIA: ICD-10-CM

## 2022-06-30 DIAGNOSIS — J43.9 PULMONARY EMPHYSEMA, UNSPECIFIED EMPHYSEMA TYPE (HCC): ICD-10-CM

## 2022-06-30 PROCEDURE — 4004F PT TOBACCO SCREEN RCVD TLK: CPT | Performed by: INTERNAL MEDICINE

## 2022-06-30 PROCEDURE — 99214 OFFICE O/P EST MOD 30 MIN: CPT | Performed by: INTERNAL MEDICINE

## 2022-06-30 PROCEDURE — 1123F ACP DISCUSS/DSCN MKR DOCD: CPT | Performed by: INTERNAL MEDICINE

## 2022-06-30 PROCEDURE — G8420 CALC BMI NORM PARAMETERS: HCPCS | Performed by: INTERNAL MEDICINE

## 2022-06-30 PROCEDURE — G8427 DOCREV CUR MEDS BY ELIG CLIN: HCPCS | Performed by: INTERNAL MEDICINE

## 2022-06-30 PROCEDURE — 3023F SPIROM DOC REV: CPT | Performed by: INTERNAL MEDICINE

## 2022-06-30 RX ORDER — METOPROLOL TARTRATE 50 MG/1
TABLET, FILM COATED ORAL
COMMUNITY
Start: 2022-05-27 | End: 2022-07-14 | Stop reason: SDUPTHER

## 2022-06-30 RX ORDER — BUPROPION HYDROCHLORIDE 150 MG/1
150 TABLET, EXTENDED RELEASE ORAL 2 TIMES DAILY
COMMUNITY
End: 2022-07-14 | Stop reason: SDUPTHER

## 2022-06-30 RX ORDER — INSULIN GLARGINE 100 [IU]/ML
20 INJECTION, SOLUTION SUBCUTANEOUS 2 TIMES DAILY
COMMUNITY
End: 2022-07-22 | Stop reason: DRUGHIGH

## 2022-06-30 RX ORDER — DRONABINOL 10 MG/1
CAPSULE ORAL
COMMUNITY
Start: 2022-05-17 | End: 2022-07-14 | Stop reason: SDUPTHER

## 2022-06-30 RX ORDER — METFORMIN HYDROCHLORIDE 500 MG/1
500 TABLET, EXTENDED RELEASE ORAL 2 TIMES DAILY
COMMUNITY
End: 2022-07-14 | Stop reason: SDUPTHER

## 2022-06-30 RX ORDER — LISINOPRIL 5 MG/1
TABLET ORAL
COMMUNITY
Start: 2022-06-13 | End: 2022-07-14 | Stop reason: SDUPTHER

## 2022-06-30 RX ORDER — GLIPIZIDE 10 MG/1
TABLET, FILM COATED, EXTENDED RELEASE ORAL
COMMUNITY
Start: 2022-06-14 | End: 2022-08-10

## 2022-06-30 RX ORDER — LORAZEPAM 1 MG/1
1 TABLET ORAL 2 TIMES DAILY
COMMUNITY
End: 2022-08-10

## 2022-06-30 RX ORDER — PANTOPRAZOLE SODIUM 40 MG/1
TABLET, DELAYED RELEASE ORAL
COMMUNITY
Start: 2022-06-04 | End: 2022-08-10

## 2022-06-30 RX ORDER — ATORVASTATIN CALCIUM 10 MG/1
TABLET, FILM COATED ORAL
COMMUNITY
Start: 2022-05-27 | End: 2022-07-14 | Stop reason: SDUPTHER

## 2022-06-30 RX ORDER — DULAGLUTIDE 4.5 MG/.5ML
INJECTION, SOLUTION SUBCUTANEOUS
COMMUNITY
Start: 2022-06-14 | End: 2022-08-10

## 2022-06-30 RX ORDER — FUROSEMIDE 20 MG/1
TABLET ORAL
COMMUNITY
Start: 2022-06-14 | End: 2022-07-14 | Stop reason: SDUPTHER

## 2022-06-30 RX ORDER — TAMSULOSIN HYDROCHLORIDE 0.4 MG/1
CAPSULE ORAL
COMMUNITY
Start: 2022-06-04 | End: 2022-07-14 | Stop reason: SDUPTHER

## 2022-06-30 SDOH — ECONOMIC STABILITY: FOOD INSECURITY: WITHIN THE PAST 12 MONTHS, THE FOOD YOU BOUGHT JUST DIDN'T LAST AND YOU DIDN'T HAVE MONEY TO GET MORE.: NEVER TRUE

## 2022-06-30 SDOH — ECONOMIC STABILITY: FOOD INSECURITY: WITHIN THE PAST 12 MONTHS, YOU WORRIED THAT YOUR FOOD WOULD RUN OUT BEFORE YOU GOT MONEY TO BUY MORE.: NEVER TRUE

## 2022-06-30 ASSESSMENT — PATIENT HEALTH QUESTIONNAIRE - PHQ9
SUM OF ALL RESPONSES TO PHQ QUESTIONS 1-9: 0
SUM OF ALL RESPONSES TO PHQ QUESTIONS 1-9: 0
SUM OF ALL RESPONSES TO PHQ9 QUESTIONS 1 & 2: 0
SUM OF ALL RESPONSES TO PHQ QUESTIONS 1-9: 0
SUM OF ALL RESPONSES TO PHQ QUESTIONS 1-9: 0
2. FEELING DOWN, DEPRESSED OR HOPELESS: 0
1. LITTLE INTEREST OR PLEASURE IN DOING THINGS: 0

## 2022-06-30 ASSESSMENT — SOCIAL DETERMINANTS OF HEALTH (SDOH): HOW HARD IS IT FOR YOU TO PAY FOR THE VERY BASICS LIKE FOOD, HOUSING, MEDICAL CARE, AND HEATING?: NOT HARD AT ALL

## 2022-06-30 NOTE — PROGRESS NOTES
06/30/2022   Location:[facility] [address] [facility]  [place]  Patient #:  [id number]  YOB: 1940        History of Present Illness     Chief Complaint   Patient presents with    New Patient     establish pcp    Diabetes    Foot Swelling     been having swelling in both feet x 1 month    Dizziness     having dizziness off and on; no falls    Discuss Medications     son, More Velasquez would like to see if some of the medications he is on could be causing the dizziness; feel some of the meds his dad is taking he dont need       Mr. Caesar Brink is a 80 y.o. male  who presents for visit to establish care. Accompanied by son who assists in providing history. Have questions regarding his current medical management. He has diabetes, HTN, BPH,HLD, emphysema and lung cancer history. History of partial colectomy current smoker  Denies knowledge of CAD or C  Current meds for diabetes include Lantus, Trulicity, glucotrol and Metformin. He takes his lantas  Based on what his reading is but cannot tell me how he decides. No written sliding scale from provider. Lantus is usually given daily at with stable dose as basal insulin. Reviewed his meter. Diabetes not well controlled. 90 d avg 264, 14 day avg 205  He may be on Wellbutrin for depression or smoking cessation. He has been on it for years but continues to smoke 1ppd for 45+ years  He is also on Marinol. Not sure why maybe appetite stimulant given his caner history. He is on Lasix but denies known history of CHF or CAD but does have bilateral edema. He does not really limit his salt intake or elevated his legs during the day. Swelling is present but better in the morning. negativ dopplers last years so suspect more chronic  No available records from PCP. Also available records from oncology reveals he had diagnosis with lung cancer initially in November 2019.   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    Last Labs  CBC:   Lab Results   Component Value Date/Time    WBC 5.5 05/02/2022 10:30 AM    RBC 4.93 05/02/2022 10:30 AM    HGB 13.8 05/02/2022 10:30 AM    HCT 40.1 05/02/2022 10:30 AM    MCV 81.3 05/02/2022 10:30 AM    MCH 28.0 05/02/2022 10:30 AM    MCHC 34.4 05/02/2022 10:30 AM    RDW 13.2 05/02/2022 10:30 AM     05/02/2022 10:30 AM    MPV 8.8 05/02/2022 10:30 AM     CMP:    Lab Results   Component Value Date/Time     05/02/2022 10:30 AM    K 4.2 05/02/2022 10:30 AM    CL 98 05/02/2022 10:30 AM    CO2 27 05/02/2022 10:30 AM    BUN 30 05/02/2022 10:30 AM    CREATININE 1.50 05/02/2022 10:30 AM    GFRAA 58 05/02/2022 10:30 AM    AGRATIO 0.9 05/02/2022 10:30 AM    LABGLOM >60 03/29/2021 09:26 AM    GLUCOSE 127 05/02/2022 10:30 AM    PROT 7.7 05/02/2022 10:30 AM    LABALBU 3.7 05/02/2022 10:30 AM    CALCIUM 9.6 05/02/2022 10:30 AM    BILITOT 0.5 05/02/2022 10:30 AM    ALKPHOS 269 05/02/2022 10:30 AM    AST 36 05/02/2022 10:30 AM    ALT 53 05/02/2022 10:30 AM     HgBA1c:    Lab Results   Component Value Date/Time    LABA1C 9.1 02/27/2020 03:10 AM     FLP:    Lab Results   Component Value Date/Time    TRIG 55 08/26/2020 11:36 AM    HDL 79 08/26/2020 11:36 AM    LDLCALC 56 08/26/2020 11:36 AM    LABVLDL 11 08/26/2020 11:36 AM     TSH:    Lab Results   Component Value Date/Time    TSH 0.812 08/26/2020 11:36 AM       No Known Allergies  Past Medical History:   Diagnosis Date    CAD (coronary artery disease) 08/2019    non obstructive     Chronic kidney disease     Stage 3    Depression     managed with medications    Diabetes ([facility] [address]     takes insulin, A1c on 12/31/19 was 10.3; unsure of avg blood sugar    HTN (hypertension) 4/29/2015    managed wt medications    Malignant neoplasm of upper lobe of left lung ([facility] [address] 12/12/2019    Other ill-defined conditions(799.89)     cholesterol    Sepsis ([facility] 12/2019     Social History     Socioeconomic History    Marital status: Single     Spouse name: None    Number of children: None    Years of education: None    Highest education level: None   Occupational History    None   Tobacco Use    Smoking status: Current Every Day Smoker     Packs/day: 1.00     Last attempt to quit: 2019     Years since quittin.5    Smokeless tobacco: Never Used   Substance and Sexual Activity    Alcohol use: No    Drug use: No    Sexual activity: None   Other Topics Concern    None   Social History Narrative    None     Social Determinants of Health     Financial Resource Strain: Low Risk     Difficulty of Paying Living Expenses: Not hard at all   Food Insecurity: No Food Insecurity    Worried About Running Out of Food in the Last Year: Never true    [place] of Food in the Last Year: Never true   Transportation Needs:     Lack of Transportation (Medical): Not on file    Lack of Transportation (Non-Medical):  Not on file   Physical Activity:     Days of Exercise per Week: Not on file    Minutes of Exercise per Session: Not on file   Stress:     Feeling of Stress : Not on file   Social Connections:     Frequency of Communication with Friends and Family: Not on file    Frequency of Social Gatherings with Friends and Family: Not on file    Attends Worship Services: Not on file    Active Member of [address] [place] [address] or Organizations: Not on file    Attends Club or Organization Meetings: Not on file    Marital Status: Not on file   Intimate Partner Violence:     Fear of Current or Ex-Partner: Not on file    Emotionally Abused: Not on file    Physically Abused: Not on file    Sexually Abused: Not on file   Housing Stability:     Unable to Pay for Housing in the Last Year: Not on file    Number of [place] in the Last Year: Not on file    Unstable Housing in the Last Year: Not on file     Past Surgical History:   Procedure Laterality Date    COLONOSCOPY      LOBECTOMY Left 2020    ORTHOPEDIC SURGERY      bilateral shoulder repairs, both knees repaired-no hardware    OTHER SURGICAL HISTORY  12/2019    lung biopsy    [place] ABDOMEN SURGERY PROC UNLISTED  5/2015    explor lap     Family History   Problem Relation Age of Onset    Cancer Brother         stomach? ?    Heart Disease Father     Heart Disease Mother     Diabetes Daughter      Current Outpatient Medications   Medication Sig Dispense Refill    buPROPion (WELLBUTRIN SR) 150 MG extended release tablet Take 150 mg by mouth 2 times daily      tamsulosin (FLOMAX) 0.4 MG capsule TAKE 1 CAPSULE BY MOUTH EVERY DAY FOR 90 DAYS      glipiZIDE (GLUCOTROL XL) 10 MG extended release tablet TAKE 1 TABLET BY MOUTH EVERY DAY WITH BREAKFAST      lisinopril (PRINIVIL;ZESTRIL) 5 MG tablet TAKE 1 TABLET BY MOUTH EVERY DAY FOR 90 DAYS      pantoprazole (PROTONIX) 40 MG tablet TAKE 1 TABLET BY MOUTH EVERY DAY FOR 90 DAYS      dronabinol (MARINOL) 10 MG capsule TAKE 1 CAPSULE BY MOUTH ONCE DAILY IN THE EVENING OR AT BEDTIME      metoprolol tartrate (LOPRESSOR) 50 MG tablet TAKE 1 TABLET BY MOUTH EVERY DAY WITH FOOD FOR 90 DAYS      atorvastatin (LIPITOR) 10 MG tablet TAKE 1 TABLET BY MOUTH EVERY DAY FOR 90 DAYS      furosemide (LASIX) 20 MG tablet TAKE 1 TABLET BY MOUTH EVERY DAY FOR 90 DAYS      LORazepam (ATIVAN) 1 MG tablet Take 1 mg by mouth 2 times daily.  insulin glargine (LANTUS SOLOSTAR) 100 UNIT/ML injection pen Inject 20 Units into the skin in the morning and at bedtime      TRULICITY 4.5 EA/3.5HL SOPN INJECT 4.5 MG (1 SYRINGE) ONCE WEEKLY      metFORMIN (GLUCOPHAGE-XR) 500 MG extended release tablet Take 500 mg by mouth in the morning and at bedtime       No current facility-administered medications for this visit.      Health Maintenance   Topic Date Due    Annual Wellness Visit (AWV)  Never done    Pneumococcal 65+ years Vaccine (1 - PCV) Never done    Hepatitis C screen  Never done    DTaP/Tdap/Td vaccine (1 - Tdap) Never done    Shingles vaccine (1 of 2) Never done    Lipids  08/26/2021    normal.      Palpations: Abdomen is soft. Tenderness: There is no abdominal tenderness. Musculoskeletal:      Right lower leg: Edema (mostly pedal/ankle) present. Left lower le+ Pitting Edema (mostly pedal/ankle) present. Feet:      Comments: Diabetic foot exam:   Left Foot:   Visual Exam: callous- heel   Pulse DP: 1+ (weak)   Filament test: reduced sensation   Vibratory Sensation: diminished  Right Foot:   Visual Exam: callous- heel   Pulse DP: 1+ (weak)   Filament test: reduced sensation   Vibratory Sensation: diminished    Lymphadenopathy:      Cervical: No cervical adenopathy. Neurological:      Mental Status: He is alert. Psychiatric:         Behavior: Behavior is cooperative. Assessment & Plan    Encounter Diagnoses   Name Primary?  Type 2 diabetes mellitus with hyperglycemia, with long-term current use of insulin (HCC) Yes    Tobacco use disorder     Primary hypertension     Mixed hyperlipidemia     Malignant neoplasm of upper lobe of left lung ([address]     Pulmonary emphysema, unspecified emphysema type ([address]        No orders of the defined types were placed in this encounter. No orders of the defined types were placed in this encounter. Will request records from his primary care physician. We will have him return in about 2 weeks hopefully by then I will have an opportunity to review his history. In the meantime we will have him check his blood sugars regularly fasting and at least also 2 hours after supper and we will work on improving his diabetes management. No changes in any of his medications at this time. I do recommend he limit the salt in his diet and elevate his legs whenever he is at rest.    Continue the same with diabetes medications at current dose  Instructed to take his Lantus regularly  Return in about 2 weeks (around 2022).         Inna Hinds MD

## 2022-07-11 ASSESSMENT — ENCOUNTER SYMPTOMS
SHORTNESS OF BREATH: 1
ABDOMINAL PAIN: 0

## 2022-07-14 ENCOUNTER — OFFICE VISIT (OUTPATIENT)
Dept: INTERNAL MEDICINE CLINIC | Facility: CLINIC | Age: 82
End: 2022-07-14
Payer: MEDICARE

## 2022-07-14 VITALS
TEMPERATURE: 97.2 F | DIASTOLIC BLOOD PRESSURE: 51 MMHG | HEIGHT: 73 IN | OXYGEN SATURATION: 98 % | WEIGHT: 142 LBS | BODY MASS INDEX: 18.82 KG/M2 | RESPIRATION RATE: 18 BRPM | HEART RATE: 83 BPM | SYSTOLIC BLOOD PRESSURE: 108 MMHG

## 2022-07-14 DIAGNOSIS — I25.10 CORONARY ARTERY DISEASE INVOLVING NATIVE CORONARY ARTERY OF NATIVE HEART WITHOUT ANGINA PECTORIS: ICD-10-CM

## 2022-07-14 DIAGNOSIS — D64.9 ANEMIA, UNSPECIFIED TYPE: ICD-10-CM

## 2022-07-14 DIAGNOSIS — E78.2 MIXED HYPERLIPIDEMIA: ICD-10-CM

## 2022-07-14 DIAGNOSIS — R63.0 ANOREXIA: ICD-10-CM

## 2022-07-14 DIAGNOSIS — R40.20 LOSS OF CONSCIOUSNESS (HCC): Primary | ICD-10-CM

## 2022-07-14 DIAGNOSIS — Z79.4 TYPE 2 DIABETES MELLITUS WITH HYPERGLYCEMIA, WITH LONG-TERM CURRENT USE OF INSULIN (HCC): ICD-10-CM

## 2022-07-14 DIAGNOSIS — I10 PRIMARY HYPERTENSION: ICD-10-CM

## 2022-07-14 DIAGNOSIS — E11.65 TYPE 2 DIABETES MELLITUS WITH HYPERGLYCEMIA, WITH LONG-TERM CURRENT USE OF INSULIN (HCC): ICD-10-CM

## 2022-07-14 DIAGNOSIS — F17.200 TOBACCO USE DISORDER: ICD-10-CM

## 2022-07-14 PROCEDURE — 1123F ACP DISCUSS/DSCN MKR DOCD: CPT | Performed by: INTERNAL MEDICINE

## 2022-07-14 PROCEDURE — G8427 DOCREV CUR MEDS BY ELIG CLIN: HCPCS | Performed by: INTERNAL MEDICINE

## 2022-07-14 PROCEDURE — 93000 ELECTROCARDIOGRAM COMPLETE: CPT | Performed by: INTERNAL MEDICINE

## 2022-07-14 PROCEDURE — G8420 CALC BMI NORM PARAMETERS: HCPCS | Performed by: INTERNAL MEDICINE

## 2022-07-14 PROCEDURE — 4004F PT TOBACCO SCREEN RCVD TLK: CPT | Performed by: INTERNAL MEDICINE

## 2022-07-14 PROCEDURE — 99214 OFFICE O/P EST MOD 30 MIN: CPT | Performed by: INTERNAL MEDICINE

## 2022-07-14 PROCEDURE — 3046F HEMOGLOBIN A1C LEVEL >9.0%: CPT | Performed by: INTERNAL MEDICINE

## 2022-07-14 RX ORDER — LISINOPRIL 5 MG/1
TABLET ORAL
Qty: 90 TABLET | Refills: 3 | Status: ON HOLD | OUTPATIENT
Start: 2022-07-14 | End: 2022-11-02 | Stop reason: HOSPADM

## 2022-07-14 RX ORDER — ATORVASTATIN CALCIUM 10 MG/1
TABLET, FILM COATED ORAL
Qty: 90 TABLET | Refills: 3 | Status: SHIPPED | OUTPATIENT
Start: 2022-07-14 | End: 2022-08-10

## 2022-07-14 RX ORDER — BUPROPION HYDROCHLORIDE 150 MG/1
150 TABLET, EXTENDED RELEASE ORAL 2 TIMES DAILY
Qty: 180 TABLET | Refills: 3 | Status: SHIPPED | OUTPATIENT
Start: 2022-07-14 | End: 2022-08-10

## 2022-07-14 RX ORDER — METFORMIN HYDROCHLORIDE 500 MG/1
500 TABLET, EXTENDED RELEASE ORAL 2 TIMES DAILY
Qty: 180 TABLET | Refills: 3 | Status: SHIPPED | OUTPATIENT
Start: 2022-07-14

## 2022-07-14 RX ORDER — FUROSEMIDE 20 MG/1
TABLET ORAL
Qty: 90 TABLET | Refills: 3 | Status: SHIPPED | OUTPATIENT
Start: 2022-07-14 | End: 2022-08-10

## 2022-07-14 RX ORDER — DRONABINOL 10 MG/1
CAPSULE ORAL
Qty: 30 CAPSULE | Refills: 3 | Status: SHIPPED | OUTPATIENT
Start: 2022-07-14 | End: 2022-07-14

## 2022-07-14 RX ORDER — TAMSULOSIN HYDROCHLORIDE 0.4 MG/1
CAPSULE ORAL
Qty: 90 CAPSULE | Refills: 3 | Status: SHIPPED | OUTPATIENT
Start: 2022-07-14 | End: 2022-08-10

## 2022-07-14 NOTE — PROGRESS NOTES
07/14/2022   Location:[facility] [address] [facility]  [place]  Patient #:  [id number]  YOB: 1940        History of Present Illness     Chief Complaint   Patient presents with    Follow-up Chronic Condition     2 week f/u    Leg Swelling     bilateral    Other     loss of consciousness on monday was found in front yard EMS called did/t got to the hospital.        Mr. Juan Antonio Oden is a 80 y.o. male  who presents for  2 week follow up. Unfortunately still done have records from prior PCP. Since last visit he has been checking his BS twice a day a requested. He has had 2 days with low redings in the morning. Evening readings in 200 to 300s. Was found down in his yard  a few days ago. EMS was called and he has low BS. He was treated with dextrose. Did not go to ER. Had not eating and had gone to [employer] to get his breakfast and had just returned when he relt dizzy and then awoke to find EMS over him.   His log book show his reading was 60         Last Labs  CBC:   Lab Results   Component Value Date/Time    WBC 4.6 07/15/2022 08:18 AM    RBC 4.26 07/15/2022 08:18 AM    HGB 11.7 07/15/2022 08:18 AM    HCT 35.7 07/15/2022 08:18 AM    MCV 83.8 07/15/2022 08:18 AM    MCH 27.5 07/15/2022 08:18 AM    MCHC 32.8 07/15/2022 08:18 AM    RDW 13.3 07/15/2022 08:18 AM     07/15/2022 08:18 AM    MPV 9.2 07/15/2022 08:18 AM     CMP:    Lab Results   Component Value Date/Time     07/15/2022 08:18 AM    K 4.8 07/15/2022 08:18 AM    CL 98 07/15/2022 08:18 AM    CO2 28 07/15/2022 08:18 AM    BUN 24 07/15/2022 08:18 AM    CREATININE 1.40 07/15/2022 08:18 AM    GFRAA >60 07/15/2022 08:18 AM    AGRATIO 0.9 05/02/2022 10:30 AM    LABGLOM 52 07/15/2022 08:18 AM    GLUCOSE 204 07/15/2022 08:18 AM    PROT 7.3 07/15/2022 08:18 AM    LABALBU 3.6 07/15/2022 08:18 AM    CALCIUM 9.2 07/15/2022 08:18 AM    BILITOT 0.3 07/15/2022 08:18 AM    ALKPHOS 303 07/15/2022 08:18 AM    ALKPHOS 269 file   Physical Activity:     Days of Exercise per Week: Not on file    Minutes of Exercise per Session: Not on file   Stress:     Feeling of Stress : Not on file   Social Connections:     Frequency of Communication with Friends and Family: Not on file    Frequency of Social Gatherings with Friends and Family: Not on file    Attends [employer] Services: Not on file    Active Member of Clubs or Organizations: Not on file    Attends Club or Organization Meetings: Not on file    Marital Status: Not on file   Intimate Partner Violence:     Fear of Current or Ex-Partner: Not on file    Emotionally Abused: Not on file    Physically Abused: Not on file    Sexually Abused: Not on file   Housing Stability:     Unable to Pay for Housing in the Last Year: Not on file    Number of [place] in the Last Year: Not on file    Unstable Housing in the Last Year: Not on file     Past Surgical History:   Procedure Laterality Date    COLONOSCOPY      LOBECTOMY Left 02/2020    ORTHOPEDIC SURGERY      bilateral shoulder repairs, both knees repaired-no hardware    OTHER SURGICAL HISTORY  12/2019    lung biopsy    [place] ABDOMEN SURGERY PROC UNLISTED  5/2015    explor lap     Family History   Problem Relation Age of Onset    Cancer Brother         stomach? ?    Heart Disease Father     Heart Disease Mother     Diabetes Daughter      Current Outpatient Medications   Medication Sig Dispense Refill    buPROPion (WELLBUTRIN SR) 150 MG extended release tablet Take 150 mg by mouth 2 times daily      tamsulosin (FLOMAX) 0.4 MG capsule TAKE 1 CAPSULE BY MOUTH EVERY DAY FOR 90 DAYS      glipiZIDE (GLUCOTROL XL) 10 MG extended release tablet TAKE 1 TABLET BY MOUTH EVERY DAY WITH BREAKFAST      lisinopril (PRINIVIL;ZESTRIL) 5 MG tablet TAKE 1 TABLET BY MOUTH EVERY DAY FOR 90 DAYS      pantoprazole (PROTONIX) 40 MG tablet TAKE 1 TABLET BY MOUTH EVERY DAY FOR 90 DAYS      dronabinol (MARINOL) 10 MG capsule TAKE 1 CAPSULE BY MOUTH ONCE DAILY IN THE EVENING OR AT BEDTIME      metoprolol tartrate (LOPRESSOR) 50 MG tablet TAKE 1 TABLET BY MOUTH EVERY DAY WITH FOOD FOR 90 DAYS      atorvastatin (LIPITOR) 10 MG tablet TAKE 1 TABLET BY MOUTH EVERY DAY FOR 90 DAYS      furosemide (LASIX) 20 MG tablet TAKE 1 TABLET BY MOUTH EVERY DAY FOR 90 DAYS      LORazepam (ATIVAN) 1 MG tablet Take 1 mg by mouth 2 times daily. insulin glargine (LANTUS SOLOSTAR) 100 UNIT/ML injection pen Inject 20 Units into the skin in the morning and at bedtime      TRULICITY 4.5 MT/3.0UY SOPN INJECT 4.5 MG (1 SYRINGE) ONCE WEEKLY      metFORMIN (GLUCOPHAGE-XR) 500 MG extended release tablet Take 500 mg by mouth in the morning and at bedtime       No current facility-administered medications for this visit. Health Maintenance   Topic Date Due    Annual Wellness Visit (AWV)  Never done    Pneumococcal 65+ years Vaccine (1 - PCV) Never done    Hepatitis C screen  Never done    DTaP/Tdap/Td vaccine (1 - Tdap) Never done    Shingles vaccine (1 of 2) Never done    Lipids  08/26/2021    Flu vaccine (1) 09/01/2022    Depression Screen  06/30/2023    COVID-19 Vaccine  Completed    Hepatitis A vaccine  Aged Out    Hib vaccine  Aged Out    Meningococcal (ACWY) vaccine  Aged Out             Review of Systems  Review of Systems   Constitutional:  Negative for fever. Respiratory:  Negative for cough and shortness of breath. Cardiovascular:  Negative for chest pain. Neurological:  Positive for syncope. BP (!) 108/51 (Site: Left Upper Arm, Position: Sitting)   Pulse 83   Temp 97.2 °F (36.2 °C) (Temporal)   Resp 18   Ht 6' 1\" (1.854 m)   Wt 142 lb (64.4 kg)   SpO2 98%   BMI 18.73 kg/m²     Wt Readings from Last 3 Encounters:   07/14/22 142 lb (64.4 kg)   06/30/22 142 lb 12.8 oz (64.8 kg)   04/05/21 154 lb (69.9 kg)       Physical Exam    Physical Exam  Vitals and nursing note reviewed. Constitutional:       Appearance: Normal appearance. HENT:      Head: Normocephalic and atraumatic. Cardiovascular:      Rate and Rhythm: Normal rate and regular rhythm. Pulmonary:      Effort: Pulmonary effort is normal.      Breath sounds: Normal breath sounds. Abdominal:      General: Bowel sounds are normal.      Palpations: Abdomen is soft. Musculoskeletal:      Right lower le+ Pitting Edema present. Left lower le+ Pitting Edema present. Neurological:      General: No focal deficit present. Mental Status: He is alert. Cranial Nerves: Cranial nerves are intact. Motor: Motor function is intact. EKG 81bpm  Sinus  Rhythm   -Right bundle branch block with left axis -bifascicular block.    -Old anteroseptal infarct. Similar 22      Assessment & Plan    Encounter Diagnoses   Name Primary?     Loss of consciousness ([facility] [address] Yes    Primary hypertension     Type 2 diabetes mellitus with hyperglycemia, with long-term current use of insulin (HCC)     Anorexia     Anemia, unspecified type     Coronary artery disease involving native coronary artery of native heart without angina pectoris     Tobacco use disorder     Mixed hyperlipidemia        Orders Placed This Encounter   Medications    metFORMIN (GLUCOPHAGE-XR) 500 MG extended release tablet     Sig: Take 1 tablet by mouth in the morning and at bedtime     Dispense:  180 tablet     Refill:  3    dronabinol (MARINOL) 10 MG capsule     Sig: TAKE 1 CAPSULE BY MOUTH ONCE DAILY IN THE EVENING OR AT BEDTIME     Dispense:  30 capsule     Refill:  3    lisinopril (PRINIVIL;ZESTRIL) 5 MG tablet     Sig: TAKE 1 TABLET BY MOUTH EVERY DAY FOR 90 DAYS     Dispense:  90 tablet     Refill:  3    buPROPion (WELLBUTRIN SR) 150 MG extended release tablet     Sig: Take 1 tablet by mouth 2 times daily     Dispense:  180 tablet     Refill:  3    tamsulosin (FLOMAX) 0.4 MG capsule     Sig: TAKE 1 CAPSULE BY MOUTH EVERY DAY FOR 90 DAYS     Dispense:  90 capsule     Refill:  3    furosemide (LASIX) 20 MG tablet     Sig: TAKE 1 TABLET BY MOUTH EVERY DAY FOR 90 DAYS     Dispense:  90 tablet     Refill:  3    atorvastatin (LIPITOR) 10 MG tablet     Sig: TAKE 1 TABLET BY MOUTH EVERY DAY FOR 90 DAYS     Dispense:  90 tablet     Refill:  3    metoprolol tartrate (LOPRESSOR) 25 MG tablet     Sig: TAKE 1 TABLET BY MOUTH EVERY DAY WITH FOOD FOR 90 DAYS     Dispense:  90 tablet     Refill:  3    insulin glargine (LANTUS SOLOSTAR) 100 UNIT/ML injection pen     Sig: Inject 30 Units into the skin before bedtime. Dispense:  5 pen     Refill:  5       Orders Placed This Encounter   Procedures    Comprehensive Metabolic Panel     Standing Status:   Future     Number of Occurrences:   1     Standing Expiration Date:   7/14/2023    CBC with Auto Differential     Standing Status:   Future     Number of Occurrences:   1     Standing Expiration Date:   7/14/2023    Hemoglobin A1C     Standing Status:   Future     Number of Occurrences:   1     Standing Expiration Date:   7/14/2023    TSH     Standing Status:   Future     Number of Occurrences:   1     Standing Expiration Date:   7/14/2023    Iron     Standing Status:   Future     Standing Expiration Date:   7/18/2023    Vitamin B12     Standing Status:   Future     Standing Expiration Date:   7/18/2023    EKG 12 Lead     Order Specific Question:   Reason for Exam?     Answer: Other     Comments:   loss of consciousness     Will adjust Lantus to 30 unit q am instead of 20units BID  They will bring in readings 2 weeks. We will adjust accordingly   Encouraged regular meals with protein and less carbs sweets. Decrease dose of Lopressore. BP runs a little low. Encouraged low salt diet and to elevated legs  above his waist during the day to help with swelling. No follow-ups on file.         Collis Aase, MD

## 2022-07-15 LAB
ALBUMIN SERPL-MCNC: 3.6 G/DL (ref 3.2–4.6)
ALBUMIN/GLOB SERPL: 1 {RATIO} (ref 1.2–3.5)
ALP SERPL-CCNC: 303 U/L (ref 50–136)
ALT SERPL-CCNC: 27 U/L (ref 12–65)
ANION GAP SERPL CALC-SCNC: 5 MMOL/L (ref 7–16)
AST SERPL-CCNC: 26 U/L (ref 15–37)
BILIRUB SERPL-MCNC: 0.3 MG/DL (ref 0.2–1.1)
BUN SERPL-MCNC: 24 MG/DL (ref 8–23)
CALCIUM SERPL-MCNC: 9.2 MG/DL (ref 8.3–10.4)
CHLORIDE SERPL-SCNC: 98 MMOL/L (ref 98–107)
CO2 SERPL-SCNC: 28 MMOL/L (ref 21–32)
CREAT SERPL-MCNC: 1.4 MG/DL (ref 0.8–1.5)
GLOBULIN SER CALC-MCNC: 3.7 G/DL (ref 2.3–3.5)
GLUCOSE SERPL-MCNC: 204 MG/DL (ref 65–100)
POTASSIUM SERPL-SCNC: 4.8 MMOL/L (ref 3.5–5.1)
PROT SERPL-MCNC: 7.3 G/DL (ref 6.3–8.2)
SODIUM SERPL-SCNC: 131 MMOL/L (ref 136–145)
TSH, 3RD GENERATION: 1.06 UIU/ML (ref 0.36–3.74)

## 2022-07-16 LAB
BASOPHILS # BLD: 0 K/UL (ref 0–0.2)
BASOPHILS NFR BLD: 1 % (ref 0–2)
DIFFERENTIAL METHOD BLD: ABNORMAL
EOSINOPHIL # BLD: 0.1 K/UL (ref 0–0.8)
EOSINOPHIL NFR BLD: 1 % (ref 0.5–7.8)
ERYTHROCYTE [DISTWIDTH] IN BLOOD BY AUTOMATED COUNT: 13.3 % (ref 11.9–14.6)
EST. AVERAGE GLUCOSE BLD GHB EST-MCNC: 255 MG/DL
HBA1C MFR BLD: 10.5 % (ref 4.8–5.6)
HCT VFR BLD AUTO: 35.7 % (ref 41.1–50.3)
HGB BLD-MCNC: 11.7 G/DL (ref 13.6–17.2)
IMM GRANULOCYTES # BLD AUTO: 0.1 K/UL (ref 0–0.5)
IMM GRANULOCYTES NFR BLD AUTO: 1 % (ref 0–5)
LYMPHOCYTES # BLD: 2.2 K/UL (ref 0.5–4.6)
LYMPHOCYTES NFR BLD: 47 % (ref 13–44)
MCH RBC QN AUTO: 27.5 PG (ref 26.1–32.9)
MCHC RBC AUTO-ENTMCNC: 32.8 G/DL (ref 31.4–35)
MCV RBC AUTO: 83.8 FL (ref 79.6–97.8)
MONOCYTES # BLD: 0.4 K/UL (ref 0.1–1.3)
MONOCYTES NFR BLD: 8 % (ref 4–12)
NEUTS SEG # BLD: 1.9 K/UL (ref 1.7–8.2)
NEUTS SEG NFR BLD: 42 % (ref 43–78)
NRBC # BLD: 0 K/UL (ref 0–0.2)
PLATELET # BLD AUTO: 260 K/UL (ref 150–450)
PMV BLD AUTO: 9.2 FL (ref 9.4–12.3)
RBC # BLD AUTO: 4.26 M/UL (ref 4.23–5.6)
WBC # BLD AUTO: 4.6 K/UL (ref 4.3–11.1)

## 2022-07-18 ENCOUNTER — TELEPHONE (OUTPATIENT)
Dept: INTERNAL MEDICINE CLINIC | Facility: CLINIC | Age: 82
End: 2022-07-18

## 2022-07-18 DIAGNOSIS — D64.9 ACUTE ANEMIA: Primary | ICD-10-CM

## 2022-07-22 RX ORDER — INSULIN GLARGINE 100 [IU]/ML
30 INJECTION, SOLUTION SUBCUTANEOUS DAILY
Qty: 5 PEN | Refills: 5 | Status: SHIPPED
Start: 2022-07-22 | End: 2022-08-10 | Stop reason: SDUPTHER

## 2022-07-22 ASSESSMENT — ENCOUNTER SYMPTOMS
SHORTNESS OF BREATH: 0
COUGH: 0

## 2022-08-08 ENCOUNTER — TELEPHONE (OUTPATIENT)
Dept: INTERNAL MEDICINE CLINIC | Facility: CLINIC | Age: 82
End: 2022-08-08

## 2022-08-08 NOTE — TELEPHONE ENCOUNTER
TCM- [facility] on 7/23/2022 discharged from [facility] 8/7/2022    D/C DATE: 07/23/2022    D/C FROM: [facility]    D/C TO: [facility] (rehab)    D/C DATE: 8/7/2022    PHONE NUMBER TO REACH PATIENT: [phone]    F/U APPT DATE & TIME: 08/10/2022 @ 11 with NP

## 2022-08-10 ENCOUNTER — OFFICE VISIT (OUTPATIENT)
Dept: INTERNAL MEDICINE CLINIC | Facility: CLINIC | Age: 82
End: 2022-08-10
Payer: MEDICARE

## 2022-08-10 VITALS
BODY MASS INDEX: 18.64 KG/M2 | DIASTOLIC BLOOD PRESSURE: 47 MMHG | WEIGHT: 140.6 LBS | TEMPERATURE: 97.9 F | OXYGEN SATURATION: 97 % | HEART RATE: 67 BPM | SYSTOLIC BLOOD PRESSURE: 107 MMHG | RESPIRATION RATE: 18 BRPM | HEIGHT: 73 IN

## 2022-08-10 DIAGNOSIS — F41.9 ANXIETY: ICD-10-CM

## 2022-08-10 DIAGNOSIS — E11.65 TYPE 2 DIABETES MELLITUS WITH HYPERGLYCEMIA, WITH LONG-TERM CURRENT USE OF INSULIN (HCC): Primary | ICD-10-CM

## 2022-08-10 DIAGNOSIS — I10 PRIMARY HYPERTENSION: ICD-10-CM

## 2022-08-10 DIAGNOSIS — J43.9 PULMONARY EMPHYSEMA, UNSPECIFIED EMPHYSEMA TYPE (HCC): ICD-10-CM

## 2022-08-10 DIAGNOSIS — Z09 HOSPITAL DISCHARGE FOLLOW-UP: ICD-10-CM

## 2022-08-10 DIAGNOSIS — Z79.4 TYPE 2 DIABETES MELLITUS WITH HYPERGLYCEMIA, WITH LONG-TERM CURRENT USE OF INSULIN (HCC): Primary | ICD-10-CM

## 2022-08-10 PROCEDURE — 99495 TRANSJ CARE MGMT MOD F2F 14D: CPT | Performed by: NURSE PRACTITIONER

## 2022-08-10 PROCEDURE — 1111F DSCHRG MED/CURRENT MED MERGE: CPT | Performed by: NURSE PRACTITIONER

## 2022-08-10 RX ORDER — PREDNISONE 20 MG/1
10 TABLET ORAL DAILY
Qty: 30 TABLET | Refills: 0
Start: 2022-08-10 | End: 2022-08-18 | Stop reason: SDUPTHER

## 2022-08-10 RX ORDER — INSULIN GLARGINE 100 [IU]/ML
25 INJECTION, SOLUTION SUBCUTANEOUS DAILY
Qty: 5 PEN | Refills: 5 | Status: SHIPPED | OUTPATIENT
Start: 2022-08-10 | End: 2022-10-13 | Stop reason: DRUGHIGH

## 2022-08-10 RX ORDER — FLASH GLUCOSE SCANNING READER
1 EACH MISCELLANEOUS 4 TIMES DAILY
Qty: 1 EACH | Refills: 3 | Status: SHIPPED | OUTPATIENT
Start: 2022-08-10

## 2022-08-10 RX ORDER — FERROUS SULFATE 325(65) MG
325 TABLET ORAL 2 TIMES DAILY
COMMUNITY

## 2022-08-10 RX ORDER — PREDNISONE 20 MG/1
20 TABLET ORAL DAILY
COMMUNITY
End: 2022-08-10

## 2022-08-10 RX ORDER — FLASH GLUCOSE SENSOR
1 KIT MISCELLANEOUS 4 TIMES DAILY
Qty: 2 EACH | Refills: 3 | Status: SHIPPED | OUTPATIENT
Start: 2022-08-10

## 2022-08-10 RX ORDER — DRONABINOL 10 MG/1
10 CAPSULE ORAL NIGHTLY
COMMUNITY
End: 2022-11-02 | Stop reason: HOSPADM

## 2022-08-10 RX ORDER — LORAZEPAM 1 MG/1
1 TABLET ORAL 2 TIMES DAILY PRN
Qty: 30 TABLET | Refills: 0
Start: 2022-08-10 | End: 2022-08-18 | Stop reason: SDUPTHER

## 2022-08-10 RX ORDER — DONEPEZIL HYDROCHLORIDE 5 MG/1
5 TABLET, FILM COATED ORAL NIGHTLY
COMMUNITY
End: 2022-10-03 | Stop reason: SDUPTHER

## 2022-08-10 RX ORDER — IPRATROPIUM BROMIDE AND ALBUTEROL SULFATE 2.5; .5 MG/3ML; MG/3ML
1 SOLUTION RESPIRATORY (INHALATION) EVERY 4 HOURS
Status: ON HOLD | COMMUNITY
End: 2022-10-29

## 2022-08-10 RX ORDER — FERROUS SULFATE 325(65) MG
325 TABLET ORAL
COMMUNITY
End: 2022-08-10

## 2022-08-10 ASSESSMENT — ENCOUNTER SYMPTOMS
NAUSEA: 0
VOMITING: 0
WHEEZING: 0
SHORTNESS OF BREATH: 0

## 2022-08-10 ASSESSMENT — PATIENT HEALTH QUESTIONNAIRE - PHQ9
1. LITTLE INTEREST OR PLEASURE IN DOING THINGS: 0
SUM OF ALL RESPONSES TO PHQ QUESTIONS 1-9: 0
2. FEELING DOWN, DEPRESSED OR HOPELESS: 0
SUM OF ALL RESPONSES TO PHQ QUESTIONS 1-9: 0
SUM OF ALL RESPONSES TO PHQ9 QUESTIONS 1 & 2: 0
SUM OF ALL RESPONSES TO PHQ QUESTIONS 1-9: 0
SUM OF ALL RESPONSES TO PHQ QUESTIONS 1-9: 0

## 2022-08-10 NOTE — PROGRESS NOTES
8/10/2022 11:29 AM  Location:[facility] [address] [facility]  [place]  Patient #:  [id number]  YOB: 1940          YOUR LAST HEMOGLOBIN A1CS:     Lab Results   Component Value Date/Time    HBA1C 9.7 06/29/2020 12:17 PM    LAS1HLAX 11.1 12/15/2020 02:00 PM       YOUR LAST LIPID PROFILE:   Lab Results   Component Value Date/Time    CHOL 146 08/26/2020 11:36 AM    HDL 79 08/26/2020 11:36 AM         Lab Results   Component Value Date/Time    GFRAA >60 07/15/2022 08:18 AM    BUN 24 07/15/2022 08:18 AM    IBUN 26 08/01/2019 08:47 AM    NAPOC 127 02/29/2020 01:34 PM     07/15/2022 08:18 AM    K 4.8 07/15/2022 08:18 AM    KPOCT 4.4 08/01/2019 08:47 AM    CLPOC 97 08/01/2019 08:47 AM    CL 98 07/15/2022 08:18 AM    CO2 28 07/15/2022 08:18 AM           History of Present Illness     Chief Complaint   Patient presents with    Follow-Up from [address] [place] [address] d/c 7/23/22       Mr. Dorothy Whalen is a 80 y.o. male  who presents for TCM follow up. Mr. Dorothy Whalen presents for TCM follow-up. He was discharged from [facility] on July 23. He presented to the emergency department on July 18 with hypoglycemia. His history is significant for type 2 diabetes on multiple agents including insulin, COPD/smoker, lung cancer status post lobectomy, hypertension, hyperlipidemia and PAD. His A1c was 10.4 during his hospitalization. His Lantus, sliding scale insulin, Farxiga and metformin were continued. He underwent an MRI during his hospitalization which was negative but Aricept was started for suspicion of dementia. He has been referred to neurology. He underwent an echocardiogram for some leg swelling which showed mild mitral regurgitation. He was instructed to elevate his legs and use compression hose. He went to SNF after his hospitalization and has been discharged and is now with his son who is with him today.   He usually lives alone but his son has been taking care of him and giving him his medications. [facility] is coming out to the house on Friday and he has been enrolled in the pace program.  He has no other specialist that he follows up with. His son reports that his blood sugars have been somewhat labile. His fasting blood sugar this morning was 140. His blood sugar last night was over 400. His son gave him 15 units of lispro for the elevated blood sugar.          No Known Allergies  Past Medical History:   Diagnosis Date    CAD (coronary artery disease) 2019    non obstructive     Chronic kidney disease     Stage 3    Depression     managed with medications    Diabetes ([facility] [address]     takes insulin, A1c on 19 was 10.3; unsure of avg blood sugar    HTN (hypertension) 2015    managed [facility] medications    Malignant neoplasm of upper lobe of left lung ([facility] [address] 2019    Other ill-defined conditions(799.89)     cholesterol    Sepsis ([facility] 2019     Social History     Socioeconomic History    Marital status: Single     Spouse name: None    Number of children: None    Years of education: None    Highest education level: None   Tobacco Use    Smoking status: Every Day     Packs/day: 1.00     Types: Cigarettes     Last attempt to quit: 2019     Years since quittin.6    Smokeless tobacco: Never   Substance and Sexual Activity    Alcohol use: No    Drug use: No     Social Determinants of Health     Financial Resource Strain: Low Risk     Difficulty of Paying Living Expenses: Not hard at all   Food Insecurity: No Food Insecurity    Worried About Running Out of Food in the Last Year: Never true    Ran Out of Food in the Last Year: Never true     Past Surgical History:   Procedure Laterality Date    COLONOSCOPY      LOBECTOMY Left 2020    ORTHOPEDIC SURGERY      bilateral shoulder repairs, both knees repaired-no hardware    OTHER SURGICAL HISTORY  2019    lung biopsy    UT ABDOMEN SURGERY PROC UNLISTED  2015    explor lap     Current Outpatient Medications Medication Sig Dispense Refill    insulin glargine (LANTUS SOLOSTAR) 100 UNIT/ML injection pen Inject 30 Units into the skin before bedtime. 5 pen 5    metFORMIN (GLUCOPHAGE-XR) 500 MG extended release tablet Take 1 tablet by mouth in the morning and at bedtime 180 tablet 3    lisinopril (PRINIVIL;ZESTRIL) 5 MG tablet TAKE 1 TABLET BY MOUTH EVERY DAY FOR 90 DAYS 90 tablet 3    buPROPion (WELLBUTRIN SR) 150 MG extended release tablet Take 1 tablet by mouth 2 times daily 180 tablet 3    tamsulosin (FLOMAX) 0.4 MG capsule TAKE 1 CAPSULE BY MOUTH EVERY DAY FOR 90 DAYS 90 capsule 3    furosemide (LASIX) 20 MG tablet TAKE 1 TABLET BY MOUTH EVERY DAY FOR 90 DAYS 90 tablet 3    atorvastatin (LIPITOR) 10 MG tablet TAKE 1 TABLET BY MOUTH EVERY DAY FOR 90 DAYS 90 tablet 3    metoprolol tartrate (LOPRESSOR) 25 MG tablet TAKE 1 TABLET BY MOUTH EVERY DAY WITH FOOD FOR 90 DAYS 90 tablet 3    glipiZIDE (GLUCOTROL XL) 10 MG extended release tablet TAKE 1 TABLET BY MOUTH EVERY DAY WITH BREAKFAST      pantoprazole (PROTONIX) 40 MG tablet TAKE 1 TABLET BY MOUTH EVERY DAY FOR 90 DAYS      LORazepam (ATIVAN) 1 MG tablet Take 1 mg by mouth 2 times daily. TRULICITY 4.5 EZ/3.0VJ SOPN INJECT 4.5 MG (1 SYRINGE) ONCE WEEKLY       No current facility-administered medications for this visit. Health Maintenance   Topic Date Due    Annual Wellness Visit (AWV)  Never done    Pneumococcal 65+ years Vaccine (1 - PCV) Never done    DTaP/Tdap/Td vaccine (1 - Tdap) Never done    Shingles vaccine (1 of 2) Never done    Lipids  08/26/2021    COVID-19 Vaccine (4 - Booster for Moderna series) 05/04/2022    Flu vaccine (1) 09/01/2022    Depression Screen  06/30/2023    Hepatitis C screen  Completed    Hepatitis A vaccine  Aged Out    Hib vaccine  Aged Out    Meningococcal (ACWY) vaccine  Aged Out     Family History   Problem Relation Age of Onset    Cancer Brother         stomach? ?    Heart Disease Father     Heart Disease Mother     Diabetes Daughter              Review of Systems  Review of Systems   Constitutional:  Positive for fatigue (per son, sleeps a lot). Negative for chills and fever. Respiratory:  Negative for shortness of breath and wheezing. Cardiovascular:  Positive for leg swelling (has improved). Gastrointestinal:  Negative for nausea and vomiting. All other systems reviewed and are negative. Ht 6' 1\" (1.854 m)   BMI 18.73 kg/m²   Blood pressure 107/47  Heart rate 67  Respiratory rate 18  Weight 140 pounds  Temperature 97.9  O2 saturation 97%    Physical Exam    Physical Exam  Vitals and nursing note reviewed. Constitutional:       General: He is not in acute distress. Appearance: He is not ill-appearing, toxic-appearing or diaphoretic. Comments: Thin    HENT:      Mouth/Throat:      Mouth: Mucous membranes are moist.   Cardiovascular:      Rate and Rhythm: Regular rhythm. Heart sounds: No murmur heard. Pulmonary:      Effort: No respiratory distress. Breath sounds: No stridor. No wheezing or rales. Musculoskeletal:      Right lower leg: Edema (localized to feet, non pitting, bilateral) present. Left lower leg: Edema present. Neurological:      Mental Status: He is oriented to person, place, and time. Gait: Gait abnormal (hesitant, uses a cane). Assessment & Plan         Current Outpatient Medications   Medication Sig Dispense Refill    insulin glargine (LANTUS SOLOSTAR) 100 UNIT/ML injection pen Inject 30 Units into the skin before bedtime.  5 pen 5    metFORMIN (GLUCOPHAGE-XR) 500 MG extended release tablet Take 1 tablet by mouth in the morning and at bedtime 180 tablet 3    lisinopril (PRINIVIL;ZESTRIL) 5 MG tablet TAKE 1 TABLET BY MOUTH EVERY DAY FOR 90 DAYS 90 tablet 3    buPROPion (WELLBUTRIN SR) 150 MG extended release tablet Take 1 tablet by mouth 2 times daily 180 tablet 3    tamsulosin (FLOMAX) 0.4 MG capsule TAKE 1 CAPSULE BY MOUTH EVERY DAY FOR 90 DAYS 90 capsule 3 (FREESTYLE LENORE 2 SENSOR) MISC; 1 kit by Does not apply route 4 times daily  Dispense: 2 each; Refill: 3    2. Pulmonary emphysema, unspecified emphysema type ([address]  With history of lung cancer, smoker with lobectomy. Not followed by pulmonology. Son declines at this time. Will try to back off his prednisone, discussed titrating down to the lowest effective dose with his son slowly. We will continue inhalers as prescribed. Knows to call the office for any new or worsening symptoms. Knows not to stop steroid therapy abruptly. - predniSONE (DELTASONE) 20 MG tablet; Take 0.5 tablets by mouth in the morning. Dispense: 30 tablet; Refill: 0    3. Anxiety  Titrate down on this as son states he is sleepy during the day. Discussed giving him his nightly dose and then using it as needed during the day. - LORazepam (ATIVAN) 1 MG tablet; Take 1 tablet by mouth 2 times daily as needed for Anxiety for up to 30 days. Dispense: 30 tablet; Refill: 0    4. Primary Hypertension  Blood pressure seems to be trending down. Will back off the metoprolol to once daily, discussed monitoring blood pressure and pulse daily. Appreciate assistance from home health. Time spent in counseling and coordination of care today, including review of all records, was 42 minutes, more than half of which was face to face.    Payal Jackson NP, APRN - CNP

## 2022-08-15 ENCOUNTER — TELEPHONE (OUTPATIENT)
Dept: INTERNAL MEDICINE CLINIC | Facility: CLINIC | Age: 82
End: 2022-08-15

## 2022-08-15 NOTE — TELEPHONE ENCOUNTER
[address]      Name of the Nurse Calling and Facility: Mitch with [facility]          Best Contact Number to Reach the Nurse: [phone]            Reason For Call: Calling for verbal orders for PT and OT.

## 2022-08-16 DIAGNOSIS — J43.9 PULMONARY EMPHYSEMA, UNSPECIFIED EMPHYSEMA TYPE (HCC): ICD-10-CM

## 2022-08-16 DIAGNOSIS — F41.9 ANXIETY: ICD-10-CM

## 2022-08-18 RX ORDER — PREDNISONE 20 MG/1
10 TABLET ORAL DAILY
Qty: 30 TABLET | Refills: 0 | Status: SHIPPED | OUTPATIENT
Start: 2022-08-18 | End: 2022-10-20

## 2022-08-18 RX ORDER — ATORVASTATIN CALCIUM 10 MG/1
TABLET, FILM COATED ORAL
COMMUNITY

## 2022-08-18 RX ORDER — BUPROPION HYDROCHLORIDE 150 MG/1
TABLET ORAL
COMMUNITY

## 2022-08-18 RX ORDER — LORAZEPAM 1 MG/1
1 TABLET ORAL 2 TIMES DAILY PRN
Qty: 60 TABLET | Refills: 0 | Status: SHIPPED | OUTPATIENT
Start: 2022-08-18 | End: 2022-10-03 | Stop reason: SDUPTHER

## 2022-08-18 RX ORDER — ALBUTEROL SULFATE 2.5 MG/3ML
3 SOLUTION RESPIRATORY (INHALATION)
COMMUNITY
End: 2022-09-01 | Stop reason: SDUPTHER

## 2022-08-18 RX ORDER — BENZONATATE 100 MG/1
100 CAPSULE ORAL 3 TIMES DAILY
COMMUNITY
End: 2022-10-03 | Stop reason: ALTCHOICE

## 2022-08-22 NOTE — TELEPHONE ENCOUNTER
Pt pharmacy report they can not fill pt humalog with the sig \"Sliding Scale\" please update and resend.

## 2022-08-26 ENCOUNTER — OFFICE VISIT (OUTPATIENT)
Dept: INTERNAL MEDICINE CLINIC | Facility: CLINIC | Age: 82
End: 2022-08-26
Payer: MEDICARE

## 2022-08-26 VITALS
TEMPERATURE: 97.7 F | OXYGEN SATURATION: 96 % | HEART RATE: 78 BPM | DIASTOLIC BLOOD PRESSURE: 54 MMHG | RESPIRATION RATE: 18 BRPM | BODY MASS INDEX: 19.22 KG/M2 | WEIGHT: 145 LBS | SYSTOLIC BLOOD PRESSURE: 118 MMHG | HEIGHT: 73 IN

## 2022-08-26 DIAGNOSIS — Z79.4 TYPE 2 DIABETES MELLITUS WITH HYPERGLYCEMIA, WITH LONG-TERM CURRENT USE OF INSULIN (HCC): Primary | ICD-10-CM

## 2022-08-26 DIAGNOSIS — F17.200 TOBACCO USE DISORDER: ICD-10-CM

## 2022-08-26 DIAGNOSIS — E11.65 TYPE 2 DIABETES MELLITUS WITH HYPERGLYCEMIA, WITH LONG-TERM CURRENT USE OF INSULIN (HCC): Primary | ICD-10-CM

## 2022-08-26 DIAGNOSIS — I10 PRIMARY HYPERTENSION: ICD-10-CM

## 2022-08-26 PROCEDURE — 4004F PT TOBACCO SCREEN RCVD TLK: CPT | Performed by: INTERNAL MEDICINE

## 2022-08-26 PROCEDURE — 99213 OFFICE O/P EST LOW 20 MIN: CPT | Performed by: INTERNAL MEDICINE

## 2022-08-26 PROCEDURE — G8427 DOCREV CUR MEDS BY ELIG CLIN: HCPCS | Performed by: INTERNAL MEDICINE

## 2022-08-26 PROCEDURE — G8420 CALC BMI NORM PARAMETERS: HCPCS | Performed by: INTERNAL MEDICINE

## 2022-08-26 PROCEDURE — 3046F HEMOGLOBIN A1C LEVEL >9.0%: CPT | Performed by: INTERNAL MEDICINE

## 2022-08-26 PROCEDURE — 1123F ACP DISCUSS/DSCN MKR DOCD: CPT | Performed by: INTERNAL MEDICINE

## 2022-08-26 ASSESSMENT — PATIENT HEALTH QUESTIONNAIRE - PHQ9
2. FEELING DOWN, DEPRESSED OR HOPELESS: 1
SUM OF ALL RESPONSES TO PHQ9 QUESTIONS 1 & 2: 2
SUM OF ALL RESPONSES TO PHQ QUESTIONS 1-9: 2
1. LITTLE INTEREST OR PLEASURE IN DOING THINGS: 1

## 2022-08-26 NOTE — PATIENT INSTRUCTIONS
If you are having problems with your continuous glucose monitor there is a number to call for assistance and there are videos on their website or YouTube to help use it. I think this type of monitor will helpful because it also gives alerts if your BS is dropping. Let me know if you need additional help. For now use Lantus 20 units daily  Humalog take 6 units before meals IF BS is above 200.   Fish Askew MD

## 2022-08-26 NOTE — PROGRESS NOTES
08/26/2022   Location:[facility] [address] [facility]  [place]  Patient #:  [id number]  YOB: 1940        History of Present Illness     Chief Complaint   Patient presents with    Follow-up Chronic Condition     6 week f/u    Swelling     Feet and legs       Mr. Shannen Waite is a 80 y.o. male  who presents for follow up. Since last visit he has been hospitalized with another hypoglycemia episode. He did not bring in readings. He was given CGM but he is not using it because they could not get it to work. This would be great from him because it can alert him to hypoglycemic episodes. I have asked him to have his son go on the website for assistance or make an appt and bring it here and we can see if we can assist him.   [phone]  Takes 20 units of lantus  Not taking Humalog at the moment. Complains of swelling. Admits to not keeping his feet elevated at recommend. Discussed wearing support hose to help.   Last Labs  CBC:   Lab Results   Component Value Date/Time    WBC 4.6 07/15/2022 08:18 AM    RBC 4.26 07/15/2022 08:18 AM    HGB 11.7 07/15/2022 08:18 AM    HCT 35.7 07/15/2022 08:18 AM    MCV 83.8 07/15/2022 08:18 AM    MCH 27.5 07/15/2022 08:18 AM    MCHC 32.8 07/15/2022 08:18 AM    RDW 13.3 07/15/2022 08:18 AM     07/15/2022 08:18 AM    MPV 9.2 07/15/2022 08:18 AM     CMP:    Lab Results   Component Value Date/Time     07/15/2022 08:18 AM    K 4.8 07/15/2022 08:18 AM    CL 98 07/15/2022 08:18 AM    CO2 28 07/15/2022 08:18 AM    BUN 24 07/15/2022 08:18 AM    CREATININE 1.40 07/15/2022 08:18 AM    GFRAA >60 07/15/2022 08:18 AM    AGRATIO 0.9 05/02/2022 10:30 AM    LABGLOM 52 07/15/2022 08:18 AM    GLUCOSE 204 07/15/2022 08:18 AM    PROT 7.3 07/15/2022 08:18 AM    LABALBU 3.6 07/15/2022 08:18 AM    CALCIUM 9.2 07/15/2022 08:18 AM    BILITOT 0.3 07/15/2022 08:18 AM    ALKPHOS 303 07/15/2022 08:18 AM    ALKPHOS 269 05/02/2022 10:30 AM    AST 26 07/15/2022 08:18 AM ALT 27 07/15/2022 08:18 AM     HgBA1c:    Lab Results   Component Value Date/Time    LABA1C 10.5 07/15/2022 08:18 AM     FLP:    Lab Results   Component Value Date/Time    TRIG 55 2020 11:36 AM    HDL 79 2020 11:36 AM    LDLCALC 56 2020 11:36 AM    LABVLDL 11 2020 11:36 AM     TSH:    Lab Results   Component Value Date/Time    TSH 0.812 2020 11:36 AM       No Known Allergies  Past Medical History:   Diagnosis Date    CAD (coronary artery disease) 2019    non obstructive     Chronic kidney disease     Stage 3    Depression     managed with medications    Diabetes ([facility]     takes insulin, A1c on 19 was 10.3; unsure of avg blood sugar    HTN (hypertension) 2015    managed wtih medications    Malignant neoplasm of upper lobe of left lung ([facility] 2019    Other ill-defined conditions(799.89)     cholesterol    Sepsis ([facility] 2019     Social History     Socioeconomic History    Marital status: Single     Spouse name: None    Number of children: None    Years of education: None    Highest education level: None   Tobacco Use    Smoking status: Every Day     Packs/day: 1.00     Types: Cigarettes     Last attempt to quit: 2019     Years since quittin.6    Smokeless tobacco: Never   Substance and Sexual Activity    Alcohol use: No    Drug use: No     Social Determinants of Health     Financial Resource Strain: Low Risk     Difficulty of Paying Living Expenses: Not hard at all   Food Insecurity: No Food Insecurity    Worried About Running Out of Food in the Last Year: Never true    Ran Out of Food in the Last Year: Never true     Past Surgical History:   Procedure Laterality Date    COLONOSCOPY      LOBECTOMY Left 2020    ORTHOPEDIC SURGERY      bilateral shoulder repairs, both knees repaired-no hardware    OTHER SURGICAL HISTORY  2019    lung biopsy    VT ABDOMEN SURGERY PROC UNLISTED  2015    explor lap     Family History   Problem Relation Age of Onset Cancer Brother         stomach? ?    Heart Disease Father     Heart Disease Mother     Diabetes Daughter      Current Outpatient Medications   Medication Sig Dispense Refill    albuterol (PROVENTIL) (2.5 MG/3ML) 0.083% nebulizer solution 3 mLs      atorvastatin (LIPITOR) 10 MG tablet atorvastatin 10 mg tablet   Take 1 tablet every day by oral route at bedtime. benzonatate (TESSALON) 100 MG capsule Take 100 mg by mouth 3 times daily      buPROPion (WELLBUTRIN XL) 150 MG extended release tablet bupropion HCl  mg 24 hr tablet, extended release   Take 1 tablet every day by oral route. cyanocobalamin 100 MCG tablet Take 100 mcg by mouth daily      LORazepam (ATIVAN) 1 MG tablet Take 1 tablet by mouth 2 times daily as needed for Anxiety for up to 30 days. 60 tablet 0    predniSONE (DELTASONE) 20 MG tablet Take 0.5 tablets by mouth daily 30 tablet 0    dapagliflozin (FARXIGA) 5 MG tablet Take 1 tablet by mouth every morning 90 tablet 3    insulin lispro (HUMALOG) 100 UNIT/ML injection vial Sliding scale 10 mL 5    donepezil (ARICEPT) 5 MG tablet Take 5 mg by mouth nightly 1 at night      dronabinol (MARINOL) 10 MG capsule Take 10 mg by mouth at bedtime. 1 capsule po at bedtime      ferrous sulfate (IRON 325) 325 (65 Fe) MG tablet Take 325 mg by mouth in the morning and at bedtime      ipratropium-albuterol (DUONEB) 0.5-2.5 (3) MG/3ML SOLN nebulizer solution Inhale 1 vial into the lungs every 4 hours      Mouthwash Compounding Base LIQD Take 10 mLs by mouth daily Give 10 ml 4 times a day      psyllium 100 % PACK Take 1 packet by mouth in the morning. tiotropium (SPIRIVA) 18 MCG inhalation capsule Inhale 18 mcg into the lungs in the morning. 1 capsule inhale orally one time a day. insulin glargine (LANTUS SOLOSTAR) 100 UNIT/ML injection pen Inject 25 Units into the skin before bedtime.  5 pen 5    Continuous Blood Gluc  (FREESTYLE LENORE 2 READER) ESTHER 1 Device by Does not apply route 4 times daily 1 each 3    Continuous Blood Gluc Sensor (FREESTYLE LENORE 2 SENSOR) MISC 1 kit by Does not apply route 4 times daily 2 each 3    metFORMIN (GLUCOPHAGE-XR) 500 MG extended release tablet Take 1 tablet by mouth in the morning and at bedtime 180 tablet 3    lisinopril (PRINIVIL;ZESTRIL) 5 MG tablet TAKE 1 TABLET BY MOUTH EVERY DAY FOR 90 DAYS 90 tablet 3    metoprolol tartrate (LOPRESSOR) 25 MG tablet TAKE 1 TABLET BY MOUTH EVERY DAY WITH FOOD FOR 90 DAYS 90 tablet 3     No current facility-administered medications for this visit. Health Maintenance   Topic Date Due    Annual Wellness Visit (AWV)  Never done    Pneumococcal 65+ years Vaccine (1 - PCV) Never done    DTaP/Tdap/Td vaccine (1 - Tdap) Never done    Shingles vaccine (1 of 2) Never done    Lipids  08/26/2021    COVID-19 Vaccine (4 - Booster for Moderna series) 05/04/2022    Flu vaccine (1) 09/01/2022    Depression Screen  08/10/2023    Hepatitis C screen  Completed    Hepatitis A vaccine  Aged Out    Hib vaccine  Aged Out    Meningococcal (ACWY) vaccine  Aged Out             Review of Systems  Review of Systems   Constitutional:  Positive for fatigue. Negative for fever. Respiratory:  Negative for shortness of breath. Cardiovascular:  Positive for leg swelling. Negative for chest pain. Genitourinary:  Negative for difficulty urinating. BP (!) 118/54 (Site: Left Upper Arm, Position: Sitting)   Pulse 78   Temp 97.7 °F (36.5 °C) (Temporal)   Resp 18   Ht 6' 1\" (1.854 m)   Wt 145 lb (65.8 kg)   SpO2 96%   BMI 19.13 kg/m²     Wt Readings from Last 3 Encounters:   08/26/22 145 lb (65.8 kg)   08/10/22 140 lb 9.6 oz (63.8 kg)   07/14/22 142 lb (64.4 kg)       Physical Exam    Physical Exam  Vitals and nursing note reviewed. Constitutional:       Appearance: Normal appearance. HENT:      Head: Normocephalic and atraumatic. Cardiovascular:      Rate and Rhythm: Normal rate and regular rhythm. Pulses: Decreased pulses. Comments: Bilateral pedal edema. Pulmonary:      Effort: Pulmonary effort is normal.   Abdominal:      General: Bowel sounds are normal.   Musculoskeletal:      Right lower le+ Pitting Edema present. Left lower le+ Pitting Edema present. Neurological:      Mental Status: He is alert. Assessment & Plan    Encounter Diagnoses   Name Primary? Type 2 diabetes mellitus with hyperglycemia, with long-term current use of insulin (HCC) Yes    Tobacco use disorder     Primary hypertension              Elevate legs at rest   Low salt diet. Compression hose. Encouraged smoking cessation. Encouraged regular meals with protein  Encourage to try again with CGM      Return in about 4 weeks (around 2022).         Nayely Franz MD

## 2022-08-31 NOTE — TELEPHONE ENCOUNTER
Patient only has one day worth left to check his BS    Medication Refill Request      Name of Medication : EZ comfort needles, 33G--6MM      Strength of Medication: na      Directions: na      30 day or 90 day supply: 90      Which Pharmacy:[facility]         Medication Refill Request      Name of Medication : Strips, Metrix      Strength of Medication: na      Directions: na      30 day or 90 day supply: 90      Which Pharmacy:[facility]

## 2022-09-01 RX ORDER — GLUCOSAMINE HCL/CHONDROITIN SU 500-400 MG
CAPSULE ORAL
Qty: 200 STRIP | Refills: 5 | Status: SHIPPED | OUTPATIENT
Start: 2022-09-01

## 2022-09-01 RX ORDER — ALBUTEROL SULFATE 2.5 MG/3ML
2.5 SOLUTION RESPIRATORY (INHALATION) 2 TIMES DAILY
Qty: 120 EACH | Refills: 5 | Status: SHIPPED | OUTPATIENT
Start: 2022-09-01

## 2022-09-06 ASSESSMENT — ENCOUNTER SYMPTOMS: SHORTNESS OF BREATH: 0

## 2022-09-27 ENCOUNTER — TELEPHONE (OUTPATIENT)
Dept: INTERNAL MEDICINE CLINIC | Facility: CLINIC | Age: 82
End: 2022-09-27

## 2022-09-27 NOTE — TELEPHONE ENCOUNTER
[facility]      Name of the Nurse Calling and Facility: Cristian Torres with [facility]      Best Contact Number to Reach the Nurse: [phone]          Reason For Call: Calling to get an order for speech therapy.

## 2022-09-28 NOTE — TELEPHONE ENCOUNTER
Please call and get additional BS readings for the past week. Ask if they are able to help him with his Evorn Araceli.   Lyman Angelucci, MD

## 2022-09-29 NOTE — TELEPHONE ENCOUNTER
Have him increase his Lantus by 5 units. Please document new dose. Tell him to bring the freestyle stuff with him for his next appt  and I will see if we can help him. This would be ideal for him.    Kennedi Cordoba MD

## 2022-10-03 ENCOUNTER — OFFICE VISIT (OUTPATIENT)
Dept: INTERNAL MEDICINE CLINIC | Facility: CLINIC | Age: 82
End: 2022-10-03
Payer: MEDICARE

## 2022-10-03 VITALS
RESPIRATION RATE: 18 BRPM | TEMPERATURE: 98.6 F | HEIGHT: 73 IN | SYSTOLIC BLOOD PRESSURE: 111 MMHG | DIASTOLIC BLOOD PRESSURE: 47 MMHG | WEIGHT: 138.6 LBS | HEART RATE: 84 BPM | BODY MASS INDEX: 18.37 KG/M2 | OXYGEN SATURATION: 97 %

## 2022-10-03 DIAGNOSIS — F03.A4 MILD DEMENTIA WITH ANXIETY, UNSPECIFIED DEMENTIA TYPE: ICD-10-CM

## 2022-10-03 DIAGNOSIS — Z00.00 MEDICARE ANNUAL WELLNESS VISIT, SUBSEQUENT: Primary | ICD-10-CM

## 2022-10-03 DIAGNOSIS — F17.200 TOBACCO USE DISORDER: ICD-10-CM

## 2022-10-03 DIAGNOSIS — Z79.4 TYPE 2 DIABETES MELLITUS WITH HYPERGLYCEMIA, WITH LONG-TERM CURRENT USE OF INSULIN (HCC): ICD-10-CM

## 2022-10-03 DIAGNOSIS — F41.9 ANXIETY: ICD-10-CM

## 2022-10-03 DIAGNOSIS — E11.65 TYPE 2 DIABETES MELLITUS WITH HYPERGLYCEMIA, WITH LONG-TERM CURRENT USE OF INSULIN (HCC): ICD-10-CM

## 2022-10-03 PROCEDURE — G8484 FLU IMMUNIZE NO ADMIN: HCPCS | Performed by: INTERNAL MEDICINE

## 2022-10-03 PROCEDURE — 1123F ACP DISCUSS/DSCN MKR DOCD: CPT | Performed by: INTERNAL MEDICINE

## 2022-10-03 PROCEDURE — G0439 PPPS, SUBSEQ VISIT: HCPCS | Performed by: INTERNAL MEDICINE

## 2022-10-03 PROCEDURE — 3046F HEMOGLOBIN A1C LEVEL >9.0%: CPT | Performed by: INTERNAL MEDICINE

## 2022-10-03 RX ORDER — FUROSEMIDE 20 MG/1
20 TABLET ORAL DAILY
COMMUNITY

## 2022-10-03 RX ORDER — LORAZEPAM 1 MG/1
1 TABLET ORAL 2 TIMES DAILY PRN
Qty: 60 TABLET | Refills: 1 | Status: ON HOLD | OUTPATIENT
Start: 2022-10-03 | End: 2022-10-29

## 2022-10-03 RX ORDER — DONEPEZIL HYDROCHLORIDE 5 MG/1
5 TABLET, FILM COATED ORAL NIGHTLY
Qty: 90 TABLET | Refills: 5 | Status: SHIPPED | OUTPATIENT
Start: 2022-10-03

## 2022-10-03 ASSESSMENT — LIFESTYLE VARIABLES
HOW MANY STANDARD DRINKS CONTAINING ALCOHOL DO YOU HAVE ON A TYPICAL DAY: PATIENT DOES NOT DRINK
HOW OFTEN DO YOU HAVE A DRINK CONTAINING ALCOHOL: NEVER

## 2022-10-03 ASSESSMENT — PATIENT HEALTH QUESTIONNAIRE - PHQ9
2. FEELING DOWN, DEPRESSED OR HOPELESS: 1
1. LITTLE INTEREST OR PLEASURE IN DOING THINGS: 1
SUM OF ALL RESPONSES TO PHQ QUESTIONS 1-9: 2
SUM OF ALL RESPONSES TO PHQ QUESTIONS 1-9: 2
SUM OF ALL RESPONSES TO PHQ9 QUESTIONS 1 & 2: 2
SUM OF ALL RESPONSES TO PHQ QUESTIONS 1-9: 2
SUM OF ALL RESPONSES TO PHQ QUESTIONS 1-9: 2

## 2022-10-03 NOTE — PROGRESS NOTES
10/03/2022   Location:[facility] [address] [facility]  [place]  Patient #:  [id number]  YOB: 1940        History of Present Illness     Chief Complaint   Patient presents with    Medicare AWV     Subsequent     Follow-up     4 weeks        Mr. Jaylyn Arizmendi is a [de-identified] y.o. male  who presents for ***      Last Labs  {LABS:[id number]}    No Known Allergies  Past Medical History:   Diagnosis Date    CAD (coronary artery disease) 2019    non obstructive     Chronic kidney disease     Stage 3    Depression     managed with medications    Diabetes ([facility] [address]     takes insulin, A1c on 19 was 10.3; unsure of avg blood sugar    HTN (hypertension) 2015    managed wt medications    Malignant neoplasm of upper lobe of left lung ([facility] [address] 2019    Other ill-defined conditions(799.89)     cholesterol    Sepsis ([facility] 2019     Social History     Socioeconomic History    Marital status: Single     Spouse name: None    Number of children: None    Years of education: None    Highest education level: None   Tobacco Use    Smoking status: Every Day     Packs/day: 1.00     Types: Cigarettes     Last attempt to quit: 2019     Years since quittin.7    Smokeless tobacco: Never   Substance and Sexual Activity    Alcohol use: No    Drug use: No     Social Determinants of Health     Financial Resource Strain: Low Risk     Difficulty of Paying Living Expenses: Not hard at all   Food Insecurity: No Food Insecurity    Worried About Running Out of Food in the Last Year: Never true    Ran Out of Food in the Last Year: Never true   Physical Activity: Insufficiently Active    Days of Exercise per Week: 7 days    Minutes of Exercise per Session: 10 min     Past Surgical History:   Procedure Laterality Date    COLONOSCOPY      LOBECTOMY Left 2020    ORTHOPEDIC SURGERY      bilateral shoulder repairs, both knees repaired-no hardware    OTHER SURGICAL HISTORY  2019    lung biopsy    OH ABDOMEN SURGERY PROC UNLISTED  5/2015    explor lap     Family History   Problem Relation Age of Onset    Cancer Brother         stomach? ?    Heart Disease Father     Heart Disease Mother     Diabetes Daughter      Current Outpatient Medications   Medication Sig Dispense Refill    furosemide (LASIX) 20 MG tablet Take 20 mg by mouth daily 1 tablet every day      Insulin Pen Needle 33G X 6 MM MISC Use daily with insulin 200 each 11    blood glucose monitor strips Test 2 times a day & as needed for symptoms of irregular blood glucose. Dispense sufficient amount for indicated testing frequency plus additional to accommodate PRN testing needs. 200 strip 5    albuterol (PROVENTIL) (2.5 MG/3ML) 0.083% nebulizer solution Take 3 mLs by nebulization in the morning and at bedtime 120 each 5    atorvastatin (LIPITOR) 10 MG tablet atorvastatin 10 mg tablet   Take 1 tablet every day by oral route at bedtime. buPROPion (WELLBUTRIN XL) 150 MG extended release tablet bupropion HCl  mg 24 hr tablet, extended release   Take 1 tablet every day by oral route. dapagliflozin (FARXIGA) 5 MG tablet Take 1 tablet by mouth every morning 90 tablet 3    ipratropium-albuterol (DUONEB) 0.5-2.5 (3) MG/3ML SOLN nebulizer solution Inhale 1 vial into the lungs every 4 hours      psyllium 100 % PACK Take 1 packet by mouth in the morning. tiotropium (SPIRIVA) 18 MCG inhalation capsule Inhale 18 mcg into the lungs in the morning. 1 capsule inhale orally one time a day. insulin glargine (LANTUS SOLOSTAR) 100 UNIT/ML injection pen Inject 25 Units into the skin before bedtime.  (Patient taking differently: Inject 30 Units into the skin daily) 5 pen 5    Continuous Blood Gluc  (FREESTYLE LENORE 2 READER) ESTHER 1 Device by Does not apply route 4 times daily 1 each 3    Continuous Blood Gluc Sensor (FREESTYLE LENORE 2 SENSOR) MISC 1 kit by Does not apply route 4 times daily 2 each 3    metFORMIN (GLUCOPHAGE-XR) 500 MG extended release tablet Take 1 tablet by mouth in the morning and at bedtime 180 tablet 3    lisinopril (PRINIVIL;ZESTRIL) 5 MG tablet TAKE 1 TABLET BY MOUTH EVERY DAY FOR 90 DAYS 90 tablet 3    metoprolol tartrate (LOPRESSOR) 25 MG tablet TAKE 1 TABLET BY MOUTH EVERY DAY WITH FOOD FOR 90 DAYS 90 tablet 3    insulin lispro (HUMALOG) 100 UNIT/ML injection vial 6 units before meals for BS readings above 200. Max dose 18 units/day 10 mL 5    cyanocobalamin 100 MCG tablet Take 100 mcg by mouth daily      predniSONE (DELTASONE) 20 MG tablet Take 0.5 tablets by mouth daily 30 tablet 0    donepezil (ARICEPT) 5 MG tablet Take 5 mg by mouth nightly 1 at night      dronabinol (MARINOL) 10 MG capsule Take 10 mg by mouth at bedtime. 1 capsule po at bedtime      ferrous sulfate (IRON 325) 325 (65 Fe) MG tablet Take 325 mg by mouth in the morning and at bedtime       No current facility-administered medications for this visit.      Health Maintenance   Topic Date Due    Pneumococcal 65+ years Vaccine (1 - PCV) Never done    DTaP/Tdap/Td vaccine (1 - Tdap) Never done    Shingles vaccine (1 of 2) Never done    Low dose CT lung screening  Never done    Lipids  08/26/2021    COVID-19 Vaccine (4 - Booster for [place] Drop series) 05/04/2022    Annual Wellness Visit (AWV)  Never done    Flu vaccine (1) 08/01/2022    Depression Screen  08/26/2023    Hepatitis A vaccine  Aged Out    Hib vaccine  Aged Out    Meningococcal (ACWY) vaccine  Aged Out             Review of Systems  Review of Systems    BP (!) 111/47 (Site: Left Upper Arm, Position: Sitting, Cuff Size: Medium Adult)   Pulse 84   Temp 98.6 °F (37 °C) (Temporal)   Resp 18   Ht 6' 1\" (1.854 m)   Wt 138 lb 9.6 oz (62.9 kg)   SpO2 97% Comment: ra  BMI 18.29 kg/m²     Wt Readings from Last 3 Encounters:   10/03/22 138 lb 9.6 oz (62.9 kg)   08/26/22 145 lb (65.8 kg)   08/10/22 140 lb 9.6 oz (63.8 kg)       Physical Exam    Physical Exam        Assessment & Plan    Encounter Diagnoses   Name Primary? Anxiety        Orders Placed This Encounter   Medications    LORazepam (ATIVAN) 1 MG tablet     Sig: Take 1 tablet by mouth 2 times daily as needed for Anxiety for up to 30 days. Dispense:  60 tablet     Refill:  1       No orders of the defined types were placed in this encounter. No follow-ups on file.         Argenis Jim MD

## 2022-10-03 NOTE — PATIENT INSTRUCTIONS
Personalized Preventive Plan for Nimco Magana - 10/3/2022  Medicare offers a range of preventive health benefits. Some of the tests and screenings are paid in full while other may be subject to a deductible, co-insurance, and/or copay. Some of these benefits include a comprehensive review of your medical history including lifestyle, illnesses that may run in your family, and various assessments and screenings as appropriate. After reviewing your medical record and screening and assessments performed today your provider may have ordered immunizations, labs, imaging, and/or referrals for you. A list of these orders (if applicable) as well as your Preventive Care list are included within your After Visit Summary for your review. Other Preventive Recommendations:    A preventive eye exam performed by an eye specialist is recommended every 1-2 years to screen for glaucoma; cataracts, macular degeneration, and other eye disorders. A preventive dental visit is recommended every 6 months. Try to get at least 150 minutes of exercise per week or 10,000 steps per day on a pedometer . Order or download the FREE \"Exercise & Physical Activity: Your Everyday Guide\" from The [facility]. Call 9-740.711.4483 or search The [facility] online. You need 9057-6473 mg of calcium and 0456-0198 IU of vitamin D per day. It is possible to meet your calcium requirement with diet alone, but a vitamin D supplement is usually necessary to meet this goal.  When exposed to the sun, use a sunscreen that protects against both UVA and UVB radiation with an SPF of 30 or greater. Reapply every 2 to 3 hours or after sweating, drying off with a towel, or swimming. Always wear a seat belt when traveling in a car. Always wear a helmet when riding a bicycle or motorcycle. Heart-Healthy Diet   Sodium, Fat, and Cholesterol Controlled Diet       What Is a Heart Healthy Diet?    A heart-healthy diet is one that limits sodium , certain types of fat , and cholesterol . This type of diet is recommended for:   People with any form of cardiovascular disease (eg, coronary heart disease , peripheral vascular disease , previous heart attack , previous stroke )   People with risk factors for cardiovascular disease, such as high blood pressure , high cholesterol , or diabetes   Anyone who wants to lower their risk of developing cardiovascular disease   Sodium    Sodium is a mineral found in many foods. In general, most people consume much more sodium than they need. Diets high in sodium can increase blood pressure and lead to edema (water retention). On a heart-healthy diet, you should consume no more than 2,300 mg (milligrams) of sodium per dayabout the amount in one teaspoon of table salt. The foods highest in sodium include table salt (about 50% sodium), processed foods, convenience foods, and preserved foods. Cholesterol    Cholesterol is a fat-like, waxy substance in your blood. Our bodies make some cholesterol. It is also found in animal products, with the highest amounts in fatty meat, egg yolks, whole milk, cheese, shellfish, and organ meats. On a heart-healthy diet, you should limit your cholesterol intake to less than 200 mg per day. It is normal and important to have some cholesterol in your bloodstream. But too much cholesterol can cause plaque to build up within your arteries, which can eventually lead to a heart attack or stroke. The two types of cholesterol that are most commonly referred to are:   Low-density lipoprotein (LDL) cholesterol  Also known as bad cholesterol, this is the cholesterol that tends to build up along your arteries. Bad cholesterol levels are increased by eating fats that are saturated or hydrogenated. Optimal level of this cholesterol is less than 100. Over 130 starts to get risky for heart disease.    High-density lipoprotein (HDL) cholesterol  Also known as good cholesterol, this type of cholesterol actually carries cholesterol away from your arteries and may, therefore, help lower your risk of having a heart attack. You want this level to be high (ideally greater than 60). It is a risk to have a level less than 40. You can raise this good cholesterol by eating olive oil, canola oil, avocados, or nuts. Exercise raises this level, too. Fat    Fat is calorie dense and packs a lot of calories into a small amount of food. Even though fats should be limited due to their high calorie content, not all fats are bad. In fact, some fats are quite healthful. Fat can be broken down into four main types. The good-for-you fats are:   Monounsaturated fat  found in oils such as olive and canola, avocados, and nuts and natural nut butters; can decrease cholesterol levels, while keeping levels of HDL cholesterol high   Polyunsaturated fat  found in oils such as safflower, sunflower, soybean, corn, and sesame; can decrease total cholesterol and LDL cholesterol   Omega-3 fatty acids  particularly those found in fatty fish (such as salmon, trout, tuna, mackerel, herring, and sardines); can decrease risk of arrhythmias, decrease triglyceride levels, and slightly lower blood pressure   The fats that you want to limit are:   Saturated fat  found in animal products, many fast foods, and a few vegetables; increases total blood cholesterol, including LDL levels   Animal fats that are saturated include: butter, lard, whole-milk dairy products, meat fat, and poultry skin   Vegetable fats that are saturated include: hydrogenated shortening, palm oil, coconut oil, cocoa butter   Hydrogenated or trans fat  found in margarine and vegetable shortening, most shelf stable snack foods, and fried foods; increases LDL and decreases HDL     It is generally recommended that you limit your total fat for the day to less than 30% of your total calories.  If you follow an 1800-calorie heart healthy diet, for example, this would mean 60 grams of fat or less per day. Saturated fat and trans fat in your diet raises your blood cholesterol the most, much more than dietary cholesterol does. For this reason, on a heart-healthy diet, less than 7% of your calories should come from saturated fat and ideally 0% from trans fat. On an 1800-calorie diet, this translates into less than 14 grams of saturated fat per day, leaving 46 grams of fat to come from mono- and polyunsaturated fats.    Food Choices on a Heart Healthy Diet   Food Category   Foods Recommended   Foods to Avoid   Grains   Breads and rolls without salted tops Most dry and cooked cereals Unsalted crackers and breadsticks Low-sodium or homemade breadcrumbs or stuffing All rice and pastas   Breads, rolls, and crackers with salted tops High-fat baked goods (eg, muffins, donuts, pastries) Quick breads, self-rising flour, and biscuit mixes Regular bread crumbs Instant hot cereals Commercially prepared rice, pasta, or stuffing mixes   Vegetables   Most fresh, frozen, and low-sodium canned vegetables Low-sodium and salt-free vegetable juices Canned vegetables if unsalted or rinsed   Regular canned vegetables and juices, including sauerkraut and pickled vegetables Frozen vegetables with sauces Commercially prepared potato and vegetable mixes   Fruits   Most fresh, frozen, and canned fruits All fruit juices   Fruits processed with salt or sodium   Milk   Nonfat or low-fat (1%) milk Nonfat or low-fat yogurt Cottage cheese, low-fat ricotta, cheeses labeled as low-fat and low-sodium   Whole milk Reduced-fat (2%) milk Malted and chocolate milk Full fat yogurt Most cheeses (unless low-fat and low salt) Buttermilk (no more than 1 cup per week)   Meats and Beans   Lean cuts of fresh or frozen beef, veal, lamb, or pork (look for the word loin) Fresh or frozen poultry without the skin Fresh or frozen fish and some shellfish Egg whites and egg substitutes (Limit whole eggs to three per week) Tofu Nuts or seeds (unsalted, dry-roasted), low-sodium peanut butter Dried peas, beans, and lentils   Any smoked, cured, salted, or canned meat, fish, or poultry (including carpio, chipped beef, cold cuts, hot dogs, sausages, sardines, and anchovies) Poultry skins Breaded and/or fried fish or meats Canned peas, beans, and lentils Salted nuts   Fats and Oils   Olive oil and canola oil Low-sodium, low-fat salad dressings and mayonnaise   Butter, margarine, coconut and palm oils, carpio fat   Snacks, Sweets, and Condiments   Low-sodium or unsalted versions of broths, soups, soy sauce, and condiments Pepper, herbs, and spices; vinegar, lemon, or lime juice Low-fat frozen desserts (yogurt, sherbet, fruit bars) Sugar, cocoa powder, honey, syrup, jam, and preserves Low-fat, trans-fat free cookies, cakes, and pies Mervin and animal crackers, fig bars, keith snaps   High-fat desserts Broth, soups, gravies, and sauces, made from instant mixes or other high-sodium ingredients Salted snack foods Canned olives Meat tenderizers, seasoning salt, and most flavored vinegars   Beverages   Low-sodium carbonated beverages Tea and coffee in moderation Soy milk   Commercially softened water   Suggestions   Make whole grains, fruits, and vegetables the base of your diet. Choose heart-healthy fats such as canola, olive, and flaxseed oil, and foods high in heart-healthy fats, such as nuts, seeds, soybeans, tofu, and fish. Eat fish at least twice per week; the fish highest in omega-3 fatty acids and lowest in mercury include salmon, herring, mackerel, sardines, and canned chunk light tuna. If you eat fish less than twice per week or have high triglycerides, talk to your doctor about taking fish oil supplements. Read food labels. For products low in fat and cholesterol, look for fat free, low-fat, cholesterol free, saturated fat free, and trans fat freeAlso scan the Nutrition Facts Label, which lists saturated fat, trans fat, and cholesterol amounts. For products low in sodium, look for sodium free, very low sodium, low sodium, no added salt, and unsalted   Skip the salt when cooking or at the table; if food needs more flavor, get creative and try out different herbs and spices. Garlic and onion also add substantial flavor to foods. Trim any visible fat off meat and poultry before cooking, and drain the fat off after leo. Use cooking methods that require little or no added fat, such as grilling, boiling, baking, poaching, broiling, roasting, steaming, stir-frying, and sauting. Avoid fast food and convenience food. They tend to be high in saturated and trans fat and have a lot of added salt. Talk to a registered dietitian for individualized diet advice. Last Reviewed: March 2011 Ez Guillen MS, MPH, RD   Updated: 3/29/2011     Keep Your Memory Alfonso Close       Many factors can affect your ability to remembera hectic lifestyle, aging, stress, chronic disease, and certain medicines. But, there are steps you can take to sharpen your mind and help preserve your memory. Challenge Your Brain   Regularly challenging your mind may help keeps it in top shape. Good mental exercises include:   Crossword puzzlesUse a dictionary if you need it; you will learn more that way. Brainteasers Try some! Crafts, such as wood working and sewing   Hobbies, such as gardening and building model airplanes   SocializingVisit old friends or join groups to meet new ones. Reading   Learning a new language   Taking a class, whether it be art history or gloria chi   TravelingExperience the food, history, and culture of your destination   Learning to use a computer   Going to museums, the theater, or thought-provoking movies   Changing things in your daily life, such as reversing your pattern in the grocery store or brushing your teeth using your nondominant hand   Use Memory Aids   There is no need to remember every detail on your own.  These memory aids can help: Calendars and day planners   Electronic organizers to store all sorts of helpful informationThese devices can \"beep\" to remind you of appointments. A book of days to record birthdays, anniversaries, and other occasions that occur on the same date every year   Detailed \"to-do\" lists and strategically placed sticky notes   Quick \"study\" sessionsBefore a gathering, review who will be there so their names will be fresh in your mind. Establish routinesFor example, keep your keys, wallet, and umbrella in the same place all the time or take medicine with your 8:00 AM glass of juice   Live a Healthy Life   Many actions that will keep your body strong will do the same for your mind. For example:   Talk to Your Doctor About Herbs and Supplements    Malnutrition and vitamin deficiencies can impair your mental function. For example, vitamin B12 deficiency can cause a range of symptoms, including confusion. But, what if your nutritional needs are being met? Can herbs and supplements still offer a benefit? Researchers have investigated a range of natural remedies, such as ginkgo , ginseng , and the supplement phosphatidylserine (PS). So far, though, the evidence is inconsistent as to whether these products can improve memory or thinking. If you are interested in taking herbs and supplements, talk to your doctor first because they may interact with other medicines that you are taking. Exercise Regularly    Among the many benefits of regular exercise are increased blood flow to the brain and decreased risk of certain diseases that can interfere with memory function. One study found that even moderate exercise has a beneficial effect. Examples of \"moderate\" exercise include:   Playing 18 holes of golf once a week, without a cart   Playing tennis twice a week   Walking one mile per day   Manage Stress    It can be tough to remember what is important when your mind is cluttered. Make time for relaxation.  Choose activities that calm you down, and make it routine. Manage Chronic Conditions    Side effects of high blood pressure , diabetes, and heart disease can interfere with mental function. Many of the lifestyle steps discussed here can help manage these conditions. Strive to eat a healthy diet, exercise regularly, get stress under control, and follow your doctor's advice for your condition. Minimize Medications    Talk to your doctor about the medicines that you take. Some may be unnecessary. Also, healthy lifestyle habits may lower the need for certain drugs. Last Reviewed: April 2010 Zaida Navarro MD   Updated: 4/13/2010     Smoking Cessation  This document explains the best ways for you to quit smoking and new treatments to help. It lists new medicines that can double or triple your chances of quitting and quitting for good. It also considers ways to avoid relapses and concerns you may have about quitting, including weight gain. NICOTINE: A POWERFUL ADDICTION  If you have tried to quit smoking, you know how hard it can be. It is hard because nicotine is a very addictive drug. For some people, it can be as addictive as heroin or cocaine. Usually, people make 2 or 3 tries, or more, before finally being able to quit. Each time you try to quit, you can learn about what helps and what hurts. Quitting takes hard work and a lot of effort, but you can quit smoking. QUITTING SMOKING IS ONE OF THE MOST IMPORTANT THINGS YOU WILL EVER DO. You will live longer, feel better, and live better. The impact on your body of quitting smoking is felt almost immediately:  Within 20 minutes, blood pressure decreases. Pulse returns to its normal level. After 8 hours, carbon monoxide levels in the blood return to normal. Oxygen level increases. After 24 hours, chance of heart attack starts to decrease. Breath, hair, and body stop smelling like smoke. After 48 hours, damaged nerve endings begin to recover.  Sense of taste and smell improve. After 72 hours, the body is virtually free of nicotine. Bronchial tubes relax and breathing becomes easier. After 2 to 12 weeks, lungs can hold more air. Exercise becomes easier and circulation improves. Quitting will reduce your risk of having a heart attack, stroke, cancer, or lung disease:  After 1 year, the risk of coronary heart disease is cut in half. After 5 years, the risk of stroke falls to the same as a nonsmoker. After 10 years, the risk of lung cancer is cut in half and the risk of other cancers decreases significantly. After 15 years, the risk of coronary heart disease drops, usually to the level of a nonsmoker. If you are pregnant, quitting smoking will improve your chances of having a healthy baby. The people you live with, especially your children, will be healthier. You will have extra money to spend on things other than cigarettes. FIVE KEYS TO QUITTING  Studies have shown that these 5 steps will help you quit smoking and quit for good. You have the best chances of quitting if you use them together:  Get ready. Get support and encouragement. Learn new skills and behaviors. Get medicine to reduce your nicotine addiction and use it correctly. Be prepared for relapse or difficult situations. Be determined to continue trying to quit, even if you do not succeed at first.  1. GET READY  Set a quit date. Change your environment. Get rid of ALL cigarettes, ashtrays, matches, and lighters in your home, car, and place of work. Do not let people smoke in your home. Review your past attempts to quit. Think about what worked and what did not. Once you quit, do not smoke. NOT EVEN A PUFF! 2. GET SUPPORT AND ENCOURAGEMENT  Studies have shown that you have a better chance of being successful if you have help. You can get support in many ways. Tell your family, friends, and coworkers that you are going to quit and need their support. Ask them not to smoke around you.   Talk to your caregivers (doctor, dentist, nurse, pharmacist, psychologist, and/or smoking counselor). Get individual, group, or telephone counseling and support. The more counseling you have, the better your chances are of quitting. Programs are available at [facility]. Call your local health department for information about programs in your area. Spiritual beliefs and practices may help some smokers quit. Quit meters are small computer programs online or downloadable that keep track of quit statistics, such as amount of \"quit-time,\" cigarettes not smoked, and money saved. Many smokers find one or more of the many self-help books available useful in helping them quit and stay off tobacco.  3. LEARN NEW SKILLS AND BEHAVIORS  Try to distract yourself from urges to smoke. Talk to someone, go for a walk, or occupy your time with a task. When you first try to quit, change your routine. Take a different route to work. Drink tea instead of coffee. Eat breakfast in a different place. Do something to reduce your stress. Take a hot bath, exercise, or read a book. Plan something enjoyable to do every day. Reward yourself for not smoking. Explore interactive web-based programs that specialize in helping you quit. 4. GET MEDICINE AND USE IT CORRECTLY  Medicines can help you stop smoking and decrease the urge to smoke. Combining medicine with the above behavioral methods and support can quadruple your chances of successfully quitting smoking. The [place] Food and Drug Administration (FDA) has approved 7 medicines to help you quit smoking. These medicines fall into 3 categories. Nicotine replacement therapy (delivers nicotine to your body without the negative effects and risks of smoking):  Nicotine gum: Available over-the-counter. Nicotine lozenges: Available over-the-counter. Nicotine inhaler: Available by prescription. Nicotine nasal spray: Available by prescription.   Nicotine skin patches (transdermal): Available by prescription and over-the-counter. Antidepressant medicine (helps people abstain from smoking, but how this works is unknown): Bupropion sustained-release (SR) tablets: Available by prescription. Nicotinic receptor partial agonist (simulates the effect of nicotine in your brain):  Varenicline tartrate tablets: Available by prescription. Ask your caregiver for advice about which medicines to use and how to use them. Carefully read the information on the package. Everyone who is trying to quit may benefit from using a medicine. If you are pregnant or trying to become pregnant, nursing an infant, you are under age 25, or you smoke fewer than 10 cigarettes per day, talk to your caregiver before taking any nicotine replacement medicines. You should stop using a nicotine replacement product and call your caregiver if you experience nausea, dizziness, weakness, vomiting, fast or irregular heartbeat, mouth problems with the lozenge or gum, or redness or swelling of the skin around the patch that does not go away. Do not use any other product containing nicotine while using a nicotine replacement product. Talk to your caregiver before using these products if you have diabetes, heart disease, asthma, stomach ulcers, you had a recent heart attack, you have high blood pressure that is not controlled with medicine, a history of irregular heartbeat, or you have been prescribed medicine to help you quit smoking. 5. BE PREPARED FOR RELAPSE OR DIFFICULT SITUATIONS  Most relapses occur within the first 3 months after quitting. Do not be discouraged if you start smoking again. Remember, most people try several times before they finally quit. You may have symptoms of withdrawal because your body is used to nicotine. You may crave cigarettes, be irritable, feel very hungry, cough often, get headaches, or have difficulty concentrating. The withdrawal symptoms are only temporary.  They are strongest when you first quit, but they will go away within 10 to 14 days. Here are some difficult situations to watch for:  Alcohol. Avoid drinking alcohol. Drinking lowers your chances of successfully quitting. Caffeine. Try to reduce the amount of caffeine you consume. It also lowers your chances of successfully quitting. Other smokers. Being around smoking can make you want to smoke. Avoid smokers. Weight gain. Many smokers will gain weight when they quit, usually less than 10 pounds. Eat a healthy diet and stay active. Do not let weight gain distract you from your main goal, quitting smoking. Some medicines that help you quit smoking may also help delay weight gain. You can always lose the weight gained after you quit. Bad mood or depression. There are a lot of ways to improve your mood other than smoking. If you are having problems with any of these situations, talk to your caregiver. SPECIAL SITUATIONS AND CONDITIONS  Studies suggest that everyone can quit smoking. Your situation or condition can give you a special reason to quit. Pregnant women/new mothers: By quitting, you protect your baby's health and your own. Hospitalized patients: By quitting, you reduce health problems and help healing. Heart attack patients: By quitting, you reduce your risk of a second heart attack. Lung, head, and neck cancer patients: By quitting, you reduce your chance of a second cancer. Parents of children and adolescents: By quitting, you protect your children from illnesses caused by secondhand smoke. QUESTIONS TO THINK ABOUT  Think about the following questions before you try to stop smoking. You may want to talk about your answers with your caregiver. Why do you want to quit? If you tried to quit in the past, what helped and what did not? What will be the most difficult situations for you after you quit? How will you plan to handle them? Who can help you through the tough times? Your family? Friends? Caregiver?   What pleasures do you get from smoking? What ways can you still get pleasure if you quit? Here are some questions to ask your caregiver: How can you help me to be successful at quitting? What medicine do you think would be best for me and how should I take it? What should I do if I need more help? What is smoking withdrawal like? How can I get information on withdrawal?  Quitting takes hard work and a lot of effort, but you can quit smoking. FOR MORE INFORMATION   Smokefree. gov ([employer]) provides free, accurate, evidence-based information and professional assistance to help support the immediate and long-term needs of people trying to quit smoking. Document Released: 12/12/2002 Document Revised: 12/06/2012 Document Reviewed: 10/04/2010  LAKEISHA E. ROMARIO [facility] Patient Information ©2012 Kaitlyn Bhat. Keeping Home a [address]       As we get older, changes in balance, gait, strength, vision, hearing, and cognition make even the most youthful senior more prone to accidents. Falls are one of the leading health risks for older people. This increased risk of falling is related to:   Aging process (eg, decreased muscle strength, slowed reflexes)   Higher incidence of chronic health problems (eg, arthritis, diabetes) that may limit mobility, agility or sensory awareness   Side effects of medicine (eg, dizziness, blurred vision)especially medicines like prescription pain medicines and drugs used to treat mental health conditions   Depending on the brittleness of your bones, the consequences of a fall can be serious and long lasting. Home Life   Research by the Association of Aging [facility]) shows that some home accidents among older adults can be prevented by making simple lifestyle changes and basic modifications and repairs to the home environment. Here are some lifestyle changes that experts recommend:   Have your hearing and vision checked regularly. Be sure to wear prescription glasses that are right for you.    Speak to your doctor or pharmacist about the possible side effects of your medicines. A number of medicines can cause dizziness. If you have problems with sleep, talk to your doctor. Limit your intake of alcohol. If necessary, use a cane or walker to help maintain your balance. Wear supportive, rubber-soled shoes, even at home. If you live in a region that gets wintry weather, you may want to put special cleats on your shoes to prevent you from slipping on the snow and ice. Exercise regularly to help maintain muscle tone, agility, and balance. Always hold the banister when going up or down stairs. Also, use  bars when getting in or out of the bath or shower, or using the toilet. To avoid dizziness, get up slowly from a lying down position. Sit up first, dangling your legs for a minute or two before rising to a standing position. Overall Home Safety Check   According to the Consumer Product Safety Commision's \"Older Consumer Home Safety Checklist,\" it is important to check for potential hazards in each room. And remember, proper lighting is an essential factor in home safety. If you cannot see clearly, you are more likely to fall. Important questions to ask yourself include:   Are lamp, electric, extension, and telephone cords placed out of the flow of traffic and maintained in good condition? Have frayed cords been replaced? Are all small rugs and runners slip resistant? If not, you can secure them to the floor with a special double-sided carpet tape. Are smoke detectors properly locatedone on every floor of your home and one outside of every sleeping area? Are they in good working order? Are batteries replaced at least once a year? Do you have a well-maintained carbon monoxide detector outside every sleeping are in your home? Does your furniture layout leave plenty of space to maneuver between and around chairs, tables, beds, and sofas?    Are hallways, stairs and passages between rooms well lit? Can you reach a lamp without getting out of bed? Are floor surfaces well maintained? Shag rugs, high-pile carpeting, tile floors, and polished wood floors can be particularly slippery. Stairs should always have handrails and be carpeted or fitted with a non-skid tread. Is your telephone easily reachable. Is the cord safely tucked away? Room by Room   According to the Association of Aging, bathrooms and reyna are the two most potentially hazardous rooms in your home. In the Kitchen    Be sure your stove is in proper working order and always make sure burners and the oven are off before you go out or go to sleep. Keep pots on the back burners, turn handles away from the front of the stove, and keep stove clean and free of grease build-up. Kitchen ventilation systems and range exhausts should be working properly. Keep flammable objects such as towels and pot holders away from the cooking area except when in use. Make sure kitchen curtains are tied back. Move cords and appliances away from the sink and hot surfaces. If extension cords are needed, install wiring guides so they do not hang over the sink, range, or working areas. Look for coffee pots, kettles and toaster ovens with automatic shut-offs. Keep a mop handy in the kitchen so you can wipe up spills instantly. You should also have a small fire extinguisher. Arrange your kitchen with frequently used items on lower shelves to avoid the need to stand on a stepstool to reach them. Make sure countertops are well-lit to avoid injuries while cutting and preparing food. In the Bathroom    Use a non-slip mat or decals in the tub and shower, since wet, soapy tile or porcelain surfaces are extremely slippery. Make sure bathroom rugs are non-skid or tape them firmly to the floor. Bathtubs should have at least one, preferably two, grab bars, firmly attached to structural supports in the wall.  (Do not use built-in soap holders or glass shower doors as grab bars.)    Tub seats fitted with non-slip material on the legs allow you to wash sitting down. For people with limited mobility, bathtub transfer benches allow you to slide safely into the tub. Raised toilet seats and toilet safety rails are helpful for those with knee or hip problems. In the [place]anner    Make sure you use a nightlight and that the area around your bed is clear of potential obstacles. Be careful with electric blankets and never go to sleep with a heating pad, which can cause serious burns even if on a low setting. Use fire-resistant mattress covers and pillows, and NEVER smoke in bed. Keep a phone next to the bed that is programmed to dial 911 at the push of a button. If you have a chronic condition, you may want to sign on with an automatic call-in service. Typically the system includes a small pendant that connects directly to an emergency medical voice-response system. You should also make arrangements to stay in contact with someonefriend, neighbor, family memberon a regular schedule. Fire Prevention   According to the [employer]. (Smoke Alarms for Every) [address] [place] [address], senior citizens are one of the two highest risk groups for death and serious injuries due to residential fires. When cooking, wear short-sleeved items, never a bulky long-sleeved robe. The [facility]'s Safety Checklist for Older Consumers emphasizes the importance of checking basements, garages, workshops and storage areas for fire hazards, such as volatile liquids, piles of old rags or clothing and overloaded circuits. Never smoke in bed or when lying down on a couch or recliner chair. Small portable electric or kerosene heaters are responsible for many home fires and should be used cautiously if at all. If you do use one, be sure to keep them away from flammable materials. In case of fire, make sure you have a pre-established emergency exit plan.     Have a professional check your fireplace and other fuel-burning appliances yearly. Helping Hands   Baby boomers entering the espinosa years will continue to see the development of new products to help older adults live safely and independently in spite of age-related changes. Making Life More Livable  , by Ganesh Espinal, lists over 1,000 products for \"living well in the mature years,\" such as bathing and mobility aids, household security devices, ergonomically designed knives and peelers, and faucet valves and knobs for temperature control. Medical supply stores and organizations are good sources of information about products that improve your quality of life and insure your safety. Last Reviewed: November 2009 Julissa Valadez MD   Updated: 3/7/2011            Advance Care Planning: Care Instructions  Your Care Instructions     It can be hard to live with an illness that cannot be cured. But if your health is getting worse, you may want to make decisions about end-of-life care. Planning for the end of your life does not mean that you are giving up. It is a way to make sure that your wishes are met. Clearly stating your wishes can make it easier for your loved ones. Making plans while you are still able may also ease your mind and make your final days less stressful and more meaningful. Follow-up care is a key part of your treatment and safety. Be sure to make and go to all appointments, and call your doctor if you are having problems. It's also a good idea to know your test results and keep a list of the medicines you take. What can you do to plan for the end of life? You can bring these issues up with your doctor. You do not need to wait until your doctor starts the conversation. You might start with, \"What makes life worth living for me is. Tierney Garrett .\" And then follow it with, \"I would not be willing to live with . Tierney Garrett \" When you complete this sentence it helps your doctor understand your wishes.   Talk openly and honestly with your doctor. This is the best way to understand the decisions you will need to make as your health changes. Know that you can always change your mind. Ask your doctor about commonly used life-support measures. These include tube feedings, breathing machines, and fluids given through a vein (IV). Understanding these treatments will help you decide whether you want them. You may choose to have these life-supporting treatments for a limited time. This allows a trial period to see whether they will help you. You may also decide that you want your doctor to take only certain measures to keep you alive. It may help to think about the big picture, like what makes life worth living for you or what your values and goals are. Talk to your doctor about how long you are likely to live. Your doctor may be able to give you an idea of what usually happens with your specific illness. Think about preparing papers that state your wishes. These papers are called advance directives. If you do this early and review them often, there will not be any confusion about what you want. You can change your instructions at any time. Which papers should you prepare? Advance directives are legal papers that tell doctors how you want to be cared for at the end of your life. You do not need a  to write these papers. Ask your doctor or your state [facility] department for information on how to write your advance directives. They may have the forms for each of these types of papers. Make sure your doctor has a copy of these on file, and give a copy to a family member or close friend. Consider a do-not-resuscitate order (DNR). This order asks that no extra treatments be done if your heart stops or you stop breathing. Extra treatments may include cardiopulmonary resuscitation (CPR), electrical shock to restart your heart, or a machine to breathe for you. If you decide to have a DNR order, ask your doctor to explain and write it. Place the order in your home where everyone can easily see it. Consider a living will. A living will explains your wishes about life support and other treatments at the end of your life if you become unable to speak for yourself. Living ledesma tell doctors to use or not use treatments that would keep you alive. You must have one or two witnesses or a notary present when you sign this form. A living will may be called something else in your state. Consider a medical power of . This form allows you to name a person to make decisions about your care if you are not able to. Most people ask a close friend or family member. Talk to this person about the kinds of treatments you want and those that you do not want. Make sure this person understands your wishes. A medical power of  may be called something else in your state. These legal papers are simple to change. Tell your doctor what you want to change, and ask him or her to make a note in your medical file. Give your family updated copies of the papers. Where can you learn more? Go to [email or web address] [email or web address].[email or web address]. org and sign in to your Zoji account. Enter P184 in the Farmainstant box to learn more about \"Advance Care Planning: Care Instructions. \"     If you do not have an account, please click on the \"Sign Up Now\" link. Current as of: October 6, 2021               Content Version: 13.4  © 7957-7033 Healthwise, Incorporated. Care instructions adapted under license by [facility] ([facility]). If you have questions about a medical condition or this instruction, always ask your healthcare professional. [address] any warranty or liability for your use of this information. Learning About Living [place]  What is a living will? A living will, also called a declaration, is a legal form. It tells your family and your doctor your wishes when you can't speak for yourself.  It's used by the health professionals who will treat you as you near the end of your life or if you get seriously hurt or ill. If you put your wishes in writing, your loved ones and others will know what kind of care you want. They won't need to guess. This can ease your mind and be helpful to others. And you can change or cancel your living will at any time. A living will is not the same as an estate or property will. An estate will explains what you want to happen with your money and property after you die. How do you use it? Keep these facts in mind about how a living will is used. Your living will is used only if you can't speak or make decisions for yourself. Most often, one or more doctors must certify that you can't speak or decide for yourself before your living will takes effect. If you get better and can speak for yourself again, you can accept or refuse any treatment. It doesn't matter what you said in your living will. Some states may limit your right to refuse treatment in certain cases. For example, you may need to clearly state in your living will that you don't want artificial hydration and nutrition, such as being fed through a tube. Is a living will a legal document? A living will is a legal document. Each state has its own laws about living ledesma. And a living will may be called something else in your state. Here are some things to know about living ledesma. You don't need an  to complete a living will. But legal advice can be helpful if your state's laws are unclear. It can also help if your health history is complicated or your family can't agree on what should be in your living will. You can change your living will at any time. Some people find that their wishes about end-of-life care change as their health changes. If you make big changes to your living will, complete a new form. If you move to another state, make sure that your living will is legal in the state where you now live.  In most cases, doctors will respect your wishes even if you have a form from a different state. You might use a universal form that has been approved by many states. This kind of form can sometimes be filled out and stored online. Your digital copy will then be available wherever you have a connection to the internet. The doctors and nurses who need to treat you can find it right away. Your state may offer an online registry. This is another place where you can store your living will online. It's a good idea to get your living will notarized. This means using a person called a [employer] to watch two people sign, or witness, your living will. What should you know when you create a living will? Here are some questions to ask yourself as you make your living will. Do you know enough about life support methods that might be used? If not, talk to your doctor so you know what might be done if you can't breathe on your own, your heart stops, or you can't swallow. What things would you still want to be able to do after you receive life-support methods? Would you want to be able to walk? To speak? To eat on your own? To live without the help of machines? Do you want certain Yazidism practices performed if you become very ill? If you have a choice, where do you want to be cared for? In your home? At a hospital or nursing home? If you have a choice at the end of your life, where would you prefer to die? At home? In a hospital or nursing home? Somewhere else? Would you prefer to be buried or cremated? Do you want your organs to be donated after you die? What should you do with your living will? Make sure that your family members and your health care agent have copies of your living will (also called a declaration). Give your doctor a copy of your living will. Ask to have it kept as part of your medical record. If you have more than one doctor, make sure that each one has a copy.   Put a copy of your living will where it can be easily found. For example, some people may put a copy on their refrigerator door. If you are using a digital copy, be sure your doctor, family members, and health care agent know how to find and access it. Where can you learn more? Go to https://chpejoaquíneb.[email or web address]. org and sign in to your [facility] account. Enter A484 in the [employer] Games box to learn more about \"Learning About Living Eleanor Maguire. \"     If you do not have an account, please click on the \"Sign Up Now\" link. Current as of: June 16, 2022               Content Version: 13.4  © 8793-0891 [employer]. Care instructions adapted under license by [facility] ([facility]). If you have questions about a medical condition or this instruction, always ask your healthcare professional. [address] any warranty or liability for your use of this information. Learning About Medical Power of   What is a medical power of ? A medical power of , also called a durable power of  for health care, is one type of the legal forms called advance directives. It lets you name the person you want to make treatment decisions for you if you can't speak or decide for yourself. The person you choose is called your health care agent. This person is also called a health care proxy or health care surrogate. A medical power of  may be called something else in your state. How do you choose a health care agent? Choose your health care agent carefully. This person may or may not be a family member. Talk to the person before you make your final decision. Make sure he or she is comfortable with this responsibility. It's a good idea to choose someone who:  Is at least 25years old. Knows you well and understands what makes life meaningful for you. Understands your Baptism and moral values. Will do what you want, not what he or she wants.   Will be able to make difficult choices at a stressful time.  Will be able to refuse or stop treatment, if that is what you would want, even if you could die. Will be firm and confident with health professionals if needed. Will ask questions to get needed information. Lives near you or agrees to travel to you if needed. Your family may help you make medical decisions while you can still be part of that process. But it's important to choose one person to be your health care agent in case you aren't able to make decisions for yourself. If you don't fill out the legal form and name a health care agent, the decisions your family can make may be limited. A health care agent may be called something else in your state. Who will make decisions for you if you don't have a health care agent? If you don't have a health care agent or a living will, you may not get the care you want. Decisions may be made by family members who disagree about your medical care. Or decisions may be made by a medical professional who doesn't know you well. In some cases, a  makes the decisions. When you name a health care agent, it is very clear who has the power to make health decisions for you. How do you name a health care agent? You name your health care agent on a legal form. This form is usually called a medical power of . Ask your hospital, state bar association, or office on aging where to find these forms. You must sign the form to make it legal. Some states require you to get the form notarized. This means that a person called a  watches you sign the form and then he or she signs the form. Some states also require that two or more witnesses sign the form. Be sure to tell your family members and doctors who your health care agent is. Where can you learn more? Go to [email or web address]://[email or web address].[email or web address] org and sign in to your Proxeon account. Enter [id number] in the Party Earth box to learn more about \"Learning About [address]. \" If you do not have an account, please click on the \"Sign Up Now\" link. Current as of: October 6, 2021               Content Version: 13.4  © 2006-2022 Healthwise, Incorporated. Care instructions adapted under license by [facility] ([facility]). If you have questions about a medical condition or this instruction, always ask your healthcare professional. [address] any warranty or liability for your use of this information.

## 2022-10-03 NOTE — PROGRESS NOTES
Medicare Annual Wellness Visit    Modesto Livingston is here for Medicare AWV (Subsequent ) and Follow-up (4 weeks )    Assessment & Plan   Medicare annual wellness visit, subsequent  Type 2 diabetes mellitus with hyperglycemia, with long-term current use of insulin (HCC)  -     insulin glargine (LANTUS SOLOSTAR) 100 UNIT/ML injection pen; Inject 30 Units into the skin daily, Disp-5 Adjustable Dose Pre-filled Pen Syringe, R-5Adjust Sig  Anxiety  -     LORazepam (ATIVAN) 1 MG tablet; Take 1 tablet by mouth 2 times daily as needed for Anxiety for up to 30 days. , Disp-60 tablet, R-1Normal  Tobacco use disorder  Mild dementia with anxiety, unspecified dementia type  -     donepezil (ARICEPT) 5 MG tablet; Take 1 tablet by mouth nightly 1 at night, Disp-90 tablet, R-5Normal    Recommendations for Preventive Services Due: see orders and patient instructions/AVS.  Recommended screening schedule for the next 5-10 years is provided to the patient in written form: see Patient Instructions/AVS.   The patient is asked to make an attempt to improve diet and exercise patterns to aid in medical management of this problem. Smoking cessation encouraged  Encouraged not to skip meals. Encouraged to make appt with son and to bring in Madhu Rather CGM so we can assist him. Return for Medicare Annual Wellness Visit in 1 year. Subjective   The following acute and/or chronic problems were also addressed today: This is a combined medical follow up office visit and a Medicare Wellness Visit. The Wellness note has been reviewed. Follow up on chronic medical issues. There is compliance and tolerance with medications. Did not bring BS readings to review. Did not bring in Madhu Rather to see if we could assist him with it. He would be a great candidate for CGM with alerts and more frequent BS checks. Stiil complains of pedal edema but not elevaing legs regularly not on a low salt diet.      Patient's complete Health Risk Assessment and screening values have been reviewed and are found in Flowsheets. The following problems were reviewed today and where indicated follow up appointments were made and/or referrals ordered. Positive Risk Factor Screenings with Interventions:    Fall Risk:  Do you feel unsteady or are you worried about falling? : (!) yes  2 or more falls in past year?: (!) yes  Fall with injury in past year?: no   Fall Risk Interventions:    Home safety tips provided      Tobacco Use:  Tobacco Use: High Risk    Smoking Tobacco Use: Every Day    Smokeless Tobacco Use: Never     E-cigarette/Vaping       Questions Responses    E-cigarette/Vaping Use     Start Date     Passive Exposure     Quit Date     Counseling Given     Comments           Substance Use - Tobacco Interventions:  tobacco cessation tips and resources provided, patient is not ready to work toward tobacco cessation at this time         General Health and ACP:  General  In general, how would you say your health is?: Fair  In the past 7 days, have you experienced any of the following: New or Increased Pain, New or Increased Fatigue, Loneliness, Social Isolation, Stress or Anger?: (!) Yes  Select all that apply: (!) New or Increased Pain  Do you get the social and emotional support that you need?: Yes  Do you have a Living Will?: Yes    Advance Directives       Power of  Living Will ACP-Advance Directive ACP-Power of     Not on File Not on File Not on File Not on File          General Health Risk Interventions:  Ask to bring copy of living will.     Health Habits/Nutrition:  Physical Activity: Insufficiently Active    Days of Exercise per Week: 7 days    Minutes of Exercise per Session: 10 min     Have you lost any weight without trying in the past 3 months?: (!) Yes  Body mass index: (!) 18.28  Have you seen the dentist within the past year?: (!) No  Health Habits/Nutrition Interventions:  Discussed recommended dental care    Hearing/Vision:  Do you or your family notice any trouble with your hearing that hasn't been managed with hearing aids?: No  Do you have difficulty driving, watching TV, or doing any of your daily activities because of your eyesight?: No  Have you had an eye exam within the past year?: (!) No  No results found. Hearing/Vision Interventions:  Vision concerns:  patient encouraged to make appointment with his/her eye specialist     ADLs:  In the past 7 days, did you need help from others to perform any of the following everyday activities: Eating, dressing, grooming, bathing, toileting, or walking/balance?: No  In the past 7 days, did you need help from others to take care of any of the following: Laundry, housekeeping, banking/finances, shopping, telephone use, food preparation, transportation, or taking medications?: (!) Yes  Select all that apply: [employer], Housekeeping, Shopping, Food Preparation, Transportation  ADL Interventions:  Patient already has assistance          Objective   Vitals:    10/03/22 1553   BP: (!) 111/47   Site: Left Upper Arm   Position: Sitting   Cuff Size: Medium Adult   Pulse: 84   Resp: 18   Temp: 98.6 °F (37 °C)   TempSrc: Temporal   SpO2: 97%   Weight: 138 lb 9.6 oz (62.9 kg)   Height: 6' 1\" (1.854 m)      Body mass index is 18.29 kg/m². General Appearance: in no acute distress and alert  Head: normocephalic and atraumatic  Pulmonary/Chest: no chest wall tenderness and decreased breath sounds noted  Cardiovascular: normal rate, normal S1 and S2, no gallops, and no carotid bruits  Extremities: clubbing noted- fingers, 1 + edema-  bilateral feet and ankles, and monofilament sensory exam abnormal- feet         No Known Allergies  Prior to Visit Medications    Medication Sig Taking? Authorizing Provider   furosemide (LASIX) 20 MG tablet Take 20 mg by mouth daily 1 tablet every day Yes Historical Provider, MD   LORazepam (ATIVAN) 1 MG tablet Take 1 tablet by mouth 2 times daily as needed for Anxiety for up to 30 days.  Yes Chung Madrid MD   donepezil (ARICEPT) 5 MG tablet Take 1 tablet by mouth nightly 1 at night Yes Chung Madrid MD   Insulin Pen Needle 33G X 6 MM MISC Use daily with insulin Yes Chung Madrid MD   blood glucose monitor strips Test 2 times a day & as needed for symptoms of irregular blood glucose. Dispense sufficient amount for indicated testing frequency plus additional to accommodate PRN testing needs. Yes Chung Madrid MD   albuterol (PROVENTIL) (2.5 MG/3ML) 0.083% nebulizer solution Take 3 mLs by nebulization in the morning and at bedtime Yes Chung Madrid MD   atorvastatin (LIPITOR) 10 MG tablet atorvastatin 10 mg tablet   Take 1 tablet every day by oral route at bedtime. Yes Historical Provider, MD   buPROPion (WELLBUTRIN XL) 150 MG extended release tablet bupropion HCl  mg 24 hr tablet, extended release   Take 1 tablet every day by oral route. Yes Historical Provider, MD   dapagliflozin (FARXIGA) 5 MG tablet Take 1 tablet by mouth every morning Yes Chung Madrid MD   ipratropium-albuterol (DUONEB) 0.5-2.5 (3) MG/3ML SOLN nebulizer solution Inhale 1 vial into the lungs every 4 hours Yes Historical Provider, MD   psyllium 100 % PACK Take 1 packet by mouth in the morning. Yes Historical Provider, MD   tiotropium (SPIRIVA) 18 MCG inhalation capsule Inhale 18 mcg into the lungs in the morning. 1 capsule inhale orally one time a day. Yes Historical Provider, MD   insulin glargine (LANTUS SOLOSTAR) 100 UNIT/ML injection pen Inject 25 Units into the skin before bedtime.   Patient taking differently: Inject 30 Units into the skin daily Yes AMARILYS Mclean CNP   Continuous Blood Gluc  (FREESTYLE LENORE 2 READER) ESTHER 1 Device by Does not apply route 4 times daily Yes AMARILYS Mclean CNP   Continuous Blood Gluc Sensor (FREESTYLE LENORE 2 SENSOR) MISC 1 kit by Does not apply route 4 times daily Yes AMARILYS Mclean CNP   metFORMIN (GLUCOPHAGE-XR) 500 MG extended release tablet Take 1 tablet by mouth in the morning and at bedtime Yes Gerald Roland MD   lisinopril (PRINIVIL;ZESTRIL) 5 MG tablet TAKE 1 TABLET BY MOUTH EVERY DAY FOR 90 DAYS Yes Gerald Roland MD   metoprolol tartrate (LOPRESSOR) 25 MG tablet TAKE 1 TABLET BY MOUTH EVERY DAY WITH FOOD FOR 90 DAYS Yes Gerald Roland MD   cyanocobalamin 100 MCG tablet Take 100 mcg by mouth daily  Historical Provider, MD   predniSONE (DELTASONE) 20 MG tablet Take 0.5 tablets by mouth daily  Gerald Roland MD   dronabinol (MARINOL) 10 MG capsule Take 10 mg by mouth at bedtime.  1 capsule po at bedtime  Historical Provider, MD   ferrous sulfate (IRON 325) 325 (65 Fe) MG tablet Take 325 mg by mouth in the morning and at bedtime  Historical Provider, MD Srivastava (Including outside providers/suppliers regularly involved in providing care):   Patient Care Team:  Gerald Roland MD as PCP - General (Internal Medicine)  Gerald Roland MD as PCP - [facility] Empaneled Provider  Rima Rothman MD as Consulting Physician     Reviewed and updated this visit:  Tobacco  Allergies  Meds  Problems  Med Hx  Surg Hx  Soc Hx  Fam Hx

## 2022-10-04 ENCOUNTER — TELEPHONE (OUTPATIENT)
Dept: INTERNAL MEDICINE CLINIC | Facility: CLINIC | Age: 82
End: 2022-10-04

## 2022-10-04 DIAGNOSIS — Z79.4 TYPE 2 DIABETES MELLITUS WITH HYPERGLYCEMIA, WITH LONG-TERM CURRENT USE OF INSULIN (HCC): Primary | ICD-10-CM

## 2022-10-04 DIAGNOSIS — E11.65 TYPE 2 DIABETES MELLITUS WITH HYPERGLYCEMIA, WITH LONG-TERM CURRENT USE OF INSULIN (HCC): Primary | ICD-10-CM

## 2022-10-04 DIAGNOSIS — I10 PRIMARY HYPERTENSION: ICD-10-CM

## 2022-10-13 ENCOUNTER — HOSPITAL ENCOUNTER (EMERGENCY)
Age: 82
Discharge: HOME | End: 2022-10-13
Attending: EMERGENCY MEDICINE
Payer: MEDICARE

## 2022-10-13 VITALS
HEIGHT: 73 IN | OXYGEN SATURATION: 100 % | RESPIRATION RATE: 18 BRPM | HEART RATE: 72 BPM | BODY MASS INDEX: 18.55 KG/M2 | TEMPERATURE: 98.3 F | DIASTOLIC BLOOD PRESSURE: 80 MMHG | WEIGHT: 140 LBS | SYSTOLIC BLOOD PRESSURE: 109 MMHG

## 2022-10-13 DIAGNOSIS — L72.3 SEBACEOUS CYST: Primary | ICD-10-CM

## 2022-10-13 PROCEDURE — 10060 I&D ABSCESS SIMPLE/SINGLE: CPT

## 2022-10-13 PROCEDURE — 99283 EMERGENCY DEPT VISIT LOW MDM: CPT

## 2022-10-13 RX ORDER — CLINDAMYCIN HYDROCHLORIDE 300 MG/1
300 CAPSULE ORAL 2 TIMES DAILY
Qty: 14 CAPSULE | Refills: 0 | Status: SHIPPED | OUTPATIENT
Start: 2022-10-13 | End: 2022-10-20

## 2022-10-13 RX ORDER — INSULIN GLARGINE 100 [IU]/ML
30 INJECTION, SOLUTION SUBCUTANEOUS DAILY
Qty: 5 ADJUSTABLE DOSE PRE-FILLED PEN SYRINGE | Refills: 5 | Status: ON HOLD
Start: 2022-10-13 | End: 2022-11-02 | Stop reason: SDUPTHER

## 2022-10-13 ASSESSMENT — PAIN SCALES - GENERAL: PAINLEVEL_OUTOF10: 8

## 2022-10-13 NOTE — ED TRIAGE NOTES
Patient ambulatory to triage with mask in place. Patient reports abscess to left side of his neck x 2 months.

## 2022-10-13 NOTE — ED PROVIDER NOTES
Emergency Department Provider Note                   PCP:                Theron Vazquez MD               Age: [de-identified] y.o. Sex: male     No diagnosis found. DISPOSITION          MDM     Amount and/or Complexity of Data Reviewed  Review and summarize past medical records: yes  Independent visualization of images, tracings, or specimens: yes    Risk of Complications, Morbidity, and/or Mortality  Presenting problems: low  Diagnostic procedures: minimal  Management options: low    Patient Progress  Patient progress: stable             No orders of the defined types were placed in this encounter. Medications - No data to display    New Prescriptions    No medications on file        Kyra Hernandez is a [de-identified] y.o. male who presents to the Emergency Department with chief complaint of    Chief Complaint   Patient presents with    Abscess      Patient presents emergency room with complaints of a swollen cystic lesion on the left side of his neck at the base. Is been there for 2months but has gotten significantly worse over the past few weeks and increasingly tender. He denies any fever or chills or trauma and review of systems otherwise negative. The history is provided by the patient. Review of Systems   Constitutional:  Negative for chills and fever. All other systems reviewed and are negative.     Past Medical History:   Diagnosis Date    CAD (coronary artery disease) 08/2019    non obstructive     Chronic kidney disease     Stage 3    Depression     managed with medications    Diabetes ([address]     takes insulin, A1c on 12/31/19 was 10.3; unsure of avg blood sugar    HTN (hypertension) 4/29/2015    managed [facility] medications    Malignant neoplasm of upper lobe of left lung ([address] 12/12/2019    Other ill-defined conditions(799.89)     cholesterol    Sepsis ([address] 12/2019        Past Surgical History:   Procedure Laterality Date    COLONOSCOPY      LOBECTOMY Left 02/2020    ORTHOPEDIC SURGERY      bilateral shoulder repairs, both knees repaired-no hardware    OTHER SURGICAL HISTORY  2019    lung biopsy    CT ABDOMEN SURGERY PROC UNLISTED  2015    explor lap        Family History   Problem Relation Age of Onset    Cancer Brother         stomach? ?    Heart Disease Father     Heart Disease Mother     Diabetes Daughter         Social History     Socioeconomic History    Marital status: Single   Tobacco Use    Smoking status: Every Day     Packs/day: 1.00     Types: Cigarettes     Last attempt to quit: 2019     Years since quittin.8    Smokeless tobacco: Never   Substance and Sexual Activity    Alcohol use: No    Drug use: No     Social Determinants of Health     Financial Resource Strain: Low Risk     Difficulty of Paying Living Expenses: Not hard at all   Food Insecurity: No Food Insecurity    Worried About Running Out of Food in the Last Year: Never true    Ran Out of Food in the Last Year: Never true   Physical Activity: Insufficiently Active    Days of Exercise per Week: 7 days    Minutes of Exercise per Session: 10 min         Patient has no known allergies. Previous Medications    ALBUTEROL (PROVENTIL) (2.5 MG/3ML) 0.083% NEBULIZER SOLUTION    Take 3 mLs by nebulization in the morning and at bedtime    ATORVASTATIN (LIPITOR) 10 MG TABLET    atorvastatin 10 mg tablet   Take 1 tablet every day by oral route at bedtime. BLOOD GLUCOSE MONITOR STRIPS    Test 2 times a day & as needed for symptoms of irregular blood glucose. Dispense sufficient amount for indicated testing frequency plus additional to accommodate PRN testing needs. BUPROPION (WELLBUTRIN XL) 150 MG EXTENDED RELEASE TABLET    bupropion HCl  mg 24 hr tablet, extended release   Take 1 tablet every day by oral route.     CONTINUOUS BLOOD GLUC  (FREESTYLE LENORE 2 READER) ESTHER    1 Device by Does not apply route 4 times daily    CONTINUOUS BLOOD GLUC SENSOR (FREESTYLE LENORE 2 SENSOR) MISC    1 kit by Does not apply route 4 times daily    CYANOCOBALAMIN 100 MCG TABLET    Take 100 mcg by mouth daily    DAPAGLIFLOZIN (FARXIGA) 5 MG TABLET    Take 1 tablet by mouth every morning    DONEPEZIL (ARICEPT) 5 MG TABLET    Take 1 tablet by mouth nightly 1 at night    DRONABINOL (MARINOL) 10 MG CAPSULE    Take 10 mg by mouth at bedtime. 1 capsule po at bedtime    FERROUS SULFATE (IRON 325) 325 (65 FE) MG TABLET    Take 325 mg by mouth in the morning and at bedtime    FUROSEMIDE (LASIX) 20 MG TABLET    Take 20 mg by mouth daily 1 tablet every day    INSULIN GLARGINE (LANTUS SOLOSTAR) 100 UNIT/ML INJECTION PEN    Inject 30 Units into the skin daily    INSULIN PEN NEEDLE 33G X 6 MM MISC    Use daily with insulin    IPRATROPIUM-ALBUTEROL (DUONEB) 0.5-2.5 (3) MG/3ML SOLN NEBULIZER SOLUTION    Inhale 1 vial into the lungs every 4 hours    LISINOPRIL (PRINIVIL;ZESTRIL) 5 MG TABLET    TAKE 1 TABLET BY MOUTH EVERY DAY FOR 90 DAYS    LORAZEPAM (ATIVAN) 1 MG TABLET    Take 1 tablet by mouth 2 times daily as needed for Anxiety for up to 30 days. METFORMIN (GLUCOPHAGE-XR) 500 MG EXTENDED RELEASE TABLET    Take 1 tablet by mouth in the morning and at bedtime    METOPROLOL TARTRATE (LOPRESSOR) 25 MG TABLET    TAKE 1 TABLET BY MOUTH EVERY DAY WITH FOOD FOR 90 DAYS    PREDNISONE (DELTASONE) 20 MG TABLET    Take 0.5 tablets by mouth daily    PSYLLIUM 100 % PACK    Take 1 packet by mouth in the morning. TIOTROPIUM (SPIRIVA) 18 MCG INHALATION CAPSULE    Inhale 18 mcg into the lungs in the morning. 1 capsule inhale orally one time a day. Vitals signs and nursing note reviewed. Patient Vitals for the past 4 hrs:   Temp Pulse Resp BP SpO2   10/13/22 1300 98.3 °F (36.8 °C) 72 18 (!) 107/50 98 %          Physical Exam  Vitals and nursing note reviewed. Constitutional:       General: He is not in acute distress. Appearance: Normal appearance. He is normal weight. He is not ill-appearing, toxic-appearing or diaphoretic.    HENT:      Head: Normocephalic and atraumatic. Eyes:      Extraocular Movements: Extraocular movements intact. Conjunctiva/sclera: Conjunctivae normal.      Pupils: Pupils are equal, round, and reactive to light. Neck:      Comments: 2 half centimeter by 1 cm cystic mass noted of the base of the left side of the neck posteriorly. Minimally tender to palpation some mild erythema noted. A small sinus tract noted at the superior margin. Cardiovascular:      Rate and Rhythm: Normal rate. Pulmonary:      Effort: Pulmonary effort is normal.   Abdominal:      General: There is no distension. Palpations: Abdomen is soft. Tenderness: There is no abdominal tenderness. Musculoskeletal:         General: Normal range of motion. Cervical back: Normal range of motion and neck supple. No rigidity or tenderness. Lymphadenopathy:      Cervical: No cervical adenopathy. Skin:     General: Skin is warm and dry. Capillary Refill: Capillary refill takes less than 2 seconds. Neurological:      General: No focal deficit present. Mental Status: He is alert and oriented to person, place, and time. Mental status is at baseline. Psychiatric:         Mood and Affect: Mood normal.         Behavior: Behavior normal.         Thought Content:  Thought content normal.        Incision/Drainage    Date/Time: 10/13/2022 3:43 PM  Performed by: Frieda Coffey DO  Authorized by: Frieda Coffey DO     Consent:     Consent obtained:  Verbal    Consent given by:  Patient    Risks, benefits, and alternatives were discussed: yes    Universal protocol:     Procedure explained and questions answered to patient or proxy's satisfaction: yes      Patient identity confirmed:  Verbally with patient  Location:     Location:  Neck    Neck location:  L posterior  Pre-procedure details:     Skin preparation:  Antiseptic wash  Sedation:     Sedation type:  None  Anesthesia:     Anesthesia method:  Local infiltration    Local anesthetic:  Lidocaine 1% w/o epi  Procedure type:     Complexity:  Simple  Procedure details:     Ultrasound guidance: no      Needle aspiration: no      Incision types:  Single straight    Incision depth:  Subcutaneous    Wound management:  Probed and deloculated    Drainage characteristics: Large amount of caseous and mildly purulent material expressed from the cyst.    Drainage amount:  Copious    Wound treatment:  Wound left open    Packing materials:  None  Post-procedure details:     Procedure completion:  Tolerated    No results found for any visits on 10/13/22. No orders to display                       Voice dictation software was used during the making of this note. This software is not perfect and grammatical and other typographical errors may be present. This note has not been completely proofread for errors.      Cecy Hamilton, DO  10/13/22 1541

## 2022-10-15 DIAGNOSIS — J43.9 PULMONARY EMPHYSEMA, UNSPECIFIED EMPHYSEMA TYPE (HCC): ICD-10-CM

## 2022-10-20 RX ORDER — PREDNISONE 20 MG/1
TABLET ORAL
Qty: 30 TABLET | Refills: 0 | Status: SHIPPED | OUTPATIENT
Start: 2022-10-20

## 2022-10-28 ENCOUNTER — HOSPITAL ENCOUNTER (INPATIENT)
Age: 82
LOS: 5 days | Discharge: NURSING, REHAB OR LONG-TERM CARE | DRG: 439 | End: 2022-11-02
Attending: EMERGENCY MEDICINE | Admitting: INTERNAL MEDICINE
Payer: MEDICARE

## 2022-10-28 ENCOUNTER — HOSPITAL ENCOUNTER (EMERGENCY)
Dept: CT IMAGING | Age: 82
Discharge: HOME | DRG: 439 | End: 2022-10-31
Payer: MEDICARE

## 2022-10-28 DIAGNOSIS — Z79.4 TYPE 2 DIABETES MELLITUS WITH HYPERGLYCEMIA, WITH LONG-TERM CURRENT USE OF INSULIN (HCC): ICD-10-CM

## 2022-10-28 DIAGNOSIS — K85.90 PANCREATITIS, UNSPECIFIED PANCREATITIS TYPE: ICD-10-CM

## 2022-10-28 DIAGNOSIS — C34.90 MALIGNANT NEOPLASM OF LUNG, UNSPECIFIED LATERALITY, UNSPECIFIED PART OF LUNG (HCC): ICD-10-CM

## 2022-10-28 DIAGNOSIS — R73.9 HYPERGLYCEMIA: ICD-10-CM

## 2022-10-28 DIAGNOSIS — K85.00 IDIOPATHIC ACUTE PANCREATITIS WITHOUT INFECTION OR NECROSIS: Primary | ICD-10-CM

## 2022-10-28 DIAGNOSIS — E86.0 DEHYDRATION: ICD-10-CM

## 2022-10-28 DIAGNOSIS — E11.65 TYPE 2 DIABETES MELLITUS WITH HYPERGLYCEMIA, WITH LONG-TERM CURRENT USE OF INSULIN (HCC): ICD-10-CM

## 2022-10-28 DIAGNOSIS — N17.9 ACUTE KIDNEY INJURY (HCC): ICD-10-CM

## 2022-10-28 PROBLEM — K59.00 CONSTIPATION: Status: ACTIVE | Noted: 2022-10-28

## 2022-10-28 PROBLEM — C34.12 MALIGNANT NEOPLASM OF UPPER LOBE OF LEFT LUNG (HCC): Status: ACTIVE | Noted: 2019-12-12

## 2022-10-28 LAB
ALBUMIN SERPL-MCNC: 3.1 G/DL (ref 3.2–4.6)
ALBUMIN/GLOB SERPL: 0.7 {RATIO} (ref 0.4–1.6)
ALP SERPL-CCNC: 217 U/L (ref 50–136)
ALT SERPL-CCNC: 22 U/L (ref 12–65)
ANION GAP SERPL CALC-SCNC: 3 MMOL/L (ref 2–11)
ANION GAP SERPL CALC-SCNC: 7 MMOL/L (ref 2–11)
AST SERPL-CCNC: 47 U/L (ref 15–37)
BASOPHILS # BLD: 0 K/UL (ref 0–0.2)
BASOPHILS NFR BLD: 0 % (ref 0–2)
BILIRUB SERPL-MCNC: 0.5 MG/DL (ref 0.2–1.1)
BILIRUB UR QL: NEGATIVE
BUN SERPL-MCNC: 32 MG/DL (ref 8–23)
BUN SERPL-MCNC: 37 MG/DL (ref 8–23)
CALCIUM SERPL-MCNC: 8.7 MG/DL (ref 8.3–10.4)
CALCIUM SERPL-MCNC: 9.1 MG/DL (ref 8.3–10.4)
CHLORIDE SERPL-SCNC: 101 MMOL/L (ref 101–110)
CHLORIDE SERPL-SCNC: 97 MMOL/L (ref 101–110)
CO2 SERPL-SCNC: 27 MMOL/L (ref 21–32)
CO2 SERPL-SCNC: 28 MMOL/L (ref 21–32)
CREAT SERPL-MCNC: 1.3 MG/DL (ref 0.8–1.5)
CREAT SERPL-MCNC: 1.8 MG/DL (ref 0.8–1.5)
DIFFERENTIAL METHOD BLD: ABNORMAL
EOSINOPHIL # BLD: 0.2 K/UL (ref 0–0.8)
EOSINOPHIL NFR BLD: 3 % (ref 0.5–7.8)
ERYTHROCYTE [DISTWIDTH] IN BLOOD BY AUTOMATED COUNT: 13.5 % (ref 11.9–14.6)
GLOBULIN SER CALC-MCNC: 4.5 G/DL (ref 2.8–4.5)
GLUCOSE BLD STRIP.AUTO-MCNC: 183 MG/DL (ref 65–100)
GLUCOSE BLD STRIP.AUTO-MCNC: 200 MG/DL (ref 65–100)
GLUCOSE BLD STRIP.AUTO-MCNC: 87 MG/DL (ref 65–100)
GLUCOSE SERPL-MCNC: 197 MG/DL (ref 65–100)
GLUCOSE SERPL-MCNC: 285 MG/DL (ref 65–100)
GLUCOSE UR QL STRIP.AUTO: >1000 MG/DL
HCT VFR BLD AUTO: 38.7 % (ref 41.1–50.3)
HCT VFR BLD AUTO: 40.3 % (ref 41.1–50.3)
HCT VFR BLD AUTO: 40.9 % (ref 41.1–50.3)
HGB BLD-MCNC: 12.9 G/DL (ref 13.6–17.2)
HGB BLD-MCNC: 13.9 G/DL (ref 13.6–17.2)
HGB BLD-MCNC: 13.9 G/DL (ref 13.6–17.2)
IMM GRANULOCYTES # BLD AUTO: 0 K/UL (ref 0–0.5)
IMM GRANULOCYTES NFR BLD AUTO: 1 % (ref 0–5)
KETONES UR-MCNC: NEGATIVE MG/DL
LACTATE SERPL-SCNC: 1.9 MMOL/L (ref 0.4–2)
LEUKOCYTE ESTERASE UR QL STRIP: NEGATIVE
LIPASE SERPL-CCNC: 1942 U/L (ref 73–393)
LYMPHOCYTES # BLD: 1.9 K/UL (ref 0.5–4.6)
LYMPHOCYTES NFR BLD: 27 % (ref 13–44)
MAGNESIUM SERPL-MCNC: 2.2 MG/DL (ref 1.8–2.4)
MCH RBC QN AUTO: 28 PG (ref 26.1–32.9)
MCHC RBC AUTO-ENTMCNC: 34.5 G/DL (ref 31.4–35)
MCV RBC AUTO: 81.3 FL (ref 82–102)
MONOCYTES # BLD: 0.5 K/UL (ref 0.1–1.3)
MONOCYTES NFR BLD: 8 % (ref 4–12)
NEUTS SEG # BLD: 4.3 K/UL (ref 1.7–8.2)
NEUTS SEG NFR BLD: 61 % (ref 43–78)
NITRITE UR QL: NEGATIVE
NRBC # BLD: 0 K/UL (ref 0–0.2)
PH UR: 6 [PH] (ref 5–9)
PLATELET # BLD AUTO: 322 K/UL (ref 150–450)
PMV BLD AUTO: 8.5 FL (ref 9.4–12.3)
POTASSIUM SERPL-SCNC: 3.8 MMOL/L (ref 3.5–5.1)
POTASSIUM SERPL-SCNC: 5.2 MMOL/L (ref 3.5–5.1)
PROT SERPL-MCNC: 7.6 G/DL (ref 6.3–8.2)
PROT UR QL: NEGATIVE MG/DL
RBC # BLD AUTO: 4.96 M/UL (ref 4.23–5.6)
RBC # UR STRIP: NEGATIVE /UL
SERVICE CMNT-IMP: ABNORMAL
SERVICE CMNT-IMP: NORMAL
SODIUM SERPL-SCNC: 128 MMOL/L (ref 133–143)
SODIUM SERPL-SCNC: 135 MMOL/L (ref 133–143)
SP GR UR: 1.01 (ref 1–1.02)
UROBILINOGEN UR QL: 0.2 EU/DL (ref 0.2–1)
WBC # BLD AUTO: 7 K/UL (ref 4.3–11.1)

## 2022-10-28 PROCEDURE — C9113 INJ PANTOPRAZOLE SODIUM, VIA: HCPCS | Performed by: EMERGENCY MEDICINE

## 2022-10-28 PROCEDURE — 6360000004 HC RX CONTRAST MEDICATION: Performed by: EMERGENCY MEDICINE

## 2022-10-28 PROCEDURE — 85014 HEMATOCRIT: CPT

## 2022-10-28 PROCEDURE — 80053 COMPREHEN METABOLIC PANEL: CPT

## 2022-10-28 PROCEDURE — 83735 ASSAY OF MAGNESIUM: CPT

## 2022-10-28 PROCEDURE — 74177 CT ABD & PELVIS W/CONTRAST: CPT

## 2022-10-28 PROCEDURE — 81003 URINALYSIS AUTO W/O SCOPE: CPT

## 2022-10-28 PROCEDURE — 85025 COMPLETE CBC W/AUTO DIFF WBC: CPT

## 2022-10-28 PROCEDURE — 99285 EMERGENCY DEPT VISIT HI MDM: CPT | Performed by: EMERGENCY MEDICINE

## 2022-10-28 PROCEDURE — 6370000000 HC RX 637 (ALT 250 FOR IP): Performed by: EMERGENCY MEDICINE

## 2022-10-28 PROCEDURE — 83690 ASSAY OF LIPASE: CPT

## 2022-10-28 PROCEDURE — A4216 STERILE WATER/SALINE, 10 ML: HCPCS | Performed by: EMERGENCY MEDICINE

## 2022-10-28 PROCEDURE — 96376 TX/PRO/DX INJ SAME DRUG ADON: CPT | Performed by: EMERGENCY MEDICINE

## 2022-10-28 PROCEDURE — 2580000003 HC RX 258: Performed by: EMERGENCY MEDICINE

## 2022-10-28 PROCEDURE — 83605 ASSAY OF LACTIC ACID: CPT

## 2022-10-28 PROCEDURE — 96374 THER/PROPH/DIAG INJ IV PUSH: CPT | Performed by: EMERGENCY MEDICINE

## 2022-10-28 PROCEDURE — 96375 TX/PRO/DX INJ NEW DRUG ADDON: CPT | Performed by: EMERGENCY MEDICINE

## 2022-10-28 PROCEDURE — 1100000003 HC PRIVATE W/ TELEMETRY

## 2022-10-28 PROCEDURE — 82962 GLUCOSE BLOOD TEST: CPT

## 2022-10-28 PROCEDURE — 6360000002 HC RX W HCPCS: Performed by: EMERGENCY MEDICINE

## 2022-10-28 PROCEDURE — 6360000002 HC RX W HCPCS: Performed by: INTERNAL MEDICINE

## 2022-10-28 PROCEDURE — 6370000000 HC RX 637 (ALT 250 FOR IP): Performed by: INTERNAL MEDICINE

## 2022-10-28 RX ORDER — PREDNISONE 10 MG/1
10 TABLET ORAL DAILY
Status: CANCELLED | OUTPATIENT
Start: 2022-10-28

## 2022-10-28 RX ORDER — 0.9 % SODIUM CHLORIDE 0.9 %
1000 INTRAVENOUS SOLUTION INTRAVENOUS
Status: COMPLETED | OUTPATIENT
Start: 2022-10-28 | End: 2022-10-28

## 2022-10-28 RX ORDER — ACETAMINOPHEN 650 MG/1
650 SUPPOSITORY RECTAL EVERY 6 HOURS PRN
Status: DISCONTINUED | OUTPATIENT
Start: 2022-10-28 | End: 2022-11-02 | Stop reason: HOSPADM

## 2022-10-28 RX ORDER — INSULIN GLARGINE 100 [IU]/ML
5 INJECTION, SOLUTION SUBCUTANEOUS NIGHTLY
Status: DISCONTINUED | OUTPATIENT
Start: 2022-10-28 | End: 2022-10-31

## 2022-10-28 RX ORDER — ENOXAPARIN SODIUM 100 MG/ML
40 INJECTION SUBCUTANEOUS EVERY 24 HOURS
Status: DISCONTINUED | OUTPATIENT
Start: 2022-10-28 | End: 2022-11-02 | Stop reason: HOSPADM

## 2022-10-28 RX ORDER — POLYETHYLENE GLYCOL 3350 17 G/17G
17 POWDER, FOR SOLUTION ORAL DAILY
Status: DISCONTINUED | OUTPATIENT
Start: 2022-10-28 | End: 2022-11-02 | Stop reason: HOSPADM

## 2022-10-28 RX ORDER — LORAZEPAM 1 MG/1
1 TABLET ORAL 2 TIMES DAILY PRN
Status: CANCELLED | OUTPATIENT
Start: 2022-10-28

## 2022-10-28 RX ORDER — MORPHINE SULFATE 2 MG/ML
2 INJECTION, SOLUTION INTRAMUSCULAR; INTRAVENOUS
Status: DISCONTINUED | OUTPATIENT
Start: 2022-10-28 | End: 2022-10-28 | Stop reason: SDUPTHER

## 2022-10-28 RX ORDER — TAMSULOSIN HYDROCHLORIDE 0.4 MG/1
0.4 CAPSULE ORAL DAILY
Status: DISCONTINUED | OUTPATIENT
Start: 2022-10-28 | End: 2022-11-02 | Stop reason: HOSPADM

## 2022-10-28 RX ORDER — TRAMADOL HYDROCHLORIDE 50 MG/1
1 TABLET ORAL EVERY 6 HOURS PRN
COMMUNITY
End: 2022-11-02 | Stop reason: HOSPADM

## 2022-10-28 RX ORDER — SODIUM CHLORIDE 0.9 % (FLUSH) 0.9 %
5-40 SYRINGE (ML) INJECTION EVERY 12 HOURS SCHEDULED
Status: DISCONTINUED | OUTPATIENT
Start: 2022-10-28 | End: 2022-10-29

## 2022-10-28 RX ORDER — INSULIN LISPRO 100 [IU]/ML
0-10 INJECTION, SOLUTION INTRAVENOUS; SUBCUTANEOUS
Status: DISCONTINUED | OUTPATIENT
Start: 2022-10-28 | End: 2022-11-02 | Stop reason: HOSPADM

## 2022-10-28 RX ORDER — PANTOPRAZOLE SODIUM 40 MG/1
40 TABLET, DELAYED RELEASE ORAL DAILY
Status: DISCONTINUED | OUTPATIENT
Start: 2022-10-28 | End: 2022-11-02 | Stop reason: HOSPADM

## 2022-10-28 RX ORDER — MAGNESIUM HYDROXIDE/ALUMINUM HYDROXICE/SIMETHICONE 120; 1200; 1200 MG/30ML; MG/30ML; MG/30ML
30 SUSPENSION ORAL EVERY 6 HOURS PRN
Status: DISCONTINUED | OUTPATIENT
Start: 2022-10-28 | End: 2022-11-02 | Stop reason: HOSPADM

## 2022-10-28 RX ORDER — LORAZEPAM 0.5 MG/1
0.5 TABLET ORAL EVERY 12 HOURS PRN
Status: DISCONTINUED | OUTPATIENT
Start: 2022-10-28 | End: 2022-11-01

## 2022-10-28 RX ORDER — FAMOTIDINE 20 MG/1
10 TABLET, FILM COATED ORAL DAILY PRN
Status: DISCONTINUED | OUTPATIENT
Start: 2022-10-28 | End: 2022-11-01

## 2022-10-28 RX ORDER — BISACODYL 10 MG
10 SUPPOSITORY, RECTAL RECTAL DAILY PRN
Status: DISCONTINUED | OUTPATIENT
Start: 2022-10-28 | End: 2022-11-02 | Stop reason: HOSPADM

## 2022-10-28 RX ORDER — FAMOTIDINE 20 MG/1
40 TABLET, FILM COATED ORAL ONCE
Status: COMPLETED | OUTPATIENT
Start: 2022-10-28 | End: 2022-10-28

## 2022-10-28 RX ORDER — DONEPEZIL HYDROCHLORIDE 5 MG/1
5 TABLET, FILM COATED ORAL NIGHTLY
Status: DISCONTINUED | OUTPATIENT
Start: 2022-10-28 | End: 2022-11-02 | Stop reason: HOSPADM

## 2022-10-28 RX ORDER — ONDANSETRON 4 MG/1
4 TABLET, ORALLY DISINTEGRATING ORAL EVERY 8 HOURS PRN
Status: DISCONTINUED | OUTPATIENT
Start: 2022-10-28 | End: 2022-11-02 | Stop reason: HOSPADM

## 2022-10-28 RX ORDER — SODIUM CHLORIDE 9 MG/ML
INJECTION, SOLUTION INTRAVENOUS PRN
Status: DISCONTINUED | OUTPATIENT
Start: 2022-10-28 | End: 2022-11-02 | Stop reason: HOSPADM

## 2022-10-28 RX ORDER — ATORVASTATIN CALCIUM 10 MG/1
10 TABLET, FILM COATED ORAL DAILY
Status: DISCONTINUED | OUTPATIENT
Start: 2022-10-28 | End: 2022-10-28

## 2022-10-28 RX ORDER — ACETAMINOPHEN 325 MG/1
650 TABLET ORAL EVERY 6 HOURS PRN
Status: DISCONTINUED | OUTPATIENT
Start: 2022-10-28 | End: 2022-11-02 | Stop reason: HOSPADM

## 2022-10-28 RX ORDER — MORPHINE SULFATE 4 MG/ML
4 INJECTION INTRAVENOUS ONCE
Status: COMPLETED | OUTPATIENT
Start: 2022-10-28 | End: 2022-10-28

## 2022-10-28 RX ORDER — ONDANSETRON 2 MG/ML
8 INJECTION INTRAMUSCULAR; INTRAVENOUS ONCE
Status: COMPLETED | OUTPATIENT
Start: 2022-10-28 | End: 2022-10-28

## 2022-10-28 RX ORDER — 0.9 % SODIUM CHLORIDE 0.9 %
100 INTRAVENOUS SOLUTION INTRAVENOUS ONCE
Status: COMPLETED | OUTPATIENT
Start: 2022-10-28 | End: 2022-10-28

## 2022-10-28 RX ORDER — POLYETHYLENE GLYCOL 3350 17 G/17G
17 POWDER, FOR SOLUTION ORAL DAILY PRN
Status: DISCONTINUED | OUTPATIENT
Start: 2022-10-28 | End: 2022-11-02 | Stop reason: HOSPADM

## 2022-10-28 RX ORDER — SODIUM CHLORIDE 0.9 % (FLUSH) 0.9 %
10 SYRINGE (ML) INJECTION
Status: COMPLETED | OUTPATIENT
Start: 2022-10-28 | End: 2022-10-28

## 2022-10-28 RX ORDER — TAMSULOSIN HYDROCHLORIDE 0.4 MG/1
1 CAPSULE ORAL DAILY
COMMUNITY

## 2022-10-28 RX ORDER — SODIUM CHLORIDE 0.9 % (FLUSH) 0.9 %
5-40 SYRINGE (ML) INJECTION PRN
Status: DISCONTINUED | OUTPATIENT
Start: 2022-10-28 | End: 2022-11-02 | Stop reason: HOSPADM

## 2022-10-28 RX ORDER — PANTOPRAZOLE SODIUM 40 MG/1
1 TABLET, DELAYED RELEASE ORAL DAILY
COMMUNITY
Start: 2022-08-18

## 2022-10-28 RX ORDER — ONDANSETRON 2 MG/ML
4 INJECTION INTRAMUSCULAR; INTRAVENOUS EVERY 6 HOURS PRN
Status: DISCONTINUED | OUTPATIENT
Start: 2022-10-28 | End: 2022-11-02 | Stop reason: HOSPADM

## 2022-10-28 RX ORDER — TRAMADOL HYDROCHLORIDE 50 MG/1
50 TABLET ORAL EVERY 6 HOURS PRN
Status: DISCONTINUED | OUTPATIENT
Start: 2022-10-28 | End: 2022-11-02 | Stop reason: HOSPADM

## 2022-10-28 RX ORDER — MORPHINE SULFATE 2 MG/ML
2 INJECTION, SOLUTION INTRAMUSCULAR; INTRAVENOUS EVERY 6 HOURS PRN
Status: DISCONTINUED | OUTPATIENT
Start: 2022-10-28 | End: 2022-11-01

## 2022-10-28 RX ORDER — LORAZEPAM 0.5 MG/1
1 TABLET ORAL 2 TIMES DAILY PRN
Status: ON HOLD | COMMUNITY
End: 2022-11-02 | Stop reason: HOSPADM

## 2022-10-28 RX ORDER — SODIUM CHLORIDE 9 MG/ML
INJECTION, SOLUTION INTRAVENOUS CONTINUOUS
Status: ACTIVE | OUTPATIENT
Start: 2022-10-28 | End: 2022-10-28

## 2022-10-28 RX ADMIN — DONEPEZIL HYDROCHLORIDE 5 MG: 5 TABLET, FILM COATED ORAL at 22:18

## 2022-10-28 RX ADMIN — METOPROLOL TARTRATE 12.5 MG: 25 TABLET, FILM COATED ORAL at 22:18

## 2022-10-28 RX ADMIN — MORPHINE SULFATE 4 MG: 4 INJECTION INTRAVENOUS at 09:37

## 2022-10-28 RX ADMIN — MORPHINE SULFATE 2 MG: 2 INJECTION, SOLUTION INTRAMUSCULAR; INTRAVENOUS at 13:24

## 2022-10-28 RX ADMIN — PANTOPRAZOLE SODIUM 40 MG: 40 TABLET, DELAYED RELEASE ORAL at 22:24

## 2022-10-28 RX ADMIN — SODIUM CHLORIDE 1000 ML: 900 INJECTION, SOLUTION INTRAVENOUS at 10:00

## 2022-10-28 RX ADMIN — BISACODYL 5 MG: 5 TABLET, COATED ORAL at 17:52

## 2022-10-28 RX ADMIN — SODIUM CHLORIDE, PRESERVATIVE FREE 10 ML: 5 INJECTION INTRAVENOUS at 11:40

## 2022-10-28 RX ADMIN — DIATRIZOATE MEGLUMINE AND DIATRIZOATE SODIUM 15 ML: 600; 100 SOLUTION ORAL; RECTAL at 10:40

## 2022-10-28 RX ADMIN — POLYETHYLENE GLYCOL 3350 17 G: 17 POWDER, FOR SOLUTION ORAL at 17:50

## 2022-10-28 RX ADMIN — FAMOTIDINE 40 MG: 20 TABLET, FILM COATED ORAL at 09:36

## 2022-10-28 RX ADMIN — INSULIN LISPRO 2 UNITS: 100 INJECTION, SOLUTION INTRAVENOUS; SUBCUTANEOUS at 22:20

## 2022-10-28 RX ADMIN — IOPAMIDOL 100 ML: 755 INJECTION, SOLUTION INTRAVENOUS at 11:39

## 2022-10-28 RX ADMIN — SODIUM CHLORIDE 100 ML: 9 INJECTION, SOLUTION INTRAVENOUS at 11:39

## 2022-10-28 RX ADMIN — SODIUM CHLORIDE 40 MG: 9 INJECTION, SOLUTION INTRAMUSCULAR; INTRAVENOUS; SUBCUTANEOUS at 09:36

## 2022-10-28 RX ADMIN — TAMSULOSIN HYDROCHLORIDE 0.4 MG: 0.4 CAPSULE ORAL at 22:24

## 2022-10-28 RX ADMIN — SODIUM CHLORIDE 1000 ML: 9 INJECTION, SOLUTION INTRAVENOUS at 09:36

## 2022-10-28 RX ADMIN — INSULIN GLARGINE 5 UNITS: 100 INJECTION, SOLUTION SUBCUTANEOUS at 22:18

## 2022-10-28 RX ADMIN — SODIUM CHLORIDE: 9 INJECTION, SOLUTION INTRAVENOUS at 17:40

## 2022-10-28 RX ADMIN — TRAMADOL HYDROCHLORIDE 50 MG: 50 TABLET, COATED ORAL at 18:24

## 2022-10-28 RX ADMIN — ONDANSETRON 8 MG: 2 INJECTION INTRAMUSCULAR; INTRAVENOUS at 09:37

## 2022-10-28 RX ADMIN — ENOXAPARIN SODIUM 40 MG: 100 INJECTION SUBCUTANEOUS at 22:18

## 2022-10-28 ASSESSMENT — PAIN DESCRIPTION - ONSET: ONSET: ON-GOING

## 2022-10-28 ASSESSMENT — PAIN DESCRIPTION - PAIN TYPE: TYPE: ACUTE PAIN

## 2022-10-28 ASSESSMENT — PAIN DESCRIPTION - DESCRIPTORS: DESCRIPTORS: ACHING

## 2022-10-28 ASSESSMENT — PAIN SCALES - GENERAL
PAINLEVEL_OUTOF10: 6
PAINLEVEL_OUTOF10: 0
PAINLEVEL_OUTOF10: 8
PAINLEVEL_OUTOF10: 0

## 2022-10-28 ASSESSMENT — PAIN - FUNCTIONAL ASSESSMENT
PAIN_FUNCTIONAL_ASSESSMENT: ACTIVITIES ARE NOT PREVENTED
PAIN_FUNCTIONAL_ASSESSMENT: 0-10

## 2022-10-28 ASSESSMENT — PAIN DESCRIPTION - FREQUENCY: FREQUENCY: CONTINUOUS

## 2022-10-28 ASSESSMENT — PAIN DESCRIPTION - LOCATION
LOCATION: ABDOMEN
LOCATION: ABDOMEN

## 2022-10-28 ASSESSMENT — PAIN DESCRIPTION - ORIENTATION: ORIENTATION: ANTERIOR

## 2022-10-28 NOTE — ED NOTES
Report given to Manuela Jaquez. Patient to be transported to [facility].      Giancarlo Grossman RN  10/28/22 7277

## 2022-10-28 NOTE — H&P
Hospitalist History and Physical   Admit Date:  10/28/2022  9:00 AM   Name:  Carlos Lopez   Age:  [de-identified] y.o. Sex:  male  :  1940   MRN:  [id number]   Room:  03/03    Presenting Complaint: Abdominal Pain     Reason(s) for Admission: Pancreatitis, unspecified pancreatitis type [K85.90]     History of Present Illness:   Carlos Lopez is a [de-identified] y.o. male with medical history of diabetes, hypertension, history of lung cancer mild dementia with anxiety who presented with pain x4 days. Patient's son at bedside. Reports that patient has been complaining of abdominal pain for about 4 to 5 days. Has some nausea without any vomiting since last night. He reports that pain started suddenly without any triggers. Located in epigastric region with radiation to the right side and sometimes to the back. Reports chills without any fevers. No chest pain or shortness of breath. Reporting having constipation due to not being able to walk outside as he normally would. In the ED, patient is hemodynamically stable. Laboratory work-up is notable for glucose of 197, hemoglobin of 12.9, lipase of 1942. CT A/P with mild peripancreatic stranding about the head and neck without fluid collection or ductal dilatation. Also noted to have moderate to large colonic stool burden. Per ED's note, patient was noted to have intermitted afib. EKG @9:48am showed sinus tachy vs aflutter with repeat at @9:53am showing NSR. Currently SR on monitor with rate of 70-80s. Review of Systems: 10 systems reviewed and negative except as noted in HPI. Assessment & Plan:   Pancreatitis  Lipase with 1942. CT noted  - IVF  - CLDs  - pain control with tramadol and morphine    Sinus tach vs atrial flutter  Per ED's note, EKG @9:48am showed sinus tachy vs aflutter with repeat at @9:53am showing NSR. I am unable to locate the EKG to verify. Currently SR on monitor with rate of 70-80s.   - tele monitoring  - continue metoprolol Constipation  - start on stool softeners    T2DM  - hold PO meds  - takes lantus but does not take it daily at same time. Educated patient  - diabetic educator consulted  - will start with lantus 5U qHS and monitor    Dementia  - continue on Aricept    Depression//Depression: Wellbutrin     Diet: ADULT DIET; Clear Liquid; 4 carb choices (60 gm/meal)  VTE ppx: Lovenox  Code status: No Order    Hospital Problems:  Principal Problem:    Pancreatitis, unspecified pancreatitis type  Active Problems:    Constipation    HTN (hypertension)    Malignant neoplasm of upper lobe of left lung (HCC)    Hyperlipidemia    Type 2 diabetes mellitus with hyperglycemia, with long-term current use of insulin (HCC)    Anxiety disorder  Resolved Problems:    * No resolved hospital problems.  *       Past History:     Past Medical History:   Diagnosis Date    CAD (coronary artery disease) 2019    non obstructive     Chronic kidney disease     Stage 3    Depression     managed with medications    Diabetes ([address]     takes insulin, A1c on 19 was 10.3; unsure of avg blood sugar    HTN (hypertension) 2015    managed wtih medications    Malignant neoplasm of upper lobe of left lung ([address] 2019    Other ill-defined conditions(799.89)     cholesterol    Sepsis ([address] 2019       Past Surgical History:   Procedure Laterality Date    COLONOSCOPY      LOBECTOMY Left 2020    ORTHOPEDIC SURGERY      bilateral shoulder repairs, both knees repaired-no hardware    OTHER SURGICAL HISTORY  2019    lung biopsy    [place] ABDOMEN SURGERY PROC UNLISTED  2015    explor lap        Social History     Tobacco Use    Smoking status: Every Day     Packs/day: 1.00     Types: Cigarettes     Last attempt to quit: 2019     Years since quittin.8    Smokeless tobacco: Never   Substance Use Topics    Alcohol use: No      Social History     Substance and Sexual Activity   Drug Use No       Family History   Problem Relation Age of Onset Cancer Brother         stomach? ?    Heart Disease Father     Heart Disease Mother     Diabetes Daughter         Immunization History   Administered Date(s) Administered    COVID-19, MODERNA BLUE border, Primary or Immunocompromised, (age 12y+), IM, 100 mcg/0.5mL 04/21/2021, 05/21/2021    COVID-19, MODERNA Booster BLUE border, (age 18y+), IM, 50mcg/0.25mL 01/04/2022    Influenza Virus Vaccine 09/27/2019    PPD Test 01/24/2020, 02/27/2020     No Known Allergies  Prior to Admit Medications:  Current Outpatient Medications   Medication Instructions    albuterol (PROVENTIL) 2.5 mg, Nebulization, 2 times daily    atorvastatin (LIPITOR) 10 MG tablet atorvastatin 10 mg tablet   Take 1 tablet every day by oral route at bedtime. blood glucose monitor strips Test 2 times a day & as needed for symptoms of irregular blood glucose. Dispense sufficient amount for indicated testing frequency plus additional to accommodate PRN testing needs. buPROPion (WELLBUTRIN XL) 150 MG extended release tablet bupropion HCl  mg 24 hr tablet, extended release   Take 1 tablet every day by oral route.     Continuous Blood Gluc  (FREESTYLE LENORE 2 READER) ESTHER 1 Device, Does not apply, 4 TIMES DAILY    Continuous Blood Gluc Sensor (FREESTYLE LENORE 2 SENSOR) MISC 1 kit, Does not apply, 4 TIMES DAILY    cyanocobalamin 100 mcg, Oral, DAILY    dapagliflozin (FARXIGA) 5 mg, Oral, EVERY MORNING    donepezil (ARICEPT) 5 mg, Oral, NIGHTLY, 1 at night    dronabinol (MARINOL) 10 mg, Oral, Nightly, 1 capsule po at bedtime    ferrous sulfate (IRON 325) 325 mg, Oral, 2 times daily    furosemide (LASIX) 20 mg, Oral, DAILY, 1 tablet every day    Insulin Pen Needle 33G X 6 MM MISC Use daily with insulin    ipratropium-albuterol (DUONEB) 0.5-2.5 (3) MG/3ML SOLN nebulizer solution 1 vial, Inhalation, EVERY 4 HOURS    Lantus SoloStar 30 Units, SubCUTAneous, DAILY    lisinopril (PRINIVIL;ZESTRIL) 5 MG tablet TAKE 1 TABLET BY MOUTH EVERY DAY FOR 90 DAYS    LORazepam (ATIVAN) 0.5 MG tablet 1 tablet, Oral, 2 TIMES DAILY PRN    LORazepam (ATIVAN) 1 mg, Oral, 2 TIMES DAILY PRN    metFORMIN (GLUCOPHAGE-XR) 500 mg, Oral, 2 times daily    metoprolol tartrate (LOPRESSOR) 25 MG tablet TAKE 1 TABLET BY MOUTH EVERY DAY WITH FOOD FOR 90 DAYS    pantoprazole (PROTONIX) 40 MG tablet 1 tablet, Oral, DAILY    predniSONE (DELTASONE) 20 MG tablet TAKE 1/2 TABLET BY MOUTH EVERY DAY    psyllium 100 % PACK 1 packet, Oral, DAILY    tamsulosin (FLOMAX) 0.4 MG capsule 1 capsule, Oral, DAILY    tiotropium (SPIRIVA) 18 mcg, Inhalation, DAILY, 1 capsule inhale orally one time a day    traMADol (ULTRAM) 50 MG tablet 1 tablet, Oral, EVERY 6 HOURS PRN         Objective:   Patient Vitals for the past 24 hrs:   Temp Pulse Resp BP SpO2   10/28/22 1245 -- 76 16 96/78 --   10/28/22 1130 -- 78 23 122/63 --   10/28/22 1115 -- -- -- (!) 120/58 --   10/28/22 1031 -- 75 22 -- 95 %   10/28/22 1030 -- (!) 122 -- -- --   10/28/22 1025 -- 83 17 -- --   10/28/22 1020 -- (!) 124 28 -- (!) 89 %   10/28/22 1000 -- 85 (!) 37 (!) 110/56 96 %   10/28/22 0947 -- (!) 145 23 -- 98 %   10/28/22 0222 97.8 °F (36.6 °C) 90 18 108/65 100 %       Oxygen Therapy  SpO2: 95 %  O2 Device: None (Room air)    Estimated body mass index is 17.81 kg/m² as calculated from the following:    Height as of this encounter: 6' 1\" (1.854 m). Weight as of this encounter: 135 lb (61.2 kg). No intake or output data in the 24 hours ending 10/28/22 1625      Physical Exam:    Blood pressure 96/78, pulse 76, temperature 97.8 °F (36.6 °C), temperature source Oral, resp. rate 16, height 6' 1\" (1.854 m), weight 135 lb (61.2 kg), SpO2 95 %. General:    Well nourished. Head:  Normocephalic, atraumatic  Eyes:  Sclerae appear normal.  Pupils equally round. ENT:  Nares appear normal, no drainage. Moist oral mucosa  Neck:  No restricted ROM. Trachea midline   CV:   RRR. No m/r/g. No jugular venous distension. Lungs:   CTAB.   No wheezing. Symmetric expansion. Abdomen: Bowel sounds present. Soft, TTP at epigastric, nondistended. Extremities: No cyanosis or clubbing. 1+ edema  Skin:     No rashes and normal coloration. Warm and dry. Neuro:  CN II-XII grossly intact. A&Ox3 but somewhat of a poor historian  Psych:  Normal mood and affect.       I have personally reviewed labs and tests showing:  Recent Labs:  Recent Results (from the past 24 hour(s))   CMP    Collection Time: 10/28/22  2:48 AM   Result Value Ref Range    Sodium 128 (L) 133 - 143 mmol/L    Potassium 5.2 (H) 3.5 - 5.1 mmol/L    Chloride 97 (L) 101 - 110 mmol/L    CO2 28 21 - 32 mmol/L    Anion Gap 3 2 - 11 mmol/L    Glucose 285 (H) 65 - 100 mg/dL    BUN 37 (H) 8 - 23 MG/DL    Creatinine 1.80 (H) 0.8 - 1.5 MG/DL    Est, Glom Filt Rate 38 (L) >60 ml/min/1.73m2    Calcium 9.1 8.3 - 10.4 MG/DL    Total Bilirubin 0.5 0.2 - 1.1 MG/DL    ALT 22 12 - 65 U/L    AST 47 (H) 15 - 37 U/L    Alk Phosphatase 217 (H) 50 - 136 U/L    Total Protein 7.6 6.3 - 8.2 g/dL    Albumin 3.1 (L) 3.2 - 4.6 g/dL    Globulin 4.5 2.8 - 4.5 g/dL    Albumin/Globulin Ratio 0.7 0.4 - 1.6     Lipase    Collection Time: 10/28/22  2:48 AM   Result Value Ref Range    Lipase 1,942 (H) 73 - 393 U/L   Hemoglobin and Hematocrit    Collection Time: 10/28/22  2:48 AM   Result Value Ref Range    Hemoglobin 13.9 13.6 - 17.2 g/dL    Hematocrit 40.9 (L) 41.1 - 50.3 %   CBC with Auto Differential    Collection Time: 10/28/22  4:17 AM   Result Value Ref Range    WBC 7.0 4.3 - 11.1 K/uL    RBC 4.96 4.23 - 5.6 M/uL    Hemoglobin 13.9 13.6 - 17.2 g/dL    Hematocrit 40.3 (L) 41.1 - 50.3 %    MCV 81.3 (L) 82 - 102 FL    MCH 28.0 26.1 - 32.9 PG    MCHC 34.5 31.4 - 35.0 g/dL    RDW 13.5 11.9 - 14.6 %    Platelets 208 720 - 390 K/uL    MPV 8.5 (L) 9.4 - 12.3 FL    nRBC 0.00 0.0 - 0.2 K/uL    Differential Type AUTOMATED      Seg Neutrophils 61 43 - 78 %    Lymphocytes 27 13 - 44 %    Monocytes 8 4.0 - 12.0 %    Eosinophils % 3 0.5 - 7.8 %    Basophils 0 0.0 - 2.0 %    Immature Granulocytes 1 0.0 - 5.0 %    Segs Absolute 4.3 1.7 - 8.2 K/UL    Absolute Lymph # 1.9 0.5 - 4.6 K/UL    Absolute Mono # 0.5 0.1 - 1.3 K/UL    Absolute Eos # 0.2 0.0 - 0.8 K/UL    Basophils Absolute 0.0 0.0 - 0.2 K/UL    Absolute Immature Granulocyte 0.0 0.0 - 0.5 K/UL   Lactate, Sepsis    Collection Time: 10/28/22  4:17 AM   Result Value Ref Range    Lactic Acid, Sepsis 1.9 0.4 - 2.0 MMOL/L   POCT Urinalysis no Micro    Collection Time: 10/28/22  4:27 AM   Result Value Ref Range    Specific Gravity, Urine, POC 1.015 1.001 - 1.023      pH, Urine, POC 6.0 5.0 - 9.0      Protein, Urine, POC Negative NEG mg/dL    Glucose, UA POC >1,000 (A) NEG mg/dL    Ketones, Urine, POC Negative NEG mg/dL    Bilirubin, Urine, POC Negative NEG      Blood, UA POC Negative NEG      URINE UROBILINOGEN POC 0.2 0.2 - 1.0 EU/dL    Nitrate, Urine, POC Negative NEG      Leukocyte Est, UA POC Negative NEG      Performed by: Ashlee Steele    POCT Glucose    Collection Time: 10/28/22  9:07 AM   Result Value Ref Range    POC Glucose 200 (H) 65 - 100 mg/dL    Performed by: [facility] [place]    Basic Metabolic Panel    Collection Time: 10/28/22 10:15 AM   Result Value Ref Range    Sodium 135 133 - 143 mmol/L    Potassium 3.8 3.5 - 5.1 mmol/L    Chloride 101 101 - 110 mmol/L    CO2 27 21 - 32 mmol/L    Anion Gap 7 2 - 11 mmol/L    Glucose 197 (H) 65 - 100 mg/dL    BUN 32 (H) 8 - 23 MG/DL    Creatinine 1.30 0.8 - 1.5 MG/DL    Est, Glom Filt Rate 56 (L) >60 ml/min/1.73m2    Calcium 8.7 8.3 - 10.4 MG/DL   Magnesium    Collection Time: 10/28/22 10:15 AM   Result Value Ref Range    Magnesium 2.2 1.8 - 2.4 mg/dL   Hemoglobin and Hematocrit    Collection Time: 10/28/22 10:15 AM   Result Value Ref Range    Hemoglobin 12.9 (L) 13.6 - 17.2 g/dL    Hematocrit 38.7 (L) 41.1 - 50.3 %       I have personally reviewed imaging studies showing:  CT ABDOMEN PELVIS W IV CONTRAST Additional Contrast? Oral    Result Date: 10/28/2022  EXAMINATION: CT ABDOMEN PELVIS W IV CONTRAST 10/28/2022 11:38 AM ACCESSION NUMBER:  [id number] COMPARISON: None available INDICATION:  Nausea, abdominal pain, pancreatitis TECHNIQUE: Contiguous axial computed tomographic images were obtained from the domes of the diaphragm to the symphysis pubis following administration 100 mL Iso-trevin 370. Coronal reconstructions were also performed. Radiation dose reduction techniques were used for this study. Our CT scanners use one or all of the following: Automated exposure control, adjustment of the mA and/or kV according to patient size, iterative reconstruction. FINDINGS: Lung Bases: Emphysematous changes with subsegmental scarring of the imaged lung bases. No pericardial effusion. Liver: Unremarkable. Gallbladder/Biliary: Unremarkable. No biliary ductal dilatation. Pancreas: Homogeneously attenuation of the pancreatic parenchyma with mild peripancreatic stranding about the head/neck. No peripancreatic fluid collection or ductal dilatation. Spleen: Unremarkable. Adrenal glands: Unremarkable. Kidneys/Ureters: Symmetric bilateral enhancement. No hydronephrosis or suspicious lesion. Right lower pole 1.9 cm intermediate attenuating hypodensity likely favoring minimally complex cysts. Bladder: Partially distended bladder is unremarkable. Reproduction:Unremarkable. Gastrointestinal: Normal caliber small and large bowel without wall thickening or inflammatory changes. Moderate to large colonic stool burden. Mesentery/Retroperitoneum/Peritoneum:No significant adenopathy or ascites. Vasculature: Moderate aortobiiliac atherosclerosis without aneurysmal dilatation. Musculoskeletal: Remote compression deformity of the superior endplate of L4. No acute or aggressive osseous abnormalities. The soft tissues are unremarkable.      1.   Mild peripancreatic stranding about the head/neck without fluid collection or ductal dilatation, otherwise pancreatic parenchyma is homogeneous and attenuation. 2.  Right lower pole 1.9 cm intermediate attenuating hypodensity likely favoring a likely minimally complex cyst. 3.  Moderate to large colonic stool burden which should be correlated with clinical constipation. 4.  Emphysematous changes and subsegmental scarring of the lung bases. Echocardiogram:  No results found for this or any previous visit. Orders Placed This Encounter   Medications    0.9 % sodium chloride bolus    ondansetron (ZOFRAN) injection 8 mg    morphine injection 4 mg    pantoprazole (PROTONIX) 40 mg in sodium chloride (PF) 0.9 % 10 mL injection    famotidine (PEPCID) tablet 40 mg    0.9 % sodium chloride infusion    DISCONTD: morphine injection 2 mg    DISCONTD: insulin regular (HUMULIN R;NOVOLIN R) injection 2 Units    0.9 % sodium chloride bolus    diatrizoate meglumine-sodium (GASTROGRAFIN) 66-10 % solution 15 mL    insulin lispro (HUMALOG) injection vial 0-10 Units    morphine injection 2 mg    traMADol (ULTRAM) tablet 50 mg    insulin glargine (LANTUS) injection vial 5 Units    LORazepam (ATIVAN) tablet 0.5 mg    bisacodyl (DULCOLAX) EC tablet 5 mg    polyethylene glycol (GLYCOLAX) packet 17 g         Signed:  Glen Mclain MD    Part of this note may have been written by using a voice dictation software. The note has been proof read but may still contain some grammatical/other typographical errors.

## 2022-10-28 NOTE — ED TRIAGE NOTES
EMS called to home for abdominal pain for 4 days with nausea tonight but no vomiting. Denies changes in PO intake, has not taken anything for pain so far. Both diarrhea and constipation issues in recent past.  No dysuria or hematuria. No known sick contacts. Has hx of diabetes with BGL tonight of 350 for EMS, does report increased urination and increased thirst.  Also c/o swollen ankles for years. Lives alone. States he was \"over here a week ago\" but does not recall what he was seen for.

## 2022-10-28 NOTE — ACP (ADVANCE CARE PLANNING)
Advanced Care Planning (Initial Encounter)      Name: Carlos Lopez            Age: [de-identified] y.o. Sex: male  : 1940    MRN:     [id number]    Date of ACP Conversation: 10/28/22    Morgan Tapia Conducted with: Patient with Decision Making Capacity  and Patient's son - [place]     Patient is AAOx3 and has adequate capacity to make medical decisions. In the event that he is no longer able to make medical decision, he would like his son to make medical decisions. Discussed the current conditions, workup/management plans as well as prognosis. The patient has been informed and is fully aware of the sufficient risks of the active diagnosis and risk for further deterioration due to the underlying condition. In the event of cardiopulmonary arrest,  patient would like us to perform resuscitation including chest compression and intubation.       Time Spent face to face with patient/family:  16 mins (This is addition to time spent for clinical care)      Code Status: FULL  Designated Health Care Decision Maker: Fiorella Roy MD

## 2022-10-28 NOTE — ED PROVIDER NOTES
Emergency Department Provider Note                   PCP:                Brittani Rodriguez MD               Age: [de-identified] y.o. Sex: male     Final diagnosis/impression:  1. Idiopathic acute pancreatitis without infection or necrosis    2. Dehydration    3. Acute kidney injury ([address]    4. Hyperglycemia         Disposition: Admit to hospitalist    MDM/Clinical Course:  Patient seen by myself here in the [facility] emergency department. Patient had signs, symptoms and clinical history most consistent with abdominal pain, nausea symptoms. Labs and radiology ordered, notable for acute pancreatitis with lipase of 1900, signs of dehydration with acute kidney injury, creatinine 1.8, elevated BUN, mild hyperglycemia noted. CT scan shows no severe complicating factors associated with pancreatitis. While under my care, patient ordered and / or received IV fluids, IV morphine, IV Protonix, p.o. famotidine, IV Zofran. In considertion of patient clinical presentation, laboratory/radiologic findings, diagnoses/conditions previously discussed and clinical course as previously discussed, I did feel it appropriate to admit the patient for further evaluation, observation and management. I communicated with the hospitalist in detail about the patient and they agreed to see and admit the patient. Patient/family verbalized understanding and agreement with this course of action and plan. During ER visit did appear to be having some intermittent atrial fibrillation which as subsided with administration of IVF. HPI: Gwen Fabian is a [de-identified] y.o. male with past medical history of diabetes, CAD, hypertension presenting for evaluation of abdominal pain and associated symptoms. Patient notes 3 to 4-day history of increasing nausea, yesterday evening with development of high moderate to severe constant epigastric pain, nonradiating, nausea but no associated vomiting. Pain is localized to the epigastric area.   No chest pain or shortness of breath symptoms. No presyncope, palpitations, syncopal episodes. Patient denies hematochezia or melena symptoms. Patient/family denies any other evaluation for today's acute complaint. Patient/family denies any other aggravating or alleviating factors. Patient/family denies any other symptoms. ROS:   All review of systems negative except as noted above in the history of the present illness. Past Medical/ Family/ Social History:     Medical history:   Past Medical History:   Diagnosis Date    CAD (coronary artery disease) 2019    non obstructive     Chronic kidney disease     Stage 3    Depression     managed with medications    Diabetes ([address]     takes insulin, A1c on 19 was 10.3; unsure of avg blood sugar    HTN (hypertension) 2015    managed wt medications    Malignant neoplasm of upper lobe of left lung ([address] 2019    Other ill-defined conditions(799.89)     cholesterol    Sepsis ([facility] [address] [facility] 2019       Surgical history:   Past Surgical History:   Procedure Laterality Date    COLONOSCOPY      LOBECTOMY Left 2020    ORTHOPEDIC SURGERY      bilateral shoulder repairs, both knees repaired-no hardware    OTHER SURGICAL HISTORY  2019    lung biopsy    [place] ABDOMEN SURGERY PROC UNLISTED  2015    explor lap       Family history:   Family History   Problem Relation Age of Onset    Cancer Brother         stomach? ?    Heart Disease Father     Heart Disease Mother     Diabetes Daughter        Social history:   Social History     Socioeconomic History    Marital status: Single   Tobacco Use    Smoking status: Every Day     Packs/day: 1.00     Types: Cigarettes     Last attempt to quit: 2019     Years since quittin.8    Smokeless tobacco: Never   Substance and Sexual Activity    Alcohol use: No    Drug use: No     Social Determinants of Health     Financial Resource Strain: Low Risk     Difficulty of Paying Living Expenses: Not hard at all   Food Insecurity: No Food Insecurity    Worried About Running Out of Food in the Last Year: Never true    Ran Out of Food in the Last Year: Never true   Physical Activity: Insufficiently Active    Days of Exercise per Week: 7 days    Minutes of Exercise per Session: 10 min        Medications:   Previous Medications    ALBUTEROL (PROVENTIL) (2.5 MG/3ML) 0.083% NEBULIZER SOLUTION    Take 3 mLs by nebulization in the morning and at bedtime    ATORVASTATIN (LIPITOR) 10 MG TABLET    atorvastatin 10 mg tablet   Take 1 tablet every day by oral route at bedtime. BLOOD GLUCOSE MONITOR STRIPS    Test 2 times a day & as needed for symptoms of irregular blood glucose. Dispense sufficient amount for indicated testing frequency plus additional to accommodate PRN testing needs. BUPROPION (WELLBUTRIN XL) 150 MG EXTENDED RELEASE TABLET    bupropion HCl  mg 24 hr tablet, extended release   Take 1 tablet every day by oral route. CONTINUOUS BLOOD GLUC  (FREESTYLE LENORE 2 READER) ESTHER    1 Device by Does not apply route 4 times daily    CONTINUOUS BLOOD GLUC SENSOR (FREESTYLE LENORE 2 SENSOR) MISC    1 kit by Does not apply route 4 times daily    CYANOCOBALAMIN 100 MCG TABLET    Take 100 mcg by mouth daily    DAPAGLIFLOZIN (FARXIGA) 5 MG TABLET    Take 1 tablet by mouth every morning    DONEPEZIL (ARICEPT) 5 MG TABLET    Take 1 tablet by mouth nightly 1 at night    DRONABINOL (MARINOL) 10 MG CAPSULE    Take 10 mg by mouth at bedtime.  1 capsule po at bedtime    FERROUS SULFATE (IRON 325) 325 (65 FE) MG TABLET    Take 325 mg by mouth in the morning and at bedtime    FUROSEMIDE (LASIX) 20 MG TABLET    Take 20 mg by mouth daily 1 tablet every day    INSULIN GLARGINE (LANTUS SOLOSTAR) 100 UNIT/ML INJECTION PEN    Inject 30 Units into the skin daily    INSULIN PEN NEEDLE 33G X 6 MM MISC    Use daily with insulin    IPRATROPIUM-ALBUTEROL (DUONEB) 0.5-2.5 (3) MG/3ML SOLN NEBULIZER SOLUTION    Inhale 1 vial into the lungs every 4 hours    LISINOPRIL (PRINIVIL;ZESTRIL) 5 MG TABLET    TAKE 1 TABLET BY MOUTH EVERY DAY FOR 90 DAYS    LORAZEPAM (ATIVAN) 1 MG TABLET    Take 1 tablet by mouth 2 times daily as needed for Anxiety for up to 30 days. METFORMIN (GLUCOPHAGE-XR) 500 MG EXTENDED RELEASE TABLET    Take 1 tablet by mouth in the morning and at bedtime    METOPROLOL TARTRATE (LOPRESSOR) 25 MG TABLET    TAKE 1 TABLET BY MOUTH EVERY DAY WITH FOOD FOR 90 DAYS    PREDNISONE (DELTASONE) 20 MG TABLET    TAKE 1/2 TABLET BY MOUTH EVERY DAY    PSYLLIUM 100 % PACK    Take 1 packet by mouth in the morning. TIOTROPIUM (SPIRIVA) 18 MCG INHALATION CAPSULE    Inhale 18 mcg into the lungs in the morning. 1 capsule inhale orally one time a day. Allergies: No Known Allergies      Physical Exam     Vitals signs reviewed.    Patient Vitals for the past 24 hrs:   BP Temp Temp src Pulse Resp SpO2 Height Weight   10/28/22 1130 122/63 -- -- 78 23 -- -- --   10/28/22 1115 (!) 120/58 -- -- -- -- -- -- --   10/28/22 1031 -- -- -- 75 22 95 % -- --   10/28/22 1030 -- -- -- (!) 122 -- -- -- --   10/28/22 1025 -- -- -- 83 17 -- -- --   10/28/22 1020 -- -- -- (!) 124 28 (!) 89 % -- --   10/28/22 1000 (!) 110/56 -- -- 85 (!) 37 96 % -- --   10/28/22 0947 -- -- -- (!) 145 23 98 % -- --   10/28/22 0222 108/65 97.8 °F (36.6 °C) Oral 90 18 100 % 6' 1\" (1.854 m) 135 lb (61.2 kg)       General: Alert and oriented ×4, no acute distress   Eyes: Anicteric, conjunctiva pink, PERRLA, EOMI  ENT: No nasal discharge, no gross nasal congestion present, mucosal membranes are dry  Pulmonary: Clear to auscultation bilaterally with symmetric chest rise, no increased work of breathing, no accessory muscle use  Cardiovascular: Regular rate and rhythm, no rub or gallop appreciated on my exam  GI: Abdomen is soft, tender to palpation with mild to moderate guarding in the epigastrium, no rebound present, nondistended  Musculoskeletal: No obvious joint deformity or joint effusion, normal joint range of motion  Neuro: Cranial nerves II through VII grossly intact, strength and sensation is grossly intact in the upper and lower extremities bilaterally  Skin: Skin is warm and dry    Procedures    ED EKG Interpretation  EKG was interpreted in the absence of a cardiologist.  Interpretation:  EKG read on 10/28/2022 at 9:48 AM  Sinus tachycardia versus atrial flutter with rate of 145, no ST elevation MI or other gross signs of cardiac ischemia, right bundle branch block, present previously, QTC is 553 and is prolonged, QRS is 169 consistent with right bundle branch block. EKG read on 10/28/2022 at 9:53 AM  Normal sinus rhythm, rate of 73, right bundle branch block, present previously, suspected LVH present, no ST elevation MI or other gross signs of cardiac ischemia, QTC is 505 and is not prolonged, QRS is 138 consistent with right bundle branch block      Results for orders placed or performed during the hospital encounter of 10/28/22   CT ABDOMEN PELVIS W IV CONTRAST Additional Contrast? Oral    Narrative    EXAMINATION: CT ABDOMEN PELVIS W IV CONTRAST 10/28/2022 11:38 AM    ACCESSION NUMBER:  [id number]    COMPARISON: None available    INDICATION:  Nausea, abdominal pain, pancreatitis    TECHNIQUE: Contiguous axial computed tomographic images were obtained from the  domes of the diaphragm to the symphysis pubis following administration 100 mL  Iso-trevin 370. Coronal reconstructions were also performed. Radiation dose reduction techniques were used for this study. Our CT scanners  use one or all of the following: Automated exposure control, adjustment of the  mA and/or kV according to patient size, iterative reconstruction. FINDINGS:    Lung Bases: Emphysematous changes with subsegmental scarring of the imaged lung  bases. No pericardial effusion. Liver: Unremarkable. Gallbladder/Biliary: Unremarkable. No biliary ductal dilatation.     Pancreas: Homogeneously attenuation of the pancreatic parenchyma with mild  peripancreatic stranding about the head/neck. No peripancreatic fluid collection  or ductal dilatation. Spleen: Unremarkable. Adrenal glands: Unremarkable. Kidneys/Ureters: Symmetric bilateral enhancement. No hydronephrosis or  suspicious lesion. Right lower pole 1.9 cm intermediate attenuating hypodensity  likely favoring minimally complex cysts. Bladder: Partially distended bladder is unremarkable. Reproduction:Unremarkable. Gastrointestinal: Normal caliber small and large bowel without wall thickening  or inflammatory changes. Moderate to large colonic stool burden. Mesentery/Retroperitoneum/Peritoneum:No significant adenopathy or ascites. Vasculature: Moderate aortobiiliac atherosclerosis without aneurysmal  dilatation. Musculoskeletal: Remote compression deformity of the superior endplate of L4. No  acute or aggressive osseous abnormalities. The soft tissues are unremarkable. Impression    1. Mild peripancreatic stranding about the head/neck without fluid collection  or ductal dilatation, otherwise pancreatic parenchyma is homogeneous and  attenuation. 2.  Right lower pole 1.9 cm intermediate attenuating hypodensity likely favoring  a likely minimally complex cyst.    3.  Moderate to large colonic stool burden which should be correlated with  clinical constipation. 4.  Emphysematous changes and subsegmental scarring of the lung bases.        CMP   Result Value Ref Range    Sodium 128 (L) 133 - 143 mmol/L    Potassium 5.2 (H) 3.5 - 5.1 mmol/L    Chloride 97 (L) 101 - 110 mmol/L    CO2 28 21 - 32 mmol/L    Anion Gap 3 2 - 11 mmol/L    Glucose 285 (H) 65 - 100 mg/dL    BUN 37 (H) 8 - 23 MG/DL    Creatinine 1.80 (H) 0.8 - 1.5 MG/DL    Est, Glom Filt Rate 38 (L) >60 ml/min/1.73m2    Calcium 9.1 8.3 - 10.4 MG/DL    Total Bilirubin 0.5 0.2 - 1.1 MG/DL    ALT 22 12 - 65 U/L    AST 47 (H) 15 - 37 U/L    Alk Phosphatase 217 (H) 50 - 136 U/L    Total Protein 7.6 6.3 - 8.2 g/dL    Albumin 3.1 (L) 3.2 - 4.6 g/dL    Globulin 4.5 2.8 - 4.5 g/dL    Albumin/Globulin Ratio 0.7 0.4 - 1.6     Lipase   Result Value Ref Range    Lipase 1,942 (H) 73 - 393 U/L   CBC with Auto Differential   Result Value Ref Range    WBC 7.0 4.3 - 11.1 K/uL    RBC 4.96 4.23 - 5.6 M/uL    Hemoglobin 13.9 13.6 - 17.2 g/dL    Hematocrit 40.3 (L) 41.1 - 50.3 %    MCV 81.3 (L) 82 - 102 FL    MCH 28.0 26.1 - 32.9 PG    MCHC 34.5 31.4 - 35.0 g/dL    RDW 13.5 11.9 - 14.6 %    Platelets 871 791 - 681 K/uL    MPV 8.5 (L) 9.4 - 12.3 FL    nRBC 0.00 0.0 - 0.2 K/uL    Differential Type AUTOMATED      Seg Neutrophils 61 43 - 78 %    Lymphocytes 27 13 - 44 %    Monocytes 8 4.0 - 12.0 %    Eosinophils % 3 0.5 - 7.8 %    Basophils 0 0.0 - 2.0 %    Immature Granulocytes 1 0.0 - 5.0 %    Segs Absolute 4.3 1.7 - 8.2 K/UL    Absolute Lymph # 1.9 0.5 - 4.6 K/UL    Absolute Mono # 0.5 0.1 - 1.3 K/UL    Absolute Eos # 0.2 0.0 - 0.8 K/UL    Basophils Absolute 0.0 0.0 - 0.2 K/UL    Absolute Immature Granulocyte 0.0 0.0 - 0.5 K/UL   Lactate, Sepsis   Result Value Ref Range    Lactic Acid, Sepsis 1.9 0.4 - 2.0 MMOL/L   Hemoglobin and Hematocrit   Result Value Ref Range    Hemoglobin 13.9 13.6 - 17.2 g/dL    Hematocrit 40.9 (L) 41.1 - 50.3 %   Basic Metabolic Panel   Result Value Ref Range    Sodium 135 133 - 143 mmol/L    Potassium 3.8 3.5 - 5.1 mmol/L    Chloride 101 101 - 110 mmol/L    CO2 27 21 - 32 mmol/L    Anion Gap 7 2 - 11 mmol/L    Glucose 197 (H) 65 - 100 mg/dL    BUN 32 (H) 8 - 23 MG/DL    Creatinine 1.30 0.8 - 1.5 MG/DL    Est, Glom Filt Rate 56 (L) >60 ml/min/1.73m2    Calcium 8.7 8.3 - 10.4 MG/DL   Magnesium   Result Value Ref Range    Magnesium 2.2 1.8 - 2.4 mg/dL   Hemoglobin and Hematocrit   Result Value Ref Range    Hemoglobin 12.9 (L) 13.6 - 17.2 g/dL    Hematocrit 38.7 (L) 41.1 - 50.3 %   POCT Urinalysis no Micro   Result Value Ref Range    Specific Gravity, Urine, POC 1.015 1.001 - 1.023      pH, Urine, POC 6.0 5.0 - 9.0 Protein, Urine, POC Negative NEG mg/dL    Glucose, UA POC >1,000 (A) NEG mg/dL    Ketones, Urine, POC Negative NEG mg/dL    Bilirubin, Urine, POC Negative NEG      Blood, UA POC Negative NEG      URINE UROBILINOGEN POC 0.2 0.2 - 1.0 EU/dL    Nitrate, Urine, POC Negative NEG      Leukocyte Est, UA POC Negative NEG      Performed by: Nubeeel    POCT Glucose   Result Value Ref Range    POC Glucose 200 (H) 65 - 100 mg/dL    Performed by: Chayito Juarez         CT ABDOMEN PELVIS W IV CONTRAST Additional Contrast? Oral   Final Result   1. Mild peripancreatic stranding about the head/neck without fluid collection   or ductal dilatation, otherwise pancreatic parenchyma is homogeneous and   attenuation. 2.  Right lower pole 1.9 cm intermediate attenuating hypodensity likely favoring   a likely minimally complex cyst.      3.  Moderate to large colonic stool burden which should be correlated with   clinical constipation. 4.  Emphysematous changes and subsegmental scarring of the lung bases. Voice dictation software was used during the making of this note. This software is not perfect and grammatical and other typographical errors may be present. This note has not been completely proofread for errors.      Jeyson Hall MD  10/28/22 [id number]

## 2022-10-29 LAB
ANION GAP SERPL CALC-SCNC: 4 MMOL/L (ref 2–11)
BASOPHILS # BLD: 0 K/UL (ref 0–0.2)
BASOPHILS NFR BLD: 0 % (ref 0–2)
BUN SERPL-MCNC: 14 MG/DL (ref 8–23)
CALCIUM SERPL-MCNC: 8.4 MG/DL (ref 8.3–10.4)
CHLORIDE SERPL-SCNC: 103 MMOL/L (ref 101–110)
CO2 SERPL-SCNC: 28 MMOL/L (ref 21–32)
CREAT SERPL-MCNC: 0.8 MG/DL (ref 0.8–1.5)
DIFFERENTIAL METHOD BLD: ABNORMAL
EOSINOPHIL # BLD: 0 K/UL (ref 0–0.8)
EOSINOPHIL NFR BLD: 0 % (ref 0.5–7.8)
ERYTHROCYTE [DISTWIDTH] IN BLOOD BY AUTOMATED COUNT: 13.5 % (ref 11.9–14.6)
GLUCOSE BLD STRIP.AUTO-MCNC: 104 MG/DL (ref 65–100)
GLUCOSE BLD STRIP.AUTO-MCNC: 115 MG/DL (ref 65–100)
GLUCOSE BLD STRIP.AUTO-MCNC: 120 MG/DL (ref 65–100)
GLUCOSE BLD STRIP.AUTO-MCNC: 75 MG/DL (ref 65–100)
GLUCOSE SERPL-MCNC: 89 MG/DL (ref 65–100)
HCT VFR BLD AUTO: 37 % (ref 41.1–50.3)
HGB BLD-MCNC: 12.6 G/DL (ref 13.6–17.2)
IMM GRANULOCYTES # BLD AUTO: 0 K/UL (ref 0–0.5)
IMM GRANULOCYTES NFR BLD AUTO: 0 % (ref 0–5)
LYMPHOCYTES # BLD: 1.5 K/UL (ref 0.5–4.6)
LYMPHOCYTES NFR BLD: 31 % (ref 13–44)
MCH RBC QN AUTO: 27.9 PG (ref 26.1–32.9)
MCHC RBC AUTO-ENTMCNC: 34.1 G/DL (ref 31.4–35)
MCV RBC AUTO: 82 FL (ref 82–102)
MONOCYTES # BLD: 0.4 K/UL (ref 0.1–1.3)
MONOCYTES NFR BLD: 9 % (ref 4–12)
NEUTS SEG # BLD: 3 K/UL (ref 1.7–8.2)
NEUTS SEG NFR BLD: 60 % (ref 43–78)
NRBC # BLD: 0 K/UL (ref 0–0.2)
PLATELET # BLD AUTO: 286 K/UL (ref 150–450)
PMV BLD AUTO: 8.7 FL (ref 9.4–12.3)
POTASSIUM SERPL-SCNC: 3.7 MMOL/L (ref 3.5–5.1)
RBC # BLD AUTO: 4.51 M/UL (ref 4.23–5.6)
SERVICE CMNT-IMP: ABNORMAL
SERVICE CMNT-IMP: NORMAL
SODIUM SERPL-SCNC: 135 MMOL/L (ref 133–143)
WBC # BLD AUTO: 5 K/UL (ref 4.3–11.1)

## 2022-10-29 PROCEDURE — 82962 GLUCOSE BLOOD TEST: CPT

## 2022-10-29 PROCEDURE — 85025 COMPLETE CBC W/AUTO DIFF WBC: CPT

## 2022-10-29 PROCEDURE — 6370000000 HC RX 637 (ALT 250 FOR IP): Performed by: INTERNAL MEDICINE

## 2022-10-29 PROCEDURE — 6370000000 HC RX 637 (ALT 250 FOR IP): Performed by: NURSE PRACTITIONER

## 2022-10-29 PROCEDURE — 1100000003 HC PRIVATE W/ TELEMETRY

## 2022-10-29 PROCEDURE — 6360000002 HC RX W HCPCS: Performed by: INTERNAL MEDICINE

## 2022-10-29 PROCEDURE — 2580000003 HC RX 258: Performed by: NURSE PRACTITIONER

## 2022-10-29 PROCEDURE — 80048 BASIC METABOLIC PNL TOTAL CA: CPT

## 2022-10-29 PROCEDURE — 36415 COLL VENOUS BLD VENIPUNCTURE: CPT

## 2022-10-29 PROCEDURE — 2580000003 HC RX 258: Performed by: INTERNAL MEDICINE

## 2022-10-29 RX ORDER — BUPROPION HYDROCHLORIDE 150 MG/1
150 TABLET, EXTENDED RELEASE ORAL DAILY
Status: DISCONTINUED | OUTPATIENT
Start: 2022-10-29 | End: 2022-11-02 | Stop reason: HOSPADM

## 2022-10-29 RX ORDER — LISINOPRIL 5 MG/1
5 TABLET ORAL DAILY
Status: DISCONTINUED | OUTPATIENT
Start: 2022-10-30 | End: 2022-11-02 | Stop reason: HOSPADM

## 2022-10-29 RX ORDER — SODIUM CHLORIDE 9 MG/ML
INJECTION, SOLUTION INTRAVENOUS CONTINUOUS
Status: DISCONTINUED | OUTPATIENT
Start: 2022-10-29 | End: 2022-10-31

## 2022-10-29 RX ORDER — FERROUS SULFATE 325(65) MG
325 TABLET ORAL 2 TIMES DAILY
Status: DISCONTINUED | OUTPATIENT
Start: 2022-10-29 | End: 2022-11-02 | Stop reason: HOSPADM

## 2022-10-29 RX ORDER — DRONABINOL 2.5 MG/1
10 CAPSULE ORAL NIGHTLY
Status: DISCONTINUED | OUTPATIENT
Start: 2022-10-29 | End: 2022-11-02 | Stop reason: HOSPADM

## 2022-10-29 RX ORDER — SENNA AND DOCUSATE SODIUM 50; 8.6 MG/1; MG/1
2 TABLET, FILM COATED ORAL DAILY
Status: DISCONTINUED | OUTPATIENT
Start: 2022-10-29 | End: 2022-11-02 | Stop reason: HOSPADM

## 2022-10-29 RX ADMIN — FERROUS SULFATE TAB 325 MG (65 MG ELEMENTAL FE) 325 MG: 325 (65 FE) TAB at 21:07

## 2022-10-29 RX ADMIN — DRONABINOL 10 MG: 2.5 CAPSULE ORAL at 21:07

## 2022-10-29 RX ADMIN — PANTOPRAZOLE SODIUM 40 MG: 40 TABLET, DELAYED RELEASE ORAL at 08:07

## 2022-10-29 RX ADMIN — DONEPEZIL HYDROCHLORIDE 5 MG: 5 TABLET, FILM COATED ORAL at 21:07

## 2022-10-29 RX ADMIN — ENOXAPARIN SODIUM 40 MG: 100 INJECTION SUBCUTANEOUS at 21:06

## 2022-10-29 RX ADMIN — SODIUM CHLORIDE, PRESERVATIVE FREE 10 ML: 5 INJECTION INTRAVENOUS at 08:09

## 2022-10-29 RX ADMIN — INSULIN GLARGINE 5 UNITS: 100 INJECTION, SOLUTION SUBCUTANEOUS at 21:16

## 2022-10-29 RX ADMIN — SODIUM CHLORIDE: 9 INJECTION, SOLUTION INTRAVENOUS at 16:55

## 2022-10-29 RX ADMIN — TAMSULOSIN HYDROCHLORIDE 0.4 MG: 0.4 CAPSULE ORAL at 08:08

## 2022-10-29 RX ADMIN — BISACODYL 5 MG: 5 TABLET, COATED ORAL at 08:07

## 2022-10-29 RX ADMIN — POLYETHYLENE GLYCOL 3350 17 G: 17 POWDER, FOR SOLUTION ORAL at 08:08

## 2022-10-29 RX ADMIN — TRAMADOL HYDROCHLORIDE 50 MG: 50 TABLET, COATED ORAL at 08:07

## 2022-10-29 RX ADMIN — METOPROLOL TARTRATE 12.5 MG: 25 TABLET, FILM COATED ORAL at 08:07

## 2022-10-29 RX ADMIN — METOPROLOL TARTRATE 12.5 MG: 25 TABLET, FILM COATED ORAL at 21:07

## 2022-10-29 RX ADMIN — FERROUS SULFATE TAB 325 MG (65 MG ELEMENTAL FE) 325 MG: 325 (65 FE) TAB at 11:53

## 2022-10-29 RX ADMIN — BUPROPION HYDROCHLORIDE 150 MG: 150 TABLET, EXTENDED RELEASE ORAL at 11:53

## 2022-10-29 ASSESSMENT — PAIN DESCRIPTION - LOCATION: LOCATION: ABDOMEN;ANKLE

## 2022-10-29 ASSESSMENT — PAIN SCALES - GENERAL
PAINLEVEL_OUTOF10: 0
PAINLEVEL_OUTOF10: 7
PAINLEVEL_OUTOF10: 0

## 2022-10-29 ASSESSMENT — PAIN DESCRIPTION - ONSET: ONSET: ON-GOING

## 2022-10-29 ASSESSMENT — PAIN DESCRIPTION - ORIENTATION: ORIENTATION: LEFT;RIGHT

## 2022-10-29 NOTE — PROGRESS NOTES
Hospitalist Progress Note   Admit Date:  10/28/2022  9:00 AM   Name:  Jordan Harrison   Age:  [de-identified] y.o. Sex:  male  :  1940   MRN:  [id number]   Room:  [facility]– [place]    Presenting Complaint: Abdominal Pain     Reason(s) for Admission: Dehydration [E86.0]  Hyperglycemia [R73.9]  Acute kidney injury ([address] [N17.9]  Pancreatitis, unspecified pancreatitis type [K85.90]  Idiopathic acute pancreatitis without infection or necrosis [K85.00]     Hospital Course:   Jordan Harrison is an [de-identified] y.o. AAM with a PMH of diabetes mellitus, hypertension, lung cancer, mild dementia with anxiety who presented with pain x4 days. Has some nausea without any vomiting since last night. He reports that pain started suddenly without any triggers. Located in epigastric region with radiation to the right side and sometimes to the back. Reports chills without any fevers. No chest pain or shortness of breath. Reporting having constipation due to not being able to walk outside as he normally would. In the ER, patient is hemodynamically stable. Laboratory work-up is notable for glucose of 197, hemoglobin of 12.9, lipase of 1942. CT A/P with mild peripancreatic stranding about the head and neck without fluid collection or ductal dilatation. Also noted to have moderate to large colonic stool burden. Per ER note, patient was noted to have intermittent afib. EKG @9:48am showed sinus tachy vs aflutter with repeat at @9:53am showing NSR. This has resolved. The patient was admitted to the Hospitalist service. Subjective & 24hr Events (10/29/22): No AEO. The patient has no acute complaints this morning. Starting IVF. He has been tolerating some clear liquids. Morning labs grossly unremarkable. Assessment & Plan:     Pancreatitis  Lipase with 194.  CT noted  - Start IVF today  - Tolerated clears this morning  - pain control with tramadol and morphine     Sinus tach vs atrial flutter  Per ER note, EKG @9:48am showed sinus tachy vs aflutter with repeat at @9:53am showing NSR. - tele monitoring  - continue metoprolol   - resolved     Constipation  - start on stool softener  - PRN MiraLAX     T2DM  - hold PO meds  - takes Lantus but does not take it daily. Educated patient  - Diabetic educator consulted  - will start with lantus 5U qHS and monitor  - correction scale lispro     Dementia  - Aricept     Depression  - Wellbutrin       Anticipated discharge needs: Home in 1-2 days      Diet:  ADULT DIET; Clear Liquid; 4 carb choices (60 gm/meal)  DVT PPx: enoxaparin  Code status: Full Code    Hospital Problems:  Principal Problem:    Pancreatitis, unspecified pancreatitis type  Active Problems:    Constipation    HTN (hypertension)    Malignant neoplasm of upper lobe of left lung (HCC)    Hyperlipidemia    Type 2 diabetes mellitus with hyperglycemia, with long-term current use of insulin (HCC)    Anxiety disorder  Resolved Problems:    * No resolved hospital problems. *      Objective:   Patient Vitals for the past 24 hrs:   Temp Pulse Resp BP SpO2   10/29/22 0745 97.5 °F (36.4 °C) 78 18 124/61 100 %   10/29/22 0353 97.5 °F (36.4 °C) 75 16 (!) 132/56 99 %   10/28/22 2322 97.7 °F (36.5 °C) 75 18 (!) 123/48 97 %   10/28/22 1945 97.7 °F (36.5 °C) 71 16 (!) 131/53 98 %   10/28/22 1800 -- 85 -- -- --   10/28/22 1759 97.6 °F (36.4 °C) 76 16 (!) 128/59 96 %   10/28/22 1611 -- 74 15 127/70 --   10/28/22 1556 -- 71 22 (!) 132/53 --   10/28/22 1541 -- 72 17 (!) 123/57 --   10/28/22 1526 -- 73 25 (!) 132/51 --   10/28/22 1511 -- 71 (!) 31 (!) 131/51 --   10/28/22 1456 -- 71 19 (!) 127/55 --   10/28/22 1441 -- 80 19 124/69 100 %   10/28/22 1245 -- 76 16 96/78 --   10/28/22 1130 -- 78 23 122/63 --       Oxygen Therapy  SpO2: 100 %  O2 Device: None (Room air)    Estimated body mass index is 17.81 kg/m² as calculated from the following:    Height as of this encounter: 6' 1\" (1.854 m).     Weight as of this encounter: 135 lb (61.2 kg).    Intake/Output Summary (Last 24 hours) at 10/29/2022 1128  Last data filed at 10/29/2022 0811  Gross per 24 hour   Intake 4200 ml   Output 1400 ml   Net 2800 ml         Physical Exam:   Blood pressure 124/61, pulse 78, temperature 97.5 °F (36.4 °C), temperature source Oral, resp. rate 18, height 6' 1\" (1.854 m), weight 135 lb (61.2 kg), SpO2 100 %. General:    NAD    Head:  Normocephalic, atraumatic  Eyes:  Sclerae appear normal.  Pupils equally round. ENT:  Nares appear normal, no drainage. Moist oral mucosa  Neck:  No restricted ROM. Trachea midline   CV:   RRR. No m/r/g. Lungs: No wheezing. No tachypnea. Abdomen:   Soft, nondistended. Extremities: No cyanosis or clubbing. Skin:     No rashes and normal coloration. Warm and dry. Neuro:  CN II-XII grossly intact. A&Ox3  Psych:  Normal mood and affect.       I have personally reviewed labs and tests showing:  Recent Labs:  Recent Results (from the past 48 hour(s))   CMP    Collection Time: 10/28/22  2:48 AM   Result Value Ref Range    Sodium 128 (L) 133 - 143 mmol/L    Potassium 5.2 (H) 3.5 - 5.1 mmol/L    Chloride 97 (L) 101 - 110 mmol/L    CO2 28 21 - 32 mmol/L    Anion Gap 3 2 - 11 mmol/L    Glucose 285 (H) 65 - 100 mg/dL    BUN 37 (H) 8 - 23 MG/DL    Creatinine 1.80 (H) 0.8 - 1.5 MG/DL    Est, Glom Filt Rate 38 (L) >60 ml/min/1.73m2    Calcium 9.1 8.3 - 10.4 MG/DL    Total Bilirubin 0.5 0.2 - 1.1 MG/DL    ALT 22 12 - 65 U/L    AST 47 (H) 15 - 37 U/L    Alk Phosphatase 217 (H) 50 - 136 U/L    Total Protein 7.6 6.3 - 8.2 g/dL    Albumin 3.1 (L) 3.2 - 4.6 g/dL    Globulin 4.5 2.8 - 4.5 g/dL    Albumin/Globulin Ratio 0.7 0.4 - 1.6     Lipase    Collection Time: 10/28/22  2:48 AM   Result Value Ref Range    Lipase 1,942 (H) 73 - 393 U/L   Hemoglobin and Hematocrit    Collection Time: 10/28/22  2:48 AM   Result Value Ref Range    Hemoglobin 13.9 13.6 - 17.2 g/dL    Hematocrit 40.9 (L) 41.1 - 50.3 %   CBC with Auto Differential    Collection Time: 10/28/22  4:17 AM   Result Value Ref Range    WBC 7.0 4.3 - 11.1 K/uL    RBC 4.96 4.23 - 5.6 M/uL    Hemoglobin 13.9 13.6 - 17.2 g/dL    Hematocrit 40.3 (L) 41.1 - 50.3 %    MCV 81.3 (L) 82 - 102 FL    MCH 28.0 26.1 - 32.9 PG    MCHC 34.5 31.4 - 35.0 g/dL    RDW 13.5 11.9 - 14.6 %    Platelets 315 331 - 856 K/uL    MPV 8.5 (L) 9.4 - 12.3 FL    nRBC 0.00 0.0 - 0.2 K/uL    Differential Type AUTOMATED      Seg Neutrophils 61 43 - 78 %    Lymphocytes 27 13 - 44 %    Monocytes 8 4.0 - 12.0 %    Eosinophils % 3 0.5 - 7.8 %    Basophils 0 0.0 - 2.0 %    Immature Granulocytes 1 0.0 - 5.0 %    Segs Absolute 4.3 1.7 - 8.2 K/UL    Absolute Lymph # 1.9 0.5 - 4.6 K/UL    Absolute Mono # 0.5 0.1 - 1.3 K/UL    Absolute Eos # 0.2 0.0 - 0.8 K/UL    Basophils Absolute 0.0 0.0 - 0.2 K/UL    Absolute Immature Granulocyte 0.0 0.0 - 0.5 K/UL   Lactate, Sepsis    Collection Time: 10/28/22  4:17 AM   Result Value Ref Range    Lactic Acid, Sepsis 1.9 0.4 - 2.0 MMOL/L   POCT Urinalysis no Micro    Collection Time: 10/28/22  4:27 AM   Result Value Ref Range    Specific Gravity, Urine, POC 1.015 1.001 - 1.023      pH, Urine, POC 6.0 5.0 - 9.0      Protein, Urine, POC Negative NEG mg/dL    Glucose, UA POC >1,000 (A) NEG mg/dL    Ketones, Urine, POC Negative NEG mg/dL    Bilirubin, Urine, POC Negative NEG      Blood, UA POC Negative NEG      URINE UROBILINOGEN POC 0.2 0.2 - 1.0 EU/dL    Nitrate, Urine, POC Negative NEG      Leukocyte Est, UA POC Negative NEG      Performed by: Indira Hawthorne    POCT Glucose    Collection Time: 10/28/22  9:07 AM   Result Value Ref Range    POC Glucose 200 (H) 65 - 100 mg/dL    Performed by: [facility] [place]    Basic Metabolic Panel    Collection Time: 10/28/22 10:15 AM   Result Value Ref Range    Sodium 135 133 - 143 mmol/L    Potassium 3.8 3.5 - 5.1 mmol/L    Chloride 101 101 - 110 mmol/L    CO2 27 21 - 32 mmol/L    Anion Gap 7 2 - 11 mmol/L    Glucose 197 (H) 65 - 100 mg/dL    BUN 32 (H) 8 - 23 MG/DL Creatinine 1.30 0.8 - 1.5 MG/DL    Est, Glom Filt Rate 56 (L) >60 ml/min/1.73m2    Calcium 8.7 8.3 - 10.4 MG/DL   Magnesium    Collection Time: 10/28/22 10:15 AM   Result Value Ref Range    Magnesium 2.2 1.8 - 2.4 mg/dL   Hemoglobin and Hematocrit    Collection Time: 10/28/22 10:15 AM   Result Value Ref Range    Hemoglobin 12.9 (L) 13.6 - 17.2 g/dL    Hematocrit 38.7 (L) 41.1 - 50.3 %   POCT Glucose    Collection Time: 10/28/22  5:40 PM   Result Value Ref Range    POC Glucose 87 65 - 100 mg/dL    Performed by: Cade    POCT Glucose    Collection Time: 10/28/22  9:01 PM   Result Value Ref Range    POC Glucose 183 (H) 65 - 100 mg/dL    Performed by: Ginna    Basic Metabolic Panel w/ Reflex to MG    Collection Time: 10/29/22  6:04 AM   Result Value Ref Range    Sodium 135 133 - 143 mmol/L    Potassium 3.7 3.5 - 5.1 mmol/L    Chloride 103 101 - 110 mmol/L    CO2 28 21 - 32 mmol/L    Anion Gap 4 2 - 11 mmol/L    Glucose 89 65 - 100 mg/dL    BUN 14 8 - 23 MG/DL    Creatinine 0.80 0.8 - 1.5 MG/DL    Est, Glom Filt Rate >60 >60 ml/min/1.73m2    Calcium 8.4 8.3 - 10.4 MG/DL   CBC with Auto Differential    Collection Time: 10/29/22  6:04 AM   Result Value Ref Range    WBC 5.0 4.3 - 11.1 K/uL    RBC 4.51 4.23 - 5.6 M/uL    Hemoglobin 12.6 (L) 13.6 - 17.2 g/dL    Hematocrit 37.0 (L) 41.1 - 50.3 %    MCV 82.0 82 - 102 FL    MCH 27.9 26.1 - 32.9 PG    MCHC 34.1 31.4 - 35.0 g/dL    RDW 13.5 11.9 - 14.6 %    Platelets 446 243 - 066 K/uL    MPV 8.7 (L) 9.4 - 12.3 FL    nRBC 0.00 0.0 - 0.2 K/uL    Differential Type AUTOMATED      Seg Neutrophils 60 43 - 78 %    Lymphocytes 31 13 - 44 %    Monocytes 9 4.0 - 12.0 %    Eosinophils % 0 (L) 0.5 - 7.8 %    Basophils 0 0.0 - 2.0 %    Immature Granulocytes 0 0.0 - 5.0 %    Segs Absolute 3.0 1.7 - 8.2 K/UL    Absolute Lymph # 1.5 0.5 - 4.6 K/UL    Absolute Mono # 0.4 0.1 - 1.3 K/UL    Absolute Eos # 0.0 0.0 - 0.8 K/UL    Basophils Absolute 0.0 0.0 - 0.2 K/UL    Absolute Immature Granulocyte 0.0 0.0 - 0.5 K/UL   POCT Glucose    Collection Time: 10/29/22  7:36 AM   Result Value Ref Range    POC Glucose 75 65 - 100 mg/dL    Performed by: Taylor Echavarria        I have personally reviewed imaging studies showing: Other Studies:  CT ABDOMEN PELVIS W IV CONTRAST Additional Contrast? Oral   Final Result   1. Mild peripancreatic stranding about the head/neck without fluid collection   or ductal dilatation, otherwise pancreatic parenchyma is homogeneous and   attenuation. 2.  Right lower pole 1.9 cm intermediate attenuating hypodensity likely favoring   a likely minimally complex cyst.      3.  Moderate to large colonic stool burden which should be correlated with   clinical constipation. 4.  Emphysematous changes and subsegmental scarring of the lung bases.                 Current Meds:  Current Facility-Administered Medications   Medication Dose Route Frequency    0.9 % sodium chloride infusion   IntraVENous Continuous    diatrizoate meglumine-sodium (GASTROGRAFIN) 66-10 % solution 15 mL  15 mL Oral ONCE PRN    sodium chloride flush 0.9 % injection 5-40 mL  5-40 mL IntraVENous 2 times per day    sodium chloride flush 0.9 % injection 5-40 mL  5-40 mL IntraVENous PRN    0.9 % sodium chloride infusion   IntraVENous PRN    ondansetron (ZOFRAN-ODT) disintegrating tablet 4 mg  4 mg Oral Q8H PRN    Or    ondansetron (ZOFRAN) injection 4 mg  4 mg IntraVENous Q6H PRN    polyethylene glycol (GLYCOLAX) packet 17 g  17 g Oral Daily PRN    bisacodyl (DULCOLAX) suppository 10 mg  10 mg Rectal Daily PRN    famotidine (PEPCID) tablet 10 mg  10 mg Oral Daily PRN    aluminum & magnesium hydroxide-simethicone (MAALOX) 200-200-20 MG/5ML suspension 30 mL  30 mL Oral Q6H PRN    acetaminophen (TYLENOL) tablet 650 mg  650 mg Oral Q6H PRN    Or    acetaminophen (TYLENOL) suppository 650 mg  650 mg Rectal Q6H PRN    enoxaparin (LOVENOX) injection 40 mg  40 mg SubCUTAneous Q24H    insulin lispro (HUMALOG) injection vial 0-10 Units  0-10 Units SubCUTAneous 4x Daily AC & HS    morphine injection 2 mg  2 mg IntraVENous Q6H PRN    traMADol (ULTRAM) tablet 50 mg  50 mg Oral Q6H PRN    insulin glargine (LANTUS) injection vial 5 Units  5 Units SubCUTAneous Nightly    donepezil (ARICEPT) tablet 5 mg  5 mg Oral Nightly    tamsulosin (FLOMAX) capsule 0.4 mg  0.4 mg Oral Daily    pantoprazole (PROTONIX) tablet 40 mg  40 mg Oral Daily    metoprolol tartrate (LOPRESSOR) tablet 12.5 mg  12.5 mg Oral BID    LORazepam (ATIVAN) tablet 0.5 mg  0.5 mg Oral Q12H PRN    bisacodyl (DULCOLAX) EC tablet 5 mg  5 mg Oral Daily    polyethylene glycol (GLYCOLAX) packet 17 g  17 g Oral Daily     Facility-Administered Medications Ordered in Other Encounters   Medication Dose Route Frequency    diatrizoate meglumine-sodium (GASTROGRAFIN) 66-10 % solution 15 mL  15 mL Oral ONCE PRN       Signed:  Jaun Birmingham APRN - CNP    Part of this note may have been written by using a voice dictation software. The note has been proof read but may still contain some grammatical/other typographical errors.

## 2022-10-30 LAB
GLUCOSE BLD STRIP.AUTO-MCNC: 157 MG/DL (ref 65–100)
GLUCOSE BLD STRIP.AUTO-MCNC: 206 MG/DL (ref 65–100)
GLUCOSE BLD STRIP.AUTO-MCNC: 93 MG/DL (ref 65–100)
GLUCOSE BLD STRIP.AUTO-MCNC: 96 MG/DL (ref 65–100)
LIPASE SERPL-CCNC: 153 U/L (ref 73–393)
SERVICE CMNT-IMP: ABNORMAL
SERVICE CMNT-IMP: ABNORMAL
SERVICE CMNT-IMP: NORMAL
SERVICE CMNT-IMP: NORMAL

## 2022-10-30 PROCEDURE — 6370000000 HC RX 637 (ALT 250 FOR IP): Performed by: NURSE PRACTITIONER

## 2022-10-30 PROCEDURE — 1100000003 HC PRIVATE W/ TELEMETRY

## 2022-10-30 PROCEDURE — 6370000000 HC RX 637 (ALT 250 FOR IP): Performed by: INTERNAL MEDICINE

## 2022-10-30 PROCEDURE — 2500000003 HC RX 250 WO HCPCS: Performed by: INTERNAL MEDICINE

## 2022-10-30 PROCEDURE — 2580000003 HC RX 258: Performed by: NURSE PRACTITIONER

## 2022-10-30 PROCEDURE — 6360000002 HC RX W HCPCS: Performed by: INTERNAL MEDICINE

## 2022-10-30 PROCEDURE — 82962 GLUCOSE BLOOD TEST: CPT

## 2022-10-30 PROCEDURE — 36415 COLL VENOUS BLD VENIPUNCTURE: CPT

## 2022-10-30 PROCEDURE — 83690 ASSAY OF LIPASE: CPT

## 2022-10-30 RX ADMIN — INSULIN LISPRO 4 UNITS: 100 INJECTION, SOLUTION INTRAVENOUS; SUBCUTANEOUS at 17:06

## 2022-10-30 RX ADMIN — PANTOPRAZOLE SODIUM 40 MG: 40 TABLET, DELAYED RELEASE ORAL at 08:57

## 2022-10-30 RX ADMIN — TRAMADOL HYDROCHLORIDE 50 MG: 50 TABLET, COATED ORAL at 15:25

## 2022-10-30 RX ADMIN — METOPROLOL TARTRATE 12.5 MG: 25 TABLET, FILM COATED ORAL at 08:56

## 2022-10-30 RX ADMIN — SENNOSIDES AND DOCUSATE SODIUM 2 TABLET: 8.6; 5 TABLET ORAL at 08:57

## 2022-10-30 RX ADMIN — DONEPEZIL HYDROCHLORIDE 5 MG: 5 TABLET, FILM COATED ORAL at 20:23

## 2022-10-30 RX ADMIN — INSULIN GLARGINE 5 UNITS: 100 INJECTION, SOLUTION SUBCUTANEOUS at 21:19

## 2022-10-30 RX ADMIN — POLYETHYLENE GLYCOL 3350 17 G: 17 POWDER, FOR SOLUTION ORAL at 08:57

## 2022-10-30 RX ADMIN — TAMSULOSIN HYDROCHLORIDE 0.4 MG: 0.4 CAPSULE ORAL at 08:56

## 2022-10-30 RX ADMIN — SODIUM CHLORIDE: 9 INJECTION, SOLUTION INTRAVENOUS at 02:44

## 2022-10-30 RX ADMIN — INSULIN LISPRO 2 UNITS: 100 INJECTION, SOLUTION INTRAVENOUS; SUBCUTANEOUS at 21:19

## 2022-10-30 RX ADMIN — LISINOPRIL 5 MG: 5 TABLET ORAL at 08:57

## 2022-10-30 RX ADMIN — FERROUS SULFATE TAB 325 MG (65 MG ELEMENTAL FE) 325 MG: 325 (65 FE) TAB at 21:19

## 2022-10-30 RX ADMIN — ENOXAPARIN SODIUM 40 MG: 100 INJECTION SUBCUTANEOUS at 20:23

## 2022-10-30 RX ADMIN — TUBERCULIN PURIFIED PROTEIN DERIVATIVE 5 UNITS: 5 INJECTION, SOLUTION INTRADERMAL at 08:57

## 2022-10-30 RX ADMIN — DRONABINOL 10 MG: 2.5 CAPSULE ORAL at 20:23

## 2022-10-30 RX ADMIN — BUPROPION HYDROCHLORIDE 150 MG: 150 TABLET, EXTENDED RELEASE ORAL at 08:57

## 2022-10-30 RX ADMIN — FERROUS SULFATE TAB 325 MG (65 MG ELEMENTAL FE) 325 MG: 325 (65 FE) TAB at 08:57

## 2022-10-30 ASSESSMENT — PAIN - FUNCTIONAL ASSESSMENT: PAIN_FUNCTIONAL_ASSESSMENT: ACTIVITIES ARE NOT PREVENTED

## 2022-10-30 ASSESSMENT — PAIN DESCRIPTION - ONSET: ONSET: GRADUAL

## 2022-10-30 ASSESSMENT — PAIN SCALES - GENERAL
PAINLEVEL_OUTOF10: 0
PAINLEVEL_OUTOF10: 0
PAINLEVEL_OUTOF10: 3
PAINLEVEL_OUTOF10: 0
PAINLEVEL_OUTOF10: 6

## 2022-10-30 ASSESSMENT — PAIN DESCRIPTION - DESCRIPTORS: DESCRIPTORS: ACHING;SHARP

## 2022-10-30 ASSESSMENT — PAIN DESCRIPTION - LOCATION: LOCATION: TEETH;SHOULDER

## 2022-10-30 ASSESSMENT — PAIN DESCRIPTION - FREQUENCY: FREQUENCY: INTERMITTENT

## 2022-10-30 ASSESSMENT — PAIN DESCRIPTION - ORIENTATION: ORIENTATION: RIGHT

## 2022-10-30 ASSESSMENT — PAIN DESCRIPTION - PAIN TYPE: TYPE: ACUTE PAIN

## 2022-10-30 NOTE — PROGRESS NOTES
Hospitalist Progress Note   Admit Date:  10/28/2022  9:00 AM   Name:  Benito Murray   Age:  [de-identified] y.o. Sex:  male  :  1940   MRN:  [id number]   Room:  [facility]/    Presenting Complaint: Abdominal Pain     Reason(s) for Admission: Dehydration [E86.0]  Hyperglycemia [R73.9]  Acute kidney injury ([address] [N17.9]  Pancreatitis, unspecified pancreatitis type [K85.90]  Idiopathic acute pancreatitis without infection or necrosis [K85.00]     Hospital Course:   Benito Murray is an [de-identified] y.o. AAM with a PMH of diabetes mellitus, hypertension, lung cancer, mild dementia with anxiety who presented with pain x4 days. Has some nausea without any vomiting since last night. He reports that pain started suddenly without any triggers. Located in epigastric region with radiation to the right side and sometimes to the back. Reports chills without any fevers. No chest pain or shortness of breath. Reporting having constipation due to not being able to walk outside as he normally would. In the ER, patient is hemodynamically stable. Laboratory work-up is notable for glucose of 197, hemoglobin of 12.9, lipase of 1942. CT A/P with mild peripancreatic stranding about the head and neck without fluid collection or ductal dilatation. Also noted to have moderate to large colonic stool burden. Per ER note, patient was noted to have intermittent afib. EKG @9:48am showed sinus tachy vs aflutter with repeat at @9:53am showing NSR. This has resolved. The patient was admitted to the Hospitalist service. Subjective & 24hr Events (10/30/22): Patient still complaining of mild epigastric discomfort. No acute events overnight    Assessment & Plan:     Pancreatitis  Lipase with 1942.  CT noted  - Start IVF today  - Tolerated clears this morning  - pain control with tramadol and morphine    10/30 - Lipase 153, will advance diet to full liquid  Sinus tach vs atrial flutter  Per ER note, EKG @9:48am showed sinus tachy vs aflutter with repeat at @9:53am showing NSR. - tele monitoring  - continue metoprolol   - resolved     Constipation  - on stool softener  - PRN MiraLAX     T2DM  - hold PO meds  - takes Lantus but does not take it daily. Educated patient  - Diabetic educator consulted  - on lantus 5U qHS and monitor  - correction scale lispro     Dementia  - Aricept     Depression  - Wellbutrin       Anticipated discharge needs: Home in 1-2 days      Diet:  ADULT DIET; Full Liquid; 4 carb choices (60 gm/meal)  DVT PPx: enoxaparin  Code status: Full Code    Hospital Problems:  Principal Problem:    Pancreatitis, unspecified pancreatitis type  Active Problems:    Constipation    HTN (hypertension)    Malignant neoplasm of upper lobe of left lung (HCC)    Hyperlipidemia    Type 2 diabetes mellitus with hyperglycemia, with long-term current use of insulin (HCC)    Anxiety disorder  Resolved Problems:    * No resolved hospital problems. *      Objective:   Patient Vitals for the past 24 hrs:   Temp Pulse Resp BP SpO2   10/30/22 1111 98.3 °F (36.8 °C) 81 20 (!) 121/58 97 %   10/30/22 0723 98.6 °F (37 °C) 86 20 (!) 130/59 97 %   10/30/22 0241 98 °F (36.7 °C) 78 -- 117/78 97 %   10/29/22 2340 98.2 °F (36.8 °C) 74 18 110/60 94 %   10/29/22 1920 97.9 °F (36.6 °C) 79 18 (!) 107/52 94 %   10/29/22 1453 98 °F (36.7 °C) 80 18 (!) 118/50 97 %       Oxygen Therapy  SpO2: 97 %  O2 Device: None (Room air)    Estimated body mass index is 17.81 kg/m² as calculated from the following:    Height as of this encounter: 6' 1\" (1.854 m). Weight as of this encounter: 135 lb (61.2 kg). Intake/Output Summary (Last 24 hours) at 10/30/2022 1414  Last data filed at 10/30/2022 1111  Gross per 24 hour   Intake 1885 ml   Output 2450 ml   Net -565 ml         Physical Exam:   Blood pressure (!) 121/58, pulse 81, temperature 98.3 °F (36.8 °C), temperature source Oral, resp. rate 20, height 6' 1\" (1.854 m), weight 135 lb (61.2 kg), SpO2 97 %.   General:    NAD Head:  Normocephalic, atraumatic  Eyes:  Sclerae appear normal.  Pupils equally round. ENT:  Nares appear normal, no drainage. Moist oral mucosa  Neck:  No restricted ROM. Trachea midline   CV:   RRR. No m/r/g. Lungs: No wheezing. No tachypnea. Abdomen:   Soft, nondistended. Extremities: No cyanosis or clubbing. Skin:     No rashes and normal coloration. Warm and dry. Neuro:  CN II-XII grossly intact. A&Ox3  Psych:  Normal mood and affect.       I have personally reviewed labs and tests showing:  Recent Labs:  Recent Results (from the past 48 hour(s))   POCT Glucose    Collection Time: 10/28/22  5:40 PM   Result Value Ref Range    POC Glucose 87 65 - 100 mg/dL    Performed by: Cade    POCT Glucose    Collection Time: 10/28/22  9:01 PM   Result Value Ref Range    POC Glucose 183 (H) 65 - 100 mg/dL    Performed by: Ginna    Basic Metabolic Panel w/ Reflex to MG    Collection Time: 10/29/22  6:04 AM   Result Value Ref Range    Sodium 135 133 - 143 mmol/L    Potassium 3.7 3.5 - 5.1 mmol/L    Chloride 103 101 - 110 mmol/L    CO2 28 21 - 32 mmol/L    Anion Gap 4 2 - 11 mmol/L    Glucose 89 65 - 100 mg/dL    BUN 14 8 - 23 MG/DL    Creatinine 0.80 0.8 - 1.5 MG/DL    Est, Glom Filt Rate >60 >60 ml/min/1.73m2    Calcium 8.4 8.3 - 10.4 MG/DL   CBC with Auto Differential    Collection Time: 10/29/22  6:04 AM   Result Value Ref Range    WBC 5.0 4.3 - 11.1 K/uL    RBC 4.51 4.23 - 5.6 M/uL    Hemoglobin 12.6 (L) 13.6 - 17.2 g/dL    Hematocrit 37.0 (L) 41.1 - 50.3 %    MCV 82.0 82 - 102 FL    MCH 27.9 26.1 - 32.9 PG    MCHC 34.1 31.4 - 35.0 g/dL    RDW 13.5 11.9 - 14.6 %    Platelets 331 094 - 231 K/uL    MPV 8.7 (L) 9.4 - 12.3 FL    nRBC 0.00 0.0 - 0.2 K/uL    Differential Type AUTOMATED      Seg Neutrophils 60 43 - 78 %    Lymphocytes 31 13 - 44 %    Monocytes 9 4.0 - 12.0 %    Eosinophils % 0 (L) 0.5 - 7.8 %    Basophils 0 0.0 - 2.0 %    Immature Granulocytes 0 0.0 - 5.0 %    Segs Absolute 3.0 1.7 - 8.2 K/UL    Absolute Lymph # 1.5 0.5 - 4.6 K/UL    Absolute Mono # 0.4 0.1 - 1.3 K/UL    Absolute Eos # 0.0 0.0 - 0.8 K/UL    Basophils Absolute 0.0 0.0 - 0.2 K/UL    Absolute Immature Granulocyte 0.0 0.0 - 0.5 K/UL   POCT Glucose    Collection Time: 10/29/22  7:36 AM   Result Value Ref Range    POC Glucose 75 65 - 100 mg/dL    Performed by: HardingRNChristian    POCT Glucose    Collection Time: 10/29/22 11:24 AM   Result Value Ref Range    POC Glucose 120 (H) 65 - 100 mg/dL    Performed by: HardingRNChristian    POCT Glucose    Collection Time: 10/29/22  4:46 PM   Result Value Ref Range    POC Glucose 104 (H) 65 - 100 mg/dL    Performed by: Ramez    POCT Glucose    Collection Time: 10/29/22  8:55 PM   Result Value Ref Range    POC Glucose 115 (H) 65 - 100 mg/dL    Performed by: Ginna    POCT Glucose    Collection Time: 10/30/22  7:49 AM   Result Value Ref Range    POC Glucose 93 65 - 100 mg/dL    Performed by: Esperanza    Lipase    Collection Time: 10/30/22  8:28 AM   Result Value Ref Range    Lipase 153 73 - 393 U/L   POCT Glucose    Collection Time: 10/30/22 11:10 AM   Result Value Ref Range    POC Glucose 96 65 - 100 mg/dL    Performed by: Therese        I have personally reviewed imaging studies showing: Other Studies:  CT ABDOMEN PELVIS W IV CONTRAST Additional Contrast? Oral   Final Result   1. Mild peripancreatic stranding about the head/neck without fluid collection   or ductal dilatation, otherwise pancreatic parenchyma is homogeneous and   attenuation. 2.  Right lower pole 1.9 cm intermediate attenuating hypodensity likely favoring   a likely minimally complex cyst.      3.  Moderate to large colonic stool burden which should be correlated with   clinical constipation. 4.  Emphysematous changes and subsegmental scarring of the lung bases.                 Current Meds:  Current Facility-Administered Medications   Medication Dose Route Frequency tuberculin injection 5 Units  5 Units IntraDERmal Once    0.9 % sodium chloride infusion   IntraVENous Continuous    lisinopril (PRINIVIL;ZESTRIL) tablet 5 mg  5 mg Oral Daily    ferrous sulfate (IRON 325) tablet 325 mg  325 mg Oral BID    dronabinol (MARINOL) capsule 10 mg  10 mg Oral Nightly    buPROPion (WELLBUTRIN SR) extended release tablet 150 mg  150 mg Oral Daily    sennosides-docusate sodium (SENOKOT-S) 8.6-50 MG tablet 2 tablet  2 tablet Oral Daily    diatrizoate meglumine-sodium (GASTROGRAFIN) 66-10 % solution 15 mL  15 mL Oral ONCE PRN    sodium chloride flush 0.9 % injection 5-40 mL  5-40 mL IntraVENous PRN    0.9 % sodium chloride infusion   IntraVENous PRN    ondansetron (ZOFRAN-ODT) disintegrating tablet 4 mg  4 mg Oral Q8H PRN    Or    ondansetron (ZOFRAN) injection 4 mg  4 mg IntraVENous Q6H PRN    polyethylene glycol (GLYCOLAX) packet 17 g  17 g Oral Daily PRN    bisacodyl (DULCOLAX) suppository 10 mg  10 mg Rectal Daily PRN    famotidine (PEPCID) tablet 10 mg  10 mg Oral Daily PRN    aluminum & magnesium hydroxide-simethicone (MAALOX) 200-200-20 MG/5ML suspension 30 mL  30 mL Oral Q6H PRN    acetaminophen (TYLENOL) tablet 650 mg  650 mg Oral Q6H PRN    Or    acetaminophen (TYLENOL) suppository 650 mg  650 mg Rectal Q6H PRN    enoxaparin (LOVENOX) injection 40 mg  40 mg SubCUTAneous Q24H    insulin lispro (HUMALOG) injection vial 0-10 Units  0-10 Units SubCUTAneous 4x Daily AC & HS    morphine injection 2 mg  2 mg IntraVENous Q6H PRN    traMADol (ULTRAM) tablet 50 mg  50 mg Oral Q6H PRN    insulin glargine (LANTUS) injection vial 5 Units  5 Units SubCUTAneous Nightly    donepezil (ARICEPT) tablet 5 mg  5 mg Oral Nightly    tamsulosin (FLOMAX) capsule 0.4 mg  0.4 mg Oral Daily    pantoprazole (PROTONIX) tablet 40 mg  40 mg Oral Daily    metoprolol tartrate (LOPRESSOR) tablet 12.5 mg  12.5 mg Oral BID    LORazepam (ATIVAN) tablet 0.5 mg  0.5 mg Oral Q12H PRN    polyethylene glycol (GLYCOLAX) packet 17 g  17 g Oral Daily     Facility-Administered Medications Ordered in Other Encounters   Medication Dose Route Frequency    diatrizoate meglumine-sodium (GASTROGRAFIN) 66-10 % solution 15 mL  15 mL Oral ONCE PRN       Signed:  Sonia Issa MD    Part of this note may have been written by using a voice dictation software. The note has been proof read but may still contain some grammatical/other typographical errors.

## 2022-10-31 LAB
ALBUMIN SERPL-MCNC: 2.1 G/DL (ref 3.2–4.6)
ALBUMIN/GLOB SERPL: 0.7 {RATIO} (ref 0.4–1.6)
ALP SERPL-CCNC: 146 U/L (ref 50–136)
ALT SERPL-CCNC: 14 U/L (ref 12–65)
ANION GAP SERPL CALC-SCNC: 3 MMOL/L (ref 2–11)
AST SERPL-CCNC: 22 U/L (ref 15–37)
BASOPHILS # BLD: 0 K/UL (ref 0–0.2)
BASOPHILS NFR BLD: 0 % (ref 0–2)
BILIRUB SERPL-MCNC: 0.3 MG/DL (ref 0.2–1.1)
BUN SERPL-MCNC: 4 MG/DL (ref 8–23)
CALCIUM SERPL-MCNC: 7.8 MG/DL (ref 8.3–10.4)
CHLORIDE SERPL-SCNC: 106 MMOL/L (ref 101–110)
CO2 SERPL-SCNC: 26 MMOL/L (ref 21–32)
CREAT SERPL-MCNC: 0.7 MG/DL (ref 0.8–1.5)
DIFFERENTIAL METHOD BLD: ABNORMAL
EOSINOPHIL # BLD: 0 K/UL (ref 0–0.8)
EOSINOPHIL NFR BLD: 0 % (ref 0.5–7.8)
ERYTHROCYTE [DISTWIDTH] IN BLOOD BY AUTOMATED COUNT: 13.3 % (ref 11.9–14.6)
GLOBULIN SER CALC-MCNC: 2.9 G/DL (ref 2.8–4.5)
GLUCOSE BLD STRIP.AUTO-MCNC: 124 MG/DL (ref 65–100)
GLUCOSE BLD STRIP.AUTO-MCNC: 243 MG/DL (ref 65–100)
GLUCOSE BLD STRIP.AUTO-MCNC: 272 MG/DL (ref 65–100)
GLUCOSE BLD STRIP.AUTO-MCNC: 80 MG/DL (ref 65–100)
GLUCOSE SERPL-MCNC: 69 MG/DL (ref 65–100)
HCT VFR BLD AUTO: 32.2 % (ref 41.1–50.3)
HGB BLD-MCNC: 10.9 G/DL (ref 13.6–17.2)
IMM GRANULOCYTES # BLD AUTO: 0 K/UL (ref 0–0.5)
IMM GRANULOCYTES NFR BLD AUTO: 0 % (ref 0–5)
LYMPHOCYTES # BLD: 1.4 K/UL (ref 0.5–4.6)
LYMPHOCYTES NFR BLD: 26 % (ref 13–44)
MAGNESIUM SERPL-MCNC: 1.6 MG/DL (ref 1.8–2.4)
MCH RBC QN AUTO: 27.6 PG (ref 26.1–32.9)
MCHC RBC AUTO-ENTMCNC: 33.9 G/DL (ref 31.4–35)
MCV RBC AUTO: 81.5 FL (ref 82–102)
MM INDURATION, POC: 0 MM (ref 0–5)
MONOCYTES # BLD: 0.6 K/UL (ref 0.1–1.3)
MONOCYTES NFR BLD: 11 % (ref 4–12)
NEUTS SEG # BLD: 3.4 K/UL (ref 1.7–8.2)
NEUTS SEG NFR BLD: 63 % (ref 43–78)
NRBC # BLD: 0 K/UL (ref 0–0.2)
PLATELET # BLD AUTO: 236 K/UL (ref 150–450)
PMV BLD AUTO: 8.7 FL (ref 9.4–12.3)
POTASSIUM SERPL-SCNC: 3.4 MMOL/L (ref 3.5–5.1)
PPD, POC: NEGATIVE
PROT SERPL-MCNC: 5 G/DL (ref 6.3–8.2)
RBC # BLD AUTO: 3.95 M/UL (ref 4.23–5.6)
SERVICE CMNT-IMP: ABNORMAL
SERVICE CMNT-IMP: NORMAL
SODIUM SERPL-SCNC: 135 MMOL/L (ref 133–143)
WBC # BLD AUTO: 5.5 K/UL (ref 4.3–11.1)

## 2022-10-31 PROCEDURE — 83735 ASSAY OF MAGNESIUM: CPT

## 2022-10-31 PROCEDURE — 6360000002 HC RX W HCPCS: Performed by: INTERNAL MEDICINE

## 2022-10-31 PROCEDURE — 97112 NEUROMUSCULAR REEDUCATION: CPT

## 2022-10-31 PROCEDURE — 80053 COMPREHEN METABOLIC PANEL: CPT

## 2022-10-31 PROCEDURE — 6370000000 HC RX 637 (ALT 250 FOR IP): Performed by: NURSE PRACTITIONER

## 2022-10-31 PROCEDURE — 97162 PT EVAL MOD COMPLEX 30 MIN: CPT

## 2022-10-31 PROCEDURE — 85025 COMPLETE CBC W/AUTO DIFF WBC: CPT

## 2022-10-31 PROCEDURE — 6370000000 HC RX 637 (ALT 250 FOR IP): Performed by: INTERNAL MEDICINE

## 2022-10-31 PROCEDURE — 1100000003 HC PRIVATE W/ TELEMETRY

## 2022-10-31 PROCEDURE — 6360000002 HC RX W HCPCS: Performed by: NURSE PRACTITIONER

## 2022-10-31 PROCEDURE — 97530 THERAPEUTIC ACTIVITIES: CPT

## 2022-10-31 PROCEDURE — 36415 COLL VENOUS BLD VENIPUNCTURE: CPT

## 2022-10-31 PROCEDURE — 97535 SELF CARE MNGMENT TRAINING: CPT

## 2022-10-31 PROCEDURE — 82962 GLUCOSE BLOOD TEST: CPT

## 2022-10-31 PROCEDURE — 97165 OT EVAL LOW COMPLEX 30 MIN: CPT

## 2022-10-31 RX ORDER — POTASSIUM CHLORIDE 20 MEQ/1
20 TABLET, EXTENDED RELEASE ORAL ONCE
Status: COMPLETED | OUTPATIENT
Start: 2022-10-31 | End: 2022-10-31

## 2022-10-31 RX ORDER — MAGNESIUM SULFATE IN WATER 40 MG/ML
2000 INJECTION, SOLUTION INTRAVENOUS ONCE
Status: COMPLETED | OUTPATIENT
Start: 2022-10-31 | End: 2022-10-31

## 2022-10-31 RX ORDER — DEXTROSE MONOHYDRATE 100 MG/ML
INJECTION, SOLUTION INTRAVENOUS CONTINUOUS PRN
Status: DISCONTINUED | OUTPATIENT
Start: 2022-10-31 | End: 2022-11-02 | Stop reason: HOSPADM

## 2022-10-31 RX ADMIN — FERROUS SULFATE TAB 325 MG (65 MG ELEMENTAL FE) 325 MG: 325 (65 FE) TAB at 09:22

## 2022-10-31 RX ADMIN — METOPROLOL TARTRATE 12.5 MG: 25 TABLET, FILM COATED ORAL at 09:22

## 2022-10-31 RX ADMIN — LISINOPRIL 5 MG: 5 TABLET ORAL at 09:22

## 2022-10-31 RX ADMIN — METOPROLOL TARTRATE 12.5 MG: 25 TABLET, FILM COATED ORAL at 20:46

## 2022-10-31 RX ADMIN — BUPROPION HYDROCHLORIDE 150 MG: 150 TABLET, EXTENDED RELEASE ORAL at 09:22

## 2022-10-31 RX ADMIN — DONEPEZIL HYDROCHLORIDE 5 MG: 5 TABLET, FILM COATED ORAL at 20:46

## 2022-10-31 RX ADMIN — MAGNESIUM SULFATE HEPTAHYDRATE 2000 MG: 40 INJECTION, SOLUTION INTRAVENOUS at 16:33

## 2022-10-31 RX ADMIN — TAMSULOSIN HYDROCHLORIDE 0.4 MG: 0.4 CAPSULE ORAL at 09:22

## 2022-10-31 RX ADMIN — TRAMADOL HYDROCHLORIDE 50 MG: 50 TABLET, COATED ORAL at 05:50

## 2022-10-31 RX ADMIN — DRONABINOL 10 MG: 2.5 CAPSULE ORAL at 20:46

## 2022-10-31 RX ADMIN — PANTOPRAZOLE SODIUM 40 MG: 40 TABLET, DELAYED RELEASE ORAL at 09:22

## 2022-10-31 RX ADMIN — INSULIN LISPRO 4 UNITS: 100 INJECTION, SOLUTION INTRAVENOUS; SUBCUTANEOUS at 20:46

## 2022-10-31 RX ADMIN — ENOXAPARIN SODIUM 40 MG: 100 INJECTION SUBCUTANEOUS at 20:45

## 2022-10-31 RX ADMIN — POTASSIUM CHLORIDE 20 MEQ: 1500 TABLET, EXTENDED RELEASE ORAL at 16:34

## 2022-10-31 RX ADMIN — INSULIN LISPRO 6 UNITS: 100 INJECTION, SOLUTION INTRAVENOUS; SUBCUTANEOUS at 17:17

## 2022-10-31 RX ADMIN — FERROUS SULFATE TAB 325 MG (65 MG ELEMENTAL FE) 325 MG: 325 (65 FE) TAB at 20:46

## 2022-10-31 ASSESSMENT — PAIN DESCRIPTION - ORIENTATION
ORIENTATION: MID;LOWER
ORIENTATION: MID

## 2022-10-31 ASSESSMENT — PAIN DESCRIPTION - LOCATION
LOCATION: ABDOMEN

## 2022-10-31 ASSESSMENT — PAIN SCALES - GENERAL
PAINLEVEL_OUTOF10: 6
PAINLEVEL_OUTOF10: 3
PAINLEVEL_OUTOF10: 0
PAINLEVEL_OUTOF10: 2
PAINLEVEL_OUTOF10: 7
PAINLEVEL_OUTOF10: 0

## 2022-10-31 ASSESSMENT — PAIN DESCRIPTION - DESCRIPTORS
DESCRIPTORS: ACHING;DISCOMFORT
DESCRIPTORS: SORE;ACHING

## 2022-10-31 ASSESSMENT — PAIN DESCRIPTION - FREQUENCY: FREQUENCY: INTERMITTENT

## 2022-10-31 ASSESSMENT — PAIN DESCRIPTION - ONSET: ONSET: GRADUAL

## 2022-10-31 ASSESSMENT — PAIN - FUNCTIONAL ASSESSMENT: PAIN_FUNCTIONAL_ASSESSMENT: ACTIVITIES ARE NOT PREVENTED

## 2022-10-31 ASSESSMENT — PAIN DESCRIPTION - PAIN TYPE: TYPE: ACUTE PAIN

## 2022-10-31 NOTE — PROGRESS NOTES
's initial visit to explore spiritual needs and convey care and concern. The visit was welcomed and I offered spiritual interventions including active listening, affirmation of sharona & emotions, exploration of coping skills & support system, and prayer as requested. Patient expressed gratitude for the visit. Chaplains remain available for follow-up care.     Kamaljit 86, Harika 68  Board Certified

## 2022-10-31 NOTE — DIABETES MGMT
Patient seen for assessment regarding diabetes management. Patient son at bedside. Patient has a past medical history of HTN, hyperlipidemia, type 2 diabetes, anxiety disorder, pneumonia, SBO, lung cancer, hyperglycemia, hypoglycemia. Patient states they have been living with diabetes for 40-50 years and voices no known family history of diabetes. Patient states they do have a working glucometer with supplies at home. Per patient they typically check blood glucose levels 2-3 times a day. Per patient their blood glucose levels have been running low 100s in the morning and sometimes 300s in the afternoons at home. Patient states they are currently taking Lantus 15-20 units nightly, Farxiga, and Metformin at home for management of diabetes. Per patient son they no longer use the \"sliding scale\" insulin as patient would take it have hypoglycemia and even passed out. Per patient son patient lives alone so this could be dangerous. Discussed hypoglycemia unawareness, CGM, and differing correctional insulin scales. Patient states he has lost a lot of weight, discussed how this may affect patient's insulin needs. Patient voices that they have experienced hypoglycemia in the past. Educated regarding hypoglycemia signs, symptoms, and treatment. Patient given educational material, \"Diabetes Self-Management: A Patient Teaching Guide\". Explained basic physiology of type 2 diabetes. Discussed steroid impact on glycemic control as patient has taken Prednisone in the past. Per patient they typically drink diet pepsi, coffee with sweetnlow, and water. Reviewed effects of sweetened beverages on glycemic control and discussed alternative beverages to help improve glycemic control. Per patient they typically eat meals on wheels. Patient son has encouraged patient to limit carbs like grits and sweets.  Did discuss how these can raise glucose levels but also discussed portion control and how most meals on wheels is portion controlled and the importance of having some carbohydrates. Per chart review patient on 60g carb per meal diet. Discussed ADA recommendations regarding target goals for blood glucose and A1C in the older adult and encouraged to discuss this with PCP. Reviewed basal/bolus insulin as patient has questions regarding why his sugars can't seem to level out. Discussed prandial versus correctional insulin. Encouraged compliance with discharge regimen. Encouraged patient to continue to work on lifestyle modifications and to follow up with primary care provider (Dr. Michael Carrasco) for further titration of regimen. Blood glucose log provided for patient to use and take to PCP appointment. Patient and son verbalized understanding and voices no further questions regarding diabetes management at this time. Patient son has been questions regarding patient's disposition home and swelling and pancreatitis and would like to speak to CM and provider. Provider and CM updated.

## 2022-10-31 NOTE — PROGRESS NOTES
Hospitalist Progress Note   Admit Date:  10/28/2022  9:00 AM   Name:  Jamal De La Cruz   Age:  [de-identified] y.o. Sex:  male  :  1940   MRN:  [id number]   Room:  [facility]/    Presenting Complaint: Abdominal Pain     Reason(s) for Admission: Dehydration [E86.0]  Hyperglycemia [R73.9]  Acute kidney injury ([address] [N17.9]  Pancreatitis, unspecified pancreatitis type [K85.90]  Idiopathic acute pancreatitis without infection or necrosis [K85.00]     Hospital Course:   Jamal De La Cruz is an [de-identified] y.o. AAM with a PMH of diabetes mellitus, hypertension, lung cancer, mild dementia with anxiety who presented with pain x4 days. Has some nausea without any vomiting since last night. He reports that pain started suddenly without any triggers. Located in epigastric region with radiation to the right side and sometimes to the back. Reports chills without any fevers. No chest pain or shortness of breath. Reporting having constipation due to not being able to walk outside as he normally would. In the ER, patient is hemodynamically stable. Laboratory work-up is notable for glucose of 197, hemoglobin of 12.9, lipase of 1942. CT A/P with mild peripancreatic stranding about the head and neck without fluid collection or ductal dilatation. Also noted to have moderate to large colonic stool burden. Per ER note, patient was noted to have intermittent afib. EKG @9:48am showed sinus tachy vs aflutter with repeat at @9:53am showing NSR. This has resolved. The patient was admitted to the Hospitalist service. Subjective & 24hr Events (10/31/22): No AEO. Lipase normal now. The patient is tolerating full liquids. PT/OT recommending STR. Discussed with Case mgmt. Replace electrolytes today. Assessment & Plan:     Pancreatitis  Lipase 1,942. CT noted.   - IVF  - Tolerating full liquids now  - pain control with tramadol and morphine  - Repeat lipase 153     Hypokalemia  Hypomagnesemia  - Replacing  - Trend    Sinus tach vs atrial flutter  - continue metoprolol   - resolved     Constipation  - MiraLAX and Senokot daily     T2DM  - hold PO meds  - takes Lantus but does not take it daily. Educated patient  - Diabetic educator consulted, rec stopping Lantus and trending for now; glu 69 this morning  - correction scale lispro     Dementia  - Aricept     Depression  - Wellbutrin     Debility  - PT/OT consulted      Anticipated discharge needs: short-term rehab      Diet:  ADULT DIET; Full Liquid; 4 carb choices (60 gm/meal)  DVT PPx: enoxaparin  Code status: Full Code    Hospital Problems:  Principal Problem:    Pancreatitis, unspecified pancreatitis type  Active Problems:    Constipation    HTN (hypertension)    Malignant neoplasm of upper lobe of left lung (HCC)    Hyperlipidemia    Type 2 diabetes mellitus with hyperglycemia, with long-term current use of insulin (HCC)    Anxiety disorder  Resolved Problems:    * No resolved hospital problems. *      Objective:   Patient Vitals for the past 24 hrs:   Temp Pulse Resp BP SpO2   10/31/22 1515 97.8 °F (36.6 °C) 88 16 121/62 100 %   10/31/22 1115 98.2 °F (36.8 °C) 83 18 120/69 95 %   10/31/22 0800 98.1 °F (36.7 °C) 89 17 126/61 92 %   10/31/22 0620 -- -- 17 -- --   10/31/22 0550 -- -- 18 -- --   10/31/22 0205 98.3 °F (36.8 °C) 88 18 121/68 94 %   10/30/22 2335 98.4 °F (36.9 °C) 87 18 (!) 119/58 93 %   10/30/22 1921 98.4 °F (36.9 °C) 90 18 (!) 121/58 93 %         Oxygen Therapy  SpO2: 100 %  O2 Device: None (Room air)    Estimated body mass index is 17.84 kg/m² as calculated from the following:    Height as of this encounter: 6' 1\" (1.854 m). Weight as of this encounter: 135 lb 4 oz (61.3 kg). Intake/Output Summary (Last 24 hours) at 10/31/2022 1530  Last data filed at 10/31/2022 1349  Gross per 24 hour   Intake 1150 ml   Output 2050 ml   Net -900 ml           Physical Exam:   Blood pressure 121/62, pulse 88, temperature 97.8 °F (36.6 °C), temperature source Oral, resp.  rate 16, height 6' 1\" (1.854 m), weight 135 lb 4 oz (61.3 kg), SpO2 100 %. General:    NAD    Head:  Normocephalic, atraumatic  Eyes:  Sclerae appear normal.  Pupils equally round. ENT:  Nares appear normal, no drainage. Moist oral mucosa  Neck:  No restricted ROM. Trachea midline   CV:   RRR. No m/r/g. Lungs: No wheezing. No tachypnea. Abdomen:   Soft, nondistended. Extremities: No cyanosis or clubbing. Skin:     No rashes and normal coloration. Warm and dry. Neuro:  CN II-XII grossly intact. A&Ox3  Psych:  Normal mood and affect. I have personally reviewed labs and tests showing:  Recent Labs:  Recent Results (from the past 48 hour(s))   POCT Glucose    Collection Time: 10/29/22  4:46 PM   Result Value Ref Range    POC Glucose 104 (H) 65 - 100 mg/dL    Performed by: Ramez    POCT Glucose    Collection Time: 10/29/22  8:55 PM   Result Value Ref Range    POC Glucose 115 (H) 65 - 100 mg/dL    Performed by: Ginna    POCT Glucose    Collection Time: 10/30/22  7:49 AM   Result Value Ref Range    POC Glucose 93 65 - 100 mg/dL    Performed by: Esperanza    Lipase    Collection Time: 10/30/22  8:28 AM   Result Value Ref Range    Lipase 153 73 - 393 U/L   POCT Glucose    Collection Time: 10/30/22 11:10 AM   Result Value Ref Range    POC Glucose 96 65 - 100 mg/dL    Performed by:  Therese    POCT Glucose    Collection Time: 10/30/22  4:40 PM   Result Value Ref Range    POC Glucose 206 (H) 65 - 100 mg/dL    Performed by: Christen    POCT Glucose    Collection Time: 10/30/22  8:40 PM   Result Value Ref Range    POC Glucose 157 (H) 65 - 100 mg/dL    Performed by: Amanda Cedeno PPD TEST IN 24 HRS    Collection Time: 10/31/22 12:00 AM   Result Value Ref Range    PPD, (POC) Negative Negative    mm Induration 0 0 - 5 mm   Comprehensive Metabolic Panel w/ Reflex to MG    Collection Time: 10/31/22  6:46 AM   Result Value Ref Range    Sodium 135 133 - 143 mmol/L    Potassium 3.4 (L) 3.5 - 5.1 mmol/L    Chloride 106 101 - 110 mmol/L    CO2 26 21 - 32 mmol/L    Anion Gap 3 2 - 11 mmol/L    Glucose 69 65 - 100 mg/dL    BUN 4 (L) 8 - 23 MG/DL    Creatinine 0.70 (L) 0.8 - 1.5 MG/DL    Est, Glom Filt Rate >60 >60 ml/min/1.73m2    Calcium 7.8 (L) 8.3 - 10.4 MG/DL    Total Bilirubin 0.3 0.2 - 1.1 MG/DL    ALT 14 12 - 65 U/L    AST 22 15 - 37 U/L    Alk Phosphatase 146 (H) 50 - 136 U/L    Total Protein 5.0 (L) 6.3 - 8.2 g/dL    Albumin 2.1 (L) 3.2 - 4.6 g/dL    Globulin 2.9 2.8 - 4.5 g/dL    Albumin/Globulin Ratio 0.7 0.4 - 1.6     CBC with Auto Differential    Collection Time: 10/31/22  6:46 AM   Result Value Ref Range    WBC 5.5 4.3 - 11.1 K/uL    RBC 3.95 (L) 4.23 - 5.6 M/uL    Hemoglobin 10.9 (L) 13.6 - 17.2 g/dL    Hematocrit 32.2 (L) 41.1 - 50.3 %    MCV 81.5 (L) 82 - 102 FL    MCH 27.6 26.1 - 32.9 PG    MCHC 33.9 31.4 - 35.0 g/dL    RDW 13.3 11.9 - 14.6 %    Platelets 130 646 - 890 K/uL    MPV 8.7 (L) 9.4 - 12.3 FL    nRBC 0.00 0.0 - 0.2 K/uL    Differential Type AUTOMATED      Seg Neutrophils 63 43 - 78 %    Lymphocytes 26 13 - 44 %    Monocytes 11 4.0 - 12.0 %    Eosinophils % 0 (L) 0.5 - 7.8 %    Basophils 0 0.0 - 2.0 %    Immature Granulocytes 0 0.0 - 5.0 %    Segs Absolute 3.4 1.7 - 8.2 K/UL    Absolute Lymph # 1.4 0.5 - 4.6 K/UL    Absolute Mono # 0.6 0.1 - 1.3 K/UL    Absolute Eos # 0.0 0.0 - 0.8 K/UL    Basophils Absolute 0.0 0.0 - 0.2 K/UL    Absolute Immature Granulocyte 0.0 0.0 - 0.5 K/UL   Magnesium    Collection Time: 10/31/22  6:46 AM   Result Value Ref Range    Magnesium 1.6 (L) 1.8 - 2.4 mg/dL   POCT Glucose    Collection Time: 10/31/22  8:14 AM   Result Value Ref Range    POC Glucose 80 65 - 100 mg/dL    Performed by: Cade    POCT Glucose    Collection Time: 10/31/22 11:28 AM   Result Value Ref Range    POC Glucose 124 (H) 65 - 100 mg/dL    Performed by: Cade        I have personally reviewed imaging studies showing:   Other Studies:  CT ABDOMEN PELVIS W IV CONTRAST Additional Contrast? Oral   Final Result   1. Mild peripancreatic stranding about the head/neck without fluid collection   or ductal dilatation, otherwise pancreatic parenchyma is homogeneous and   attenuation. 2.  Right lower pole 1.9 cm intermediate attenuating hypodensity likely favoring   a likely minimally complex cyst.      3.  Moderate to large colonic stool burden which should be correlated with   clinical constipation. 4.  Emphysematous changes and subsegmental scarring of the lung bases.                 Current Meds:  Current Facility-Administered Medications   Medication Dose Route Frequency    glucose chewable tablet 16 g  4 tablet Oral PRN    dextrose bolus 10% 125 mL  125 mL IntraVENous PRN    Or    dextrose bolus 10% 250 mL  250 mL IntraVENous PRN    glucagon (rDNA) injection 1 mg  1 mg SubCUTAneous PRN    dextrose 10 % infusion   IntraVENous Continuous PRN    potassium chloride (KLOR-CON M) extended release tablet 20 mEq  20 mEq Oral Once    lisinopril (PRINIVIL;ZESTRIL) tablet 5 mg  5 mg Oral Daily    ferrous sulfate (IRON 325) tablet 325 mg  325 mg Oral BID    dronabinol (MARINOL) capsule 10 mg  10 mg Oral Nightly    buPROPion (WELLBUTRIN SR) extended release tablet 150 mg  150 mg Oral Daily    sennosides-docusate sodium (SENOKOT-S) 8.6-50 MG tablet 2 tablet  2 tablet Oral Daily    diatrizoate meglumine-sodium (GASTROGRAFIN) 66-10 % solution 15 mL  15 mL Oral ONCE PRN    sodium chloride flush 0.9 % injection 5-40 mL  5-40 mL IntraVENous PRN    0.9 % sodium chloride infusion   IntraVENous PRN    ondansetron (ZOFRAN-ODT) disintegrating tablet 4 mg  4 mg Oral Q8H PRN    Or    ondansetron (ZOFRAN) injection 4 mg  4 mg IntraVENous Q6H PRN    polyethylene glycol (GLYCOLAX) packet 17 g  17 g Oral Daily PRN    bisacodyl (DULCOLAX) suppository 10 mg  10 mg Rectal Daily PRN    famotidine (PEPCID) tablet 10 mg  10 mg Oral Daily PRN    aluminum & magnesium hydroxide-simethicone (MAALOX) 897-454-39 MG/5ML suspension 30 mL  30 mL Oral Q6H PRN    acetaminophen (TYLENOL) tablet 650 mg  650 mg Oral Q6H PRN    Or    acetaminophen (TYLENOL) suppository 650 mg  650 mg Rectal Q6H PRN    enoxaparin (LOVENOX) injection 40 mg  40 mg SubCUTAneous Q24H    insulin lispro (HUMALOG) injection vial 0-10 Units  0-10 Units SubCUTAneous 4x Daily AC & HS    morphine injection 2 mg  2 mg IntraVENous Q6H PRN    traMADol (ULTRAM) tablet 50 mg  50 mg Oral Q6H PRN    donepezil (ARICEPT) tablet 5 mg  5 mg Oral Nightly    tamsulosin (FLOMAX) capsule 0.4 mg  0.4 mg Oral Daily    pantoprazole (PROTONIX) tablet 40 mg  40 mg Oral Daily    metoprolol tartrate (LOPRESSOR) tablet 12.5 mg  12.5 mg Oral BID    LORazepam (ATIVAN) tablet 0.5 mg  0.5 mg Oral Q12H PRN    polyethylene glycol (GLYCOLAX) packet 17 g  17 g Oral Daily     Facility-Administered Medications Ordered in Other Encounters   Medication Dose Route Frequency    diatrizoate meglumine-sodium (GASTROGRAFIN) 66-10 % solution 15 mL  15 mL Oral ONCE PRN       Signed:  AMARILYS Aiken - CNP    Part of this note may have been written by using a voice dictation software. The note has been proof read but may still contain some grammatical/other typographical errors.

## 2022-10-31 NOTE — CARE COORDINATION
Pt's son at the bedside inquiring about therapy and stating he wants pt to go to STR. Pt is current with [facility]. Pt's son interrupts pt and speaks over him. Pt's son states that pt is weak and needs STR. CM explained that PT/OT must evaluate before any further steps can be taken. Pt is A/O x3 and able to make his own decisions. CM ordered PT/OT consults for a professional opinion. CM will continue to follow. Update: PT/OT have evaluated pt and recommend STR at d/c.  CM left a voicemail for pt's son with this information, offered to email him a copy of the facility list if he would like, and stated that a copy of the list has been left in pt's room. CM met with pt to discuss PT/OT recommendations and showed him the list, however pt states he does not have his glasses and is therefore unable to read it. CM left the list on pt's windowsill per his request for when his son visits again.

## 2022-10-31 NOTE — PROGRESS NOTES
ACUTE PHYSICAL THERAPY GOALS:   (Developed with and agreed upon by patient and/or caregiver.)  (1.) Gwen Fabian  will move from supine to sit and sit to supine , scoot up and down, and roll side to side with MODIFIED INDEPENDENCE within 7 treatment day(s). (2.) Gwen Fabian will transfer from bed to chair and chair to bed with STAND BY ASSIST using the least restrictive device within 7 treatment day(s). (3.) Gwen Fabian will ambulate with CONTACT GUARD ASSIST for 150 feet with the least restrictive device within 7 treatment day(s). (4.) Gwen Fabian will perform standing static and dynamic balance activities x 15 minutes with STAND BY ASSIST to improve safety within 7 treatment day(s). (5.) Gwen Fabian will perform therapeutic exercises x 15 min for HEP with SUPERVISION to improve strength, endurance, and functional mobility within 7 treatment day(s). PHYSICAL THERAPY Initial Assessment, Daily Note, and PM  (Link to Caseload Tracking: PT Visit Days : 1  Acknowledge Orders  Time In/Out  PT Charge Capture  Rehab Caseload Tracker    Gwen Fabian is a [de-identified] y.o. male   PRIMARY DIAGNOSIS: Pancreatitis, unspecified pancreatitis type  Dehydration [E86.0]  Hyperglycemia [R73.9]  Acute kidney injury ([facility] [address] [N17.9]  Pancreatitis, unspecified pancreatitis type [K85.90]  Idiopathic acute pancreatitis without infection or necrosis [K85.00]       Reason for Referral: Generalized Muscle Weakness (M62.81)  Difficulty in walking, Not elsewhere classified (R26.2)  Other abnormalities of gait and mobility (R26.89)  Inpatient: Payor: [address] [place] [address] Ayah / Plan: May Perez / Product Type: *No Product type* /     ASSESSMENT:     REHAB RECOMMENDATIONS:   Recommendation to date pending progress:  Setting:  Short-term Rehab    Equipment:    To Be Determined     ASSESSMENT:  Mr. Alanna Ramirez is an [de-identified]year old M who presents to hospital with pancreatitis, generalized weakness.  This date pt performs mobility including bed mobility, sitting balance activities, sit <> stand transfers, standing balance activities, and ambulation in room with Min Ax2, BUE support at RW. Pt very weak and unsteady; endorsing swelling in his BLE which is impacting his mobility and balance control. He reports dizziness with standing activities; vitals assessed:  Seated (just following standing mobility): 109/55 mm Hg. HR 95 bpm  Reclining (after sitting for >5 minutes): 117/68 mm Hg. HR 94 bpmn    . Pt presents as functioning below his baseline, with deficits in mobility including transfers, gait, balance, and activity tolerance. Pt will benefit from skilled therapy services to address stated deficits to promote return to highest level of function, independence, and safety. Will continue to follow.      [address] [id number] AM-PAC 6 Clicks Basic Mobility Inpatient Short Form  AM-PAC Mobility Inpatient   How much difficulty turning over in bed?: A Little  How much difficulty sitting down on / standing up from a chair with arms?: A Lot  How much difficulty moving from lying on back to sitting on side of bed?: A Little  How much help from another person moving to and from a bed to a chair?: A Little  How much help from another person needed to walk in hospital room?: A Little  How much help from another person for climbing 3-5 steps with a railing?: A Lot  AM-PAC Inpatient Mobility Raw Score : 16  AM-PAC Inpatient T-Scale Score : 40.78  Mobility Inpatient CMS 0-100% Score: 54.16  Mobility Inpatient CMS G-Code Modifier : CK    SUBJECTIVE:   Mr. Marleny Hassan states, \"I feel unsteady on my feet\"     Social/Functional Lives With: Alone  Type of Home: Mobile home  Home Layout: One level  Bathroom Equipment: Shower chair  Home Equipment: gopal Stoddard Janee Snowman, rolling  Receives Help From: Family  ADL Assistance: Independent  Homemaking Assistance: Independent  Ambulation Assistance: Needs assistance  Transfer Assistance: Independent  Active : No  Mode of Transportation: Family  Occupation: Retired    OBJECTIVE:     PAIN: VITALS / O2: PRECAUTION / Josefine New / DRAINS:   Pre Treatment:   Pain Assessment: 0-10  Pain Level: 7  Pain Location: Abdomen  Pain Orientation: Mid;Lower  Pain Descriptors: Sore;Aching      Post Treatment: 6/10 Vitals        Oxygen      None    RESTRICTIONS/PRECAUTIONS:  Restrictions/Precautions: Fall Risk                 GROSS EVALUATION: Intact Impaired (Comments):   AROM [x]     PROM [x]    Strength []   Generalized weakness   Balance [] Sitting - Static: Good  Sitting - Dynamic: Fair, +  Standing - Static: Fair  Standing - Dynamic: Fair   Posture [] Forward Head  Rounded Shoulders  Trunk Flexion   Sensation [x]     Coordination [x]      Tone []     Edema []    Activity Tolerance []   Limited by weakness, fatigue, hypotension.  Decreased standing tolerance    []      COGNITION/  PERCEPTION: Intact Impaired (Comments):   Orientation [x]     Vision [x]     Hearing []   Appears hard of hearing; required repetition of statements frequently   Cognition  []   Follows commands with repetition     MOBILITY: I Mod I S SBA CGA Min Mod Max Total  NT x2 Comments:   Bed Mobility    Rolling [] [] [] [] [] [x] [] [] [] [] [x]    Supine to Sit [] [] [] [] [] [x] [] [] [] [] [x]    Scooting [] [] [] [] [] [x] [] [] [] [] [x]    Sit to Supine [] [] [] [] [] [x] [] [] [] [] [x]    Transfers    Sit to Stand [] [] [] [] [] [x] [] [] [] [] [x]    Bed to Chair [] [] [] [] [] [x] [] [] [] [] [x]    Stand to Sit [] [] [] [] [] [x] [] [] [] [] [x]     [] [] [] [] [] [] [] [] [] [] []    I=Independent, Mod I=Modified Independent, S=Supervision, SBA=Standby Assistance, CGA=Contact Guard Assistance,   Min=Minimal Assistance, Mod=Moderate Assistance, Max=Maximal Assistance, Total=Total Assistance, NT=Not Tested    GAIT: I Mod I S SBA CGA Min Mod Max Total  NT x2 Comments:   Level of Assistance [] [] [] [] [] [x] [] [] [] [] [x]    Distance 20 feet    DME Gait Belt and Rolling Walker    Gait Quality Narrow base of support, Path deviations , and Shuffling     Weightbearing Status Restrictions/Precautions  Restrictions/Precautions: Fall Risk    Stairs      I=Independent, Mod I=Modified Independent, S=Supervision, SBA=Standby Assistance, CGA=Contact Guard Assistance,   Min=Minimal Assistance, Mod=Moderate Assistance, Max=Maximal Assistance, Total=Total Assistance, NT=Not Tested    PLAN:   FREQUENCY AND DURATION: 3 times/week for duration of hospital stay or until stated goals are met, whichever comes first.    THERAPY PROGNOSIS: Good    PROBLEM LIST:   (Skilled intervention is medically necessary to address:)  Decreased Activity Tolerance  Decreased Balance  Decreased Coordination  Decreased Gait Ability  Decreased Safety Awareness  Decreased Strength  Decreased Transfer Abilities INTERVENTIONS PLANNED:   (Benefits and precautions of physical therapy have been discussed with the patient.)  Therapeutic Activity  Therapeutic Exercise/HEP  Neuromuscular Re-education  Gait Training  Education       TREATMENT:   EVALUATION: MODERATE COMPLEXITY: (Untimed Charge)    TREATMENT:   Co-Treatment PT/OT necessary due to patient's decreased overall endurance/tolerance levels, as well as need for high level skilled assistance to complete functional transfers/mobility and functional tasks  Therapeutic Activity (23 Minutes): Therapeutic activity included Supine to Sit, Scooting, Transfer Training, Ambulation on level ground, Sitting balance , and Standing balance to improve functional Activity tolerance, Balance, Coordination, Mobility, and Strength. TREATMENT GRID:  N/A    AFTER TREATMENT PRECAUTIONS: Bed/Chair Locked, Call light within reach, Chair, Needs within reach, and RN notified    INTERDISCIPLINARY COLLABORATION:  RN/ PCT, PT/ PTA, and OT/ SILVA    EDUCATION: Education Given To: Patient  Education Provided: Role of Therapy;Plan of Care;Precautions;Transfer Training;Equipment; Fall Prevention Strategies  Education Outcome: Verbalized understanding;Continued education needed    TIME IN/OUT:  Time In: 1315  Time Out: 1403 [facility]  Minutes: [address] [place], [place]

## 2022-10-31 NOTE — DIABETES MGMT
Patient admitted with pancreatitis. Admitting blood glucose 285. HbA1c 10.5% in July 2022. Blood glucose ranged  yesterday with patient receiving Lantus 5 units and Humalog 6 units. Lab blood glucose this morning was 69. Floor staff updated and fingerstick blood sugar requested. Creatinine 0.70. GFR >60. Reviewed patient current regimen: Lantus 5 units nightly and Humalog correctional insulin. Patient would likely benefit from d/c of basal insulin to reduce risk of morning hypoglycemia. Pending patient appetite patient may benefit from low dose prandial insulin to help offset hyperglycemia during the day. Provider updated via [employer] regarding recommendations and patient glycemic control.

## 2022-10-31 NOTE — CARE COORDINATION
Case Management Assessment  Initial Evaluation    Date/Time of Evaluation: 10/31/2022 9:25 AM  Assessment Completed by: BRADY Cesar    If patient is discharged prior to next notation, then this note serves as note for discharge by case management. Patient Name: Mary Lopez                   YOB: 1940  Diagnosis: Dehydration [E86.0]  Hyperglycemia [R73.9]  Acute kidney injury ([address] [N17.9]  Pancreatitis, unspecified pancreatitis type [K85.90]  Idiopathic acute pancreatitis without infection or necrosis [K85.00]                   Date / Time: 10/28/2022  9:00 AM    Patient Admission Status: Inpatient   Readmission Risk (Low < 19, Mod (19-27), High > 27): Readmission Risk Score: 17.8    Current PCP: Gerald Roland MD  PCP verified by CM? Yes Gerald Roland MD)    Chart Reviewed: Yes      History Provided by: Patient, Medical Record  Patient Orientation: Alert and Oriented, Person, Place, Self    Patient Cognition: Alert    Hospitalization in the last 30 days (Readmission):  No    If yes, Readmission Assessment in  Navigator will be completed.     Advance Directives:      Code Status: Full Code   Patient's Primary Decision Maker is: Legal Next of Kin    Primary Decision MakerMclint Espino  Shalonda - [phone]    Discharge Planning:    Patient lives with: Alone Type of Home: Trailer/Mobile Home  Primary Care Giver: Self  Patient Support Systems include: Home Care Staff, Children (Pt is current with [facility]: SN, PT, OT)   Current Financial resources: Medicare ([facility])  Current community resources:    Current services prior to admission: Meals On Wheels, Durable Medical Equipment, Home Care (Pt is current with [facility]: SN, PT, OT)            Current DME: Cane, Glucometer, Shower Chair, Other (Comment) ([employer])            Type of Home Care services:  Meals on Wheels, Nursing Services, OT, PT    ADLS  Prior functional level: Assistance with the following:, Mobility  Current functional level: Assistance with the following:, Mobility    PT AM-PAC:   /24  OT AM-PAC:   /24    Family can provide assistance at DC: Yes  Would you like Case Management to discuss the discharge plan with any other family members/significant others, and if so, who? No  Plans to Return to Present Housing: Yes  Other Identified Issues/Barriers to RETURNING to current housing: None  Potential Assistance needed at discharge: [address] ([facility])            Potential DME:  None identified  Patient expects to discharge to: Trailer/mobile home  Plan for transportation at discharge: Family    Financial    Payor: Sherryle Brunswick / Plan: Miko Field / Product Type: *No Product type* /     Does insurance require precert for SNF: Yes    Potential assistance Purchasing Medications: No    [id number] [place], [address] [phone] - [address]  [place] [address]  Phone: [phone] Fax: [phone]      Notes:    Factors facilitating achievement of predicted outcomes: Family support, Cooperative, Has needed Durable Medical Equipment at home, and Current with [facility], Receives MOW    Barriers to discharge: Pain    Additional Case Management Notes: [de-identified] y/o male pt who resides alone. He has insurance with pharmacy benefits, a PCP, and home DME. Pt is current with [facility]: SN, PT, OT. He receives MOW. Pt's son Nini Snow [phone] is his emergency contact. Pt states he has Chris Reil been able to tolerate a FLD and was told by the doctor that they are supposed to try to put him on a regular diet. \"  He also stated that \"he does not feel any better than when he was admitted r/t stomach pain. \"    The Plan for Transition of Care is related to the following treatment goals of Dehydration [E86.0]  Hyperglycemia [R73.9]  Acute kidney injury ([facility] [address] [N17.9]  Pancreatitis, unspecified pancreatitis type [K85.90]  Idiopathic acute pancreatitis without infection or necrosis [B58.00]    IF APPLICABLE: The Patient and/or patient representative Patrice and his family were provided with a choice of provider and agrees with the discharge plan. Freedom of choice list with basic dialogue that supports the patient's individualized plan of care/goals and shares the quality data associated with the providers was provided to: Patient   Patient Representative Name:       The Patient and/or Patient Representative Agree with the Discharge Plan?       BRADY Valencia  Case Management Department

## 2022-10-31 NOTE — PROGRESS NOTES
ACUTE OCCUPATIONAL THERAPY GOALS:   (Developed with and agreed upon by patient and/or caregiver.)  1. Patient will complete lower body bathing and dressing with MOD I and adaptive equipment as needed. 2.Patient will complete upper body bathing and dressing with MOD I and adaptive equipment as needed. 3. Patient will complete toileting with MOD I.   4. Patient will tolerate 30 minutes of OT treatment with 1-2 rest breaks to increase activity tolerance for ADLs. 5. Patient will complete functional transfers with MOD I and adaptive equipment as needed. 6. Patient will complete simple grooming task standing at the sink with MOD I. Timeframe: 7 visits      OCCUPATIONAL THERAPY Initial Assessment and Daily Note       OT Visit Days: 1  Acknowledge Orders  Time  OT Charge Capture  Rehab Caseload Tracker      Daniel Smith is a [de-identified] y.o. male   PRIMARY DIAGNOSIS: Pancreatitis, unspecified pancreatitis type  Dehydration [E86.0]  Hyperglycemia [R73.9]  Acute kidney injury ([facility] [address] [N17.9]  Pancreatitis, unspecified pancreatitis type [K85.90]  Idiopathic acute pancreatitis without infection or necrosis [K85.00]       Reason for Referral: Generalized Muscle Weakness (M62.81)  Other lack of cordination (R27.8)  Difficulty in walking, Not elsewhere classified (R26.2)  Inpatient: Payor: Yanick Harrington / Plan: Lacy Fiore / Product Type: *No Product type* /     ASSESSMENT:     REHAB RECOMMENDATIONS:   Recommendation to date pending progress:  Setting:  Short-term Rehab    Equipment:    To Be Determined     ASSESSMENT:  Mr. Maximo Matson presented to the hospital with abdominal pain--pt admitted with pancreatitis. At baseline, pt lives alone and is mod I for his ADLs and IADLs. Uses a SPC occasionally. Today, pt was received supine in bed. Completed bed mobility with CGA. Kim for LB dressing. Sit>stand and ambulation in room Kim x2. Pt reported feeling lightheaded/queezy.  BP noted to be 109/55 following mobility and increased to 117/68 once recliner. Pt reported improvement in symptoms. Completed grooming task in sitting with SBA. Pt weak and unsteady--recommend STR as pt is currently a falls risk and not safe to return home alone at current functional level. Mary Lopez currently demonstrates overall deficits in strength, balance, activity tolerance, and ADL performance. Patient would benefit from skilled OT services at this time in order to address functional deficits and OT goals stated above. [facility] [address] [address] AM-PAC 6 Clicks Daily Activity Inpatient Short Form:    AM-PAC Daily Activity Inpatient   How much help for putting on and taking off regular lower body clothing?: A Little  How much help for Bathing?: A Little  How much help for Toileting?: A Little  How much help for putting on and taking off regular upper body clothing?: A Little  How much help for taking care of personal grooming?: A Little  How much help for eating meals?: None  AM-[facility] Inpatient Daily Activity Raw Score: 19  AM-PAC Inpatient ADL T-Scale Score : 40.22  ADL Inpatient CMS 0-100% Score: 42.8  ADL Inpatient CMS G-Code Modifier : CK      SUBJECTIVE:     Mr. Barker Face states, \"I am not hard of hearing. \"     Social/Functional Lives With: Alone  Type of Home: Mobile home  Home Layout: One level  Bathroom Equipment: Shower chair  Home Equipment: DANIELLE/gopal Rdz Walker, [address] Help From: Family  ADL Assistance: Independent  Homemaking Assistance: Independent  Ambulation Assistance: Needs assistance  Transfer Assistance: Independent  Active : No  Mode of Transportation: Family  Occupation: Retired    OBJECTIVE:     RODY / Thomas Barcenas / Jovani Lauth: NA    RESTRICTIONS/PRECAUTIONS:  Restrictions/Precautions: Fall Risk    PAIN: VITALS / O2:   Pre Treatment:   Pain Assessment: None - Denies Pain      Post Treatment: no change        Vitals          Oxygen            GROSS EVALUATION: INTACT IMPAIRED   (See Comments)   UE AROM [x] []   UE PROM [x] []   Strength [] Functional for bADLs, generalized weakness noted throughout       Posture / Balance []  Fair+ sitting, fair standing    Sensation [x]     Coordination []       Tone [x]       Edema [x]    Activity Tolerance []  Seated rest breaks throughout session     Hand Dominance R [] L []      COGNITION/  PERCEPTION: INTACT IMPAIRED   (See Comments)   Orientation [x]     Vision [x]     Hearing [x]     Cognition  [x]     Perception [x]       MOBILITY: I Mod I S SBA CGA Min Mod Max Total  NT x2 Comments:   Bed Mobility    Rolling [] [] [] [] [] [] [] [] [] [x] []    Supine to Sit [] [] [] [] [x] [] [] [] [] [] []    Scooting [] [] [] [] [x] [] [] [] [] [] []    Sit to Supine [] [] [] [] [] [] [] [] [] [x] [] Left sitting in the chair    Transfers    Sit to Stand [] [] [] [] [] [x] [] [] [] [] [x]    Bed to Chair [] [] [] [] [] [x] [] [] [] [] [x] RW   Stand to Sit [] [] [] [] [] [x] [] [] [] [] []    Tub/Shower [] [] [] [] [] [] [] [] [] [x] []     Toilet [] [] [] [] [] [] [] [] [] [x] []      [] [] [] [] [] [] [] [] [] [] []    I=Independent, Mod I=Modified Independent, S=Supervision/Setup, SBA=Standby Assistance, CGA=Contact Guard Assistance, Min=Minimal Assistance, Mod=Moderate Assistance, Max=Maximal Assistance, Total=Total Assistance, NT=Not Tested    ACTIVITIES OF DAILY LIVING: I Mod I S SBA CGA Min Mod Max Total NT Comments   BASIC ADLs:              Upper Body Bathing  [] [] [] [] [] [] [] [] [] [x]    Lower Body Bathing [] [] [] [] [] [] [] [] [] [x]    Toileting [] [] [] [] [] [] [] [] [] [x]    Upper Body Dressing [] [] [] [] [] [] [] [] [] [x]    Lower Body Dressing [] [] [] [] [x] [] [] [] [] [] Socks    Feeding [] [] [] [] [] [] [] [] [] [x]    Grooming [] [] [] [x] [] [] [] [] [] [] Washed face sitting in the chair    Personal Device Care [] [] [] [] [] [] [] [] [] [x]    Functional Mobility [] [] [] [] [] [x] [] [] [] [] X2 RW   I=Independent, Mod I=Modified Independent, S=Supervision/Setup, SBA=Standby Assistance, CGA=Contact Lisa [address], Min=Minimal Assistance, Mod=Moderate Assistance, Max=Maximal Assistance, Total=Total Assistance, NT=Not Tested    PLAN:   [facility] of Care: 3 times/week for duration of hospital stay or until stated goals are met, whichever comes first.    PROBLEM LIST:   (Skilled intervention is medically necessary to address:)  Decreased ADL/Functional Activities  Decreased Activity Tolerance  Decreased Balance  Decreased Cognition  Decreased Coordination  Decreased Safety Awareness  Decreased Strength  Decreased Transfer Abilities   INTERVENTIONS PLANNED:  (Benefits and precautions of occupational therapy have been discussed with the patient.)  Self Care Training  Therapeutic Activity  Therapeutic Exercise/HEP  Neuromuscular Re-education  Manual Therapy  Education         TREATMENT:     EVALUATION: LOW COMPLEXITY: (Untimed Charge)    TREATMENT:   Co-Treatment PT/OT necessary due to patient's decreased overall endurance/tolerance levels, as well as need for high level skilled assistance to complete functional transfers/mobility and functional tasks  Neuromuscular Re-education (10 Minutes): Neuromuscular Re-education included Balance Training, Coordination training, Postural training, Sitting balance training, and Standing balance training to improve Balance, Coordination, Functional Mobility, and Postural Control. Self Care (10 minutes): Patient participated in lower body dressing and grooming ADLs in unsupported sitting with minimal verbal cueing to increase independence, decrease assistance required, increase activity tolerance, and increase safety awareness. Patient also participated in functional mobility and functional transfer training to increase independence, decrease assistance required, increase activity tolerance, and increase safety awareness.      TREATMENT GRID:  N/A    AFTER TREATMENT PRECAUTIONS: Call light within reach, Chair, Needs within reach, and RN notified    INTERDISCIPLINARY COLLABORATION:  RN/ PCT, PT/ PTA, and OT/ SILVA    EDUCATION:  Education Given To: Patient  Education Provided: Role of Therapy;Plan of Care; Fall Prevention Strategies  Education Outcome: Verbalized understanding;Demonstrated understanding    TOTAL TREATMENT DURATION AND TIME:  Time In: 1315  Time Out: [address]  Minutes: 12 Jesse Don, OT

## 2022-11-01 LAB
GLUCOSE BLD STRIP.AUTO-MCNC: 205 MG/DL (ref 65–100)
GLUCOSE BLD STRIP.AUTO-MCNC: 232 MG/DL (ref 65–100)
GLUCOSE BLD STRIP.AUTO-MCNC: 253 MG/DL (ref 65–100)
GLUCOSE BLD STRIP.AUTO-MCNC: 263 MG/DL (ref 65–100)
MAGNESIUM SERPL-MCNC: 2 MG/DL (ref 1.8–2.4)
MM INDURATION, POC: 0 MM (ref 0–5)
POTASSIUM SERPL-SCNC: 4.1 MMOL/L (ref 3.5–5.1)
PPD, POC: NEGATIVE
SERVICE CMNT-IMP: ABNORMAL

## 2022-11-01 PROCEDURE — 1100000000 HC RM PRIVATE

## 2022-11-01 PROCEDURE — 6370000000 HC RX 637 (ALT 250 FOR IP): Performed by: INTERNAL MEDICINE

## 2022-11-01 PROCEDURE — 84132 ASSAY OF SERUM POTASSIUM: CPT

## 2022-11-01 PROCEDURE — 2580000003 HC RX 258: Performed by: INTERNAL MEDICINE

## 2022-11-01 PROCEDURE — 82962 GLUCOSE BLOOD TEST: CPT

## 2022-11-01 PROCEDURE — 83735 ASSAY OF MAGNESIUM: CPT

## 2022-11-01 PROCEDURE — 97530 THERAPEUTIC ACTIVITIES: CPT

## 2022-11-01 PROCEDURE — 36415 COLL VENOUS BLD VENIPUNCTURE: CPT

## 2022-11-01 PROCEDURE — 6370000000 HC RX 637 (ALT 250 FOR IP): Performed by: NURSE PRACTITIONER

## 2022-11-01 PROCEDURE — 97112 NEUROMUSCULAR REEDUCATION: CPT

## 2022-11-01 RX ORDER — INSULIN LISPRO 100 [IU]/ML
2 INJECTION, SOLUTION INTRAVENOUS; SUBCUTANEOUS 2 TIMES DAILY WITH MEALS
Status: DISCONTINUED | OUTPATIENT
Start: 2022-11-01 | End: 2022-11-02 | Stop reason: HOSPADM

## 2022-11-01 RX ADMIN — TAMSULOSIN HYDROCHLORIDE 0.4 MG: 0.4 CAPSULE ORAL at 09:10

## 2022-11-01 RX ADMIN — POLYETHYLENE GLYCOL 3350 17 G: 17 POWDER, FOR SOLUTION ORAL at 10:29

## 2022-11-01 RX ADMIN — BUPROPION HYDROCHLORIDE 150 MG: 150 TABLET, EXTENDED RELEASE ORAL at 09:10

## 2022-11-01 RX ADMIN — LISINOPRIL 5 MG: 5 TABLET ORAL at 09:10

## 2022-11-01 RX ADMIN — FERROUS SULFATE TAB 325 MG (65 MG ELEMENTAL FE) 325 MG: 325 (65 FE) TAB at 09:10

## 2022-11-01 RX ADMIN — INSULIN LISPRO 2 UNITS: 100 INJECTION, SOLUTION INTRAVENOUS; SUBCUTANEOUS at 17:40

## 2022-11-01 RX ADMIN — INSULIN LISPRO 6 UNITS: 100 INJECTION, SOLUTION INTRAVENOUS; SUBCUTANEOUS at 17:42

## 2022-11-01 RX ADMIN — SODIUM CHLORIDE, PRESERVATIVE FREE 10 ML: 5 INJECTION INTRAVENOUS at 20:50

## 2022-11-01 RX ADMIN — SENNOSIDES AND DOCUSATE SODIUM 2 TABLET: 8.6; 5 TABLET ORAL at 09:10

## 2022-11-01 RX ADMIN — FERROUS SULFATE TAB 325 MG (65 MG ELEMENTAL FE) 325 MG: 325 (65 FE) TAB at 20:53

## 2022-11-01 RX ADMIN — INSULIN LISPRO 6 UNITS: 100 INJECTION, SOLUTION INTRAVENOUS; SUBCUTANEOUS at 11:53

## 2022-11-01 RX ADMIN — INSULIN LISPRO 2 UNITS: 100 INJECTION, SOLUTION INTRAVENOUS; SUBCUTANEOUS at 11:52

## 2022-11-01 RX ADMIN — METOPROLOL TARTRATE 12.5 MG: 25 TABLET, FILM COATED ORAL at 09:11

## 2022-11-01 RX ADMIN — INSULIN LISPRO 4 UNITS: 100 INJECTION, SOLUTION INTRAVENOUS; SUBCUTANEOUS at 09:11

## 2022-11-01 RX ADMIN — DONEPEZIL HYDROCHLORIDE 5 MG: 5 TABLET, FILM COATED ORAL at 20:53

## 2022-11-01 RX ADMIN — INSULIN LISPRO 4 UNITS: 100 INJECTION, SOLUTION INTRAVENOUS; SUBCUTANEOUS at 21:26

## 2022-11-01 RX ADMIN — DRONABINOL 10 MG: 2.5 CAPSULE ORAL at 20:53

## 2022-11-01 RX ADMIN — PANTOPRAZOLE SODIUM 40 MG: 40 TABLET, DELAYED RELEASE ORAL at 09:10

## 2022-11-01 ASSESSMENT — PAIN SCALES - GENERAL
PAINLEVEL_OUTOF10: 0

## 2022-11-01 NOTE — PROGRESS NOTES
ACUTE OCCUPATIONAL THERAPY GOALS:   (Developed with and agreed upon by patient and/or caregiver.)  1. Patient will complete lower body bathing and dressing with MOD I and adaptive equipment as needed. 2.Patient will complete upper body bathing and dressing with MOD I and adaptive equipment as needed. 3. Patient will complete toileting with MOD I.   4. Patient will tolerate 30 minutes of OT treatment with 1-2 rest breaks to increase activity tolerance for ADLs. 5. Patient will complete functional transfers with MOD I and adaptive equipment as needed. 6. Patient will complete simple grooming task standing at the sink with MOD I. Timeframe: 7 visits     OCCUPATIONAL THERAPY: Daily Note    OT Visit Days: 2   Time  OT Charge Capture  Rehab Caseload Tracker  OT Orders    Char Her is a [de-identified] y.o. male   PRIMARY DIAGNOSIS: Pancreatitis, unspecified pancreatitis type  Dehydration [E86.0]  Hyperglycemia [R73.9]  Acute kidney injury ([facility] [address] [N17.9]  Pancreatitis, unspecified pancreatitis type [K85.90]  Idiopathic acute pancreatitis without infection or necrosis [K85.00]       Inpatient: Payor: [facility] MEDICARE / Plan: [place] / Product Type: *No Product type* /     ASSESSMENT:     REHAB RECOMMENDATIONS: CURRENT LEVEL OF FUNCTION:  (Most Recently Demonstrated)   Recommendation to date pending progress:  Setting:  Short-term Rehab    Equipment:    To Be Determined Bathing:  Not Tested  Dressing: Moderate Assist  Feeding/Grooming:  Not Tested  Toileting:  Not Tested  Functional Mobility:  Minimal Assist     ASSESSMENT:  Mr. Angela Mixon is slowly progressing towards OT goals but was limited today by being orthostatic with mobility. Today, pt was received sitting in the chair. ModA for LB dressing. Sit>stand Kim. Stood with Kim for BP readings--sitting 108/78, standing 92/47, sitting 113/53 (after feet elevated and approx 5-10 minutes). Pt reported feeling whoozy and lightheaded with stand.  Once recovered attempted one more stand with BP dropping again to 96/55 and pt symptomatic. PCT and RN came to bedside. Continued to require 5-10 minutes to recover. Session very limited by pt's BP today. Recommend STR at discharge. Mr. Gerard Hooks continues to demonstrate overall deficits in strength, balance, activity tolerance, and ADL performance. Continue OT efforts and POC in order to address functional deficits and OT goals stated above. SUBJECTIVE:     Mr. Gerard Hooks states, \"I am not feeling too good. \"     Social/Functional Lives With: Alone  Type of Home: Mobile home  Home Layout: One level  Bathroom Equipment: Shower chair  Home Equipment: [place], [place], quad, Walker, [address] Help From: Family  ADL Assistance: Independent  Homemaking Assistance: Independent  Ambulation Assistance: Needs assistance  Transfer Assistance: Independent  Active : No  Mode of Transportation: Family  Occupation: Retired    OBJECTIVE:     LINES / Florencia Cough / Loreda Martinez: NA    RESTRICTIONS/PRECAUTIONS:  Restrictions/Precautions  Restrictions/Precautions: Fall Risk        PAIN: Cori Revering / O2:   Pre Treatment:            Post Treatment: no change  Vitals          Oxygen        MOBILITY: I Mod I S SBA CGA Min Mod Max Total  NT x2 Comments:   Bed Mobility    Rolling [] [] [] [] [] [] [] [] [] [x] []    Supine to Sit [] [] [] [] [] [] [] [] [] [x] [] Received in chair    Scooting [] [] [] [] [] [] [] [] [] [x] []    Sit to Supine [] [] [] [] [] [] [] [] [] [x] [] Left sitting in the chair    Transfers    Sit to Stand [] [] [] [] [] [x] [] [] [] [] []    Bed to Chair [] [] [] [] [] [] [] [] [] [x] []    Stand to Sit [] [] [] [] [] [x] [] [] [] [] []    Tub/Shower [] [] [] [] [] [] [] [] [] [x] []     Toilet [] [] [] [] [] [] [] [] [] [x] []      [] [] [] [] [] [] [] [] [] [] []    I=Independent, Mod I=Modified Independent, S=Supervision/Setup, SBA=Standby Assistance, CGA=Contact Guard Assistance, Min=Minimal Assistance, Mod=Moderate Assistance, Max=Maximal Assistance, Total=Total Assistance, NT=Not Tested    ACTIVITIES OF DAILY LIVING: I Mod I S SBA CGA Min Mod Max Total NT Comments   BASIC ADLs:              Upper Body   Bathing [] [] [] [] [] [] [] [] [] [x]    Lower Body Bathing [] [] [] [] [] [] [] [] [] [x]    Toileting [] [] [] [] [] [] [] [] [] [x]    Upper Body Dressing [] [] [] [] [] [] [] [] [] [x]    Lower Body Dressing [] [] [] [] [] [] [x] [] [] [] Socks    Feeding [] [] [] [] [] [] [] [] [] [x]    Grooming [] [] [] [] [] [] [] [] [] [x]    Personal Device Care [] [] [] [] [] [] [] [] [] [x]    Functional Mobility [] [] [] [] [] [x] [] [] [] []    I=Independent, Mod I=Modified Independent, S=Supervision/Setup, SBA=Standby Assistance, CGA=Contact Guard Assistance, Min=Minimal Assistance, Mod=Moderate Assistance, Max=Maximal Assistance, Total=Total Assistance, NT=Not Tested    BALANCE: Good Fair+ Fair Fair- Poor NT Comments   Sitting Static [x] [] [] [] [] []    Sitting Dynamic [] [x] [] [] [] []              Standing Static [] [x] [] [] [] []    Standing Dynamic [] [] [] [] [] [x]        PLAN:     FREQUENCY/DURATION   OT Plan of Care: 3 times/week for duration of hospital stay or until stated goals are met, whichever comes first.    TREATMENT:     TREATMENT:   Co-Treatment PT/OT necessary due to patient's decreased overall endurance/tolerance levels, as well as need for high level skilled assistance to complete functional transfers/mobility and functional tasks  Neuromuscular Re-education (23 Minutes): Neuromuscular Re-education included Balance Training, Coordination training, Postural training, Sitting balance training, and Standing balance training to improve Balance, Coordination, Functional Mobility, and Postural Control.     TREATMENT GRID:  N/A    AFTER TREATMENT PRECAUTIONS: Call light within reach, Chair, Needs within reach, and RN notified    INTERDISCIPLINARY COLLABORATION:  RN/ PCT, PT/ PTA, and OT/ SILVA    EDUCATION: TOTAL TREATMENT DURATION AND TIME:  Time In: 2595  Time Out: 1107  Minutes: [address], OT

## 2022-11-01 NOTE — CARE COORDINATION
Pt's son provided CM with three  facility choices for STR:  [facility] - Accepted. Son's first choice. [address] [address] stating facility full  [facility]     Pt discussed during IDR and per Dr. Isrrael Burroughs pt is medically stable to d/c once insurance auth is received. CM requested that [facility] start pre-cert today. CM updated pt and pt's son. Pt's son stated that pt has been to STR at that facility before and they really like it. CM will continue to follow.

## 2022-11-01 NOTE — PROGRESS NOTES
-Pt telemetry , provider notified and no new orders for telemetry.   -Pt has no complaints of pain, N/V  -Pt states he is just hungry and wants food.  Pt given coffee and chicken broth

## 2022-11-01 NOTE — DIABETES MGMT
Patient admitted with pancreatitis. Blood glucose ranged  yesterday with patient receiving Humalog 10 units. Blood glucose this morning was not yet available in [facility]. Reviewed patient current regimen: Humalog correctional insulin medium dose. Noted glucose elevated post lunch and dinner patient would likely benefit from initiation of prandial insulin to help offset hyperglycemia during the day related to caloric intake. Provider updated via [employer] regarding recommendations and patient glycemic control.

## 2022-11-01 NOTE — PROGRESS NOTES
Physician Progress Note      PATIENT:               Jamie STRAUSS #:                  [id number]  :                       1940  ADMIT DATE:       10/28/2022 9:00 AM  [address] [place] DATE:  RESPONDING  PROVIDER #:        Danelle Olivera MD          QUERY TEXT:    Patient admitted with pancreatitis and noted to have tachycardia, tachypnea   and elevated creatinine. If possible, please document in progress notes and   discharge summary if you are evaluating and/or treating any of the following: The medical record reflects the following:  Risk Factors: [de-identified] y.o. male with medical history of diabetes, hypertension,   history of lung cancer mild dementia with anxiety who presented with pain x4   days. Clinical Indicators: tachycardia, tachypnea, Creatinine: 1.8, 1.3, .80, .70  Treatment: IVF's, monitor VS, serial labs    Thank you,  Patricia Jamison RN, BSN, CDI  [email or web address]  . Options provided:  -- SIRS of non-infectious origin due to pancreatitis without acute organ   dysfunction  -- SIRS of non-infectious origin due to pancreatitis with ERNESTO  -- Other - I will add my own diagnosis  -- Disagree - Not applicable / Not valid  -- Disagree - Clinically unable to determine / Unknown  -- Refer to Clinical Documentation Reviewer    PROVIDER RESPONSE TEXT:    This patient has SIRS of non-infectious origin due to pancreatitis without   acute organ dysfunction.     Query created by: Doreen Camargo on 2022 9:45 AM      Electronically signed by:  Danelle Olivera MD 2022 1:16 PM

## 2022-11-01 NOTE — PROGRESS NOTES
Hospitalist Progress Note   Admit Date:  10/28/2022  9:00 AM   Name:  Jordan Harrison   Age:  [de-identified] y.o. Sex:  male  :  1940   MRN:  [id number]   Room:  [facility]/    Presenting Complaint: Abdominal Pain     Reason(s) for Admission: Dehydration [E86.0]  Hyperglycemia [R73.9]  Acute kidney injury ([address] [N17.9]  Pancreatitis, unspecified pancreatitis type [K85.90]  Idiopathic acute pancreatitis without infection or necrosis [K85.00]     Hospital Course:   Jordan Harrison is an [de-identified] y.o. AAM with a PMH of diabetes mellitus, hypertension, lung cancer, mild dementia with anxiety who presented with pain x4 days. Has some nausea without any vomiting since last night. He reports that pain started suddenly without any triggers. Located in epigastric region with radiation to the right side and sometimes to the back. Reports chills without any fevers. No chest pain or shortness of breath. Reporting having constipation due to not being able to walk outside as he normally would. In the ER, patient is hemodynamically stable. Laboratory work-up is notable for glucose of 197, hemoglobin of 12.9, lipase of 1942. CT A/P with mild peripancreatic stranding about the head and neck without fluid collection or ductal dilatation. Also noted to have moderate to large colonic stool burden. Per ER note, patient was noted to have intermittent afib. EKG @9:48am showed sinus tachy vs aflutter with repeat at @9:53am showing NSR. This has resolved. The patient was admitted to the Hospitalist service. Subjective & 24hr Events (22): No AEO. Electrolytes normalized today. Will advance diet. Stable for discharge when STR bed available. Assessment & Plan:     Pancreatitis  Lipase 1,942. CT noted.   - IVF  - Advance to GI soft on   - pain control with tramadol and morphine  - Repeat lipase 153     Hypokalemia  Hypomagnesemia  - Resolved    Sinus tach vs atrial flutter  - continue metoprolol   - resolved     Constipation  - MiraLAX and Senokot daily  - Resolved     T2DM  - hold PO meds  - takes Lantus but does not take it daily. 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 *      Objective:   Patient Vitals for the past 24 hrs:   Temp Pulse Resp BP SpO2   11/01/22 1102 98.1 °F (36.7 °C) (!) 107 -- (!) 96/55 95 %   11/01/22 0807 99.7 °F (37.6 °C) 97 18 109/63 93 %   11/01/22 0214 98.4 °F (36.9 °C) 89 16 126/61 99 %   10/31/22 2304 98.2 °F (36.8 °C) 77 16 119/62 99 %   10/31/22 1910 98.4 °F (36.9 °C) 79 16 124/63 100 %   10/31/22 1515 97.8 °F (36.6 °C) 88 16 121/62 100 %   10/31/22 1115 98.2 °F (36.8 °C) 83 18 120/69 95 %         Oxygen Therapy  SpO2: 95 %  O2 Device: None (Room air)    Estimated body mass index is 17.79 kg/m² as calculated from the following:    Height as of this encounter: 6' 1\" (1.854 m). Weight as of this encounter: 134 lb 14 oz (61.2 kg). Intake/Output Summary (Last 24 hours) at 11/1/2022 1104  Last data filed at 11/1/2022 0904  Gross per 24 hour   Intake 1080 ml   Output 3050 ml   Net -1970 ml           Physical Exam:   Blood pressure (!) 96/55, pulse (!) 107, temperature 98.1 °F (36.7 °C), resp.  rate 18, height 6' 1\" (1.854 m), weight 134 lb 14 oz (61.2 kg), SpO2 95 %. General:    NAD    Head:  Normocephalic, atraumatic  Eyes:  Sclerae appear normal.  Pupils equally round. ENT:  Nares appear normal, no drainage. Moist oral mucosa  Neck:  No restricted ROM. Trachea midline   CV:   RRR. No m/r/g. Lungs: No wheezing. No tachypnea. Abdomen:   Soft, nondistended. Extremities: No cyanosis or clubbing. Skin:     No rashes and normal coloration. Warm and dry. Neuro:  CN II-XII grossly intact. A&Ox3  Psych:  Normal mood and affect. I have personally reviewed labs and tests showing:  Recent Labs:  Recent Results (from the past 48 hour(s))   POCT Glucose    Collection Time: 10/30/22 11:10 AM   Result Value Ref Range    POC Glucose 96 65 - 100 mg/dL    Performed by:  Therese    POCT Glucose    Collection Time: 10/30/22  4:40 PM   Result Value Ref Range    POC Glucose 206 (H) 65 - 100 mg/dL    Performed by: Christen    POCT Glucose    Collection Time: 10/30/22  8:40 PM   Result Value Ref Range    POC Glucose 157 (H) 65 - 100 mg/dL    Performed by: Zee Zuleta PPD TEST IN 24 HRS    Collection Time: 10/31/22 12:00 AM   Result Value Ref Range    PPD, (POC) Negative Negative    mm Induration 0 0 - 5 mm   Comprehensive Metabolic Panel w/ Reflex to MG    Collection Time: 10/31/22  6:46 AM   Result Value Ref Range    Sodium 135 133 - 143 mmol/L    Potassium 3.4 (L) 3.5 - 5.1 mmol/L    Chloride 106 101 - 110 mmol/L    CO2 26 21 - 32 mmol/L    Anion Gap 3 2 - 11 mmol/L    Glucose 69 65 - 100 mg/dL    BUN 4 (L) 8 - 23 MG/DL    Creatinine 0.70 (L) 0.8 - 1.5 MG/DL    Est, Glom Filt Rate >60 >60 ml/min/1.73m2    Calcium 7.8 (L) 8.3 - 10.4 MG/DL    Total Bilirubin 0.3 0.2 - 1.1 MG/DL    ALT 14 12 - 65 U/L    AST 22 15 - 37 U/L    Alk Phosphatase 146 (H) 50 - 136 U/L    Total Protein 5.0 (L) 6.3 - 8.2 g/dL    Albumin 2.1 (L) 3.2 - 4.6 g/dL    Globulin 2.9 2.8 - 4.5 g/dL    Albumin/Globulin Ratio 0.7 0.4 - 1.6 CBC with Auto Differential    Collection Time: 10/31/22  6:46 AM   Result Value Ref Range    WBC 5.5 4.3 - 11.1 K/uL    RBC 3.95 (L) 4.23 - 5.6 M/uL    Hemoglobin 10.9 (L) 13.6 - 17.2 g/dL    Hematocrit 32.2 (L) 41.1 - 50.3 %    MCV 81.5 (L) 82 - 102 FL    MCH 27.6 26.1 - 32.9 PG    MCHC 33.9 31.4 - 35.0 g/dL    RDW 13.3 11.9 - 14.6 %    Platelets 207 018 - 437 K/uL    MPV 8.7 (L) 9.4 - 12.3 FL    nRBC 0.00 0.0 - 0.2 K/uL    Differential Type AUTOMATED      Seg Neutrophils 63 43 - 78 %    Lymphocytes 26 13 - 44 %    Monocytes 11 4.0 - 12.0 %    Eosinophils % 0 (L) 0.5 - 7.8 %    Basophils 0 0.0 - 2.0 %    Immature Granulocytes 0 0.0 - 5.0 %    Segs Absolute 3.4 1.7 - 8.2 K/UL    Absolute Lymph # 1.4 0.5 - 4.6 K/UL    Absolute Mono # 0.6 0.1 - 1.3 K/UL    Absolute Eos # 0.0 0.0 - 0.8 K/UL    Basophils Absolute 0.0 0.0 - 0.2 K/UL    Absolute Immature Granulocyte 0.0 0.0 - 0.5 K/UL   Magnesium    Collection Time: 10/31/22  6:46 AM   Result Value Ref Range    Magnesium 1.6 (L) 1.8 - 2.4 mg/dL   POCT Glucose    Collection Time: 10/31/22  8:14 AM   Result Value Ref Range    POC Glucose 80 65 - 100 mg/dL    Performed by: Cade    POCT Glucose    Collection Time: 10/31/22 11:28 AM   Result Value Ref Range    POC Glucose 124 (H) 65 - 100 mg/dL    Performed by: Cade    POCT Glucose    Collection Time: 10/31/22  4:20 PM   Result Value Ref Range    POC Glucose 272 (H) 65 - 100 mg/dL    Performed by: Gaby    POCT Glucose    Collection Time: 10/31/22  8:35 PM   Result Value Ref Range    POC Glucose 243 (H) 65 - 100 mg/dL    Performed by: Jeffery    PLEASE READ & DOCUMENT PPD TEST IN 48 HRS    Collection Time: 11/01/22 12:00 AM   Result Value Ref Range    PPD, (POC) Negative Negative    mm Induration 0 0 - 5 mm   Potassium    Collection Time: 11/01/22  6:00 AM   Result Value Ref Range    Potassium 4.1 3.5 - 5.1 mmol/L   Magnesium    Collection Time: 11/01/22  6:00 AM   Result Value Ref Range    Magnesium 2.0 1.8 - 2.4 mg/dL   POCT Glucose    Collection Time: 11/01/22  8:11 AM   Result Value Ref Range    POC Glucose 205 (H) 65 - 100 mg/dL    Performed by: Baljit Xie. I have personally reviewed imaging studies showing: Other Studies:  CT ABDOMEN PELVIS W IV CONTRAST Additional Contrast? Oral   Final Result   1. Mild peripancreatic stranding about the head/neck without fluid collection   or ductal dilatation, otherwise pancreatic parenchyma is homogeneous and   attenuation. 2.  Right lower pole 1.9 cm intermediate attenuating hypodensity likely favoring   a likely minimally complex cyst.      3.  Moderate to large colonic stool burden which should be correlated with   clinical constipation. 4.  Emphysematous changes and subsegmental scarring of the lung bases.                 Current Meds:  Current Facility-Administered Medications   Medication Dose Route Frequency    insulin lispro (HUMALOG) injection vial 2 Units  2 Units SubCUTAneous BID WC    glucose chewable tablet 16 g  4 tablet Oral PRN    dextrose bolus 10% 125 mL  125 mL IntraVENous PRN    Or    dextrose bolus 10% 250 mL  250 mL IntraVENous PRN    glucagon (rDNA) injection 1 mg  1 mg SubCUTAneous PRN    dextrose 10 % infusion   IntraVENous Continuous PRN    lisinopril (PRINIVIL;ZESTRIL) tablet 5 mg  5 mg Oral Daily    ferrous sulfate (IRON 325) tablet 325 mg  325 mg Oral BID    dronabinol (MARINOL) capsule 10 mg  10 mg Oral Nightly    buPROPion (WELLBUTRIN SR) extended release tablet 150 mg  150 mg Oral Daily    sennosides-docusate sodium (SENOKOT-S) 8.6-50 MG tablet 2 tablet  2 tablet Oral Daily    diatrizoate meglumine-sodium (GASTROGRAFIN) 66-10 % solution 15 mL  15 mL Oral ONCE PRN    sodium chloride flush 0.9 % injection 5-40 mL  5-40 mL IntraVENous PRN    0.9 % sodium chloride infusion   IntraVENous PRN    ondansetron (ZOFRAN-ODT) disintegrating tablet 4 mg  4 mg Oral Q8H PRN    Or    ondansetron [facility]) injection 4 mg  4 mg IntraVENous Q6H PRN    polyethylene glycol (GLYCOLAX) packet 17 g  17 g Oral Daily PRN    bisacodyl (DULCOLAX) suppository 10 mg  10 mg Rectal Daily PRN    famotidine (PEPCID) tablet 10 mg  10 mg Oral Daily PRN    aluminum & magnesium hydroxide-simethicone (MAALOX) 200-200-20 MG/5ML suspension 30 mL  30 mL Oral Q6H PRN    acetaminophen (TYLENOL) tablet 650 mg  650 mg Oral Q6H PRN    Or    acetaminophen (TYLENOL) suppository 650 mg  650 mg Rectal Q6H PRN    enoxaparin (LOVENOX) injection 40 mg  40 mg SubCUTAneous Q24H    insulin lispro (HUMALOG) injection vial 0-10 Units  0-10 Units SubCUTAneous 4x Daily AC & HS    morphine injection 2 mg  2 mg IntraVENous Q6H PRN    traMADol (ULTRAM) tablet 50 mg  50 mg Oral Q6H PRN    donepezil (ARICEPT) tablet 5 mg  5 mg Oral Nightly    tamsulosin (FLOMAX) capsule 0.4 mg  0.4 mg Oral Daily    pantoprazole (PROTONIX) tablet 40 mg  40 mg Oral Daily    metoprolol tartrate (LOPRESSOR) tablet 12.5 mg  12.5 mg Oral BID    LORazepam (ATIVAN) tablet 0.5 mg  0.5 mg Oral Q12H PRN    polyethylene glycol (GLYCOLAX) packet 17 g  17 g Oral Daily       Signed:  Radha Callejas, APRN - CNP    Part of this note may have been written by using a voice dictation software. The note has been proof read but may still contain some grammatical/other typographical errors.

## 2022-11-01 NOTE — PROGRESS NOTES
ACUTE PHYSICAL THERAPY GOALS:   (Developed with and agreed upon by patient and/or caregiver.)  (1.) Kitty Plater  will move from supine to sit and sit to supine , scoot up and down, and roll side to side with MODIFIED INDEPENDENCE within 7 treatment day(s). (2.) Kitty Plater will transfer from bed to chair and chair to bed with STAND BY ASSIST using the least restrictive device within 7 treatment day(s). (3.) Kitty Plater will ambulate with CONTACT GUARD ASSIST for 150 feet with the least restrictive device within 7 treatment day(s). (4.) Kitty Plater will perform standing static and dynamic balance activities x 15 minutes with STAND BY ASSIST to improve safety within 7 treatment day(s). (5.) Kitty Plater will perform therapeutic exercises x 15 min for HEP with SUPERVISION to improve strength, endurance, and functional mobility within 7 treatment day(s). PHYSICAL THERAPY: Daily Note AM   (Link to Caseload Tracking: PT Visit Days : 2  Time In/Out PT Charge Capture  Rehab Caseload Tracker  Orders    Kitty Sinclairr is a [de-identified] y.o. male   PRIMARY DIAGNOSIS: Pancreatitis, unspecified pancreatitis type  Dehydration [E86.0]  Hyperglycemia [R73.9]  Acute kidney injury ([facility] [address] [N17.9]  Pancreatitis, unspecified pancreatitis type [K85.90]  Idiopathic acute pancreatitis without infection or necrosis [K85.00]       Inpatient: Payor: [facility] MEDICARE / Plan: Jose J Leong / Product Type: *No Product type* /     ASSESSMENT:     REHAB RECOMMENDATIONS:   Recommendation to date pending progress:  Setting:  Short-term Rehab    Equipment:    To Be Determined     ASSESSMENT:  Mr. Leanna Story is rather orthostatic today. In fact most of the treatment was spent working through positional blood pressures. He was received in the chair and BP was 108/78, he stood and bp was 92/47. He was symptomatic. Had him sit and after 5 minutes 113/53. Although he was still symptomatic.   Had him stand again and bp was 96/55.   He was left in chair with nurse aware and messaging the MD>       SUBJECTIVE:   Mr. Nino Ascencio states, Raydell Fruits do I feel like this\"     Social/Functional Lives With: Alone  Type of Home: Mobile home  Home Layout: One level  Bathroom Equipment: Shower chair  Home Equipment: Ned Hua, quad, Nubia Matthews, [address] Help From: Family  ADL Assistance: Independent  Homemaking Assistance: Independent  Ambulation Assistance: Needs assistance  Transfer Assistance: Independent  Active : No  Mode of Transportation: Family  Occupation: Retired  OBJECTIVE:     PAIN: VITALS / O2: PRECAUTION / Mariam Adair / Ayana Vishal:   Pre Treatment:    o      Post Treatment: o Vitals        Oxygen    IV    RESTRICTIONS/PRECAUTIONS:  Restrictions/Precautions  Restrictions/Precautions: Fall Risk  Restrictions/Precautions: Fall Risk     MOBILITY: I Mod I S SBA CGA Min Mod Max Total  NT x2 Comments:   Bed Mobility    Rolling [] [] [] [] [] [] [] [] [] [] []    Supine to Sit [] [] [] [] [] [] [] [] [] [] []    Scooting [] [] [] [] [] [] [] [] [] [] []    Sit to Supine [] [] [] [] [] [] [] [] [] [] []    Transfers    Sit to Stand [] [] [] [] [] [] [] [] [] [] []    Bed to Chair [] [] [] [] [] [] [] [] [] [] []    Stand to Sit [] [] [] [] [] [] [] [] [] [] []     [] [] [] [] [] [] [] [] [] [] []    I=Independent, Mod I=Modified Independent, S=Supervision, SBA=Standby Assistance, CGA=Contact Guard Assistance,   Min=Minimal Assistance, Mod=Moderate Assistance, Max=Maximal Assistance, Total=Total Assistance, NT=Not Tested    BALANCE: Good Fair+ Fair Fair- Poor NT Comments   Sitting Static [] [] [] [] [] []    Sitting Dynamic [] [] [] [] [] []              Standing Static [] [] [] [] [] []    Standing Dynamic [] [] [] [] [] []      GAIT: I Mod I S SBA CGA Min Mod Max Total  NT x2 Comments:   Level of Assistance [] [] [] [] [] [] [] [] [] [] []    Distance   feet    DME N/A    Gait Quality N/A    Weightbearing Status      Stairs I=Independent, Mod I=Modified Independent, S=Supervision, SBA=Standby Assistance, CGA=Contact Guard Assistance,   Min=Minimal Assistance, Mod=Moderate Assistance, Max=Maximal Assistance, Total=Total Assistance, NT=Not Tested    PLAN:   FREQUENCY AND DURATION: 3 times/week for duration of hospital stay or until stated goals are met, whichever comes first.    TREATMENT:   TREATMENT:   Co-Treatment PT/OT necessary due to patient's decreased overall endurance/tolerance levels, as well as need for high level skilled assistance to complete functional transfers/mobility and functional tasks  Therapeutic Activity (23 Minutes): Therapeutic activity included Transfer Training, Sitting balance , and Standing balance to improve functional Mobility.     TREATMENT GRID:  N/A    AFTER TREATMENT PRECAUTIONS: Call light within reach, Chair, Needs within reach, and RN notified    INTERDISCIPLINARY COLLABORATION:  RN/ PCT, PT/ PTA, and OT/ SILVA    EDUCATION:      TIME IN/OUT:  Time In: 5773  Time Out: 1107  Minutes: 888 Darline Lopes PT

## 2022-11-02 VITALS
HEART RATE: 95 BPM | WEIGHT: 134.63 LBS | HEIGHT: 73 IN | OXYGEN SATURATION: 95 % | SYSTOLIC BLOOD PRESSURE: 106 MMHG | DIASTOLIC BLOOD PRESSURE: 58 MMHG | BODY MASS INDEX: 17.84 KG/M2 | RESPIRATION RATE: 18 BRPM | TEMPERATURE: 98.8 F

## 2022-11-02 LAB
GLUCOSE BLD STRIP.AUTO-MCNC: 191 MG/DL (ref 65–100)
GLUCOSE BLD STRIP.AUTO-MCNC: 278 MG/DL (ref 65–100)
SARS-COV-2 RDRP RESP QL NAA+PROBE: NOT DETECTED
SERVICE CMNT-IMP: ABNORMAL
SERVICE CMNT-IMP: ABNORMAL
SOURCE: NORMAL

## 2022-11-02 PROCEDURE — 82962 GLUCOSE BLOOD TEST: CPT

## 2022-11-02 PROCEDURE — 6370000000 HC RX 637 (ALT 250 FOR IP): Performed by: NURSE PRACTITIONER

## 2022-11-02 PROCEDURE — 97535 SELF CARE MNGMENT TRAINING: CPT

## 2022-11-02 PROCEDURE — 6370000000 HC RX 637 (ALT 250 FOR IP): Performed by: INTERNAL MEDICINE

## 2022-11-02 PROCEDURE — 97530 THERAPEUTIC ACTIVITIES: CPT

## 2022-11-02 PROCEDURE — 87635 SARS-COV-2 COVID-19 AMP PRB: CPT

## 2022-11-02 RX ORDER — DRONABINOL 10 MG/1
10 CAPSULE ORAL NIGHTLY
Qty: 30 CAPSULE | Refills: 0 | Status: SHIPPED | OUTPATIENT
Start: 2022-11-02 | End: 2022-12-02

## 2022-11-02 RX ORDER — INSULIN LISPRO 100 [IU]/ML
4 INJECTION, SOLUTION INTRAVENOUS; SUBCUTANEOUS
Qty: 2 EACH | Refills: 0 | Status: SHIPPED | OUTPATIENT
Start: 2022-11-02

## 2022-11-02 RX ORDER — INSULIN GLARGINE 100 [IU]/ML
2 INJECTION, SOLUTION SUBCUTANEOUS NIGHTLY
Qty: 5 ADJUSTABLE DOSE PRE-FILLED PEN SYRINGE | Refills: 5 | Status: SHIPPED | OUTPATIENT
Start: 2022-11-02

## 2022-11-02 RX ORDER — TRAMADOL HYDROCHLORIDE 50 MG/1
50 TABLET ORAL EVERY 8 HOURS PRN
Qty: 9 TABLET | Refills: 0 | Status: SHIPPED | OUTPATIENT
Start: 2022-11-02 | End: 2022-11-05

## 2022-11-02 RX ADMIN — INSULIN LISPRO 2 UNITS: 100 INJECTION, SOLUTION INTRAVENOUS; SUBCUTANEOUS at 12:36

## 2022-11-02 RX ADMIN — POLYETHYLENE GLYCOL 3350 17 G: 17 POWDER, FOR SOLUTION ORAL at 08:05

## 2022-11-02 RX ADMIN — PANTOPRAZOLE SODIUM 40 MG: 40 TABLET, DELAYED RELEASE ORAL at 08:05

## 2022-11-02 RX ADMIN — SENNOSIDES AND DOCUSATE SODIUM 2 TABLET: 8.6; 5 TABLET ORAL at 08:05

## 2022-11-02 RX ADMIN — FERROUS SULFATE TAB 325 MG (65 MG ELEMENTAL FE) 325 MG: 325 (65 FE) TAB at 08:05

## 2022-11-02 RX ADMIN — BUPROPION HYDROCHLORIDE 150 MG: 150 TABLET, EXTENDED RELEASE ORAL at 08:05

## 2022-11-02 RX ADMIN — TAMSULOSIN HYDROCHLORIDE 0.4 MG: 0.4 CAPSULE ORAL at 08:05

## 2022-11-02 RX ADMIN — LISINOPRIL 5 MG: 5 TABLET ORAL at 08:05

## 2022-11-02 RX ADMIN — INSULIN LISPRO 4 UNITS: 100 INJECTION, SOLUTION INTRAVENOUS; SUBCUTANEOUS at 12:36

## 2022-11-02 RX ADMIN — TRAMADOL HYDROCHLORIDE 50 MG: 50 TABLET, COATED ORAL at 05:28

## 2022-11-02 RX ADMIN — METOPROLOL TARTRATE 12.5 MG: 25 TABLET, FILM COATED ORAL at 08:05

## 2022-11-02 ASSESSMENT — PAIN DESCRIPTION - PAIN TYPE: TYPE: ACUTE PAIN

## 2022-11-02 ASSESSMENT — PAIN DESCRIPTION - LOCATION: LOCATION: ARM

## 2022-11-02 ASSESSMENT — PAIN DESCRIPTION - ORIENTATION: ORIENTATION: RIGHT

## 2022-11-02 ASSESSMENT — PAIN DESCRIPTION - DESCRIPTORS: DESCRIPTORS: ACHING;DISCOMFORT

## 2022-11-02 ASSESSMENT — PAIN SCALES - GENERAL
PAINLEVEL_OUTOF10: 0
PAINLEVEL_OUTOF10: 0
PAINLEVEL_OUTOF10: 6

## 2022-11-02 ASSESSMENT — PAIN - FUNCTIONAL ASSESSMENT: PAIN_FUNCTIONAL_ASSESSMENT: ACTIVITIES ARE NOT PREVENTED

## 2022-11-02 ASSESSMENT — PAIN DESCRIPTION - ONSET: ONSET: GRADUAL

## 2022-11-02 ASSESSMENT — PAIN DESCRIPTION - FREQUENCY: FREQUENCY: INTERMITTENT

## 2022-11-02 NOTE — PROGRESS NOTES
ACUTE OCCUPATIONAL THERAPY GOALS:   (Developed with and agreed upon by patient and/or caregiver.)  1. Patient will complete lower body bathing and dressing with MOD I and adaptive equipment as needed. 2.Patient will complete upper body bathing and dressing with MOD I and adaptive equipment as needed. 3. Patient will complete toileting with MOD I.   4. Patient will tolerate 30 minutes of OT treatment with 1-2 rest breaks to increase activity tolerance for ADLs. 5. Patient will complete functional transfers with MOD I and adaptive equipment as needed. 6. Patient will complete simple grooming task standing at the sink with MOD I. Timeframe: 7 visits     OCCUPATIONAL THERAPY: Daily Note  AM  OT Visit Days: 3   Time  OT Charge Capture  Rehab Caseload Tracker  OT Orders    Donald Salamanca is a [de-identified] y.o. male   PRIMARY DIAGNOSIS: Pancreatitis, unspecified pancreatitis type  Dehydration [E86.0]  Hyperglycemia [R73.9]  Acute kidney injury ([facility] [address] [N17.9]  Pancreatitis, unspecified pancreatitis type [K85.90]  Idiopathic acute pancreatitis without infection or necrosis [K85.00]       Inpatient: Payor: [facility] MEDICARE / Plan: Bhavik Cordero / Product Type: *No Product type* /     ASSESSMENT:     REHAB RECOMMENDATIONS: CURRENT LEVEL OF FUNCTION:  (Most Recently Demonstrated)   Recommendation to date pending progress:  Setting:  Short-term Rehab    Equipment:    To Be Determined Bathing:  Not Tested  Dressing:  Not Tested  Feeding/Grooming:  Not Tested  Toileting:  Stand by Assist  Functional Mobility:  Minimal Assist     ASSESSMENT:  Mr. Jayleen Larson is doing fair today. Pt presents supine upon arrival. Pt required SBA for bed mobility today. Pt demonstrates fair sitting balance at EOB. Pt was able to stand with min A and enter bathroom to perform toileting. Pt required SBA to complete bowel hygiene. Pt returned to chair and instructed in a few UE exercises and educated on energy conservation.  Pt tolerated well and demonstrated understanding. Pt making progress with goal. Pt is to be discharged to SNF today.        SUBJECTIVE:     Mr. Gerard Hooks states, \"I don't be doing much at home\"     Social/Functional Lives With: Alone  Type of Home: Mobile home  Home Layout: One level  Bathroom Equipment: Shower chair  Home Equipment: Pamela Zurita, gopal, Rosamaria Rader, [address] Help From: Family  ADL Assistance: Independent  Homemaking Assistance: Independent  Ambulation Assistance: Needs assistance  Transfer Assistance: Independent  Active : No  Mode of Transportation: Family  Occupation: Retired    OBJECTIVE:     Daniela Hughs / Florencia Cough / Loreda Martinez: NA    RESTRICTIONS/PRECAUTIONS:  Restrictions/Precautions  Restrictions/Precautions: Fall Risk        PAIN: Cori Revering / O2:   Pre Treatment:            Post Treatment: no change  Vitals          Oxygen        MOBILITY: I Mod I S SBA CGA Min Mod Max Total  NT x2 Comments:   Bed Mobility    Rolling [] [] [] [] [] [] [] [] [] [x] []    Supine to Sit [] [] [] [x] [] [] [] [] [] [] []    Scooting [] [] [] [] [] [] [] [] [] [x] []    Sit to Supine [] [] [] [] [] [] [] [] [] [x] []    Transfers    Sit to Stand [] [] [] [] [] [x] [] [] [] [] []    Bed to Chair [] [] [] [] [] [x] [] [] [] [] []    Stand to Sit [] [] [] [] [] [x] [] [] [] [] []    Tub/Shower [] [] [] [] [] [] [] [] [] [x] []     Toilet [] [] [] [] [x] [] [] [] [] [] []      [] [] [] [] [] [] [] [] [] [] []    I=Independent, Mod I=Modified Independent, S=Supervision/Setup, SBA=Standby Assistance, CGA=Contact Guard Assistance, Min=Minimal Assistance, Mod=Moderate Assistance, Max=Maximal Assistance, Total=Total Assistance, NT=Not Tested    ACTIVITIES OF DAILY LIVING: I Mod I S SBA CGA Min Mod Max Total NT Comments   BASIC ADLs:              Upper Body   Bathing [] [] [] [] [] [] [] [] [] [x]    Lower Body Bathing [] [] [] [] [] [] [] [] [] [x]    Toileting [] [] [] [x] [] [] [] [] [] []    Upper Body Dressing [] [] [] [] [] [] [] [] [] [x] Lower Body Dressing [] [] [] [] [] [] [] [] [] [x]    Feeding [] [] [] [] [] [] [] [] [] [x]    Grooming [] [] [] [] [] [] [] [] [] [x]    Personal Device Care [] [] [] [] [] [] [] [] [] [x]    Functional Mobility [] [] [] [] [] [x] [] [] [] []    I=Independent, Mod I=Modified Independent, S=Supervision/Setup, SBA=Standby Assistance, CGA=Contact Guard Assistance, Min=Minimal Assistance, Mod=Moderate Assistance, Max=Maximal Assistance, Total=Total Assistance, NT=Not Tested    BALANCE: Good Fair+ Fair Fair- Poor NT Comments   Sitting Static [x] [] [] [] [] []    Sitting Dynamic [] [x] [] [] [] []              Standing Static [] [x] [] [] [] []    Standing Dynamic [] [] [x] [] [] []        PLAN:     FREQUENCY/DURATION   OT Plan of Care: 3 times/week for duration of hospital stay or until stated goals are met, whichever comes first.    TREATMENT:     TREATMENT:   Therapeutic Activity (10 Minutes): Therapeutic activity included Supine to Sit, Transfer Training, Ambulation on level ground, Sitting balance , and Standing balance to improve functional Activity tolerance, Balance, Mobility, and Strength. Self Care (15 minutes): Patient participated in toileting ADLs in supported sitting and standing with minimal manual and tactile cueing to increase independence and decrease assistance required. Patient also participated in functional transfer, energy conservation, and adaptive equipment training to decrease assistance required, increase activity tolerance, and increase safety awareness.      TREATMENT GRID:  N/A    AFTER TREATMENT PRECAUTIONS: Call light within reach, Chair, Needs within reach, and RN notified    INTERDISCIPLINARY COLLABORATION:  RN/ PCT and OT/ SILVA    EDUCATION:       TOTAL TREATMENT DURATION AND TIME:  Time In: 9323  Time Out: 6206  Minutes: 25    Rico Jasso

## 2022-11-02 NOTE — CARE COORDINATION
CM received a call from [employer] company inquiring about his baseline and current function with ADLs, ambulation, and transfers. CM explained what the therapy notes say, even though insurance has these notes, and what CM observed as pt needed assistance transferring from the chair to the bed. The insurance rep stated that auth should be completed today. CM sent Young Lomeli NP a message via [employer] inquiring if pt is stable for d/c once insurance Rachel Watts is received and he stated that pt is stable. CM will continue to follow.

## 2022-11-02 NOTE — PROGRESS NOTES
Physician Progress Note      PATIENT:               Silvana Vieira  [facility] #:                  [id number]  :                       1940  ADMIT DATE:       10/28/2022 9:00 AM  [address] DATE:        2022 3:02 PM  RESPONDING  PROVIDER #:        Elizabeth Partida NP          QUERY TEXT:    Pt admitted with pancreatitis. Pt noted to have elevated creatinine. If   possible, please document in the progress notes and discharge summary if you   are evaluating and/or treating any of the following: The medical record reflects the following:  Risk Factors: [de-identified] y.o. male with medical history of diabetes, hypertension,   history of lung cancer mild dementia with anxiety who presented with pain x4   days. Clinical Indicators: Creatinine: 1.8, 1.3, .80, .70  Treatment: IVF's, serial labs    Defined by Kidney Disease Improving Global Outcomes (KDIGO) clinical practice   guideline for acute kidney injury:  -Increase in SCr by greater than or equal to 0.3 mg/dl within 48 hours; or  -Increase or decrease in SCr to greater than or equal to 1.5 times baseline,   which is known or presumed to have occurred within the prior 7 days; or  -Urine volume < 0.5ml/kg/h for 6 hours    Thank you,  Patricia Jamison RN, BSN, CDI  Corry [email or web address]  . Options provided:  -- Acute kidney failure  -- Acute kidney failure with acute tubular necrosis  -- Acute kidney injury  -- Other - I will add my own diagnosis  -- Disagree - Not applicable / Not valid  -- Disagree - Clinically unable to determine / Unknown  -- Refer to Clinical Documentation Reviewer    PROVIDER RESPONSE TEXT:    This patient is in Acute kidney failure.     Query created by: Maren Rubi on 2022 1:21 PM      Electronically signed by:  Elizabeth Partida NP 2022 3:53 PM

## 2022-11-02 NOTE — CARE COORDINATION
Pt to d/c today to [facility] for [employer]. Room: 313. Report: [id number]. Transport scheduled via [employer] for 1500. Pt and son Kalina Shukla updated on d/c info. No other supportive care needs identified. Pt and son agree with d/c plan. Milestones met. 10/28/22 2123   Service Assessment   Patient Orientation Alert and Oriented;Person;Place; Self   Cognition Alert   History Provided By Patient;Medical Record; Child/Family   Primary Caregiver Self   Support Systems Home Care Staff;Children  (Pt is [facility] with [facility]: SN, PT, OT)   Patient's Healthcare Decision Maker is: Legal Next of Kin   PCP Verified by CM Yes  Lucille Simpson MD)   Last Visit to PCP Within last 3 months   Prior Functional Level Assistance with the following:;Mobility   Current Functional Level Assistance with the following:;Mobility   Can patient return to prior living arrangement Yes   Ability to make needs known: Good   Family able to assist with home care needs: Yes   Would you like for me to discuss the discharge plan with any other family members/significant others, and if so, who? No   Financial Resources Medicare [facility])   Social/Functional History   Lives With Alone   Type of Home Mobile home   Home Layout One level   [address]  ([place])   [address] [facility]   Ambulation Assistance Needs assistance   Transfer Assistance Independent   Active  No   Mode of Transportation Family   Occupation Retired   Discharge Planning   Type of Residence Trailer/Mobile [address] [facility] Prior To Admission Meals On Wheels;Durable [address]  (Pt is current with [facility]: SN, PT, OT)   Current DME Prior to [employer]; Shower Chair; Other (Comment)  ([employer])   Potential [address]  (Resume [facility])   DME Ordered? No   Potential Assistance Purchasing Medications No   Type of Home Care Services Meals on Wheels;Nursing Services;OT;PT   Patient expects to be discharged to: [facility])   Follow Up Appointment: Best Day/Time  Thursday AM   One/Two Story Residence One story   History of falls? 1   Services At/After Discharge   Transition of Care Consult (CM Consult) Discharge Planning;SNF  [facility])   Partner SNF Yes   Services At/After Discharge Transport; In ambulance;Skilled [address] (SNF)   [facility] Information Provided? No   Mode of Transport at Discharge BLS  ([facility])   Confirm Follow Up Transport Other (see comment)  [facility])   Condition of Participation: Discharge Planning   The Plan for Transition of Care is related to the following treatment goals: Pt requires STR to return to functional baseline in the community. The Patient and/or Patient Representative was provided with a Choice of Provider? Patient;Patient Representative   Name of the Patient Representative who was provided with the Choice of Provider and agrees with the Discharge Plan? Ibrahim and Filipe Jennifer (pt and son)   The Patient and/Or Patient Representative agree with the Discharge Plan? Yes   Freedom of Choice list was provided with basic dialogue that supports the patient's individualized plan of care/goals, treatment preferences, and shares the quality data associated with the providers?   Yes

## 2022-11-02 NOTE — DIABETES MGMT
Patient admitted with pancreatitis. Blood glucose ranged 205-263 yesterday with patient receiving Humalog 24 units. Blood glucose this morning was 191. Reviewed patient current regimen: Humalog 2 units with meals and Humalog correctional insulin. Patient has diet with 4 carb choices ordered. Patient would likely benefit from an increase in prandial insulin to help offset hyperglycemia during the day related to caloric intake. Provider updated via [employer] regarding recommendations and patient glycemic control.

## 2022-11-02 NOTE — DISCHARGE SUMMARY
Hospitalist Discharge Summary   Admit Date:  10/28/2022  9:00 AM   DC Note date: 2022  Name:  Jordan Harrison   Age:  [de-identified] y.o. Sex:  male  :  1940   MRN:  [id number]   Room:  [facility]  PCP:  Ellen Baumann MD    Presenting Complaint: Abdominal Pain     Initial Admission Diagnosis: Dehydration [E86.0]  Hyperglycemia [R73.9]  Acute kidney injury ([address] [N17.9]  Pancreatitis, unspecified pancreatitis type [K85.90]  Idiopathic acute pancreatitis without infection or necrosis [K85.00]     Problem List for this Hospitalization (present on admission):    Principal Problem:    Pancreatitis, unspecified pancreatitis type  Active Problems:    Constipation    HTN (hypertension)    Malignant neoplasm of upper lobe of left lung ([address]    Hyperlipidemia    Type 2 diabetes mellitus with hyperglycemia, with long-term current use of insulin ([facility]    Anxiety disorder  Resolved Problems:    * No resolved hospital problems. *      Hospital Course:  Jordan Harrison is an [de-identified] y.o. AAM with a PMH of diabetes mellitus, hypertension, lung cancer, mild dementia with anxiety who presented with pain x4 days. He c/o nausea without any vomiting. He reports that pain started suddenly without any triggers. Located in epigastric region with radiation to the right side and sometimes to the back. Reports chills without any fevers. No chest pain or shortness of breath. Reporting having constipation due to not being able to walk outside as he normally would. In the ER, patient is hemodynamically stable. Laboratory work-up is notable for glucose of 197, hemoglobin of 12.9, lipase of 1942. CT A/P with mild peripancreatic stranding about the head and neck without fluid collection or ductal dilatation. Also noted to have moderate to large colonic stool burden. The patient was admitted to the Hospitalist service. He was placed on NPO status and given copious IVF. The patient's pain improved and he was started on clear liquids. He tolerated this well and has continued to tolerate further diet advancement. His lipase level normalized. His constipation has also resolved since being admitted. Diabetes educator was consulted during this admission to discuss outpatient management and provide daily insulin recommendations. He was found to have some weakness thus PT/OT were consulted. They recommended that Mr. Fareed Arellano discharge to a short-term rehab facility. He and his family are in agreement. Mr. Fareed Arellano is now appropriate for discharge on Nov/02/2022. A&P  Acute pancreatitis  Lipase 1,942. CT noted. - IVF  - Advance to GI soft on Nov/01  - Tolerating diet advancement  - pain control  - Repeat lipase 153     Hypokalemia  Hypomagnesemia  - Resolved     Sinus tach vs atrial flutter, resolved  - continue metoprolol      Constipation, resolved     T2DM  - hold metformin while inpatient  - Diabetic educator consulted, rec resuming Lantus 2 units at discharge and lispro 4 units pre-meals TID  - correction scale lispro while inpatient     Dementia  - Aricept     Depression  - Wellbutrin      Debility  - PT/OT consulted and recommend STR      Disposition: Short-term rehab  Diet: ADULT DIET; Easy to Chew; 4 carb choices (60 gm/meal)  Code Status: Full Code    Follow Ups:   Contact information for follow-up providers     Annabelle Miranda MD Follow up in 2 week(s). Specialty: Internal Medicine  Why: post discharge check up and medication review  Contact information:  [facility]  [phone]                   Contact information for after-discharge care     Discharge [address] . Service: Inpatient Rehabilitation  Contact information:  [facility] [address]  [address] [facility] ([place], [place])  [phone]                           Time spent in patient discharge and coordination 37 minutes. Plan was discussed with the patient. All questions answered.   Patient was stable at time of discharge. Instructions given to call a physician or return if any concerns. Current Discharge Medication List        START taking these medications    Details   insulin lispro (HUMALOG) 100 UNIT/ML SOLN injection vial Inject 4 Units into the skin 3 times daily (before meals)  Qty: 2 each, Refills: 0           CONTINUE these medications which have CHANGED    Details   insulin glargine (LANTUS SOLOSTAR) 100 UNIT/ML injection pen Inject 2 Units into the skin nightly  Qty: 5 Adjustable Dose Pre-filled Pen Syringe, Refills: 5    Associated Diagnoses: Type 2 diabetes mellitus with hyperglycemia, with long-term current use of insulin ([facility])      metoprolol tartrate (LOPRESSOR) 25 MG tablet Take 0.5 tablets by mouth 2 times daily  Qty: 30 tablet, Refills: 0           CONTINUE these medications which have NOT CHANGED    Details   traMADol (ULTRAM) 50 MG tablet Take 1 tablet by mouth every 6 hours as needed. tamsulosin (FLOMAX) 0.4 MG capsule Take 1 capsule by mouth daily      pantoprazole (PROTONIX) 40 MG tablet Take 1 tablet by mouth daily      predniSONE (DELTASONE) 20 MG tablet TAKE 1/2 TABLET BY MOUTH EVERY DAY  Qty: 30 tablet, Refills: 0    Associated Diagnoses: Pulmonary emphysema, unspecified emphysema type ([facility])      furosemide (LASIX) 20 MG tablet Take 20 mg by mouth daily 1 tablet every day      donepezil (ARICEPT) 5 MG tablet Take 1 tablet by mouth nightly 1 at night  Qty: 90 tablet, Refills: 5    Associated Diagnoses: Mild dementia with anxiety, unspecified dementia type      Insulin Pen Needle 33G X 6 MM MISC Use daily with insulin  Qty: 200 each, Refills: 11    Comments: EZ comfort needles      blood glucose monitor strips Test 2 times a day & as needed for symptoms of irregular blood glucose. Dispense sufficient amount for indicated testing frequency plus additional to accommodate PRN testing needs.   Qty: 200 strip, Refills: 5    Comments: Strips, Metrix      albuterol (PROVENTIL) (2.5 MG/3ML) 0.083% nebulizer solution Take 3 mLs by nebulization in the morning and at bedtime  Qty: 120 each, Refills: 5      atorvastatin (LIPITOR) 10 MG tablet atorvastatin 10 mg tablet   Take 1 tablet every day by oral route at bedtime. buPROPion (WELLBUTRIN XL) 150 MG extended release tablet bupropion HCl  mg 24 hr tablet, extended release   Take 1 tablet every day by oral route. cyanocobalamin 100 MCG tablet Take 100 mcg by mouth daily      dronabinol (MARINOL) 10 MG capsule Take 10 mg by mouth at bedtime. 1 capsule po at bedtime      ferrous sulfate (IRON 325) 325 (65 Fe) MG tablet Take 325 mg by mouth in the morning and at bedtime      psyllium 100 % PACK Take 1 packet by mouth in the morning. tiotropium (SPIRIVA) 18 MCG inhalation capsule Inhale 18 mcg into the lungs in the morning. 1 capsule inhale orally one time a day.       Continuous Blood Gluc  (FREESTYLE LENORE 2 READER) ESTHER 1 Device by Does not apply route 4 times daily  Qty: 1 each, Refills: 3    Associated Diagnoses: Type 2 diabetes mellitus with hyperglycemia, with long-term current use of insulin ([facility])      Continuous Blood Gluc Sensor (FREESTYLE LENORE 2 SENSOR) MISC 1 kit by Does not apply route 4 times daily  Qty: 2 each, Refills: 3    Associated Diagnoses: Type 2 diabetes mellitus with hyperglycemia, with long-term current use of insulin ([facility])      metFORMIN (GLUCOPHAGE-XR) 500 MG extended release tablet Take 1 tablet by mouth in the morning and at bedtime  Qty: 180 tablet, Refills: 3           STOP taking these medications       LORazepam (ATIVAN) 0.5 MG tablet Comments:   Reason for Stopping:         LORazepam (ATIVAN) 1 MG tablet Comments:   Reason for Stopping:         dapagliflozin (FARXIGA) 5 MG tablet Comments:   Reason for Stopping:         ipratropium-albuterol (DUONEB) 0.5-2.5 (3) MG/3ML SOLN nebulizer solution Comments:   Reason for Stopping:         lisinopril (PRINIVIL;ZESTRIL) 5 MG tablet Comments: Reason for Stopping:               Procedures done this admission:  * No surgery found *    Consults this admission:  IP CONSULT TO DIABETES MANAGEMENT    Echocardiogram results:  No results found for this or any previous visit. Diagnostic Imaging/Tests:   CT ABDOMEN PELVIS W IV CONTRAST Additional Contrast? Oral    Result Date: 10/28/2022  1. Mild peripancreatic stranding about the head/neck without fluid collection or ductal dilatation, otherwise pancreatic parenchyma is homogeneous and attenuation. 2.  Right lower pole 1.9 cm intermediate attenuating hypodensity likely favoring a likely minimally complex cyst. 3.  Moderate to large colonic stool burden which should be correlated with clinical constipation. 4.  Emphysematous changes and subsegmental scarring of the lung bases.         Labs: Results:       BMP, Mg, Phos Recent Labs     10/31/22  0646 11/01/22  0600     --    K 3.4* 4.1     --    CO2 26  --    ANIONGAP 3  --    BUN 4*  --    CREATININE 0.70*  --    LABGLOM >60  --    CALCIUM 7.8*  --    GLUCOSE 69  --    MG 1.6* 2.0      CBC Recent Labs     10/31/22  0646   WBC 5.5   RBC 3.95*   HGB 10.9*   HCT 32.2*   MCV 81.5*   MCH 27.6   MCHC 33.9   RDW 13.3      MPV 8.7*   NRBC 0.00   SEGS 63   LYMPHOPCT 26   EOSRELPCT 0*   MONOPCT 11   BASOPCT 0   IMMGRAN 0   SEGSABS 3.4   LYMPHSABS 1.4   EOSABS 0.0   MONOSABS 0.6   BASOSABS 0.0   ABSIMMGRAN 0.0      LFT Recent Labs     10/31/22  0646   BILITOT 0.3   ALKPHOS 146*   AST 22   ALT 14   PROT 5.0*   LABALBU 2.1*   GLOB 2.9      Cardiac  Lab Results   Component Value Date/Time    TROPHS 11.4 05/02/2022 12:39 PM    TROPHS 11.7 05/02/2022 10:30 AM      Coags Lab Results   Component Value Date/Time    PROTIME 13.1 02/25/2020 12:17 PM    PROTIME 13.4 01/08/2020 11:31 AM    PROTIME 14.7 01/01/2020 09:13 AM    INR 1.0 02/25/2020 12:17 PM    INR 1.0 01/08/2020 11:31 AM    INR 1.1 01/01/2020 09:13 AM    APTT 30.1 01/01/2020 09:13 AM      A1c Lab Results   Component Value Date/Time    LABA1C 10.5 07/15/2022 08:18 AM    LABA1C 9.1 02/27/2020 03:10 AM    LABA1C 10.3 01/01/2020 09:13 AM     07/15/2022 08:18 AM     02/27/2020 03:10 AM     01/01/2020 09:13 AM      Lipids Lab Results   Component Value Date/Time    CHOL 146 08/26/2020 11:36 AM    LDLCALC 56 08/26/2020 11:36 AM    LABVLDL 11 08/26/2020 11:36 AM    HDL 79 08/26/2020 11:36 AM    TRIG 55 08/26/2020 11:36 AM      Thyroid  Lab Results   Component Value Date/Time    BHE1JRY 1.060 07/15/2022 08:18 AM        Most Recent UA Lab Results   Component Value Date/Time    COLORU YELLOW 02/25/2020 12:17 PM    SPECGRAV 1.005 02/25/2020 12:17 PM    PROTEINU TRACE 02/25/2020 12:17 PM    GLUCOSEU >1,000 10/28/2022 04:27 AM    GLUCOSEU 500 02/25/2020 12:17 PM    KETUA NEGATIVE 02/25/2020 12:17 PM    BILIRUBINUR NEGATIVE 02/25/2020 12:17 PM    BLOODU Negative 10/28/2022 04:27 AM    BLOODU NEGATIVE 02/25/2020 12:17 PM    NITRU NEGATIVE 02/25/2020 12:17 PM    LEUKOCYTESUR NEGATIVE 02/25/2020 12:17 PM    WBCUA 0-3 02/25/2020 12:17 PM    RBCUA 0-3 02/25/2020 12:17 PM    BACTERIA 0 02/25/2020 12:17 PM        No results for input(s): CULTURE in the last 720 hours. All Labs from Last 24 Hrs:  Recent Results (from the past 24 hour(s))   POCT Glucose    Collection Time: 11/01/22 11:46 AM   Result Value Ref Range    POC Glucose 253 (H) 65 - 100 mg/dL    Performed by: Merlin Shelling.     POCT Glucose    Collection Time: 11/01/22  4:18 PM   Result Value Ref Range    POC Glucose 263 (H) 65 - 100 mg/dL    Performed by: Esperanza    POCT Glucose    Collection Time: 11/01/22  8:49 PM   Result Value Ref Range    POC Glucose 232 (H) 65 - 100 mg/dL    Performed by: Deysi Cline    POCT Glucose    Collection Time: 11/02/22  7:44 AM   Result Value Ref Range    POC Glucose 191 (H) 65 - 100 mg/dL    Performed by: Ramez        No Known Allergies  Immunization History   Administered Date(s) Administered COVID-19, AMANDEEPA SHELDON hannon, Primary or Immunocompromised, (age 12y+), IM, 100 mcg/0.5mL 04/21/2021, 05/21/2021    COVID-19, MODERNA Booster BLUE border, (age 18y+), IM, 50mcg/0.25mL 01/04/2022    Influenza Virus Vaccine 09/27/2019    PPD Test 01/24/2020, 02/27/2020, 10/30/2022       Recent Vital Data:  Patient Vitals for the past 24 hrs:   Temp Pulse Resp BP SpO2   11/02/22 0810 98.9 °F (37.2 °C) (!) 106 18 117/60 95 %   11/02/22 0558 -- -- 17 -- --   11/02/22 0528 -- -- 18 -- --   11/02/22 0251 98.5 °F (36.9 °C) 91 18 113/67 96 %   11/01/22 2345 -- -- -- (!) 109/52 --   11/01/22 2332 98.4 °F (36.9 °C) 67 18 (!) 108/48 96 %   11/01/22 2331 98.4 °F (36.9 °C) 96 -- (!) 108/48 --   11/01/22 1934 98.5 °F (36.9 °C) 91 18 (!) 103/56 95 %   11/01/22 1545 98 °F (36.7 °C) 86 -- 106/61 98 %   11/01/22 1115 -- -- -- (!) 112/58 --   11/01/22 1102 98.1 °F (36.7 °C) (!) 107 18 (!) 96/55 95 %       Oxygen Therapy  SpO2: 95 %  O2 Device: None (Room air)    Estimated body mass index is 17.76 kg/m² as calculated from the following:    Height as of this encounter: 6' 1\" (1.854 m). Weight as of this encounter: 134 lb 10 oz (61.1 kg). Intake/Output Summary (Last 24 hours) at 11/2/2022 0941  Last data filed at 11/2/2022 7321  Gross per 24 hour   Intake 480 ml   Output 1650 ml   Net -1170 ml         Physical Exam:  General:    No overt distress  Head:  Normocephalic, atraumatic  Eyes:  Sclerae appear normal.  Pupils equally round. HENT:  Nares appear normal, no drainage. Moist mucous membranes  Neck:  No restricted ROM. Trachea midline  CV:   RRR. No m/r/g. Lungs: No wheezing. Respirations even, unlabored  Abdomen:   Soft, nontender, nondistended. Extremities: Warm and dry. No cyanosis or clubbing. Skin:     No rashes. Normal coloration  Neuro:  CN II-XII grossly intact. Psych:  Normal mood and affect.     Signed:  AMARILYS Cuevas CNP    Part of this note may have been written by using a voice dictation software. The note has been proof read but may still contain some grammatical/other typographical errors.

## 2022-11-03 ENCOUNTER — CARE COORDINATION (OUTPATIENT)
Dept: OTHER | Facility: CLINIC | Age: 82
End: 2022-11-03

## 2022-11-03 NOTE — CARE COORDINATION
Transition of care outreach postponed due to patient's discharge to SNF. Patient is discharge to [facility] for [employer]. CTN to follow up after SNF discharge for HIEN gale.

## 2022-11-16 ENCOUNTER — CARE COORDINATION (OUTPATIENT)
Dept: OTHER | Facility: CLINIC | Age: 82
End: 2022-11-16

## 2022-11-21 ENCOUNTER — CARE COORDINATION (OUTPATIENT)
Dept: OTHER | Facility: CLINIC | Age: 82
End: 2022-11-21

## 2022-11-21 NOTE — CARE COORDINATION
Patient remains IP at [facility] per [place]. Plans for patient to return to [facility] for [address] [place] and possibly LTC [facility]. CTN will continue to monitor chart for jitendra pending discharge disposition.

## 2022-12-05 ENCOUNTER — CARE COORDINATION (OUTPATIENT)
Dept: OTHER | Facility: CLINIC | Age: 82
End: 2022-12-05

## 2022-12-05 NOTE — CARE COORDINATION
Patient remains IP at [facility] per [facility]. Plans for patient to return to [facility] for [address] [employer] and possibly LTC per notes in [facility].

## 2023-06-23 NOTE — PROGRESS NOTES
Compression hose applied bilaterally, tolerated well Island Pedicle Flap-Requiring Vessel Identification Text: The defect edges were debeveled with a #15 scalpel blade.  Given the location of the defect, shape of the defect and the proximity to free margins an island pedicle advancement flap was deemed most appropriate.  Using a sterile surgical marker, an appropriate advancement flap was drawn, based on the axial vessel mentioned above, incorporating the defect, outlining the appropriate donor tissue and placing the expected incisions within the relaxed skin tension lines where possible.    The area thus outlined was incised deep to adipose tissue with a #15 scalpel blade.  The skin margins were undermined to an appropriate distance in all directions around the primary defect and laterally outward around the island pedicle utilizing iris scissors.  There was minimal undermining beneath the pedicle flap.

## 2024-09-09 NOTE — PLAN OF CARE
Problem: Pain  Goal: Verbalizes/displays adequate comfort level or baseline comfort level  Outcome: Progressing     Problem: ABCDS Injury Assessment  Goal: Absence of physical injury  Outcome: Progressing     Problem: Safety - Adult  Goal: Free from fall injury  Outcome: Progressing
No

## 2024-12-13 NOTE — TELEPHONE ENCOUNTER
----- Message from Fish Askew MD sent at 7/18/2022 11:36 AM EDT -----  Despite having the low BS readings recently your diabetes is not controlled. We will work over the next few weeks at adjusting your medication to help decrease you your blood sugar without causing hypoglycemia. Thyroid is okay. He is anemic. See if fabián can add on iron, b12 folate.   Have him do hem cards  Fish Askew MD Patient called to request medication refill.    Last appointment with our providers: 08/14/2024   Next appointment with our providers: 01/22/2025   Name of Medication: famotidine (Pepcid) 40 mg tablet      Pharmacy:  [id number] - [place], [place] - [address] [facility] [address] [address]  [address], [facility] [address]  Phone: [phone]  Fax: [phone]

## (undated) DEVICE — 2000CC GUARDIAN II: Brand: GUARDIAN

## (undated) DEVICE — ADAPTER BRONCHSCP FOR USE W/ OLY EDGE

## (undated) DEVICE — STAPLER INT L34CM 60MM LNG ENDOSCP ARTC PWR + ECHELON FLX

## (undated) DEVICE — GARMENT,MEDLINE,DVT,INT,CALF,MED, GEN2: Brand: MEDLINE

## (undated) DEVICE — SINGLE USE SUCTION VALVE MAJ-209: Brand: SINGLE USE SUCTION VALVE (STERILE)

## (undated) DEVICE — DRAPE TWL SURG 16X26IN BLU ORB04] ALLCARE INC]

## (undated) DEVICE — SUTURE PERMAHAND SZ 3-0 L30IN NONABSORBABLE BLK L26MM SH C017D

## (undated) DEVICE — TUBING, SUCTION, 1/4" X 10', STRAIGHT: Brand: MEDLINE

## (undated) DEVICE — BRONCHOSCOPY PACK: Brand: MEDLINE INDUSTRIES, INC.

## (undated) DEVICE — MOUTHPIECE ENDOSCP 20X27MM --

## (undated) DEVICE — SPONGE: SPECIALTY PEANUT XR 100/CS: Brand: MEDICAL ACTION INDUSTRIES

## (undated) DEVICE — 3M™ IOBAN™ 2 ANTIMICROBIAL INCISE DRAPE 6650EZ: Brand: IOBAN™ 2

## (undated) DEVICE — [HIGH FLOW INSUFFLATOR,  DO NOT USE IF PACKAGE IS DAMAGED,  KEEP DRY,  KEEP AWAY FROM SUNLIGHT,  PROTECT FROM HEAT AND RADIOACTIVE SOURCES.]: Brand: PNEUMOSURE

## (undated) DEVICE — SOL INJ SOD CL0.9% 10ML PREFIL --

## (undated) DEVICE — APPLIER LIG CLP M L11IN TI STR RNG HNDL FOR 20 CLP DISP

## (undated) DEVICE — UNIVERSAL DRAPES: Brand: MEDLINE INDUSTRIES, INC.

## (undated) DEVICE — NEEDLE HYPO 25GA L1.5IN BLU POLYPR HUB S STL REG BVL STR

## (undated) DEVICE — STAPLER SKIN L320MM 35MM VASC TISS 12 FIRING B FRM PWR

## (undated) DEVICE — KIT SEALNT PLEURAL PRGL 4ML --

## (undated) DEVICE — PROBE CRYO CRYOSPHERE 11IN -- ARTRICURE

## (undated) DEVICE — SINGLE USE ASPIRATION NEEDLE: Brand: SINGLE USE ASPIRATION NEEDLE

## (undated) DEVICE — PROTECTOR CUSHION ULNAR NERVE --

## (undated) DEVICE — RELOAD STPL H1-2.5XL35MM VASC THN TISS WHT B FRM NAT ARTC

## (undated) DEVICE — GOWN,SIRUS,NONRNF,SETINSLV,XL,20/CS: Brand: MEDLINE

## (undated) DEVICE — 2, DISPOSABLE SUCTION/IRRIGATOR WITHOUT DISPOSABLE TIP: Brand: STRYKEFLOW

## (undated) DEVICE — CARDINAL HEALTH FLEXIBLE LIGHT HANDLE COVER: Brand: CARDINAL HEALTH

## (undated) DEVICE — PATCH SENS PT FOR ELECTROMAGNETIC NAVIGATION BRONCHSCP SYS

## (undated) DEVICE — BUTTON SWITCH PENCIL BLADE ELECTRODE, HOLSTER: Brand: EDGE

## (undated) DEVICE — RELOAD STPL L60MM H1.5-3.6MM REG TISS BLU GRIPPING SURF B

## (undated) DEVICE — TROCAR: Brand: KII® SLEEVE

## (undated) DEVICE — SET EXTN L6IN W/ S STL CLMP

## (undated) DEVICE — COVER,LIGHT HANDLE,FLX,2/PK: Brand: MEDLINE INDUSTRIES, INC.

## (undated) DEVICE — BLADE ELECTRODE: Brand: EDGE

## (undated) DEVICE — DRESSING PETRO GZ 7.2X0.5 IN WND IMPREG OVERWRAP CURAD DISP

## (undated) DEVICE — CRADLE POS 3X5X24IN RASPBERRY ARM PRONE FOAM DISP

## (undated) DEVICE — Device

## (undated) DEVICE — FORCEPS BX WRK L110MM CHN L2MM DIA1.7MM SUPERDIMENSION

## (undated) DEVICE — GOWN,BREATHABLE SLV,AURORA,LG,STRL: Brand: MEDLINE

## (undated) DEVICE — VINYL URETHRAL CATHETER: Brand: DOVER

## (undated) DEVICE — VISUALIZATION SYSTEM: Brand: CLEARIFY

## (undated) DEVICE — GLOVE SURG SZ 6.5 L11.2IN THK8.6MIL LT BRN LTX FREE

## (undated) DEVICE — SPONGE LAP 18X18IN STRL -- 5/PK

## (undated) DEVICE — SUTURE VCRL SZ 0 L36IN ABSRB UD L36MM CT-1 1/2 CIR J946H

## (undated) DEVICE — TRAY CATH 16F URIN MTR LTX -- CONVERT TO ITEM 363111

## (undated) DEVICE — CATHETER THOR 32FR L23IN PVC 6 EYELET STR ATRAUM

## (undated) DEVICE — KENDALL RADIOLUCENT FOAM MONITORING ELECTRODE RECTANGULAR SHAPE: Brand: KENDALL

## (undated) DEVICE — SUTURE PERMAHAND SZ 0 L30IN NONABSORBABLE BLK L30MM PSL 3/8 590H

## (undated) DEVICE — REM POLYHESIVE ADULT PATIENT RETURN ELECTRODE: Brand: VALLEYLAB

## (undated) DEVICE — Device: Brand: BALLOON

## (undated) DEVICE — SUTURE VCRL SZ 2 L54IN ABSRB UD L65MM TP-1 1/2 CIR J880T

## (undated) DEVICE — YANKAUER,BULB TIP,W/O VENT,RIGID,STERILE: Brand: MEDLINE

## (undated) DEVICE — LAPAROSCOPIC TROCAR SLEEVE/SINGLE USE: Brand: KII® OPTICAL ACCESS SYSTEM

## (undated) DEVICE — SYRINGE IRRIG 60ML SFT PLIABLE BLB EZ TO GRP 1 HND USE W/

## (undated) DEVICE — SOLUTION IRRIG 1000ML H2O STRL BLT

## (undated) DEVICE — BRUSH CYTO L115CM DIA1.9MM 3 NDL PULM USE SUPERDIMENSION

## (undated) DEVICE — GARMENT,MEDLINE,DVT,INT,CALF,LG, GEN2: Brand: MEDLINE

## (undated) DEVICE — INTENDED FOR TISSUE SEPARATION, AND OTHER PROCEDURES THAT REQUIRE A SHARP SURGICAL BLADE TO PUNCTURE OR CUT.: Brand: BARD-PARKER SAFETY BLADES SIZE 15, STERILE

## (undated) DEVICE — CONTAINER,SPECIMEN,O.R.STRL,4.5OZ: Brand: MEDLINE

## (undated) DEVICE — CATHETER THOR 32FR L23IN PVC 5 EYELET STR ATRAUM

## (undated) DEVICE — THORACOSCOPY: Brand: MEDLINE INDUSTRIES, INC.

## (undated) DEVICE — AMD ANTIMICROBIAL GAUZE SPONGES,12 PLY USP TYPE VII, 0.2% POLYHEXAMETHYLENE BIGUANIDE HCI (PHMB): Brand: CURITY

## (undated) DEVICE — RELOAD STPL L60MM H2.3-4.2MM VERY THCK TISS BLK 6 ROW

## (undated) DEVICE — FIRM TIP; ENDOBRONCHIAL PROCEDURE KIT;MEDIAL CATHETER: Brand: EDGE

## (undated) DEVICE — (D)SYR 10ML 1/5ML GRAD NSAF -- PKGING CHANGE USE ITEM 338027